# Patient Record
Sex: MALE | Race: ASIAN | NOT HISPANIC OR LATINO
[De-identification: names, ages, dates, MRNs, and addresses within clinical notes are randomized per-mention and may not be internally consistent; named-entity substitution may affect disease eponyms.]

---

## 2021-09-14 ENCOUNTER — TRANSCRIPTION ENCOUNTER (OUTPATIENT)
Age: 36
End: 2021-09-14

## 2021-09-14 VITALS
SYSTOLIC BLOOD PRESSURE: 158 MMHG | HEART RATE: 89 BPM | WEIGHT: 177.91 LBS | DIASTOLIC BLOOD PRESSURE: 100 MMHG | RESPIRATION RATE: 17 BRPM | OXYGEN SATURATION: 100 % | TEMPERATURE: 98 F

## 2021-09-14 PROCEDURE — 99291 CRITICAL CARE FIRST HOUR: CPT

## 2021-09-14 NOTE — ED ADULT TRIAGE NOTE - OTHER COMPLAINTS
reports b/l leg numbness and tingling for approx 1 hour. denies fall/injury. also reports sudden onset lower back pain prior to leg numbness. no bowel incontinence. pt reports working out at gym today and heavy lifting. reports b/l leg numbness and tingling for approx 1 hour. denies fall/injury. also reports sudden onset lower back pain prior to leg numbness. no bowel incontinence. pt reports working out at gym today and heavy lifting. hx HIV

## 2021-09-14 NOTE — ED ADULT NURSE NOTE - IN THE PAST 12 MONTHS HAVE YOU USED DRUGS OTHER THAN THOSE REQUIRED FOR MEDICAL REASON?
No Diverticulitis of intestine with perforation without bleeding, unspecified part of intestinal tract

## 2021-09-14 NOTE — ED ADULT NURSE NOTE - OTHER COMPLAINTS
reports b/l leg numbness and tingling for approx 1 hour. denies fall/injury. also reports sudden onset lower back pain prior to leg numbness. no bowel incontinence. pt reports working out at gym today and heavy lifting. hx HIV

## 2021-09-15 ENCOUNTER — RESULT REVIEW (OUTPATIENT)
Age: 36
End: 2021-09-15

## 2021-09-15 ENCOUNTER — INPATIENT (INPATIENT)
Facility: HOSPITAL | Age: 36
LOS: 12 days | Discharge: NURSING, REHAB OR LONG-TERM CARE | DRG: 29 | End: 2021-09-28
Attending: NEUROLOGICAL SURGERY | Admitting: NEUROLOGICAL SURGERY
Payer: MEDICAID

## 2021-09-15 DIAGNOSIS — B20 HUMAN IMMUNODEFICIENCY VIRUS [HIV] DISEASE: ICD-10-CM

## 2021-09-15 DIAGNOSIS — G95.20 UNSPECIFIED CORD COMPRESSION: ICD-10-CM

## 2021-09-15 DIAGNOSIS — S06.5X9A TRAUMATIC SUBDURAL HEMORRHAGE WITH LOSS OF CONSCIOUSNESS OF UNSPECIFIED DURATION, INITIAL ENCOUNTER: ICD-10-CM

## 2021-09-15 LAB
ALBUMIN SERPL ELPH-MCNC: 4.5 G/DL — SIGNIFICANT CHANGE UP (ref 3.3–5)
ALP SERPL-CCNC: 51 U/L — SIGNIFICANT CHANGE UP (ref 40–120)
ALT FLD-CCNC: 19 U/L — SIGNIFICANT CHANGE UP (ref 10–45)
ANION GAP SERPL CALC-SCNC: 12 MMOL/L — SIGNIFICANT CHANGE UP (ref 5–17)
APPEARANCE UR: ABNORMAL
APTT BLD: 28.9 SEC — SIGNIFICANT CHANGE UP (ref 27.5–35.5)
AST SERPL-CCNC: 43 U/L — HIGH (ref 10–40)
BACTERIA # UR AUTO: SIGNIFICANT CHANGE UP /HPF
BASOPHILS # BLD AUTO: 0.02 K/UL — SIGNIFICANT CHANGE UP (ref 0–0.2)
BASOPHILS NFR BLD AUTO: 0.1 % — SIGNIFICANT CHANGE UP (ref 0–2)
BILIRUB SERPL-MCNC: 0.4 MG/DL — SIGNIFICANT CHANGE UP (ref 0.2–1.2)
BILIRUB UR-MCNC: NEGATIVE — SIGNIFICANT CHANGE UP
BLD GP AB SCN SERPL QL: NEGATIVE — SIGNIFICANT CHANGE UP
BLD GP AB SCN SERPL QL: NEGATIVE — SIGNIFICANT CHANGE UP
BUN SERPL-MCNC: 12 MG/DL — SIGNIFICANT CHANGE UP (ref 7–23)
CALCIUM SERPL-MCNC: 9.6 MG/DL — SIGNIFICANT CHANGE UP (ref 8.4–10.5)
CHLORIDE SERPL-SCNC: 102 MMOL/L — SIGNIFICANT CHANGE UP (ref 96–108)
CO2 SERPL-SCNC: 27 MMOL/L — SIGNIFICANT CHANGE UP (ref 22–31)
COLOR SPEC: YELLOW — SIGNIFICANT CHANGE UP
CREAT SERPL-MCNC: 0.79 MG/DL — SIGNIFICANT CHANGE UP (ref 0.5–1.3)
CRP SERPL-MCNC: <3 MG/L — SIGNIFICANT CHANGE UP (ref 0–4)
DIFF PNL FLD: ABNORMAL
EOSINOPHIL # BLD AUTO: 0 K/UL — SIGNIFICANT CHANGE UP (ref 0–0.5)
EOSINOPHIL NFR BLD AUTO: 0 % — SIGNIFICANT CHANGE UP (ref 0–6)
EPI CELLS # UR: SIGNIFICANT CHANGE UP /HPF (ref 0–5)
ERYTHROCYTE [SEDIMENTATION RATE] IN BLOOD: 4 MM/HR — SIGNIFICANT CHANGE UP
GLUCOSE BLDC GLUCOMTR-MCNC: 130 MG/DL — HIGH (ref 70–99)
GLUCOSE BLDC GLUCOMTR-MCNC: 158 MG/DL — HIGH (ref 70–99)
GLUCOSE SERPL-MCNC: 135 MG/DL — HIGH (ref 70–99)
GLUCOSE UR QL: NEGATIVE — SIGNIFICANT CHANGE UP
HCT VFR BLD CALC: 40.8 % — SIGNIFICANT CHANGE UP (ref 39–50)
HGB BLD-MCNC: 14.1 G/DL — SIGNIFICANT CHANGE UP (ref 13–17)
HYALINE CASTS # UR AUTO: SIGNIFICANT CHANGE UP /LPF (ref 0–2)
IMM GRANULOCYTES NFR BLD AUTO: 0.8 % — SIGNIFICANT CHANGE UP (ref 0–1.5)
INR BLD: 0.97 — SIGNIFICANT CHANGE UP (ref 0.88–1.16)
KETONES UR-MCNC: ABNORMAL MG/DL
LEUKOCYTE ESTERASE UR-ACNC: NEGATIVE — SIGNIFICANT CHANGE UP
LYMPHOCYTES # BLD AUTO: 1.35 K/UL — SIGNIFICANT CHANGE UP (ref 1–3.3)
LYMPHOCYTES # BLD AUTO: 8.1 % — LOW (ref 13–44)
MAGNESIUM SERPL-MCNC: 1.5 MG/DL — LOW (ref 1.6–2.6)
MCHC RBC-ENTMCNC: 31.5 PG — SIGNIFICANT CHANGE UP (ref 27–34)
MCHC RBC-ENTMCNC: 34.6 GM/DL — SIGNIFICANT CHANGE UP (ref 32–36)
MCV RBC AUTO: 91.3 FL — SIGNIFICANT CHANGE UP (ref 80–100)
MONOCYTES # BLD AUTO: 0.4 K/UL — SIGNIFICANT CHANGE UP (ref 0–0.9)
MONOCYTES NFR BLD AUTO: 2.4 % — SIGNIFICANT CHANGE UP (ref 2–14)
NEUTROPHILS # BLD AUTO: 14.79 K/UL — HIGH (ref 1.8–7.4)
NEUTROPHILS NFR BLD AUTO: 88.6 % — HIGH (ref 43–77)
NITRITE UR-MCNC: NEGATIVE — SIGNIFICANT CHANGE UP
NRBC # BLD: 0 /100 WBCS — SIGNIFICANT CHANGE UP (ref 0–0)
PH UR: 7.5 — SIGNIFICANT CHANGE UP (ref 5–8)
PLATELET # BLD AUTO: 255 K/UL — SIGNIFICANT CHANGE UP (ref 150–400)
POTASSIUM SERPL-MCNC: 3.7 MMOL/L — SIGNIFICANT CHANGE UP (ref 3.5–5.3)
POTASSIUM SERPL-SCNC: 3.7 MMOL/L — SIGNIFICANT CHANGE UP (ref 3.5–5.3)
PROT SERPL-MCNC: 7.3 G/DL — SIGNIFICANT CHANGE UP (ref 6–8.3)
PROT UR-MCNC: 30 MG/DL
PROTHROM AB SERPL-ACNC: 11.7 SEC — SIGNIFICANT CHANGE UP (ref 10.6–13.6)
RBC # BLD: 4.47 M/UL — SIGNIFICANT CHANGE UP (ref 4.2–5.8)
RBC # FLD: 11.9 % — SIGNIFICANT CHANGE UP (ref 10.3–14.5)
RBC CASTS # UR COMP ASSIST: ABNORMAL /HPF
RH IG SCN BLD-IMP: POSITIVE — SIGNIFICANT CHANGE UP
RH IG SCN BLD-IMP: POSITIVE — SIGNIFICANT CHANGE UP
SARS-COV-2 RNA SPEC QL NAA+PROBE: NEGATIVE — SIGNIFICANT CHANGE UP
SODIUM SERPL-SCNC: 141 MMOL/L — SIGNIFICANT CHANGE UP (ref 135–145)
SP GR SPEC: 1.01 — SIGNIFICANT CHANGE UP (ref 1–1.03)
UROBILINOGEN FLD QL: 0.2 E.U./DL — SIGNIFICANT CHANGE UP
WBC # BLD: 16.69 K/UL — HIGH (ref 3.8–10.5)
WBC # FLD AUTO: 16.69 K/UL — HIGH (ref 3.8–10.5)
WBC UR QL: < 5 /HPF — SIGNIFICANT CHANGE UP

## 2021-09-15 PROCEDURE — 99291 CRITICAL CARE FIRST HOUR: CPT | Mod: 25

## 2021-09-15 PROCEDURE — 72157 MRI CHEST SPINE W/O & W/DYE: CPT | Mod: 26

## 2021-09-15 PROCEDURE — 75774 ARTERY X-RAY EACH VESSEL: CPT | Mod: 26,59

## 2021-09-15 PROCEDURE — 88305 TISSUE EXAM BY PATHOLOGIST: CPT | Mod: 26

## 2021-09-15 PROCEDURE — 36218 PLACE CATHETER IN ARTERY: CPT | Mod: 59

## 2021-09-15 PROCEDURE — 69990 MICROSURGERY ADD-ON: CPT

## 2021-09-15 PROCEDURE — 72128 CT CHEST SPINE W/O DYE: CPT | Mod: 26,MG

## 2021-09-15 PROCEDURE — G1004: CPT

## 2021-09-15 PROCEDURE — 75705 ARTERY X-RAYS SPINE: CPT | Mod: 26,59

## 2021-09-15 PROCEDURE — 36226 PLACE CATH VERTEBRAL ART: CPT | Mod: 50

## 2021-09-15 PROCEDURE — 75894 X-RAYS TRANSCATH THERAPY: CPT | Mod: 26

## 2021-09-15 PROCEDURE — 99291 CRITICAL CARE FIRST HOUR: CPT

## 2021-09-15 PROCEDURE — 36215 PLACE CATHETER IN ARTERY: CPT | Mod: 59

## 2021-09-15 PROCEDURE — 63271 EXCISE INTRSPINL LESION THRC: CPT

## 2021-09-15 PROCEDURE — 36245 INS CATH ABD/L-EXT ART 1ST: CPT

## 2021-09-15 PROCEDURE — 72158 MRI LUMBAR SPINE W/O & W/DYE: CPT | Mod: 26

## 2021-09-15 PROCEDURE — 36223 PLACE CATH CAROTID/INOM ART: CPT | Mod: 50

## 2021-09-15 PROCEDURE — 72131 CT LUMBAR SPINE W/O DYE: CPT | Mod: 26,MG

## 2021-09-15 PROCEDURE — 75898 FOLLOW-UP ANGIOGRAPHY: CPT | Mod: 26

## 2021-09-15 PROCEDURE — 61624 TCAT PERM OCCLS/EMBOLJ CNS: CPT

## 2021-09-15 PROCEDURE — 36227 PLACE CATH XTRNL CAROTID: CPT

## 2021-09-15 PROCEDURE — 36217 PLACE CATHETER IN ARTERY: CPT | Mod: 59

## 2021-09-15 PROCEDURE — 71045 X-RAY EXAM CHEST 1 VIEW: CPT | Mod: 26

## 2021-09-15 RX ORDER — DEXTROSE 50 % IN WATER 50 %
12.5 SYRINGE (ML) INTRAVENOUS ONCE
Refills: 0 | Status: DISCONTINUED | OUTPATIENT
Start: 2021-09-15 | End: 2021-09-15

## 2021-09-15 RX ORDER — SODIUM CHLORIDE 9 MG/ML
1000 INJECTION INTRAMUSCULAR; INTRAVENOUS; SUBCUTANEOUS
Refills: 0 | Status: DISCONTINUED | OUTPATIENT
Start: 2021-09-15 | End: 2021-09-16

## 2021-09-15 RX ORDER — METHOCARBAMOL 500 MG/1
500 TABLET, FILM COATED ORAL EVERY 8 HOURS
Refills: 0 | Status: DISCONTINUED | OUTPATIENT
Start: 2021-09-15 | End: 2021-09-28

## 2021-09-15 RX ORDER — ACETAMINOPHEN 500 MG
1000 TABLET ORAL EVERY 8 HOURS
Refills: 0 | Status: COMPLETED | OUTPATIENT
Start: 2021-09-15 | End: 2021-09-16

## 2021-09-15 RX ORDER — CHLORHEXIDINE GLUCONATE 213 G/1000ML
1 SOLUTION TOPICAL EVERY 12 HOURS
Refills: 0 | Status: COMPLETED | OUTPATIENT
Start: 2021-09-15 | End: 2021-09-16

## 2021-09-15 RX ORDER — ONDANSETRON 8 MG/1
4 TABLET, FILM COATED ORAL EVERY 6 HOURS
Refills: 0 | Status: COMPLETED | OUTPATIENT
Start: 2021-09-15 | End: 2021-09-16

## 2021-09-15 RX ORDER — BICTEGRAVIR SODIUM, EMTRICITABINE, AND TENOFOVIR ALAFENAMIDE FUMARATE 30; 120; 15 MG/1; MG/1; MG/1
1 TABLET ORAL DAILY
Refills: 0 | Status: DISCONTINUED | OUTPATIENT
Start: 2021-09-15 | End: 2021-09-15

## 2021-09-15 RX ORDER — HYDROMORPHONE HYDROCHLORIDE 2 MG/ML
0.25 INJECTION INTRAMUSCULAR; INTRAVENOUS; SUBCUTANEOUS ONCE
Refills: 0 | Status: DISCONTINUED | OUTPATIENT
Start: 2021-09-15 | End: 2021-09-15

## 2021-09-15 RX ORDER — CEFAZOLIN SODIUM 1 G
1000 VIAL (EA) INJECTION EVERY 8 HOURS
Refills: 0 | Status: COMPLETED | OUTPATIENT
Start: 2021-09-15 | End: 2021-09-16

## 2021-09-15 RX ORDER — METOCLOPRAMIDE HCL 10 MG
10 TABLET ORAL EVERY 8 HOURS
Refills: 0 | Status: DISCONTINUED | OUTPATIENT
Start: 2021-09-15 | End: 2021-09-28

## 2021-09-15 RX ORDER — DEXTROSE 50 % IN WATER 50 %
25 SYRINGE (ML) INTRAVENOUS ONCE
Refills: 0 | Status: DISCONTINUED | OUTPATIENT
Start: 2021-09-15 | End: 2021-09-15

## 2021-09-15 RX ORDER — SODIUM CHLORIDE 9 MG/ML
1000 INJECTION, SOLUTION INTRAVENOUS
Refills: 0 | Status: DISCONTINUED | OUTPATIENT
Start: 2021-09-15 | End: 2021-09-15

## 2021-09-15 RX ORDER — GLUCAGON INJECTION, SOLUTION 0.5 MG/.1ML
1 INJECTION, SOLUTION SUBCUTANEOUS ONCE
Refills: 0 | Status: DISCONTINUED | OUTPATIENT
Start: 2021-09-15 | End: 2021-09-17

## 2021-09-15 RX ORDER — OXYCODONE HYDROCHLORIDE 5 MG/1
15 TABLET ORAL EVERY 6 HOURS
Refills: 0 | Status: DISCONTINUED | OUTPATIENT
Start: 2021-09-15 | End: 2021-09-19

## 2021-09-15 RX ORDER — PANTOPRAZOLE SODIUM 20 MG/1
40 TABLET, DELAYED RELEASE ORAL
Refills: 0 | Status: DISCONTINUED | OUTPATIENT
Start: 2021-09-15 | End: 2021-09-28

## 2021-09-15 RX ORDER — DEXAMETHASONE 0.5 MG/5ML
10 ELIXIR ORAL EVERY 6 HOURS
Refills: 0 | Status: DISCONTINUED | OUTPATIENT
Start: 2021-09-15 | End: 2021-09-15

## 2021-09-15 RX ORDER — SODIUM CHLORIDE 9 MG/ML
1000 INJECTION INTRAMUSCULAR; INTRAVENOUS; SUBCUTANEOUS ONCE
Refills: 0 | Status: COMPLETED | OUTPATIENT
Start: 2021-09-15 | End: 2021-09-15

## 2021-09-15 RX ORDER — CHLORHEXIDINE GLUCONATE 213 G/1000ML
1 SOLUTION TOPICAL DAILY
Refills: 0 | Status: DISCONTINUED | OUTPATIENT
Start: 2021-09-15 | End: 2021-09-28

## 2021-09-15 RX ORDER — POLYETHYLENE GLYCOL 3350 17 G/17G
17 POWDER, FOR SOLUTION ORAL DAILY
Refills: 0 | Status: DISCONTINUED | OUTPATIENT
Start: 2021-09-15 | End: 2021-09-28

## 2021-09-15 RX ORDER — DEXAMETHASONE 0.5 MG/5ML
4 ELIXIR ORAL EVERY 6 HOURS
Refills: 0 | Status: DISCONTINUED | OUTPATIENT
Start: 2021-09-15 | End: 2021-09-15

## 2021-09-15 RX ORDER — POVIDONE-IODINE 5 %
1 AEROSOL (ML) TOPICAL ONCE
Refills: 0 | Status: COMPLETED | OUTPATIENT
Start: 2021-09-15 | End: 2021-09-15

## 2021-09-15 RX ORDER — INSULIN LISPRO 100/ML
VIAL (ML) SUBCUTANEOUS
Refills: 0 | Status: DISCONTINUED | OUTPATIENT
Start: 2021-09-15 | End: 2021-09-17

## 2021-09-15 RX ORDER — DEXAMETHASONE 0.5 MG/5ML
4 ELIXIR ORAL EVERY 6 HOURS
Refills: 0 | Status: DISCONTINUED | OUTPATIENT
Start: 2021-09-15 | End: 2021-09-17

## 2021-09-15 RX ORDER — BICTEGRAVIR SODIUM, EMTRICITABINE, AND TENOFOVIR ALAFENAMIDE FUMARATE 30; 120; 15 MG/1; MG/1; MG/1
1 TABLET ORAL DAILY
Refills: 0 | Status: DISCONTINUED | OUTPATIENT
Start: 2021-09-15 | End: 2021-09-28

## 2021-09-15 RX ORDER — SENNA PLUS 8.6 MG/1
2 TABLET ORAL AT BEDTIME
Refills: 0 | Status: DISCONTINUED | OUTPATIENT
Start: 2021-09-15 | End: 2021-09-28

## 2021-09-15 RX ORDER — ACETAMINOPHEN 500 MG
650 TABLET ORAL EVERY 6 HOURS
Refills: 0 | Status: DISCONTINUED | OUTPATIENT
Start: 2021-09-15 | End: 2021-09-15

## 2021-09-15 RX ORDER — DEXAMETHASONE 0.5 MG/5ML
10 ELIXIR ORAL ONCE
Refills: 0 | Status: COMPLETED | OUTPATIENT
Start: 2021-09-15 | End: 2021-09-15

## 2021-09-15 RX ORDER — OXYCODONE AND ACETAMINOPHEN 5; 325 MG/1; MG/1
2 TABLET ORAL EVERY 6 HOURS
Refills: 0 | Status: DISCONTINUED | OUTPATIENT
Start: 2021-09-15 | End: 2021-09-15

## 2021-09-15 RX ORDER — DEXTROSE 50 % IN WATER 50 %
15 SYRINGE (ML) INTRAVENOUS ONCE
Refills: 0 | Status: DISCONTINUED | OUTPATIENT
Start: 2021-09-15 | End: 2021-09-15

## 2021-09-15 RX ADMIN — Medication 102 MILLIGRAM(S): at 12:25

## 2021-09-15 RX ADMIN — SODIUM CHLORIDE 1000 MILLILITER(S): 9 INJECTION INTRAMUSCULAR; INTRAVENOUS; SUBCUTANEOUS at 10:45

## 2021-09-15 RX ADMIN — SODIUM CHLORIDE 75 MILLILITER(S): 9 INJECTION INTRAMUSCULAR; INTRAVENOUS; SUBCUTANEOUS at 08:54

## 2021-09-15 RX ADMIN — Medication 1 APPLICATION(S): at 15:20

## 2021-09-15 RX ADMIN — OXYCODONE HYDROCHLORIDE 15 MILLIGRAM(S): 5 TABLET ORAL at 23:59

## 2021-09-15 RX ADMIN — HYDROMORPHONE HYDROCHLORIDE 0.25 MILLIGRAM(S): 2 INJECTION INTRAMUSCULAR; INTRAVENOUS; SUBCUTANEOUS at 14:39

## 2021-09-15 RX ADMIN — SODIUM CHLORIDE 75 MILLILITER(S): 9 INJECTION INTRAMUSCULAR; INTRAVENOUS; SUBCUTANEOUS at 09:00

## 2021-09-15 RX ADMIN — OXYCODONE HYDROCHLORIDE 15 MILLIGRAM(S): 5 TABLET ORAL at 05:49

## 2021-09-15 RX ADMIN — METHOCARBAMOL 500 MILLIGRAM(S): 500 TABLET, FILM COATED ORAL at 23:58

## 2021-09-15 RX ADMIN — HYDROMORPHONE HYDROCHLORIDE 0.25 MILLIGRAM(S): 2 INJECTION INTRAMUSCULAR; INTRAVENOUS; SUBCUTANEOUS at 16:10

## 2021-09-15 RX ADMIN — Medication 102 MILLIGRAM(S): at 01:36

## 2021-09-15 RX ADMIN — CHLORHEXIDINE GLUCONATE 1 APPLICATION(S): 213 SOLUTION TOPICAL at 13:23

## 2021-09-15 RX ADMIN — OXYCODONE HYDROCHLORIDE 15 MILLIGRAM(S): 5 TABLET ORAL at 07:39

## 2021-09-15 RX ADMIN — BICTEGRAVIR SODIUM, EMTRICITABINE, AND TENOFOVIR ALAFENAMIDE FUMARATE 1 TABLET(S): 30; 120; 15 TABLET ORAL at 23:58

## 2021-09-15 RX ADMIN — Medication 10 MILLIGRAM(S): at 05:06

## 2021-09-15 NOTE — ED PROVIDER NOTE - PROGRESS NOTE DETAILS
neurosurgery consult called; coming to ED right away d/w radiology, MRI tech being called in d/w radiology, MRI tech being called in. reccs for pt in mri. nsgy reccs for admission to nsgy/tele under dr d'amico.

## 2021-09-15 NOTE — PROGRESS NOTE ADULT - SUBJECTIVE AND OBJECTIVE BOX
Surgery: Spinal angiogram with possible embolization and T8-T9 laminectomy for intradural/extradural mass   Consent: Signed by patient vs HCP - yes                   NAME/NUMBER of HCP:     Representative Consent: [ X ] Signed by patient vs HCP                                                 [  ] N/A -> only for cerebral angiogram    No Known Allergies      OVERNIGHT EVENTS: New lower extremity plegia with loss of sensation, started on decadron 10 IV q 6 hours.    T(C): 37.7 (09-15-21 @ 09:00), Max: 37.7 (09-15-21 @ 09:00)  HR: 101 (09-15-21 @ 11:00) (88 - 116)  BP: 137/84 (09-15-21 @ 11:00) (135/84 - 158/100)  RR: 36 (09-15-21 @ 11:00) (15 - 36)  SpO2: 100% (09-15-21 @ 11:00) (99% - 100%)  Wt(kg): --    EXAM:  General: NAD, pt is comfortably sitting up in bed, on room air  HEENT: CN II-XII grossly intact, PERRL 3mm briskly reactive, EOMI b/l, face symmetric, tongue midline, neck FROM  Cardiovascular: RRR, normal S1 and S2   Respiratory: lungs CTAB, no wheezing, rhonchi, or crackles   GI: normoactive BS to auscultation, abd soft, NTND   Neuro: A&Ox3, No aphasia, speech clear, no dysmetria, no pronator drift. Follows commands.  OSPINA x4 spontaneously, 5/5 strength in all extremities throughout. SILT throughout   Extremities: distal pulses 2+ x4  Wound/incision:  Drain:       09-15    141  |  102  |  12  ----------------------------<  135<H>  3.7   |  27  |  0.79    Ca    9.6      15 Sep 2021 01:05  Mg     1.5     09-15    TPro  7.3  /  Alb  4.5  /  TBili  0.4  /  DBili  x   /  AST  43<H>  /  ALT  19  /  AlkPhos  51  09-15    CBC Full  -  ( 15 Sep 2021 01:05 )  WBC Count : 16.69 K/uL  RBC Count : 4.47 M/uL  Hemoglobin : 14.1 g/dL  Hematocrit : 40.8 %  Platelet Count - Automated : 255 K/uL  Mean Cell Volume : 91.3 fl  Mean Cell Hemoglobin : 31.5 pg  Mean Cell Hemoglobin Concentration : 34.6 gm/dL  Auto Neutrophil # : 14.79 K/uL  Auto Lymphocyte # : 1.35 K/uL  Auto Monocyte # : 0.40 K/uL  Auto Eosinophil # : 0.00 K/uL  Auto Basophil # : 0.02 K/uL  Auto Neutrophil % : 88.6 %  Auto Lymphocyte % : 8.1 %  Auto Monocyte % : 2.4 %  Auto Eosinophil % : 0.0 %  Auto Basophil % : 0.1 %    PT/INR - ( 15 Sep 2021 01:05 )   PT: 11.7 sec;   INR: 0.97          PTT - ( 15 Sep 2021 01:05 )  PTT:28.9 sec    Pregnancy test (serum hcg for any female under 56, must be resulted day before OR): [ ] Negative Result  [ ] Positive Result  [ ] N/A : male or female>57 y/o  Type & Screen (in past 72hrs):     2 Type & Screen within 72 hours if anticipate blood need in OR:  _ Y _ N     Blood ordered and on hold for OR:   [ ] No need     [ ] 1u pRBC on hold      [ ] 2u pRBC on hold    COVID swab (in past 48hrs): _ Y  _N    CXR: normal  EKG: normal  ECHO: n/a  Medical Clearances: n/a  Other Clearances:  n/a    Last dose of antiplatelet/anticoagulation drug: none     Implanted Devices (pacemaker, drug pump...etc):  []YES   [X] NO                  If yes --> EPS consulted to interrogate device: [ ] YES  [ ] NO                            If yes -->  EPS called to let them know patient going for surgery: [ ] device needs to be turned off                                                                                                                                                 [ ] magnet needs to be placed for surgery                                                                                                                                                [ ] nothing to do per EP, may proceed with cautery use in OR                                       3M nasal swab ordered?  X Y  _N    Cranial surgery: Order written for hair to be shampooed night before surgery and morning before surgery  [] yes   [X]no  Chlorhexidine Wipes ordered for Neck Down?  X Y  _ N  (twice a day if 1 day before surgery, daily for 3 days if 3 days prior, daily if in ICU)                 Assessment:  36 M PMHx HIV (CD4 700s, VLUD on biktarvy) hx of IVDU and methamphetamine use,  was at gym this afternoon lifting heavy weights, ie dead lifts; tonight began to develop pain in thoracolumbar region; over past couple of hours he began to develop paresthesias down both legs and buttock, then progressed to complete plegia of bilateral extremities with loss of sensation from T8 dermatomes and below found on MRI to have intradural/extramedullary lesion with mass effect on spinal cord displacing it to the left, now s/p decadron load and pending spinal angiogram with possible embolization and OR for T8-T9 lami decompression     Plan:  Neuro  - neuro checks q1  - Vitals q 1   - MAP goal > 80 for spinal cord perfusion  - Decadron 10q6 IV   - Angio prior to OR   - OR possible this AM if patient is amenable     Cardio  - Map goal >80  - EKG WNL     Pulm  - RA   - CXR     GI   - NPO in anticipation of OR   - bowel regimen   - protonix while on Decadron     /renal  - + allen 9/15   - NS @ 75     Heme  - SCDs     Endo  - ISS while on decadron     D/W Dr. D'amico         Assessment:  Present when checked    []  GCS  E   V  M     Heart Failure: []Acute, [] acute on chronic , []chronic  Heart Failure:  [] Diastolic (HFpEF), [] Systolic (HFrEF), []Combined (HFpEF and HFrEF), [] RHF, [] Pulm HTN, [] Other    [] CAROLA, [] ATN, [] AIN, [] other  [] CKD1, [] CKD2, [] CKD 3, [] CKD 4, [] CKD 5, []ESRD    Encephalopathy: [] Metabolic, [] Hepatic, [] toxic, [] Neurological, [] Other    Abnormal Nurtitional Status: [] malnurtition (see nutrition note), [ ]underweight: BMI < 19, [] morbid obesity: BMI >40, [] Cachexia    [] Sepsis  [] hypovolemic shock,[] cardiogenic shock, [] hemorrhagic shock, [] neuogenic shock  [] Acute Respiratory Failure  []Cerebral edema, [] Brain compression/ herniation,   [] Functional quadriplegia  [] Acute blood loss anemia

## 2021-09-15 NOTE — ED PROVIDER NOTE - OBJECTIVE STATEMENT
36 m PMHx HIV (CD4 700s, VLUD) was at gym this afternoon lifting heavy weights, ie dead lifts; tonight began to develop pain in thoracolumbar region; over past couple of hours he began to develop paresthesias down both legs. now can barely feel legs and unable to stand or move legs. has not gone to bathroom, but thus far no bladder or bowel incontinence.  + IVDU, most recently today (methamphetamine).  no fever/chills.

## 2021-09-15 NOTE — H&P ADULT - NSHPLABSRESULTS_GEN_ALL_CORE
CBC Full  -  ( 15 Sep 2021 01:05 )  WBC Count : 16.69 K/uL    < from: MR Thoracic Spine w/wo IV Cont (09.15.21 @ 03:35) >    IMPRESSION:    Ill-defined intradural extramedullary 0.9 x 0.6 cm lesion with faint associated enhancement at the level of T8. The lesion exerts mass effect on the cord displacing it to the left. There is minimal associated cord edema.      < end of copied text >

## 2021-09-15 NOTE — ED PROVIDER NOTE - PHYSICAL EXAMINATION
CONSTITUTIONAL: NAD   SKIN: Normal color and turgor.    HEAD: NC/AT.  EYES: Conjunctiva clear. EOMI. PERRL.    ENT: Airway clear. Normal voice.   RESPIRATORY:  Normal work of breathing. Lungs CTAB.  CARDIOVASCULAR:  RRR, S1S2. No M/R/G.      GI:  Abdomen soft, nontender.    MSK: Neck supple.  No lower extremity edema or calf tenderness.  No joint swelling or ROM limitation.  NEURO: Alert and oriented; CN II-XII grossly intact. Decreased sensatiion to light touch below level of umbilicus.  Decreased pinprick sensation both legs.  Rectal tone poor.  Patellar DTR absent bilat.  No clonus in feet.  Babinsky neg. CONSTITUTIONAL: NAD   SKIN: Normal color and turgor.    HEAD: NC/AT.  EYES: Conjunctiva clear. EOMI. PERRL.    ENT: Airway clear. Normal voice.   RESPIRATORY:  Normal work of breathing. Lungs CTAB.  CARDIOVASCULAR:  RRR, S1S2. No M/R/G.      GI:  Abdomen soft, nontender.    MSK: Neck supple.  Mild TTP over midline thoracolumbar region.  No lower extremity edema or calf tenderness.  No joint swelling or ROM limitation.  NEURO: Alert and oriented; CN II-XII grossly intact. Decreased sensatiion to light touch below level of umbilicus.  Decreased pinprick sensation both legs.  Rectal tone poor.  Patellar DTR absent bilat.  No clonus in feet.  Babinski neg.

## 2021-09-15 NOTE — H&P ADULT - PROBLEM SELECTOR PLAN 1
No - MRI T and L spine  - CT T and L spine  - Decadron 10 mg IV   - Preop labs in anticipation of possible OR - Admit to Neuro ICU  - recommending OR, patient will decide within the next few hours   - MAP goal > 80 for spinal cord perfusion   - Decadron 10mg q6

## 2021-09-15 NOTE — H&P ADULT - NSHPPHYSICALEXAM_GEN_ALL_CORE
General Examination: pleasant, conversant not in obvious pain     Neurologic Examination:           Cranial nerves 2-10 intact, legs flaccid bilaterally, absent lower extremity reflexes, upper extremities 5/5 with refelxes 2+, sensation: decreased in bl lower extremities and buttok, poor rectal tone  Pulm: breathing comfortably   Abdomen: soft, non tender, non distended, no suprapubic distention

## 2021-09-15 NOTE — PROGRESS NOTE ADULT - ASSESSMENT
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     PHx:  (1) HIV  (2) IVDU and methamphetamine use    PLAN:    NEURO:  Neurochecks q1h  Pain control - tylenol  Decadron 10 mg IV q6h  MAP > 80 mmHg  Dr. D'Amico (on ED admission) and I discussed with patient, re:  urgency for surgical decompression and likelihood for permanent paraplegia, loss of bladder/bowel function.  Patient understands prognosis and has deferred urgent surgical decompression last evening, currently discussing with family ([place]) and friends.  Spinal angiogram for better characterization, r/o vascular malformation    CV  MAP goal > 80 mmHg for spinal cord perfusion  Telemetry  TTE    PULM  On room air  Admission CXR    ENDO:  Euglycemia goal  HbA1c, lipids panel pending    GI/:  IV NS hydration  LFTs normal  Urinary cath. Monitor Is/OS  DIET  NPO    HEM/ONC:  Hb 14.1 - being hydrated    CD4 700s; diff pending  VTE Prophylaxis  SCDs    ID:  Afebrile, reactive leukocytosis  Continue retroviral    Social:  Updated patient    *****    ATTENDING ATTESTATION:  I was physically present for the key portions of the evaluation and management (E/M) service provided.  I agree with the above history, physical and plan, which I have reviewed and edited where appropriate.    Patient at high risk for neurological deterioration or death due to:  infection, DVT/PE.  Critical Care Time:  I have personally provided 35 minutes of critical care time excluding time spent on separate procedures.    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   POD 0 S/P spinal angiogram and T8- T0 laminectomy with evacuation of subarachnoid clot    PHx:  (1) HIV  (2) IVDU and methamphetamine use.    PLAN:    NEURO:  Neurochecks Q1h  Analgesics: tylenol, oxycodone  MRI/ MRA spine 9/16  Decadron 4mg Q 6, taper over 7days  S/P spinal angiogram, no active heme, no vascular abn  Aggressive PT/OT    CV  Initially MAP goal > 80--> post op, Keep SBP <140   Telemetry    PULM  On room air  CXR clear    ENDO:  Euglycemia goal  HbA1c, lipids panel pending    GI  DIET: NPO for now  NS at 75ml/hr  PPI while on decadron  LFTs normal    :  Urinary cath. Monitor Is/OS  Continue allen for urine retention    HEM/ONC:  Hb 14.1. . Post op labs pending  VTE Prophylaxis: SCDs  Hold chemoppx for now as fresh post op.   SQL in 48 hours post op    ID:  Afebrile, reactive leukocytosis  Continue retroviral treatment  Perioperative ancef    Social:  Updated patient.    *****    ATTENDING ATTESTATION:  I was physically present for the key portions of the evaluation and management (E/M) service provided.  I agree with the above history, physical and plan, which I have reviewed and edited where appropriate.    Patient at high risk for neurological deterioration or death due to:  infection, DVT/PE.  Critical Care Time:  I have personally provided 35 minutes of critical care time excluding time spent on separate procedures.    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   POD 0 S/P spinal angiogram and T8 laminectomy with evacuation of subdural/subarachnoid clot    PHx:  (1) HIV  (2) IVDU and methamphetamine use.    PLAN:    NEURO:  Neurochecks Q1h  Analgesics: Tylenol, oxycodone  MRI/ MRA spine 9/16  Decadron 4mg Q 6, taper over 7days  S/P spinal angiogram, no active heme, no vascular abn. Follow up MRI/MRA  Monitor bile bag output   Aggressive PT/OT    CV  Initially MAP goal > 80--> post op, Keep SBP <140   Telemetry  Lay    PULM  On room air  CXR clear    ENDO:  Euglycemia goal  HbA1c, lipids panel    GI  DIET: Dysphagia and advance as tolerated  PPI while on decadron  LFTs normal  Bowel regimen    :  Urinary cath. Monitor Is/OS  Continue allen for urine retention  NS at 75ml/hr    HEM/ONC:  Hb 14.1. . Post op labs pending  VTE Prophylaxis: SCDs  Hold chemoppx for now as fresh post op.   SQL in 48 hours post op    ID:  Afebrile, reactive leukocytosis  Continue retroviral treatment  Perioperative ancef    Social:  Updated patient.    *****    ATTENDING ATTESTATION:  I was physically present for the key portions of the evaluation and management (E/M) service provided.  I agree with the above history, physical and plan, which I have reviewed and edited where appropriate.    Patient at high risk for neurological deterioration or death due to:  infection, DVT/PE.  Critical Care Time:  I have personally provided 35 minutes of critical care time excluding time spent on separate procedures.    *****

## 2021-09-15 NOTE — ED PROVIDER NOTE - NS ED ROS FT
CONSTITUTIONAL: No fever, chills, or generalized weakness  NEURO: HPI; No headache, no dizziness, no syncope  EYES: No visual changes  ENT: No rhinorrhea or sore throat  PULM: No cough or dyspnea  CV: No chest pain or palpitations  GI: No abdominal pain, vomiting, or diarrhea  : No dysuria, hematuria, frequency  MSK: HPI  SKIN: no rash or unusual bruising

## 2021-09-15 NOTE — PROGRESS NOTE ADULT - SUBJECTIVE AND OBJECTIVE BOX
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  At gym yesterday afternoon lifted heavy weights, ie dead lifts; began to develop pain in thoracolumbar region; over past couple of hours he began to develop paresthesias down both legs and buttock. Since 22:00 09.14.2021, barely feel legs and unable to stand or move legs.  Brought in by ambulance after a passerby on the street witnessed him unable to rise from curb.  Has not gone to bathroom, but thus far no bladder or bowel incontinence.  + IVDU, most recently yesterday (methamphetamine).  No fever/chills.  Improved sensation after decadron dose.  Urinary retention 800 mL this morning requiring allen catheter.    09.15.2021 - Dr. D'Amico and I discussed with patient, re: urgency for surgical decompression and likelihood for permanent paraplegia, loss of bladder/bowel function.  Patient has deferred urgent surgical decompression last evening, discussing with family ([place]) and friends.    Past Medical History:  Infection, HIV    Allergies:  No Known Allergies    Home Meds:  Biktarvy oral tablet: 1 tab(s) orally once a day (15 Sep 2021 06:13)      ICU Vital Signs Last 24 Hrs  T(C): 37.1 (15 Sep 2021 14:02), Max: 37.7 (15 Sep 2021 09:00)  T(F): 98.7 (15 Sep 2021 14:02), Max: 99.8 (15 Sep 2021 09:00)  HR: 94 (15 Sep 2021 15:00) (83 - 120)  BP: 153/90 (15 Sep 2021 15:00) (135/84 - 158/100)  BP(mean): 116 (15 Sep 2021 15:00) (100 - 116)  RR: 16 (15 Sep 2021 15:00) (14 - 36)  SpO2: 100% (15 Sep 2021 15:00) (99% - 100%)      09-14-21 @ 07:01  -  09-15-21 @ 07:00  --------------------------------------------------------  IN: 0 mL / OUT: 750 mL / NET: -750 mL    09-15-21 @ 07:01  -  09-15-21 @ 21:01  --------------------------------------------------------  IN: 1537.5 mL / OUT: 1225 mL / NET: 312.5 mL        PHYSICAL EXAMINATION  Neurologic Examination: alert, oriented, mentation clear, REUBEN, face symmetric, UE full power and sensation  -T8-10 decreased sensation, ++ decreased sensation below, including lower extremities - absent LE reflexes, flaccid paraplegia bilaterally; no LE reaction to stimuli (except upgoing Babinski bilaterally); decreased rectal tone on ED admission          CVS S1S2 noS3S4 noM  Pulmonary: Clear to bases  Abdomen: Soft, absent abdo reflexes  Extremities: Intact pulses, warm      MEDICATIONS:   bictegravir 50 mG/emtricitabine 200 mG/tenofovir alafenamide 25 mG (BIKTARVY) 1 Tablet(s) Oral daily  chlorhexidine 2% Cloths 1 Application(s) Topical every 12 hours  chlorhexidine 2% Cloths 1 Application(s) Topical daily  dexAMETHasone  IVPB 10 milliGRAM(s) IV Intermittent every 6 hours  glucagon  Injectable 1 milliGRAM(s) IntraMuscular once  insulin lispro (ADMELOG) corrective regimen sliding scale   SubCutaneous three times a day before meals  LORazepam   Injectable 2 milliGRAM(s) IV Push every 1 hour PRN  oxyCODONE    IR 15 milliGRAM(s) Oral every 6 hours PRN  pantoprazole    Tablet 40 milliGRAM(s) Oral before breakfast  polyethylene glycol 3350 17 Gram(s) Oral daily  senna 2 Tablet(s) Oral at bedtime  sodium chloride 0.9%. 1000 milliLiter(s) (75 mL/Hr) IV Continuous <Continuous>          LABS:                     14.1   16.69 )-----------( 255      ( 15 Sep 2021 01:05 )             40.8     09-15    141  |  102  |  12  ----------------------------<  135<H>  3.7   |  27  |  0.79    Ca    9.6      15 Sep 2021 01:05  Mg     1.5     09-15    TPro  7.3  /  Alb  4.5  /  TBili  0.4  /  DBili  x   /  AST  43<H>  /  ALT  19  /  AlkPhos  51  09-15    LIVER FUNCTIONS - ( 15 Sep 2021 01:05 )  Alb: 4.5 g/dL / Pro: 7.3 g/dL / ALK PHOS: 51 U/L / ALT: 19 U/L / AST: 43 U/L / GGT: x             CAPILLARY BLOOD GLUCOSE    POCT Blood Glucose.: 158 mg/dL (15 Sep 2021 07:44)    CRP < 3.0    IV FLUIDS:  []  DRIPS:  []  LINES:  []  DRAINS:  []  WOUNDS:  []    MRI T-L spine:  diffuse T1 iso to hyperintense signal and heterogeneous T2 signal throughout the spinal canal of the thoracic and lumbar spine which is most compatible with spinal canal hemorrhage, more focal hematoma in the spinal canal at the T8-T9 level on the right which measures 0.7 x 0.6 cm with mass effect on the cord deviated to the left; focal T2 hyperintense/T1 hypointense signal within the ventral portion of the spinal canal from L1 through L5-S1 most likely representing subdural hematoma; diffuse mild to moderate spinal canal stenosis in the lumbar spine secondary to hemorrhage with more prominent spinal canal stenosis at L5-S1 due to the hemorrhage as well as prominence of the epidural fat    CODE STATUS:  [Full]  GOALS OF CARE:  [Aggressive]  DISPOSITION:  [ICU] NEUROCRITICAL CARE ATTENDING NOTE    ALISSA ARAUJO [id number]    35 y/o M Hx HIV (CD4 700s, viral load undetectable) with 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  At gym yesterday afternoon lifted heavy weights, i.e. dead lifts; began to develop pain in thoracolumbar region; over past couple of hours he began to develop paresthesias down both legs and buttock. Since 22:00 09.14.2021, barely feel legs and unable to stand or move legs.  Brought in by ambulance after a passerby on the street witnessed him unable to rise from curb.  Has not gone to bathroom, but thus far no bladder or bowel incontinence.  + IVDU, most recently yesterday (methamphetamine).  No fever/chills.  Improved sensation after decadron dose.  Urinary retention 800 mL this morning requiring allen catheter.    Hospital course: Patient initially deferred urgent surgical decompression.  Agreed after discussion with family.     Past Medical History:  Infection, HIV    Allergies:  No Known Allergies    Home Meds:  Biktarvy oral tablet: 1 tab(s) orally once a day (15 Sep 2021 06:13)      ICU Vital Signs Last 24 Hrs  T(C): 37.1 (15 Sep 2021 14:02), Max: 37.7 (15 Sep 2021 09:00)  T(F): 98.7 (15 Sep 2021 14:02), Max: 99.8 (15 Sep 2021 09:00)  HR: 94 (15 Sep 2021 15:00) (83 - 120)  BP: 153/90 (15 Sep 2021 15:00) (135/84 - 158/100)  BP(mean): 116 (15 Sep 2021 15:00) (100 - 116)  RR: 16 (15 Sep 2021 15:00) (14 - 36)  SpO2: 100% (15 Sep 2021 15:00) (99% - 100%)      09-14-21 @ 07:01  -  09-15-21 @ 07:00  --------------------------------------------------------  IN: 0 mL / OUT: 750 mL / NET: -750 mL    09-15-21 @ 07:01  -  09-15-21 @ 21:01  --------------------------------------------------------  IN: 1537.5 mL / OUT: 1225 mL / NET: 312.5 mL        PHYSICAL EXAMINATION  Neurologic Examination:   Mental status: Alert, oriented, mentation clear  Cranial nerves: REUBEN, face symmetric  Motor: UE full power and sensation  -T8-10 decreased sensation, ++ decreased sensation below, including lower extremities - absent LE reflexes, flaccid paraplegia bilaterally; no LE reaction to stimuli (except upgoing Babinski bilaterally); decreased rectal tone on ED admission          CVS S1S2 noS3/S4 no M/R/G.  Pulmonary: Clear to bases  Abdomen: Soft, non- tender  Extremities: Intact pulses, warm      MEDICATIONS  (STANDING):  bictegravir 50 mG/emtricitabine 200 mG/tenofovir alafenamide 25 mG (BIKTARVY) 1 Tablet(s) Oral daily  chlorhexidine 2% Cloths 1 Application(s) Topical every 12 hours  chlorhexidine 2% Cloths 1 Application(s) Topical daily  dexAMETHasone  IVPB 10 milliGRAM(s) IV Intermittent every 6 hours  glucagon  Injectable 1 milliGRAM(s) IntraMuscular once  insulin lispro (ADMELOG) corrective regimen sliding scale   SubCutaneous three times a day before meals  pantoprazole    Tablet 40 milliGRAM(s) Oral before breakfast  polyethylene glycol 3350 17 Gram(s) Oral daily  senna 2 Tablet(s) Oral at bedtime  sodium chloride 0.9%. 1000 milliLiter(s) (75 mL/Hr) IV Continuous <Continuous>    MEDICATIONS  (PRN):  LORazepam   Injectable 2 milliGRAM(s) IV Push every 1 hour PRN CIWA-Ar score 8 or greater  oxyCODONE    IR 15 milliGRAM(s) Oral every 6 hours PRN Severe Pain (7 - 10)        LABS:                     14.1   16.69 )-----------( 255      ( 15 Sep 2021 01:05 )             40.8     09-15    141  |  102  |  12  ----------------------------<  135<H>  3.7   |  27  |  0.79    Ca    9.6      15 Sep 2021 01:05  Mg     1.5     09-15    TPro  7.3  /  Alb  4.5  /  TBili  0.4  /  DBili  x   /  AST  43<H>  /  ALT  19  /  AlkPhos  51  09-15    LIVER FUNCTIONS - ( 15 Sep 2021 01:05 )  Alb: 4.5 g/dL / Pro: 7.3 g/dL / ALK PHOS: 51 U/L / ALT: 19 U/L / AST: 43 U/L / GGT: x             CAPILLARY BLOOD GLUCOSE  POCT Blood Glucose.: 158 mg/dL (15 Sep 2021 07:44)    CRP < 3.0      MRI T-L spine:  Diffuse T1 iso to hyperintense signal and heterogeneous T2 signal throughout the spinal canal of the thoracic and lumbar spine which is most compatible with spinal canal hemorrhage, more focal hematoma in the spinal canal at the T8-T9 level on the right which measures 0.7 x 0.6 cm with mass effect on the cord deviated to the left; focal T2 hyperintense/T1 hypointense signal within the ventral portion of the spinal canal from L1 through L5-S1 most likely representing subdural hematoma; diffuse mild to moderate spinal canal stenosis in the lumbar spine secondary to hemorrhage with more prominent spinal canal stenosis at L5-S1 due to the hemorrhage as well as prominence of the epidural fat    CODE STATUS:  [Full]  GOALS OF CARE:  [Aggressive]  DISPOSITION:  [ICU] NEUROCRITICAL CARE ATTENDING NOTE    ALISSA ARAUJO [id number]    37 y/o M Hx HIV (CD4 700s, viral load undetectable) with 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  At gym yesterday afternoon lifted heavy weights, i.e. dead lifts; began to develop pain in thoracolumbar region; over past couple of hours he began to develop paresthesias down both legs and buttock. Since 22:00 09.14.2021, barely feel legs and unable to stand or move legs.  Brought in by ambulance after a passerby on the street witnessed him unable to rise from curb.  Has not gone to bathroom, but thus far no bladder or bowel incontinence.  + IVDU, most recently yesterday (methamphetamine).  No fever/chills.  Improved sensation after decadron dose.  Urinary retention 800 mL this morning requiring allen catheter.    Hospital course: Patient initially deferred urgent surgical decompression.  Agreed after his discussion with family.     Past Medical History:  Infection, HIV    Allergies:  No Known Allergies    Home Meds:  Biktarvy oral tablet: 1 tab(s) orally once a day (15 Sep 2021 06:13)      ICU Vital Signs Last 24 Hrs  T(C): 37.1 (15 Sep 2021 14:02), Max: 37.7 (15 Sep 2021 09:00)  T(F): 98.7 (15 Sep 2021 14:02), Max: 99.8 (15 Sep 2021 09:00)  HR: 94 (15 Sep 2021 15:00) (83 - 120)  BP: 153/90 (15 Sep 2021 15:00) (135/84 - 158/100)  BP(mean): 116 (15 Sep 2021 15:00) (100 - 116)  RR: 16 (15 Sep 2021 15:00) (14 - 36)  SpO2: 100% (15 Sep 2021 15:00) (99% - 100%)      09-14-21 @ 07:01  -  09-15-21 @ 07:00  --------------------------------------------------------  IN: 0 mL / OUT: 750 mL / NET: -750 mL    09-15-21 @ 07:01  -  09-15-21 @ 21:01  --------------------------------------------------------  IN: 1537.5 mL / OUT: 1225 mL / NET: 312.5 mL        PHYSICAL EXAMINATION: Seen immediately post op  Neuro: Mental status: Alert, oriented x3, mentation clear.  Cranial nerves: REUBEN, face symmetric    Motor: B/L upper extremities full power and sensation.  T8-10 decreased sensation with even further decreased sensation distally (improved from preop exam)  RLE 3+ patellar reflex, LLE 2+ patellar reflex.   Flaccid paraplegia bilaterally; no LE reaction to stimuli.   Decreased rectal tone on ED admission            CVS S1S2 noS3/S4 no M/R/G.  Pulmonary: Clear to bases  Abdomen: Soft, non- tender  Extremities: Intact pulses, warm      MEDICATIONS  (STANDING):  bictegravir 50 mG/emtricitabine 200 mG/tenofovir alafenamide 25 mG (BIKTARVY) 1 Tablet(s) Oral daily  chlorhexidine 2% Cloths 1 Application(s) Topical every 12 hours  chlorhexidine 2% Cloths 1 Application(s) Topical daily  dexAMETHasone  IVPB 10 milliGRAM(s) IV Intermittent every 6 hours  glucagon  Injectable 1 milliGRAM(s) IntraMuscular once  insulin lispro (ADMELOG) corrective regimen sliding scale   SubCutaneous three times a day before meals  pantoprazole    Tablet 40 milliGRAM(s) Oral before breakfast  polyethylene glycol 3350 17 Gram(s) Oral daily  senna 2 Tablet(s) Oral at bedtime  sodium chloride 0.9%. 1000 milliLiter(s) (75 mL/Hr) IV Continuous <Continuous>    MEDICATIONS  (PRN):  LORazepam   Injectable 2 milliGRAM(s) IV Push every 1 hour PRN CIWA-Ar score 8 or greater  oxyCODONE    IR 15 milliGRAM(s) Oral every 6 hours PRN Severe Pain (7 - 10)        LABS:                     14.1   16.69 )-----------( 255      ( 15 Sep 2021 01:05 )             40.8     09-15    141  |  102  |  12  ----------------------------<  135<H>  3.7   |  27  |  0.79    Ca    9.6      15 Sep 2021 01:05  Mg     1.5     09-15    TPro  7.3  /  Alb  4.5  /  TBili  0.4  /  DBili  x   /  AST  43<H>  /  ALT  19  /  AlkPhos  51  09-15    LIVER FUNCTIONS - ( 15 Sep 2021 01:05 )  Alb: 4.5 g/dL / Pro: 7.3 g/dL / ALK PHOS: 51 U/L / ALT: 19 U/L / AST: 43 U/L / GGT: x             CAPILLARY BLOOD GLUCOSE  POCT Blood Glucose.: 158 mg/dL (15 Sep 2021 07:44)    CRP < 3.0      MRI T-L spine:  Diffuse T1 iso to hyperintense signal and heterogeneous T2 signal throughout the spinal canal of the thoracic and lumbar spine which is most compatible with spinal canal hemorrhage, more focal hematoma in the spinal canal at the T8-T9 level on the right which measures 0.7 x 0.6 cm with mass effect on the cord deviated to the left; focal T2 hyperintense/T1 hypointense signal within the ventral portion of the spinal canal from L1 through L5-S1 most likely representing subdural hematoma; diffuse mild to moderate spinal canal stenosis in the lumbar spine secondary to hemorrhage with more prominent spinal canal stenosis at L5-S1 due to the hemorrhage as well as prominence of the epidural fat    CODE STATUS:  [Full]  GOALS OF CARE:  [Aggressive]  DISPOSITION:  [ICU]

## 2021-09-15 NOTE — CHART NOTE - NSCHARTNOTEFT_GEN_A_CORE
Neurosurgical Indications for Screening Dopplers on Admission:     1) Known hypercoagulation disorder (h/o VTE, thrombophilia, HIT, etc.)   2) Admitted from prolonged stay from another institution (straight forward ER transfers not included)  3) Presenting with significant leg immobility - YES   4) Presenting with signs and symptoms of VTE?    5) With significant critical illness, Including "found down" for unknown period of time in HPI  6) With significant neurotrauma (TBI, SCI / TLS spine fractures)   7) Who are comatose   8) With known malignancy (e.g. glioblastoma multiforme, meningioma, etc.). Excludes IA chemo 23hr observation stays  9) On hemodialysis   10) Who have received platelet transfusion or antithrombotic reversal agents recently   11) Who have had recent major orthopedic surgery          Screening dopplers indicated?   YES   N _    DVT Prophylaxis:  [X] SCD's   _ chemoprophylaxis

## 2021-09-15 NOTE — H&P ADULT - ASSESSMENT
36 m PMHx HIV (CD4 700s, VLUD) was at gym this afternoon lifting heavy weights, ie dead lifts; tonight began to develop pain in thoracolumbar region; over past couple of hours he began to develop paresthesias down both legs and buttok. now can barely feel legs and unable to stand or move legs. Was brought in by ambulance after a passerby on the street witnessed him unable to rise from curb.  has not gone to bathroom, but thus far no bladder or bowel incontinence.  + IVDU, most recently today (methamphetamine).  no fever/chills.
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
## 2021-09-15 NOTE — PROGRESS NOTE ADULT - SUBJECTIVE AND OBJECTIVE BOX
Post op Note    Dx:    36y    Male   s/p       Post op pt seen and examined at bedside. ?**Extubated, awake, no acute distress.     T(C): 37.1 (09-15-21 @ 14:02), Max: 37.7 (09-15-21 @ 09:00)  HR: 94 (09-15-21 @ 15:00) (83 - 120)  BP: 153/90 (09-15-21 @ 15:00) (135/84 - 158/100)  RR: 16 (09-15-21 @ 15:00) (14 - 36)  SpO2: 100% (09-15-21 @ 15:00) (99% - 100%)  Wt(kg): --      Physical exam  Awake, alert, oriented x 3, PERRL, EOMI  Follows commands, speech clear  OSPINA X4 with good strength   Incision: C/D/I    Lines and drains:   Allen:   EBL:   Crystalloids:   UO:     Plan:     - Neurocheck q 1 /? SCM   - Vitals q 1   - pain control prn  - Cont Keppra/decadron ?   - Post op CT? / ? MRI   - f/u final path   - home meds   - f/u post op labs   - HOB up/ambulate as tolerated   - PT/OT / ? speech  - Monitor HMV/RENÉE output   - ADAT   - remove allen, A-line in AM   - Bowel regimen   - Protonix for ulcer ppx  - Vendynes for thromboprophylaxis   - No chemo ppx due to fresh post op status     d/w   HPI:  CHIEF COMPLAINT/ REASON FOR CONSULTATION: possible spinal cord compression      HPI: 36 m PMHx HIV (CD4 700s, VLUD) was at gym this afternoon lifting heavy weights, ie dead lifts; tonight began to develop pain in thoracolumbar region; over past couple of hours he began to develop paresthesias down both legs and buttok. now can barely feel legs and unable to stand or move legs. Was brought in by ambulance after a passerby on the street witnessed him unable to rise from curb.  has not gone to bathroom, but thus far no bladder or bowel incontinence.  + IVDU, most recently today (methamphetamine).  no fever/chills.      S/Overnight events:  s/p spinal angiogram (neg) and T8-T9 lami decompression, intraoperative findings of subarachnoid clot s/p evacuation POD # 0 (9/15) dilaudid x 1 for pain. diet advanced       Hospital Course:   POD# 1: s/p spinal angiogram (neg) and T8-T9 lami decompression, intraoperative findings of subarachnoid clot s/p evacuation POD # 0 (9/15) dilaudid x 1 for pain. diet advanced       Vital Signs Last 24 Hrs  T(C): 37.1 (16 Sep 2021 01:00), Max: 37.7 (15 Sep 2021 09:00)  T(F): 98.8 (16 Sep 2021 01:00), Max: 99.8 (15 Sep 2021 09:00)  HR: 94 (16 Sep 2021 03:00) (83 - 120)  BP: 128/64 (16 Sep 2021 03:00) (120/74 - 160/70)  BP(mean): 91 (16 Sep 2021 03:00) (91 - 116)  RR: 25 (16 Sep 2021 03:00) (14 - 36)  SpO2: 99% (16 Sep 2021 03:00) (99% - 100%)    I&O's Detail    14 Sep 2021 07:01  -  15 Sep 2021 07:00  --------------------------------------------------------  IN:  Total IN: 0 mL    OUT:    Indwelling Catheter - Urethral (mL): 750 mL  Total OUT: 750 mL    Total NET: -750 mL      15 Sep 2021 07:01  -  16 Sep 2021 03:56  --------------------------------------------------------  IN:    IV PiggyBack: 50 mL    Oral Fluid: 480 mL    sodium chloride 0.9%: 862.5 mL    Sodium Chloride 0.9% Bolus: 1000 mL  Total IN: 2392.5 mL    OUT:    Indwelling Catheter - Urethral (mL): 1600 mL  Total OUT: 1600 mL    Total NET: 792.5 mL        I&O's Summary    14 Sep 2021 07:  -  15 Sep 2021 07:00  --------------------------------------------------------  IN: 0 mL / OUT: 750 mL / NET: -750 mL    15 Sep 2021 07:01  -  16 Sep 2021 03:56  --------------------------------------------------------  IN: 2392.5 mL / OUT: 1600 mL / NET: 792.5 mL        PHYSICAL EXAM:  AAOX3. Verbal function intact  Cranial Nerves: II-XII intact  Motor: 5/5 power in b/l UE   LE: 0/5 motor strength, sensation to light touch intact, LLE > RLE   RLE slightly hyperreflexic, LLE hyporeflexic   Pronator Drift: none     DEVICE/DRAIN DRESSING:    TUBES/LINES:  [] CVC  [x] A-line  [] Lumbar Drain  [] Ventriculostomy  [x] Other: HMV connected to bile bag   [x] allen     DIET:  [] NPO  [x] Mechanical  [] Tube feeds    LABS:                        12.0   17.64 )-----------( 217      ( 15 Sep 2021 23:39 )             35.8     09-15    140  |  109<H>  |  10  ----------------------------<  148<H>  4.0   |  22  |  0.72    Ca    9.0      15 Sep 2021 23:39  Phos  2.6     -15  Mg     1.9     09-15    TPro  7.3  /  Alb  4.5  /  TBili  0.4  /  DBili  x   /  AST  43<H>  /  ALT  19  /  AlkPhos  51  -15    PT/INR - ( 15 Sep 2021 01:05 )   PT: 11.7 sec;   INR: 0.97          PTT - ( 15 Sep 2021 01:05 )  PTT:28.9 sec  Urinalysis Basic - ( 15 Sep 2021 07:51 )    Color: Yellow / Appearance: Hazy / S.015 / pH: x  Gluc: x / Ketone: Trace mg/dL  / Bili: Negative / Urobili: 0.2 E.U./dL   Blood: x / Protein: 30 mg/dL / Nitrite: NEGATIVE   Leuk Esterase: NEGATIVE / RBC: 5-10 /HPF / WBC < 5 /HPF   Sq Epi: x / Non Sq Epi: 0-5 /HPF / Bacteria: None /HPF          CAPILLARY BLOOD GLUCOSE      POCT Blood Glucose.: 130 mg/dL (15 Sep 2021 10:43)  POCT Blood Glucose.: 158 mg/dL (15 Sep 2021 07:44)      Drug Levels: [] N/A    CSF Analysis: [] N/A      Allergies    No Known Allergies    Intolerances      MEDICATIONS:  Antibiotics:  bictegravir 50 mG/emtricitabine 200 mG/tenofovir alafenamide 25 mG (BIKTARVY) 1 Tablet(s) Oral daily  ceFAZolin   IVPB 1000 milliGRAM(s) IV Intermittent every 8 hours    Neuro:  acetaminophen   Tablet .. 1000 milliGRAM(s) Oral every 8 hours  LORazepam   Injectable 2 milliGRAM(s) IV Push every 1 hour PRN  methocarbamol 500 milliGRAM(s) Oral every 8 hours  ondansetron   Disintegrating Tablet 4 milliGRAM(s) Oral every 6 hours  oxyCODONE    IR 15 milliGRAM(s) Oral every 6 hours PRN  pregabalin 50 milliGRAM(s) Oral every 8 hours    Anticoagulation:    OTHER:  chlorhexidine 2% Cloths 1 Application(s) Topical every 12 hours  chlorhexidine 2% Cloths 1 Application(s) Topical daily  dexAMETHasone  Injectable 4 milliGRAM(s) IV Push every 6 hours  glucagon  Injectable 1 milliGRAM(s) IntraMuscular once  insulin lispro (ADMELOG) corrective regimen sliding scale   SubCutaneous three times a day before meals  metoclopramide 10 milliGRAM(s) Oral every 8 hours PRN  pantoprazole    Tablet 40 milliGRAM(s) Oral before breakfast  polyethylene glycol 3350 17 Gram(s) Oral daily  senna 2 Tablet(s) Oral at bedtime    IVF:  sodium chloride 0.9%. 1000 milliLiter(s) IV Continuous <Continuous>    CULTURES:    RADIOLOGY & ADDITIONAL TESTS:      ASSESSMENT:  36 M PMHx HIV (CD4 700s, VLUD on biktarvy), hx of IVDU and methamphetamine use, was at gym this afternoon lifting heavy weights, ie dead lifts; tonight began to develop pain in thoracolumbar region; over past couple of hours he began to develop paresthesias down both legs and buttock, then progressed to complete plegia of bilateral extremities with loss of sensation from T8 dermatomes and below found on MRI to have intradural/extramedullary lesion with mass effect on spinal cord displacing it to the left, now s/p decadron load and s/p spinal angiogram (neg) and T8-T9 lami decompression, intraoperative findings of subarachnoid clot s/p evacuation POD # 1 (9/15)     FLACCID PARALYSIS OF LEG    Handoff    MEWS Score    Infection, HIV    Subdural hematoma    Subdural hematoma    Flaccid paralysis of legs    Spinal cord compression    HIV disease    Subdural hematoma    Angiogram, spine, selective    Lumbar laminectomy    NUMBNESS    Spinal cord compression    [email or web address]        Plan:     Neuro  - neuro checks q1  - Vitals q 1   - higher map goal is not pursued due to findings of spinal SAH, will keep pt normotensive   - MRI tomorrow morning   - decadron taper   - SQL in 48 hours   - PT/OT   - monitor bile bag output     Cardio  - SBP < 140   - EKG WNL     Pulm  - RA       GI   - ADAT   - dysphagia screen   - bowel regimen   - protonix while on Decadron     /renal  - + allen 9/15   - NS @ 75   - baseline labs     Heme  - SCDs   - SQL after 48 hours     Endo  - ISS while on decadron   - baseline a1 c   - pt /ot     D/W Dr. D'amico         []  GCS  E   V  M     Heart Failure: []Acute, [] acute on chronic , []chronic  Heart Failure:  [] Diastolic (HFpEF), [] Systolic (HFrEF), []Combined (HFpEF and HFrEF), [] RHF, [] Pulm HTN, [] Other    [] CAROLA, [] ATN, [] AIN, [] other  [] CKD1, [] CKD2, [] CKD 3, [] CKD 4, [] CKD 5, []ESRD    Encephalopathy: [] Metabolic, [] Hepatic, [] toxic, [] Neurological, [] Other    Abnormal Nurtitional Status: [] malnurtition (see nutrition note), [ ]underweight: BMI < 19, [] morbid obesity: BMI >40, [] Cachexia    [] Sepsis  [] hypovolemic shock,[] cardiogenic shock, [] hemorrhagic shock, [] neuogenic shock  [] Acute Respiratory Failure  []Cerebral edema, [] Brain compression/ herniation,   [] Functional quadriplegia  [] Acute blood loss anemia

## 2021-09-15 NOTE — PROGRESS NOTE ADULT - ASSESSMENT
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   PHx:  (1) HIV  (2) IVDU and methamphetamine use    PLAN -     NEURO -   -neurochecks q1h  -pain control - tylenol  -decadron 10 mg IV q6h  -MAP > 80 mmHg  -Dr. D'Amico (on ED admission) and I discussed with patient, re:  urgency for surgical decompression and likelihood for permanent paraplegia.  Patient understands prognosis and has deferred urgent surgical decompression last evening, currently discussing with family (Angelica) and friends.  -spinal angiogram for better characterization of T8 lesion    CV -  -MAP goal > 80 mmHg for spinal cord perfusion  -telemetry  -TTE    PUL -  -on room air  -admission CXR    ENDO -  -euglycemia goal  -HbA1c, lipids panel pending    GI/ -  -IV NS hydration  -LFTs normal  -urinary catheter placed    DIET -   -NPO    HEM/ONC -  -Hb 14.1 - being hydrated  -  -CD4 700s; diff pending    VTE Prophylaxis -  -SCDs    ID -   -afebrile, reactive leukocytosis  -continue retroviral    Social -  -updated patient    *****    ATTENDING ATTESTATION:  I was physically present for the key portions of the evaluation and management (E/M) service provided.  I agree with the above history, physical and plan, which I have reviewed and edited where appropriate.    Patient at high risk for neurological deterioration or death due to:  infection, DVT/PE.  Critical Care Time:  I have personally provided 45 minutes of critical care time excluding time spent on separate procedures.    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   PHx:  (1) HIV  (2) IVDU and methamphetamine use    PLAN -     NEURO -   -neurochecks q1h  -pain control - tylenol  -decadron 10 mg IV q6h  -MAP > 80 mmHg  -Dr. D'Amico (on ED admission) and I discussed with patient, re:  urgency for surgical decompression and likelihood for permanent paraplegia.  Patient understands prognosis and has deferred urgent surgical decompression last evening, currently discussing with family ([place]) and friends.  -spinal angiogram for better characterization, r/o vascular malformation    CV -  -MAP goal > 80 mmHg for spinal cord perfusion  -telemetry  -TTE    PUL -  -on room air  -admission CXR    ENDO -  -euglycemia goal  -HbA1c, lipids panel pending    GI/ -  -IV NS hydration  -LFTs normal  -urinary catheter placed    DIET -   -NPO    HEM/ONC -  -Hb 14.1 - being hydrated  -  -CD4 700s; diff pending    VTE Prophylaxis -  -SCDs    ID -   -afebrile, reactive leukocytosis  -continue retroviral    Social -  -updated patient    *****    ATTENDING ATTESTATION:  I was physically present for the key portions of the evaluation and management (E/M) service provided.  I agree with the above history, physical and plan, which I have reviewed and edited where appropriate.    Patient at high risk for neurological deterioration or death due to:  infection, DVT/PE.  Critical Care Time:  I have personally provided 45 minutes of critical care time excluding time spent on separate procedures.    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   PHx:  (1) HIV  (2) IVDU and methamphetamine use    PLAN -     NEURO -   -neurochecks q1h  -pain control - tylenol  -decadron 10 mg IV q6h  -MAP > 80 mmHg  -Dr. D'Amico (on ED admission) and I discussed with patient, re:  urgency for surgical decompression and likelihood for permanent paraplegia, loss of bladder/bowel function.  Patient understands prognosis and has deferred urgent surgical decompression last evening, currently discussing with family ([place]) and friends.  -spinal angiogram for better characterization, r/o vascular malformation    CV -  -MAP goal > 80 mmHg for spinal cord perfusion  -telemetry  -TTE    PUL -  -on room air  -admission CXR    ENDO -  -euglycemia goal  -HbA1c, lipids panel pending    GI/ -  -IV NS hydration  -LFTs normal  -urinary catheter placed    DIET -   -NPO    HEM/ONC -  -Hb 14.1 - being hydrated  -  -CD4 700s; diff pending    VTE Prophylaxis -  -SCDs    ID -   -afebrile, reactive leukocytosis  -continue retroviral    Social -  -updated patient    *****    ATTENDING ATTESTATION:  I was physically present for the key portions of the evaluation and management (E/M) service provided.  I agree with the above history, physical and plan, which I have reviewed and edited where appropriate.    Patient at high risk for neurological deterioration or death due to:  infection, DVT/PE.  Critical Care Time:  I have personally provided 45 minutes of critical care time excluding time spent on separate procedures.    *****

## 2021-09-15 NOTE — H&P ADULT - HISTORY OF PRESENT ILLNESS
CHIEF COMPLAINT/ REASON FOR CONSULTATION: possible spinal cord compression      HPI: 36 m PMHx HIV (CD4 700s, VLUD) was at gym this afternoon lifting heavy weights, ie dead lifts; tonight began to develop pain in thoracolumbar region; over past couple of hours he began to develop paresthesias down both legs and buttok. now can barely feel legs and unable to stand or move legs. Was brought in by ambulance after a passerby on the street witnessed him unable to rise from curb.  has not gone to bathroom, but thus far no bladder or bowel incontinence.  + IVDU, most recently today (methamphetamine).  no fever/chills.    PAST MEDICAL HISTORY   Infection, HIV      PAST SURGICAL HISTORY denies         MEDICATIONS:  Antibiotics:    Neuro:    Anticoagulation:    Other:  dexAMETHasone  IVPB 10 milliGRAM(s) IV Intermittent Once      SOCIAL HISTORY:   Occupation:   Marital Status:     FAMILY HISTORY:      REVIEW OF SYSTEMS:  Check here if all are normal other than Neurological [x]      PHYSICAL EXAMINATION:   T(C): 36.9 (09-14-21 @ 23:53), Max: 36.9 (09-14-21 @ 23:53)  HR: 89 (09-14-21 @ 23:53) (89 - 89)  BP: 158/100 (09-14-21 @ 23:53) (158/100 - 158/100)  RR: 17 (09-14-21 @ 23:53) (17 - 17)  SpO2: 100% (09-14-21 @ 23:53) (100% - 100%)  Wt(kg): --  Weight (kg): 80.7 (09-14 @ 23:53)    General Examination:     Neurologic Examination:           Cranial nerves 2-10 intact, legs flaccid bilaterally, absent lower extremity reflexes, upper extremities 5/5 with refelxes 2+,     LABS:                        14.1   16.69 )-----------( 255      ( 15 Sep 2021 01:05 )             40.8           PT/INR - ( 15 Sep 2021 01:05 )   PT: 11.7 sec;   INR: 0.97          PTT - ( 15 Sep 2021 01:05 )  PTT:28.9 sec      RADIOLOGY & ADDITIONAL STUDIES:     CHIEF COMPLAINT/ REASON FOR CONSULTATION: possible spinal cord compression      HPI: 36 m PMHx HIV (CD4 700s, VLUD) was at gym this afternoon lifting heavy weights, ie dead lifts; tonight began to develop pain in thoracolumbar region; over past couple of hours he began to develop paresthesias down both legs and buttok. now can barely feel legs and unable to stand or move legs. Was brought in by ambulance after a passerby on the street witnessed him unable to rise from curb.  has not gone to bathroom, but thus far no bladder or bowel incontinence.  + IVDU, most recently today (methamphetamine).  no fever/chills.    PAST MEDICAL HISTORY   Infection, HIV      PAST SURGICAL HISTORY denies         MEDICATIONS:  Antibiotics:    Neuro:    Anticoagulation:    Other:  dexAMETHasone  IVPB 10 milliGRAM(s) IV Intermittent Once      SOCIAL HISTORY:   Occupation:   Marital Status:     FAMILY HISTORY:      REVIEW OF SYSTEMS:  Check here if all are normal other than Neurological [x]      PHYSICAL EXAMINATION:   T(C): 36.9 (09-14-21 @ 23:53), Max: 36.9 (09-14-21 @ 23:53)  HR: 89 (09-14-21 @ 23:53) (89 - 89)  BP: 158/100 (09-14-21 @ 23:53) (158/100 - 158/100)  RR: 17 (09-14-21 @ 23:53) (17 - 17)  SpO2: 100% (09-14-21 @ 23:53) (100% - 100%)  Wt(kg): --  Weight (kg): 80.7 (09-14 @ 23:53)    General Examination: pleasant, conversant not in obvious pain     Neurologic Examination:           Cranial nerves 2-10 intact, legs flaccid bilaterally, absent lower extremity reflexes, upper extremities 5/5 with refelxes 2+, sensation: decreased in bl lower extremities and buttok, poor rectal tone  Pulm: breathing comfortably   Abdomen: soft, non tender, non distended      LABS:                        14.1   16.69 )-----------( 255      ( 15 Sep 2021 01:05 )             40.8           PT/INR - ( 15 Sep 2021 01:05 )   PT: 11.7 sec;   INR: 0.97          PTT - ( 15 Sep 2021 01:05 )  PTT:28.9 sec      RADIOLOGY & ADDITIONAL STUDIES:      CHIEF COMPLAINT/ REASON FOR CONSULTATION: possible spinal cord compression      HPI: 36 m PMHx HIV (CD4 700s, VLUD) was at gym this afternoon lifting heavy weights, ie dead lifts; tonight began to develop pain in thoracolumbar region; over past couple of hours he began to develop paresthesias down both legs and buttok. now can barely feel legs and unable to stand or move legs. Was brought in by ambulance after a passerby on the street witnessed him unable to rise from curb.  has not gone to bathroom, but thus far no bladder or bowel incontinence.  + IVDU, most recently today (methamphetamine).  no fever/chills.    PAST MEDICAL HISTORY   Infection, HIV      PAST SURGICAL HISTORY denies         MEDICATIONS:  Antibiotics:    Neuro:    Anticoagulation:    Other:  dexAMETHasone  IVPB 10 milliGRAM(s) IV Intermittent Once      SOCIAL HISTORY:   Occupation:   Marital Status:     FAMILY HISTORY:      REVIEW OF SYSTEMS:  Check here if all are normal other than Neurological [x]      PHYSICAL EXAMINATION:   T(C): 36.9 (09-14-21 @ 23:53), Max: 36.9 (09-14-21 @ 23:53)  HR: 89 (09-14-21 @ 23:53) (89 - 89)  BP: 158/100 (09-14-21 @ 23:53) (158/100 - 158/100)  RR: 17 (09-14-21 @ 23:53) (17 - 17)  SpO2: 100% (09-14-21 @ 23:53) (100% - 100%)  Wt(kg): --  Weight (kg): 80.7 (09-14 @ 23:53)

## 2021-09-15 NOTE — PROGRESS NOTE ADULT - SUBJECTIVE AND OBJECTIVE BOX
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  At gym this afternoon lifting heavy weights, ie dead lifts; tonight began to develop pain in thoracolumbar region; over past couple of hours he began to develop paresthesias down both legs and buttock. Now can barely feel legs and unable to stand or move legs. Was brought in by ambulance after a passerby on the street witnessed him unable to rise from curb.  has not gone to bathroom, but thus far no bladder or bowel incontinence.  + IVDU, most recently today (methamphetamine).  no fever/chills.    Past Medical History:  []  Allergies:  []  Home Meds:  []    PHYSICAL EXAMINATION  [vitals]  NEUROLOGICAL EXAMINATION:  []  GENERAL:  []  EENT:  []  CARDIOVASCULAR:  []  PULMONARY:  []  ABDOMEN:  []  EXTREMITIES:  []  SKIN:  []    LABS & INVESTIGATIONS  []    MEDICATIONS: []    IV FLUIDS:  []  DRIPS:  []  LINES:  []  DRAINS:  []  WOUNDS:  []    MRI T-L spine:  Ill-defined intradural extramedullary 0.9 x 0.6 cm lesion with faint associated enhancement at the level of T8. The lesion exerts mass effect on the cord displacing it to the left. There is minimal associated cord edema.    CODE STATUS:  []  GOALS OF CARE:  []  DISPOSITION:  []    ASSESSMENT & PLAN    ASSESSMENT    PLAN -   NEURO -   CV -  PUL -  ENDO -  GI/ -  DIET -   HEM/ONC -  VTE Prophylaxis -  ID -   Social -    *****    ATTENDING ATTESTATION:  I was physically present for the key portions of the evaluation and management (E/M) service provided.  I agree with the above history, physical and plan, which I have reviewed and edited where appropriate.    Patient at high risk for neurological deterioriation or death due to:  _____.  Critical Care Time:  I have personally provided ___ minutes of critical care time excluding time spent on separate procedures.    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  At gym yesterday afternoon lifted heavy weights, ie dead lifts; began to develop pain in thoracolumbar region; over past couple of hours he began to develop paresthesias down both legs and buttock. Since 22:00 2021, barely feel legs and unable to stand or move legs.  Brought in by ambulance after a passerby on the street witnessed him unable to rise from curb.  Has not gone to bathroom, but thus far no bladder or bowel incontinence.  + IVDU, most recently today (methamphetamine).  No fever/chills.  Improved sensation after decadron dose.  Urinary retention 800 mL this morning requiring allen catheter.    09.15.2021 - Dr. D'Amico and I discussed with patient, re:  urgency for surgical decompression and likelihood for paraplegia.  Patient has deferred urgent surgical decompression last evening, discussing with family ([place]) and friends.    Past Medical History:  Infection, HIV    Allergies:  No Known Allergies    Home Meds:  Biktarvy oral tablet: 1 tab(s) orally once a day (15 Sep 2021 06:13)    Current Meds:  MEDICATIONS  (STANDING):  bictegravir 50 mG/emtricitabine 200 mG/tenofovir alafenamide 25 mG (BIKTARVY) 1 Tablet(s) Oral daily  dexAMETHasone  IVPB 10 milliGRAM(s) IV Intermittent every 6 hours  insulin lispro (ADMELOG) corrective regimen sliding scale   SubCutaneous three times a day before meals  pantoprazole    Tablet 40 milliGRAM(s) Oral before breakfast    MEDICATIONS  (PRN):  oxyCODONE    IR 15 milliGRAM(s) Oral every 6 hours PRN Severe Pain (7 - 10)    PHYSICAL EXAMINATION    ICU Vital Signs Last 24 Hrs  T(C): 37.1 (15 Sep 2021 07:00), Max: 37.1 (15 Sep 2021 07:00)  T(F): 98.7 (15 Sep 2021 07:00), Max: 98.7 (15 Sep 2021 07:00)  HR: 105 (15 Sep 2021 08:00) (88 - 105)  BP: 138/79 (15 Sep 2021 08:00) (138/79 - 158/100)  BP(mean): 102 (15 Sep 2021 08:00) (102 - 107)  RR: 17 (15 Sep 2021 08:00) (16 - 18)  SpO2: 100% (15 Sep 2021 08:00) (99% - 100%)    Neurologic Examination: alert, oriented, mentation clear, REUBEN, face symmetric, UE full power and sensation  -T8-10 decreased sensation, ++ decreased sensation below, including lower extremities - absent LE reflexes, flaccid paraplegia bilaterally; no LE reaction to stimuli; decreased rectal tone on ED admission          CVS - S1S2 noS3S4 noM  Pul - clear to bases  Abdo - soft, absent abdo reflexes  Extrem - intact pulses, warm    I/O's    21 @ 07:01  -  09-15-21 @ 07:00  --------------------------------------------------------  IN: 0 mL / OUT: 750 mL / NET: -750 mL    09-15-21 @ 07:01  -  09-15-21 @ 08:24  --------------------------------------------------------  IN: 0 mL / OUT: 175 mL / NET: -175 mL    LABS:                        14.1   16.69 )-----------( 255      ( 15 Sep 2021 01:05 )             40.8     15    141  |  102  |  12  ----------------------------<  135<H>  3.7   |  27  |  0.79    Ca    9.6      15 Sep 2021 01:05  Mg     1.5     15    TPro  7.3  /  Alb  4.5  /  TBili  0.4  /  DBili  x   /  AST  43<H>  /  ALT  19  /  AlkPhos  51  -15    PT/INR - ( 15 Sep 2021 01:05 )   PT: 11.7 sec;   INR: 0.97     PTT - ( 15 Sep 2021 01:05 )  PTT:28.9 sec    Urinalysis Basic - ( 15 Sep 2021 07:51 )    Color: Yellow / Appearance: Hazy / S.015 / pH: x  Gluc: x / Ketone: Trace mg/dL  / Bili: Negative / Urobili: 0.2 E.U./dL   Blood: x / Protein: 30 mg/dL / Nitrite: NEGATIVE   Leuk Esterase: NEGATIVE / RBC: 5-10 /HPF / WBC < 5 /HPF   Sq Epi: x / Non Sq Epi: 0-5 /HPF / Bacteria: None /HPF    CAPILLARY BLOOD GLUCOSE    POCT Blood Glucose.: 158 mg/dL (15 Sep 2021 07:44)    IV FLUIDS:  []  DRIPS:  []  LINES:  []  DRAINS:  []  WOUNDS:  []    MRI T-L spine:  Ill-defined intradural extramedullary 0.9 x 0.6 cm lesion with faint associated enhancement at the level of T8. The lesion exerts mass effect on the cord displacing it to the left. There is minimal associated cord edema.    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  At gym yesterday afternoon lifted heavy weights, ie dead lifts; began to develop pain in thoracolumbar region; over past couple of hours he began to develop paresthesias down both legs and buttock. Since 22:00 2021, barely feel legs and unable to stand or move legs.  Brought in by ambulance after a passerby on the street witnessed him unable to rise from curb.  Has not gone to bathroom, but thus far no bladder or bowel incontinence.  + IVDU, most recently yesterday (methamphetamine).  No fever/chills.  Improved sensation after decadron dose.  Urinary retention 800 mL this morning requiring allen catheter.    09.15.2021 - Dr. D'Amico and I discussed with patient, re:  urgency for surgical decompression and likelihood for paraplegia.  Patient has deferred urgent surgical decompression last evening, discussing with family ([place]) and friends.    Past Medical History:  Infection, HIV    Allergies:  No Known Allergies    Home Meds:  Biktarvy oral tablet: 1 tab(s) orally once a day (15 Sep 2021 06:13)    Current Meds:  MEDICATIONS  (STANDING):  bictegravir 50 mG/emtricitabine 200 mG/tenofovir alafenamide 25 mG (BIKTARVY) 1 Tablet(s) Oral daily  dexAMETHasone  IVPB 10 milliGRAM(s) IV Intermittent every 6 hours  insulin lispro (ADMELOG) corrective regimen sliding scale   SubCutaneous three times a day before meals  pantoprazole    Tablet 40 milliGRAM(s) Oral before breakfast  polyethylene glycol 3350 17 Gram(s) Oral daily  povidone iodine 5% Nasal Swab 1 Application(s) Both Nostrils once  senna 2 Tablet(s) Oral at bedtime  sodium chloride 0.9% Bolus 1000 milliLiter(s) IV Bolus once  sodium chloride 0.9%. 1000 milliLiter(s) (75 mL/Hr) IV Continuous <Continuous>    MEDICATIONS  (PRN):  LORazepam   Injectable 2 milliGRAM(s) IV Push every 1 hour PRN CIWA-Ar score 8 or greater  oxyCODONE    IR 15 milliGRAM(s) Oral every 6 hours PRN Severe Pain (7 - 10)    PHYSICAL EXAMINATION    ICU Vital Signs Last 24 Hrs  T(C): 37.1 (15 Sep 2021 07:00), Max: 37.1 (15 Sep 2021 07:00)  T(F): 98.7 (15 Sep 2021 07:00), Max: 98.7 (15 Sep 2021 07:00)  HR: 105 (15 Sep 2021 08:00) (88 - 105)  BP: 138/79 (15 Sep 2021 08:00) (138/79 - 158/100)  BP(mean): 102 (15 Sep 2021 08:00) (102 - 107)  RR: 17 (15 Sep 2021 08:) (16 - 18)  SpO2: 100% (15 Sep 2021 08:) (99% - 100%)    Neurologic Examination: alert, oriented, mentation clear, REUBEN, face symmetric, UE full power and sensation  -T8-10 decreased sensation, ++ decreased sensation below, including lower extremities - absent LE reflexes, flaccid paraplegia bilaterally; no LE reaction to stimuli (except upgoing Babinski bilaterally); decreased rectal tone on ED admission          CVS - S1S2 noS3S4 noM  Pul - clear to bases  Abdo - soft, absent abdo reflexes  Extrem - intact pulses, warm    I/O's    21 @ 07:  -  09-15-21 @ 07:00  --------------------------------------------------------  IN: 0 mL / OUT: 750 mL / NET: -750 mL    09-15-21 @ 07:01  -  09-15-21 @ 08:24  --------------------------------------------------------  IN: 0 mL / OUT: 175 mL / NET: -175 mL    LABS:                        14.1   16.69 )-----------( 255      ( 15 Sep 2021 01:05 )             40.8     09-15    141  |  102  |  12  ----------------------------<  135<H>  3.7   |  27  |  0.79    Ca    9.6      15 Sep 2021 01:05  Mg     1.5     09-15    TPro  7.3  /  Alb  4.5  /  TBili  0.4  /  DBili  x   /  AST  43<H>  /  ALT  19  /  AlkPhos  51  09-15    PT/INR - ( 15 Sep 2021 01:05 )   PT: 11.7 sec;   INR: 0.97     PTT - ( 15 Sep 2021 01:05 )  PTT:28.9 sec    Urinalysis Basic - ( 15 Sep 2021 07:51 )    Color: Yellow / Appearance: Hazy / S.015 / pH: x  Gluc: x / Ketone: Trace mg/dL  / Bili: Negative / Urobili: 0.2 E.U./dL   Blood: x / Protein: 30 mg/dL / Nitrite: NEGATIVE   Leuk Esterase: NEGATIVE / RBC: 5-10 /HPF / WBC < 5 /HPF   Sq Epi: x / Non Sq Epi: 0-5 /HPF / Bacteria: None /HPF    CAPILLARY BLOOD GLUCOSE    POCT Blood Glucose.: 158 mg/dL (15 Sep 2021 07:44)    CRP < 3.0    IV FLUIDS:  []  DRIPS:  []  LINES:  []  DRAINS:  []  WOUNDS:  []    MRI T-L spine:  diffuse T1 iso to hyperintense signal and heterogeneous T2 signal throughout the spinal canal of the thoracic and lumbar spine which is most compatible with spinal canal hemorrhage, more focal hematoma in the spinal canal at the T8-T9 level on the right which measures 0.7 x 0.6 cm with mass effect on the cord deviated to the left; focal T2 hyperintense/T1 hypointense signal within the ventral portion of the spinal canal from L1 through L5-S1 most likely representing subdural hematoma; diffuse mild to moderate spinal canal stenosis in the lumbar spine secondary to hemorrhage with more prominent spinal canal stenosis at L5-S1 due to the hemorrhage as well as prominence of the epidural fat    CODE STATUS:  [Full]  GOALS OF CARE:  [Aggressive]  DISPOSITION:  [ICU]   NEUROCRITICAL CARE ATTENDING NOTE (Wed.09.15.2021)    ALISSA ARAUJO [id number]    36M Hx HIV (CD4 700s, VLUD) with 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  At gym yesterday afternoon lifted heavy weights, ie dead lifts; began to develop pain in thoracolumbar region; over past couple of hours he began to develop paresthesias down both legs and buttock. Since 22:00 2021, barely feel legs and unable to stand or move legs.  Brought in by ambulance after a passerby on the street witnessed him unable to rise from curb.  Has not gone to bathroom, but thus far no bladder or bowel incontinence.  + IVDU, most recently yesterday (methamphetamine).  No fever/chills.  Improved sensation after decadron dose.  Urinary retention 800 mL this morning requiring allen catheter.    09.15.2021 - Dr. D'Amico and I discussed with patient, re:  urgency for surgical decompression and likelihood for permanent paraplegia, loss of bladder/bowel function.  Patient has deferred urgent surgical decompression last evening, discussing with family ([place]) and friends.    Past Medical History:  Infection, HIV    Allergies:  No Known Allergies    Home Meds:  Biktarvy oral tablet: 1 tab(s) orally once a day (15 Sep 2021 06:13)    Current Meds:  MEDICATIONS  (STANDING):  bictegravir 50 mG/emtricitabine 200 mG/tenofovir alafenamide 25 mG (BIKTARVY) 1 Tablet(s) Oral daily  dexAMETHasone  IVPB 10 milliGRAM(s) IV Intermittent every 6 hours  insulin lispro (ADMELOG) corrective regimen sliding scale   SubCutaneous three times a day before meals  pantoprazole    Tablet 40 milliGRAM(s) Oral before breakfast  polyethylene glycol 3350 17 Gram(s) Oral daily  povidone iodine 5% Nasal Swab 1 Application(s) Both Nostrils once  senna 2 Tablet(s) Oral at bedtime  sodium chloride 0.9% Bolus 1000 milliLiter(s) IV Bolus once  sodium chloride 0.9%. 1000 milliLiter(s) (75 mL/Hr) IV Continuous <Continuous>    MEDICATIONS  (PRN):  LORazepam   Injectable 2 milliGRAM(s) IV Push every 1 hour PRN CIWA-Ar score 8 or greater  oxyCODONE    IR 15 milliGRAM(s) Oral every 6 hours PRN Severe Pain (7 - 10)    PHYSICAL EXAMINATION    ICU Vital Signs Last 24 Hrs  T(C): 37.1 (15 Sep 2021 07:00), Max: 37.1 (15 Sep 2021 07:00)  T(F): 98.7 (15 Sep 2021 07:00), Max: 98.7 (15 Sep 2021 07:00)  HR: 105 (15 Sep 2021 08:) (88 - 105)  BP: 138/79 (15 Sep 2021 08:00) (138/79 - 158/100)  BP(mean): 102 (15 Sep 2021 08:) (102 - 107)  RR: 17 (15 Sep 2021 08:) (16 - 18)  SpO2: 100% (15 Sep 2021 08:) (99% - 100%)    Neurologic Examination: alert, oriented, mentation clear, REUBEN, face symmetric, UE full power and sensation  -T8-10 decreased sensation, ++ decreased sensation below, including lower extremities - absent LE reflexes, flaccid paraplegia bilaterally; no LE reaction to stimuli (except upgoing Babinski bilaterally); decreased rectal tone on ED admission          CVS - S1S2 noS3S4 noM  Pul - clear to bases  Abdo - soft, absent abdo reflexes  Extrem - intact pulses, warm    I/O's    21 @ 07:01  -  09-15-21 @ 07:00  --------------------------------------------------------  IN: 0 mL / OUT: 750 mL / NET: -750 mL    09-15-21 @ 07:01  -  09-15-21 @ 08:24  --------------------------------------------------------  IN: 0 mL / OUT: 175 mL / NET: -175 mL    LABS:                        14.1   16.69 )-----------( 255      ( 15 Sep 2021 01:05 )             40.8     09-15    141  |  102  |  12  ----------------------------<  135<H>  3.7   |  27  |  0.79    Ca    9.6      15 Sep 2021 01:05  Mg     1.5     09-15    TPro  7.3  /  Alb  4.5  /  TBili  0.4  /  DBili  x   /  AST  43<H>  /  ALT  19  /  AlkPhos  51  15    PT/INR - ( 15 Sep 2021 01:05 )   PT: 11.7 sec;   INR: 0.97     PTT - ( 15 Sep 2021 01:05 )  PTT:28.9 sec    Urinalysis Basic - ( 15 Sep 2021 07:51 )    Color: Yellow / Appearance: Hazy / S.015 / pH: x  Gluc: x / Ketone: Trace mg/dL  / Bili: Negative / Urobili: 0.2 E.U./dL   Blood: x / Protein: 30 mg/dL / Nitrite: NEGATIVE   Leuk Esterase: NEGATIVE / RBC: 5-10 /HPF / WBC < 5 /HPF   Sq Epi: x / Non Sq Epi: 0-5 /HPF / Bacteria: None /HPF    CAPILLARY BLOOD GLUCOSE    POCT Blood Glucose.: 158 mg/dL (15 Sep 2021 07:44)    CRP < 3.0    IV FLUIDS:  []  DRIPS:  []  LINES:  []  DRAINS:  []  WOUNDS:  []    MRI T-L spine:  diffuse T1 iso to hyperintense signal and heterogeneous T2 signal throughout the spinal canal of the thoracic and lumbar spine which is most compatible with spinal canal hemorrhage, more focal hematoma in the spinal canal at the T8-T9 level on the right which measures 0.7 x 0.6 cm with mass effect on the cord deviated to the left; focal T2 hyperintense/T1 hypointense signal within the ventral portion of the spinal canal from L1 through L5-S1 most likely representing subdural hematoma; diffuse mild to moderate spinal canal stenosis in the lumbar spine secondary to hemorrhage with more prominent spinal canal stenosis at L5-S1 due to the hemorrhage as well as prominence of the epidural fat    CODE STATUS:  [Full]  GOALS OF CARE:  [Aggressive]  DISPOSITION:  [ICU]

## 2021-09-16 LAB
A1C WITH ESTIMATED AVERAGE GLUCOSE RESULT: 5 % — SIGNIFICANT CHANGE UP (ref 4–5.6)
ANION GAP SERPL CALC-SCNC: 12 MMOL/L — SIGNIFICANT CHANGE UP (ref 5–17)
ANION GAP SERPL CALC-SCNC: 9 MMOL/L — SIGNIFICANT CHANGE UP (ref 5–17)
BUN SERPL-MCNC: 10 MG/DL — SIGNIFICANT CHANGE UP (ref 7–23)
BUN SERPL-MCNC: 13 MG/DL — SIGNIFICANT CHANGE UP (ref 7–23)
CALCIUM SERPL-MCNC: 8.9 MG/DL — SIGNIFICANT CHANGE UP (ref 8.4–10.5)
CALCIUM SERPL-MCNC: 9 MG/DL — SIGNIFICANT CHANGE UP (ref 8.4–10.5)
CHLORIDE SERPL-SCNC: 107 MMOL/L — SIGNIFICANT CHANGE UP (ref 96–108)
CHLORIDE SERPL-SCNC: 109 MMOL/L — HIGH (ref 96–108)
CO2 SERPL-SCNC: 22 MMOL/L — SIGNIFICANT CHANGE UP (ref 22–31)
CO2 SERPL-SCNC: 22 MMOL/L — SIGNIFICANT CHANGE UP (ref 22–31)
COVID-19 SPIKE DOMAIN AB INTERP: POSITIVE
COVID-19 SPIKE DOMAIN ANTIBODY RESULT: >250 U/ML — HIGH
CREAT SERPL-MCNC: 0.72 MG/DL — SIGNIFICANT CHANGE UP (ref 0.5–1.3)
CREAT SERPL-MCNC: 0.73 MG/DL — SIGNIFICANT CHANGE UP (ref 0.5–1.3)
ESTIMATED AVERAGE GLUCOSE: 97 MG/DL — SIGNIFICANT CHANGE UP (ref 68–114)
GLUCOSE BLDC GLUCOMTR-MCNC: 131 MG/DL — HIGH (ref 70–99)
GLUCOSE BLDC GLUCOMTR-MCNC: 138 MG/DL — HIGH (ref 70–99)
GLUCOSE BLDC GLUCOMTR-MCNC: 144 MG/DL — HIGH (ref 70–99)
GLUCOSE BLDC GLUCOMTR-MCNC: 157 MG/DL — HIGH (ref 70–99)
GLUCOSE SERPL-MCNC: 147 MG/DL — HIGH (ref 70–99)
GLUCOSE SERPL-MCNC: 148 MG/DL — HIGH (ref 70–99)
HCT VFR BLD CALC: 35.8 % — LOW (ref 39–50)
HCT VFR BLD CALC: 36.7 % — LOW (ref 39–50)
HGB BLD-MCNC: 12 G/DL — LOW (ref 13–17)
HGB BLD-MCNC: 12.5 G/DL — LOW (ref 13–17)
MAGNESIUM SERPL-MCNC: 1.8 MG/DL — SIGNIFICANT CHANGE UP (ref 1.6–2.6)
MAGNESIUM SERPL-MCNC: 1.9 MG/DL — SIGNIFICANT CHANGE UP (ref 1.6–2.6)
MCHC RBC-ENTMCNC: 31.6 PG — SIGNIFICANT CHANGE UP (ref 27–34)
MCHC RBC-ENTMCNC: 31.6 PG — SIGNIFICANT CHANGE UP (ref 27–34)
MCHC RBC-ENTMCNC: 33.5 GM/DL — SIGNIFICANT CHANGE UP (ref 32–36)
MCHC RBC-ENTMCNC: 34.1 GM/DL — SIGNIFICANT CHANGE UP (ref 32–36)
MCV RBC AUTO: 92.7 FL — SIGNIFICANT CHANGE UP (ref 80–100)
MCV RBC AUTO: 94.2 FL — SIGNIFICANT CHANGE UP (ref 80–100)
NRBC # BLD: 0 /100 WBCS — SIGNIFICANT CHANGE UP (ref 0–0)
NRBC # BLD: 0 /100 WBCS — SIGNIFICANT CHANGE UP (ref 0–0)
PHOSPHATE SERPL-MCNC: 2.6 MG/DL — SIGNIFICANT CHANGE UP (ref 2.5–4.5)
PHOSPHATE SERPL-MCNC: 3 MG/DL — SIGNIFICANT CHANGE UP (ref 2.5–4.5)
PLATELET # BLD AUTO: 217 K/UL — SIGNIFICANT CHANGE UP (ref 150–400)
PLATELET # BLD AUTO: 220 K/UL — SIGNIFICANT CHANGE UP (ref 150–400)
POTASSIUM SERPL-MCNC: 3.6 MMOL/L — SIGNIFICANT CHANGE UP (ref 3.5–5.3)
POTASSIUM SERPL-MCNC: 4 MMOL/L — SIGNIFICANT CHANGE UP (ref 3.5–5.3)
POTASSIUM SERPL-SCNC: 3.6 MMOL/L — SIGNIFICANT CHANGE UP (ref 3.5–5.3)
POTASSIUM SERPL-SCNC: 4 MMOL/L — SIGNIFICANT CHANGE UP (ref 3.5–5.3)
RBC # BLD: 3.8 M/UL — LOW (ref 4.2–5.8)
RBC # BLD: 3.96 M/UL — LOW (ref 4.2–5.8)
RBC # FLD: 12.6 % — SIGNIFICANT CHANGE UP (ref 10.3–14.5)
RBC # FLD: 12.7 % — SIGNIFICANT CHANGE UP (ref 10.3–14.5)
SARS-COV-2 IGG+IGM SERPL QL IA: >250 U/ML — HIGH
SARS-COV-2 IGG+IGM SERPL QL IA: POSITIVE
SODIUM SERPL-SCNC: 140 MMOL/L — SIGNIFICANT CHANGE UP (ref 135–145)
SODIUM SERPL-SCNC: 141 MMOL/L — SIGNIFICANT CHANGE UP (ref 135–145)
WBC # BLD: 17.64 K/UL — HIGH (ref 3.8–10.5)
WBC # BLD: 20.26 K/UL — HIGH (ref 3.8–10.5)
WBC # FLD AUTO: 17.64 K/UL — HIGH (ref 3.8–10.5)
WBC # FLD AUTO: 20.26 K/UL — HIGH (ref 3.8–10.5)

## 2021-09-16 PROCEDURE — 72158 MRI LUMBAR SPINE W/O & W/DYE: CPT | Mod: 26

## 2021-09-16 PROCEDURE — 93970 EXTREMITY STUDY: CPT | Mod: 26

## 2021-09-16 PROCEDURE — 72157 MRI CHEST SPINE W/O & W/DYE: CPT | Mod: 26

## 2021-09-16 RX ORDER — DIAZEPAM 5 MG
5 TABLET ORAL EVERY 8 HOURS
Refills: 0 | Status: DISCONTINUED | OUTPATIENT
Start: 2021-09-16 | End: 2021-09-17

## 2021-09-16 RX ORDER — MAGNESIUM SULFATE 500 MG/ML
2 VIAL (ML) INJECTION ONCE
Refills: 0 | Status: COMPLETED | OUTPATIENT
Start: 2021-09-16 | End: 2021-09-16

## 2021-09-16 RX ORDER — INFLUENZA VIRUS VACCINE 15; 15; 15; 15 UG/.5ML; UG/.5ML; UG/.5ML; UG/.5ML
0.5 SUSPENSION INTRAMUSCULAR ONCE
Refills: 0 | Status: DISCONTINUED | OUTPATIENT
Start: 2021-09-16 | End: 2021-09-28

## 2021-09-16 RX ORDER — HYDROMORPHONE HYDROCHLORIDE 2 MG/ML
0.5 INJECTION INTRAMUSCULAR; INTRAVENOUS; SUBCUTANEOUS ONCE
Refills: 0 | Status: DISCONTINUED | OUTPATIENT
Start: 2021-09-16 | End: 2021-09-16

## 2021-09-16 RX ORDER — ENOXAPARIN SODIUM 100 MG/ML
40 INJECTION SUBCUTANEOUS ONCE
Refills: 0 | Status: COMPLETED | OUTPATIENT
Start: 2021-09-17 | End: 2021-09-16

## 2021-09-16 RX ORDER — POTASSIUM CHLORIDE 20 MEQ
40 PACKET (EA) ORAL ONCE
Refills: 0 | Status: COMPLETED | OUTPATIENT
Start: 2021-09-16 | End: 2021-09-16

## 2021-09-16 RX ADMIN — Medication 50 MILLIGRAM(S): at 13:46

## 2021-09-16 RX ADMIN — Medication 50 GRAM(S): at 06:42

## 2021-09-16 RX ADMIN — Medication 50 MILLIGRAM(S): at 06:13

## 2021-09-16 RX ADMIN — Medication 1000 MILLIGRAM(S): at 21:56

## 2021-09-16 RX ADMIN — ENOXAPARIN SODIUM 40 MILLIGRAM(S): 100 INJECTION SUBCUTANEOUS at 23:56

## 2021-09-16 RX ADMIN — POLYETHYLENE GLYCOL 3350 17 GRAM(S): 17 POWDER, FOR SOLUTION ORAL at 11:56

## 2021-09-16 RX ADMIN — Medication 2 MILLIGRAM(S): at 11:09

## 2021-09-16 RX ADMIN — ONDANSETRON 4 MILLIGRAM(S): 8 TABLET, FILM COATED ORAL at 00:00

## 2021-09-16 RX ADMIN — Medication 4 MILLIGRAM(S): at 17:45

## 2021-09-16 RX ADMIN — Medication 40 MILLIEQUIVALENT(S): at 06:57

## 2021-09-16 RX ADMIN — ONDANSETRON 4 MILLIGRAM(S): 8 TABLET, FILM COATED ORAL at 06:13

## 2021-09-16 RX ADMIN — Medication 100 MILLIGRAM(S): at 21:26

## 2021-09-16 RX ADMIN — ONDANSETRON 4 MILLIGRAM(S): 8 TABLET, FILM COATED ORAL at 17:45

## 2021-09-16 RX ADMIN — HYDROMORPHONE HYDROCHLORIDE 0.5 MILLIGRAM(S): 2 INJECTION INTRAMUSCULAR; INTRAVENOUS; SUBCUTANEOUS at 02:51

## 2021-09-16 RX ADMIN — Medication 1000 MILLIGRAM(S): at 07:00

## 2021-09-16 RX ADMIN — PANTOPRAZOLE SODIUM 40 MILLIGRAM(S): 20 TABLET, DELAYED RELEASE ORAL at 06:54

## 2021-09-16 RX ADMIN — Medication 1000 MILLIGRAM(S): at 13:16

## 2021-09-16 RX ADMIN — Medication 2: at 11:50

## 2021-09-16 RX ADMIN — Medication 4 MILLIGRAM(S): at 11:56

## 2021-09-16 RX ADMIN — Medication 5 MILLIGRAM(S): at 21:26

## 2021-09-16 RX ADMIN — SENNA PLUS 2 TABLET(S): 8.6 TABLET ORAL at 23:00

## 2021-09-16 RX ADMIN — METHOCARBAMOL 500 MILLIGRAM(S): 500 TABLET, FILM COATED ORAL at 06:14

## 2021-09-16 RX ADMIN — CHLORHEXIDINE GLUCONATE 1 APPLICATION(S): 213 SOLUTION TOPICAL at 11:59

## 2021-09-16 RX ADMIN — Medication 4 MILLIGRAM(S): at 00:00

## 2021-09-16 RX ADMIN — HYDROMORPHONE HYDROCHLORIDE 0.5 MILLIGRAM(S): 2 INJECTION INTRAMUSCULAR; INTRAVENOUS; SUBCUTANEOUS at 01:42

## 2021-09-16 RX ADMIN — ONDANSETRON 4 MILLIGRAM(S): 8 TABLET, FILM COATED ORAL at 11:46

## 2021-09-16 RX ADMIN — Medication 100 MILLIGRAM(S): at 06:14

## 2021-09-16 RX ADMIN — BICTEGRAVIR SODIUM, EMTRICITABINE, AND TENOFOVIR ALAFENAMIDE FUMARATE 1 TABLET(S): 30; 120; 15 TABLET ORAL at 11:47

## 2021-09-16 RX ADMIN — Medication 1000 MILLIGRAM(S): at 14:13

## 2021-09-16 RX ADMIN — Medication 50 MILLIGRAM(S): at 21:26

## 2021-09-16 RX ADMIN — OXYCODONE HYDROCHLORIDE 15 MILLIGRAM(S): 5 TABLET ORAL at 00:11

## 2021-09-16 RX ADMIN — Medication 1000 MILLIGRAM(S): at 06:13

## 2021-09-16 RX ADMIN — Medication 4 MILLIGRAM(S): at 06:13

## 2021-09-16 RX ADMIN — Medication 100 MILLIGRAM(S): at 13:17

## 2021-09-16 RX ADMIN — METHOCARBAMOL 500 MILLIGRAM(S): 500 TABLET, FILM COATED ORAL at 21:26

## 2021-09-16 RX ADMIN — Medication 5 MILLIGRAM(S): at 13:16

## 2021-09-16 RX ADMIN — METHOCARBAMOL 500 MILLIGRAM(S): 500 TABLET, FILM COATED ORAL at 13:16

## 2021-09-16 RX ADMIN — Medication 1000 MILLIGRAM(S): at 21:26

## 2021-09-16 NOTE — DIETITIAN INITIAL EVALUATION ADULT. - PROBLEM SELECTOR PLAN 1
- Admit to Neuro ICU  - recommending OR, patient will decide within the next few hours   - MAP goal > 80 for spinal cord perfusion   - Decadron 10mg q6

## 2021-09-16 NOTE — PROGRESS NOTE ADULT - SUBJECTIVE AND OBJECTIVE BOX
HPI:  CHIEF COMPLAINT/ REASON FOR CONSULTATION: possible spinal cord compression      HPI: 36 m PMHx HIV (CD4 700s, VLUD) was at gym this afternoon lifting heavy weights, ie dead lifts; tonight began to develop pain in thoracolumbar region; over past couple of hours he began to develop paresthesias down both legs and buttok. now can barely feel legs and unable to stand or move legs. Was brought in by ambulance after a passerby on the street witnessed him unable to rise from curb.  has not gone to bathroom, but thus far no bladder or bowel incontinence.  + IVDU, most recently today (methamphetamine).  no fever/chills.      S/Overnight events:  s/p spinal angiogram (neg) and T8-T9 lami decompression, intraoperative findings of subarachnoid clot s/p evacuation POD # 0 (9/15) dilaudid x 1 for pain. diet advanced       Hospital Course:   POD# 1: s/p spinal angiogram (neg) and T8-T9 lami decompression, intraoperative findings of subarachnoid clot s/p evacuation POD # 0 (9/15) dilaudid x 1 for pain. diet advanced       Vital Signs Last 24 Hrs  T(C): 37.1 (16 Sep 2021 01:00), Max: 37.7 (15 Sep 2021 09:00)  T(F): 98.8 (16 Sep 2021 01:00), Max: 99.8 (15 Sep 2021 09:00)  HR: 94 (16 Sep 2021 03:00) (83 - 120)  BP: 128/64 (16 Sep 2021 03:00) (120/74 - 160/70)  BP(mean): 91 (16 Sep 2021 03:00) (91 - 116)  RR: 25 (16 Sep 2021 03:00) (14 - 36)  SpO2: 99% (16 Sep 2021 03:00) (99% - 100%)    I&O's Detail    14 Sep 2021 07:01  -  15 Sep 2021 07:00  --------------------------------------------------------  IN:  Total IN: 0 mL    OUT:    Indwelling Catheter - Urethral (mL): 750 mL  Total OUT: 750 mL    Total NET: -750 mL      15 Sep 2021 07:01  -  16 Sep 2021 03:56  --------------------------------------------------------  IN:    IV PiggyBack: 50 mL    Oral Fluid: 480 mL    sodium chloride 0.9%: 862.5 mL    Sodium Chloride 0.9% Bolus: 1000 mL  Total IN: 2392.5 mL    OUT:    Indwelling Catheter - Urethral (mL): 1600 mL  Total OUT: 1600 mL    Total NET: 792.5 mL        I&O's Summary    14 Sep 2021 07:  -  15 Sep 2021 07:00  --------------------------------------------------------  IN: 0 mL / OUT: 750 mL / NET: -750 mL    15 Sep 2021 07:01  -  16 Sep 2021 03:56  --------------------------------------------------------  IN: 2392.5 mL / OUT: 1600 mL / NET: 792.5 mL        PHYSICAL EXAM:  AAOX3. Verbal function intact  Cranial Nerves: II-XII intact  Motor: 5/5 power in b/l UE   LE: 0/5 motor strength, sensation to light touch intact, LLE > RLE   RLE slightly hyperreflexic, LLE hyporeflexic   Pronator Drift: none     DEVICE/DRAIN DRESSING:    TUBES/LINES:  [] CVC  [x] A-line  [] Lumbar Drain  [] Ventriculostomy  [x] Other: HMV connected to bile bag   [x] allen     DIET:  [] NPO  [x] Mechanical  [] Tube feeds    LABS:                        12.0   17.64 )-----------( 217      ( 15 Sep 2021 23:39 )             35.8     09-15    140  |  109<H>  |  10  ----------------------------<  148<H>  4.0   |  22  |  0.72    Ca    9.0      15 Sep 2021 23:39  Phos  2.6     -15  Mg     1.9     09-15    TPro  7.3  /  Alb  4.5  /  TBili  0.4  /  DBili  x   /  AST  43<H>  /  ALT  19  /  AlkPhos  51  -15    PT/INR - ( 15 Sep 2021 01:05 )   PT: 11.7 sec;   INR: 0.97          PTT - ( 15 Sep 2021 01:05 )  PTT:28.9 sec  Urinalysis Basic - ( 15 Sep 2021 07:51 )    Color: Yellow / Appearance: Hazy / S.015 / pH: x  Gluc: x / Ketone: Trace mg/dL  / Bili: Negative / Urobili: 0.2 E.U./dL   Blood: x / Protein: 30 mg/dL / Nitrite: NEGATIVE   Leuk Esterase: NEGATIVE / RBC: 5-10 /HPF / WBC < 5 /HPF   Sq Epi: x / Non Sq Epi: 0-5 /HPF / Bacteria: None /HPF          CAPILLARY BLOOD GLUCOSE      POCT Blood Glucose.: 130 mg/dL (15 Sep 2021 10:43)  POCT Blood Glucose.: 158 mg/dL (15 Sep 2021 07:44)      Drug Levels: [] N/A    CSF Analysis: [] N/A      Allergies    No Known Allergies    Intolerances      MEDICATIONS:  Antibiotics:  bictegravir 50 mG/emtricitabine 200 mG/tenofovir alafenamide 25 mG (BIKTARVY) 1 Tablet(s) Oral daily  ceFAZolin   IVPB 1000 milliGRAM(s) IV Intermittent every 8 hours    Neuro:  acetaminophen   Tablet .. 1000 milliGRAM(s) Oral every 8 hours  LORazepam   Injectable 2 milliGRAM(s) IV Push every 1 hour PRN  methocarbamol 500 milliGRAM(s) Oral every 8 hours  ondansetron   Disintegrating Tablet 4 milliGRAM(s) Oral every 6 hours  oxyCODONE    IR 15 milliGRAM(s) Oral every 6 hours PRN  pregabalin 50 milliGRAM(s) Oral every 8 hours    Anticoagulation:    OTHER:  chlorhexidine 2% Cloths 1 Application(s) Topical every 12 hours  chlorhexidine 2% Cloths 1 Application(s) Topical daily  dexAMETHasone  Injectable 4 milliGRAM(s) IV Push every 6 hours  glucagon  Injectable 1 milliGRAM(s) IntraMuscular once  insulin lispro (ADMELOG) corrective regimen sliding scale   SubCutaneous three times a day before meals  metoclopramide 10 milliGRAM(s) Oral every 8 hours PRN  pantoprazole    Tablet 40 milliGRAM(s) Oral before breakfast  polyethylene glycol 3350 17 Gram(s) Oral daily  senna 2 Tablet(s) Oral at bedtime    IVF:  sodium chloride 0.9%. 1000 milliLiter(s) IV Continuous <Continuous>    CULTURES:    RADIOLOGY & ADDITIONAL TESTS:      ASSESSMENT:  36 M PMHx HIV (CD4 700s, VLUD on biktarvy), hx of IVDU and methamphetamine use, was at gym this afternoon lifting heavy weights, ie dead lifts; tonight began to develop pain in thoracolumbar region; over past couple of hours he began to develop paresthesias down both legs and buttock, then progressed to complete plegia of bilateral extremities with loss of sensation from T8 dermatomes and below found on MRI to have intradural/extramedullary lesion with mass effect on spinal cord displacing it to the left, now s/p decadron load and s/p spinal angiogram (neg) and T8-T9 lami decompression, intraoperative findings of subarachnoid clot s/p evacuation POD # 1 (9/15)     FLACCID PARALYSIS OF LEG    Handoff    MEWS Score    Infection, HIV    Subdural hematoma    Subdural hematoma    Flaccid paralysis of legs    Spinal cord compression    HIV disease    Subdural hematoma    Angiogram, spine, selective    Lumbar laminectomy    NUMBNESS    Spinal cord compression    [email or web address]        Plan:     Neuro  - neuro checks q1  - Vitals q 1   - higher map goal is not pursued due to findings of spinal SAH, will keep pt normotensive   - MRI tomorrow morning   - decadron taper   - SQL in 48 hours   - PT/OT   - monitor bile bag output     Cardio  - SBP < 140   - EKG WNL     Pulm  - RA       GI   - ADAT   - dysphagia screen   - bowel regimen   - protonix while on Decadron     /renal  - + allen 9/15   - NS @ 75   - baseline labs     Heme  - SCDs   - SQL after 48 hours     Endo  - ISS while on decadron   - baseline a1 c   - pt /ot     D/W Dr. D'amico         []  GCS  E   V  M     Heart Failure: []Acute, [] acute on chronic , []chronic  Heart Failure:  [] Diastolic (HFpEF), [] Systolic (HFrEF), []Combined (HFpEF and HFrEF), [] RHF, [] Pulm HTN, [] Other    [] CAROLA, [] ATN, [] AIN, [] other  [] CKD1, [] CKD2, [] CKD 3, [] CKD 4, [] CKD 5, []ESRD    Encephalopathy: [] Metabolic, [] Hepatic, [] toxic, [] Neurological, [] Other    Abnormal Nurtitional Status: [] malnurtition (see nutrition note), [ ]underweight: BMI < 19, [] morbid obesity: BMI >40, [] Cachexia    [] Sepsis  [] hypovolemic shock,[] cardiogenic shock, [] hemorrhagic shock, [] neuogenic shock  [] Acute Respiratory Failure  []Cerebral edema, [] Brain compression/ herniation,   [] Functional quadriplegia  [] Acute blood loss anemia

## 2021-09-16 NOTE — PROGRESS NOTE ADULT - ASSESSMENT
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   POD 0 S/P spinal angiogram and T8 laminectomy with evacuation of subdural/subarachnoid clot    PHx:  (1) HIV  (2) IVDU and methamphetamine use.    PLAN:    NEURO:  Neurochecks Q1h  Analgesics: Tylenol, oxycodone  MRI/ MRA spine 9/16  Decadron 4mg Q 6, taper over 7days  S/P spinal angiogram, no active heme, no vascular abn. Follow up MRI/MRA  Monitor bile bag output   Aggressive PT/OT    CV  Initially MAP goal > 80--> post op, Keep SBP <140   Telemetry  [place]    PULM  On room air  CXR clear    ENDO:  Euglycemia goal  HbA1c, lipids panel    GI  DIET: Dysphagia and advance as tolerated  PPI while on decadron  LFTs normal  Bowel regimen    :  Urinary cath. Monitor Is/OS  Continue allen for urine retention  NS at 75ml/hr    HEM/ONC:  Hb 14.1. . Post op labs pending  VTE Prophylaxis: SCDs  Hold chemoppx for now as fresh post op.   SQL in 48 hours post op    ID:  Afebrile, reactive leukocytosis  Continue retroviral treatment  Perioperative ancef    Social:  Updated patient.    *****    ATTENDING ATTESTATION:  I was physically present for the key portions of the evaluation and management (E/M) service provided.  I agree with the above history, physical and plan, which I have reviewed and edited where appropriate.    Patient at high risk for neurological deterioration or death due to:  infection, DVT/PE.  Critical Care Time:  I have personally provided 45 minutes of critical care time excluding time spent on separate procedures.    *****

## 2021-09-16 NOTE — PROGRESS NOTE ADULT - SUBJECTIVE AND OBJECTIVE BOX
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  At gym yesterday afternoon lifted heavy weights, i.e. dead lifts; began to develop pain in thoracolumbar region; over past couple of hours he began to develop paresthesias down both legs and buttock. Since 22:00 09.14.2021, barely feel legs and unable to stand or move legs.  Brought in by ambulance after a passerby on the street witnessed him unable to rise from curb.  Has not gone to bathroom, but thus far no bladder or bowel incontinence.  + IVDU, most recently yesterday (methamphetamine).  No fever/chills.  Improved sensation after decadron dose.  Urinary retention 800 mL this morning requiring allen catheter.    Hospital course: Patient initially deferred urgent surgical decompression.  Agreed after his discussion with family.           PHYSICAL EXAMINATION  T(C): 36.9 (09-16 @ 05:15), Max: 37.7 (09-15 @ 09:00)  HR: 95 (09-16 @ 07:00) (83 - 120)  BP: 126/67 (09-16 @ 07:00) (120/74 - 160/70)  BP(mean): 88 (09-16 @ 07:00)  ABP:  (140/67 - 174/85)  ABP(mean):  (93 - 111)  RR: 13 (09-16 @ 07:00) (13 - 36)  SpO2: 98% (09-16 @ 07:00) (96% - 100%)  CVP(mm Hg): --      09-15-21 @ 07:01  -  09-16-21 @ 07:00  --------------------------------------------------------  IN:    IV PiggyBack: 150 mL    Oral Fluid: 1200 mL    sodium chloride 0.9%: 1087.5 mL    Sodium Chloride 0.9% Bolus: 1000 mL  Total IN: 3437.5 mL    OUT:    Drain (mL): 40 mL    Indwelling Catheter - Urethral (mL): 1860 mL  Total OUT: 1900 mL    Total NET: 1537.5 mL      09-16-21 @ 07:01  -  09-16-21 @ 07:09  --------------------------------------------------------  IN:    sodium chloride 0.9%: 75 mL  Total IN: 75 mL    OUT:  Total OUT: 0 mL    Total NET: 75 mL          09-15-21 @ 07:01  -  09-16-21 @ 07:00  --------------------------------------------------------  IN: 0 mL/kg/hr / OUT: 0.96 mL/kg/hr / NET: -0.96 mL/kg/hr            LABS:  CAPILLARY BLOOD GLUCOSE      POCT Blood Glucose.: 138 mg/dL (16 Sep 2021 05:32)  POCT Blood Glucose.: 130 mg/dL (15 Sep 2021 10:43)  POCT Blood Glucose.: 158 mg/dL (15 Sep 2021 07:44)                          12.5   20.26 )-----------( 220      ( 16 Sep 2021 05:08 )             36.7     09-16    141  |  107  |  13  ----------------------------<  147<H>  3.6   |  22  |  0.73    Ca    8.9      16 Sep 2021 05:08  Phos  3.0     09-16  Mg     1.8     09-16    TPro  7.3  /  Alb  4.5  /  TBili  0.4  /  DBili  x   /  AST  43<H>  /  ALT  19  /  AlkPhos  51  09-15          MEDICATIONS:  MEDICATIONS  (STANDING):  acetaminophen   Tablet .. 1000 milliGRAM(s) Oral every 8 hours  bictegravir 50 mG/emtricitabine 200 mG/tenofovir alafenamide 25 mG (BIKTARVY) 1 Tablet(s) Oral daily  ceFAZolin   IVPB 1000 milliGRAM(s) IV Intermittent every 8 hours  chlorhexidine 2% Cloths 1 Application(s) Topical daily  dexAMETHasone  Injectable 4 milliGRAM(s) IV Push every 6 hours  glucagon  Injectable 1 milliGRAM(s) IntraMuscular once  insulin lispro (ADMELOG) corrective regimen sliding scale   SubCutaneous three times a day before meals  methocarbamol 500 milliGRAM(s) Oral every 8 hours  ondansetron   Disintegrating Tablet 4 milliGRAM(s) Oral every 6 hours  pantoprazole    Tablet 40 milliGRAM(s) Oral before breakfast  polyethylene glycol 3350 17 Gram(s) Oral daily  pregabalin 50 milliGRAM(s) Oral every 8 hours  senna 2 Tablet(s) Oral at bedtime  sodium chloride 0.9%. 1000 milliLiter(s) (75 mL/Hr) IV Continuous <Continuous>    MEDICATIONS  (PRN):  LORazepam   Injectable 2 milliGRAM(s) IV Push every 1 hour PRN CIWA-Ar score 8 or greater  metoclopramide 10 milliGRAM(s) Oral every 8 hours PRN nausea/vomiting  oxyCODONE    IR 15 milliGRAM(s) Oral every 6 hours PRN Severe Pain (7 - 10)      Past Medical History:  Infection, HIV    Allergies:  No Known Allergies    Home Meds:  Biktarvy oral tablet: 1 tab(s) orally once a day (15 Sep 2021 06:13)        PHYSICAL EXAMINATION: Seen immediately post op  Neuro: Mental status: Alert, oriented x3, mentation clear.  Cranial nerves: REUBEN, face symmetric    Motor: B/L upper extremities full power and sensation.  T8-10 decreased sensation with even further decreased sensation distally (improved from preop exam)  RLE 3+ patellar reflex, LLE 2+ patellar reflex.   Flaccid paraplegia bilaterally; no LE reaction to stimuli.   Decreased rectal tone on ED admission            CVS S1S2 noS3/S4 no M/R/G.  Pulmonary: Clear to bases  Abdomen: Soft, non- tender  Extremities: Intact pulses, warm            CAPILLARY BLOOD GLUCOSE  POCT Blood Glucose.: 158 mg/dL (15 Sep 2021 07:44)    CRP < 3.0      MRI T-L spine:  Diffuse T1 iso to hyperintense signal and heterogeneous T2 signal throughout the spinal canal of the thoracic and lumbar spine which is most compatible with spinal canal hemorrhage, more focal hematoma in the spinal canal at the T8-T9 level on the right which measures 0.7 x 0.6 cm with mass effect on the cord deviated to the left; focal T2 hyperintense/T1 hypointense signal within the ventral portion of the spinal canal from L1 through L5-S1 most likely representing subdural hematoma; diffuse mild to moderate spinal canal stenosis in the lumbar spine secondary to hemorrhage with more prominent spinal canal stenosis at L5-S1 due to the hemorrhage as well as prominence of the epidural fat

## 2021-09-16 NOTE — DIETITIAN INITIAL EVALUATION ADULT. - OTHER INFO
36M PMHx HIV (CD4 700s, VLUD on biktarvy), hx of IVDU and methamphetamine use, was at gym this afternoon lifting heavy weights, ie dead lifts; tonight began to develop pain in thoracolumbar region; over past couple of hours he began to develop paresthesias down both legs and buttock, then progressed to complete plegia of bilateral extremities with loss of sensation from T8 dermatomes and below found on MRI to have intradural/extramedullary lesion with 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 Transferred to NSICU for further monitoring s/p sx.     On assessment, pt resting in bed without complaints. Currently on regular diet tolerating Po well. Pt currently on CLD tolerating well this am. No reported n/v/d. Last BM 9/14- started on bowel regime. No abd pain or distention noted. Education provided on importance of adequate PO intake with emphasis on lean protein to support wound healing once diet advances- pt receptive. Pt noting good appetite PTA. UBW is consistent with admission weight- pt denies weight changes. NKFA. Skin: Surgical incisions, griffin score 12. No pain noted at this time- well controlled at time of assessment. Please see nutrition recs below. RD to follow

## 2021-09-17 DIAGNOSIS — F12.10 CANNABIS ABUSE, UNCOMPLICATED: ICD-10-CM

## 2021-09-17 DIAGNOSIS — F15.10 OTHER STIMULANT ABUSE, UNCOMPLICATED: ICD-10-CM

## 2021-09-17 LAB
ANION GAP SERPL CALC-SCNC: 8 MMOL/L — SIGNIFICANT CHANGE UP (ref 5–17)
BUN SERPL-MCNC: 19 MG/DL — SIGNIFICANT CHANGE UP (ref 7–23)
CALCIUM SERPL-MCNC: 8.6 MG/DL — SIGNIFICANT CHANGE UP (ref 8.4–10.5)
CHLORIDE SERPL-SCNC: 109 MMOL/L — HIGH (ref 96–108)
CO2 SERPL-SCNC: 26 MMOL/L — SIGNIFICANT CHANGE UP (ref 22–31)
CREAT SERPL-MCNC: 0.8 MG/DL — SIGNIFICANT CHANGE UP (ref 0.5–1.3)
GLUCOSE BLDC GLUCOMTR-MCNC: 129 MG/DL — HIGH (ref 70–99)
GLUCOSE SERPL-MCNC: 129 MG/DL — HIGH (ref 70–99)
HCT VFR BLD CALC: 34.4 % — LOW (ref 39–50)
HGB BLD-MCNC: 11.4 G/DL — LOW (ref 13–17)
MAGNESIUM SERPL-MCNC: 2.2 MG/DL — SIGNIFICANT CHANGE UP (ref 1.6–2.6)
MCHC RBC-ENTMCNC: 32 PG — SIGNIFICANT CHANGE UP (ref 27–34)
MCHC RBC-ENTMCNC: 33.1 GM/DL — SIGNIFICANT CHANGE UP (ref 32–36)
MCV RBC AUTO: 96.6 FL — SIGNIFICANT CHANGE UP (ref 80–100)
NRBC # BLD: 0 /100 WBCS — SIGNIFICANT CHANGE UP (ref 0–0)
PHOSPHATE SERPL-MCNC: 4.4 MG/DL — SIGNIFICANT CHANGE UP (ref 2.5–4.5)
PLATELET # BLD AUTO: 206 K/UL — SIGNIFICANT CHANGE UP (ref 150–400)
POTASSIUM SERPL-MCNC: 4.4 MMOL/L — SIGNIFICANT CHANGE UP (ref 3.5–5.3)
POTASSIUM SERPL-SCNC: 4.4 MMOL/L — SIGNIFICANT CHANGE UP (ref 3.5–5.3)
RBC # BLD: 3.56 M/UL — LOW (ref 4.2–5.8)
RBC # FLD: 12.9 % — SIGNIFICANT CHANGE UP (ref 10.3–14.5)
SODIUM SERPL-SCNC: 143 MMOL/L — SIGNIFICANT CHANGE UP (ref 135–145)
WBC # BLD: 17.96 K/UL — HIGH (ref 3.8–10.5)
WBC # FLD AUTO: 17.96 K/UL — HIGH (ref 3.8–10.5)

## 2021-09-17 PROCEDURE — 99223 1ST HOSP IP/OBS HIGH 75: CPT

## 2021-09-17 PROCEDURE — 99024 POSTOP FOLLOW-UP VISIT: CPT

## 2021-09-17 RX ORDER — OXYCODONE HYDROCHLORIDE 5 MG/1
5 TABLET ORAL EVERY 4 HOURS
Refills: 0 | Status: DISCONTINUED | OUTPATIENT
Start: 2021-09-17 | End: 2021-09-22

## 2021-09-17 RX ORDER — TRAZODONE HCL 50 MG
50 TABLET ORAL AT BEDTIME
Refills: 0 | Status: DISCONTINUED | OUTPATIENT
Start: 2021-09-17 | End: 2021-09-28

## 2021-09-17 RX ORDER — DEXAMETHASONE 0.5 MG/5ML
4 ELIXIR ORAL EVERY 6 HOURS
Refills: 0 | Status: COMPLETED | OUTPATIENT
Start: 2021-09-17 | End: 2021-09-18

## 2021-09-17 RX ORDER — DEXAMETHASONE 0.5 MG/5ML
4 ELIXIR ORAL EVERY 8 HOURS
Refills: 0 | Status: COMPLETED | OUTPATIENT
Start: 2021-09-18 | End: 2021-09-20

## 2021-09-17 RX ORDER — DEXAMETHASONE 0.5 MG/5ML
2 ELIXIR ORAL EVERY 12 HOURS
Refills: 0 | Status: COMPLETED | OUTPATIENT
Start: 2021-09-23 | End: 2021-09-24

## 2021-09-17 RX ORDER — ENOXAPARIN SODIUM 100 MG/ML
40 INJECTION SUBCUTANEOUS AT BEDTIME
Refills: 0 | Status: DISCONTINUED | OUTPATIENT
Start: 2021-09-17 | End: 2021-09-28

## 2021-09-17 RX ORDER — DEXAMETHASONE 0.5 MG/5ML
2 ELIXIR ORAL EVERY 6 HOURS
Refills: 0 | Status: COMPLETED | OUTPATIENT
Start: 2021-09-20 | End: 2021-09-22

## 2021-09-17 RX ORDER — DEXAMETHASONE 0.5 MG/5ML
ELIXIR ORAL
Refills: 0 | Status: COMPLETED | OUTPATIENT
Start: 2021-09-17 | End: 2021-09-25

## 2021-09-17 RX ADMIN — OXYCODONE HYDROCHLORIDE 15 MILLIGRAM(S): 5 TABLET ORAL at 00:34

## 2021-09-17 RX ADMIN — ENOXAPARIN SODIUM 40 MILLIGRAM(S): 100 INJECTION SUBCUTANEOUS at 21:35

## 2021-09-17 RX ADMIN — OXYCODONE HYDROCHLORIDE 15 MILLIGRAM(S): 5 TABLET ORAL at 18:43

## 2021-09-17 RX ADMIN — Medication 50 MILLIGRAM(S): at 21:36

## 2021-09-17 RX ADMIN — Medication 50 MILLIGRAM(S): at 05:38

## 2021-09-17 RX ADMIN — BICTEGRAVIR SODIUM, EMTRICITABINE, AND TENOFOVIR ALAFENAMIDE FUMARATE 1 TABLET(S): 30; 120; 15 TABLET ORAL at 11:45

## 2021-09-17 RX ADMIN — Medication 4 MILLIGRAM(S): at 00:00

## 2021-09-17 RX ADMIN — METHOCARBAMOL 500 MILLIGRAM(S): 500 TABLET, FILM COATED ORAL at 21:36

## 2021-09-17 RX ADMIN — CHLORHEXIDINE GLUCONATE 1 APPLICATION(S): 213 SOLUTION TOPICAL at 12:28

## 2021-09-17 RX ADMIN — Medication 4 MILLIGRAM(S): at 17:51

## 2021-09-17 RX ADMIN — Medication 50 MILLIGRAM(S): at 14:18

## 2021-09-17 RX ADMIN — POLYETHYLENE GLYCOL 3350 17 GRAM(S): 17 POWDER, FOR SOLUTION ORAL at 11:45

## 2021-09-17 RX ADMIN — Medication 4 MILLIGRAM(S): at 05:40

## 2021-09-17 RX ADMIN — METHOCARBAMOL 500 MILLIGRAM(S): 500 TABLET, FILM COATED ORAL at 14:18

## 2021-09-17 RX ADMIN — Medication 5 MILLIGRAM(S): at 14:17

## 2021-09-17 RX ADMIN — SENNA PLUS 2 TABLET(S): 8.6 TABLET ORAL at 21:35

## 2021-09-17 RX ADMIN — PANTOPRAZOLE SODIUM 40 MILLIGRAM(S): 20 TABLET, DELAYED RELEASE ORAL at 06:52

## 2021-09-17 RX ADMIN — Medication 5 MILLIGRAM(S): at 05:38

## 2021-09-17 RX ADMIN — OXYCODONE HYDROCHLORIDE 15 MILLIGRAM(S): 5 TABLET ORAL at 00:14

## 2021-09-17 RX ADMIN — METHOCARBAMOL 500 MILLIGRAM(S): 500 TABLET, FILM COATED ORAL at 05:38

## 2021-09-17 RX ADMIN — OXYCODONE HYDROCHLORIDE 15 MILLIGRAM(S): 5 TABLET ORAL at 19:40

## 2021-09-17 RX ADMIN — Medication 5 MILLIGRAM(S): at 21:36

## 2021-09-17 NOTE — PHYSICAL THERAPY INITIAL EVALUATION ADULT - PERTINENT HX OF CURRENT PROBLEM, REHAB EVAL
36 m PMHx HIV (CD4 700s, VLUD) was at gym this afternoon lifting heavy weights, ie dead lifts; tonight began to develop pain in thoracolumbar region; over past couple of hours he began to develop paresthesias down both legs and buttok. now can barely feel legs and unable to stand or move legs.

## 2021-09-17 NOTE — PROCEDURE NOTE - ADDITIONAL PROCEDURE DETAILS
Drain insertion site identified, drain taken off suction, site prepped with chlorhexidine, anchoring suture cut, catheter pulled without resistance, tip of catheter visualized, two steri strips placed. Patient tolerated procedure well without complications, no blood loss.

## 2021-09-17 NOTE — PROGRESS NOTE ADULT - ASSESSMENT
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   S/P spinal angiogram and T8 laminectomy with evacuation of subdural/subarachnoid clot, 9/15/2021      PHx:  (1) HIV  (2) IVDU and methamphetamine use.    PLAN:    NEURO:  Neurochecks Q1h  Analgesics: Tylenol, oxycodone  MRI/ MRA spine 9/16  Decadron 4mg Q 6, taper over 7days  S/P spinal angiogram, no active heme, no vascular abn. Follow up MRI/MRA  Monitor bile bag output   Aggressive PT/OT    CV  Initially MAP goal > 80--> post op, Keep SBP <140   Telemetry  [place]    PULM  On room air  CXR clear    ENDO:  Euglycemia goal  HbA1c, lipids panel    GI  DIET: Dysphagia and advance as tolerated  PPI while on decadron  LFTs normal  Bowel regimen    :  Urinary cath. Monitor Is/OS  Continue allen for urine retention  NS at 75ml/hr    HEM/ONC:  Hb 14.1. . Post op labs pending  VTE Prophylaxis: SCDs  Hold chemoppx for now as fresh post op.   SQL in 48 hours post op    ID:  Afebrile, reactive leukocytosis  Continue retroviral treatment  Perioperative ancef    Social:  Updated patient.    SDU

## 2021-09-17 NOTE — BH CONSULTATION LIAISON ASSESSMENT NOTE - RISK ASSESSMENT
Assessed at low acute risk for suicide Assessed at low acute risk for suicide - no current or known past SI, future-oriented, tx seeking  risk factors include new medical dx, h/o substance use  protective factors include family support, spiritual beliefs, lack of past psychiatric dx

## 2021-09-17 NOTE — OCCUPATIONAL THERAPY INITIAL EVALUATION ADULT - MD ORDER
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  At gym; began to develop pain in thoracolumbar region; over past couple of hours he began to develop paresthesias down both legs and buttock. Since 22:00 09.14.2021, barely feel legs and unable to stand or move legs.  Brought in by ambulance after a passerby on the street witnessed him unable to rise from curb.

## 2021-09-17 NOTE — BH CONSULTATION LIAISON ASSESSMENT NOTE - CURRENT MEDICATION
MEDICATIONS  (STANDING):  bictegravir 50 mG/emtricitabine 200 mG/tenofovir alafenamide 25 mG (BIKTARVY) 1 Tablet(s) Oral daily  bisacodyl 5 milliGRAM(s) Oral at bedtime  chlorhexidine 2% Cloths 1 Application(s) Topical daily  dexAMETHasone     Tablet   Oral   dexAMETHasone     Tablet 4 milliGRAM(s) Oral every 6 hours  diazepam    Tablet 5 milliGRAM(s) Oral every 8 hours  enoxaparin Injectable 40 milliGRAM(s) SubCutaneous at bedtime  influenza   Vaccine 0.5 milliLiter(s) IntraMuscular once  methocarbamol 500 milliGRAM(s) Oral every 8 hours  pantoprazole    Tablet 40 milliGRAM(s) Oral before breakfast  polyethylene glycol 3350 17 Gram(s) Oral daily  pregabalin 50 milliGRAM(s) Oral every 8 hours  senna 2 Tablet(s) Oral at bedtime    MEDICATIONS  (PRN):  LORazepam   Injectable 2 milliGRAM(s) IV Push every 1 hour PRN CIWA-Ar score 8 or greater  metoclopramide 10 milliGRAM(s) Oral every 8 hours PRN nausea/vomiting  oxyCODONE    IR 15 milliGRAM(s) Oral every 6 hours PRN Severe Pain (7 - 10)  saline laxative (FLEET) Rectal Enema 1 Enema Rectal once PRN constipation

## 2021-09-17 NOTE — PHYSICAL THERAPY INITIAL EVALUATION ADULT - ADDITIONAL COMMENTS
Pt lives alone in an apartment, no JES[address]S + elevator inside.  Pt denies recent history of falls or Assistive Device use.

## 2021-09-17 NOTE — BH CONSULTATION LIAISON ASSESSMENT NOTE - HPI (INCLUDE ILLNESS QUALITY, SEVERITY, DURATION, TIMING, CONTEXT, MODIFYING FACTORS, ASSOCIATED SIGNS AND SYMPTOMS)
Pt seen early this afternoon with Rajeev Lind, psychology intern.      Pt seen early this afternoon with Rajeev Lind, psychology intern.     Substance use - reports daily cannabis use, helps with sleep. Reports rare crystal meth use, last use on day of presentation, use prior to that in February 2021. History of more frequent use - reports helped with self-consciousness during sexual experiences  PPH - denies past psychiatric dx, outpt or intpt treatment. Denies past medication trials    SH - originally from [place], lives alone in [facility], sister lives in same building. Recently completed school for [employer] and plans to start jewelry business.    FH - no known family psychiatric hx Per H&P Pt is "36 m PMHx HIV (CD4 700s, VLUD) was at gym this afternoon lifting heavy weights, ie dead lifts; tonight began to develop pain in thoracolumbar region; over past couple of hours he began to develop paresthesias down both legs and buttok. now can barely feel legs and unable to stand or move legs. Was brought in by ambulance after a passerby on the street witnessed him unable to rise from curb.  has not gone to bathroom, but thus far no bladder or bowel incontinence.  + IVDU, most recently today (methamphetamine).  no fever/chills." Pt found on MRI to have intradural/extramedullary lesion with mass effect on spinal cord, admitted for neurosurgical treatment with T8-T9 lami decompression, evacuation of subarachnoid clot on 9/15. Also receiving decadron taper. Psychiatry consulted for evaluation of mood and possible SI in setting of new lower extremity paralysis.     Per primary team, long-term prognosis of leg function unclear. Pt started on Valium yesterday for elevated anxiety and possible panic attack. Pt also with episode yesterday in which he asked RN about the purpose of his arterial line, was informed about it's importance, and later was found with arterial line removed. Pt reported this to be accidental and denied any SI to primary team.    Pt seen early this afternoon with Rajeev Lind, psychology intern. Pt awake, alert, calm, talkative, forthcoming about recent events. Endorses sadness and anxiety about recent dx, desire to be "strong" and "fight" but also periods of just wanting to "accept" what he has been told about his prognosis, which he understands to be "permanent" paralysis, with fluctuating emotions over several days. He reports one episode of elevated anxiety with racing heart, difficulty breathing yesterday relieved with support from RN and later Valium. He reports understanding that there was concern for SI after he inquired about the purpose of his arterial line (states he noticed the location was an important body location in Chinese medicine so asked RN about it) and then it accidentally fell out while he was sleeping; adamantly denies any experience of SI, intent, plan or past attempts. Cites his family, work as , spiritual beliefs as protective factors. Denies sustained low mood or anhedonia. Reports ok sleep overnight, frequent reliance on cannabis at home to sleep.   pt receptive to support and interest in continuing psychotherapy.    Substance use - reports daily cannabis use, helps with sleep. Reports rare crystal meth use, last use on day of presentation in setting of a sexual encounter, use prior to that in February 2021. History of more frequent use - reports helped with self-consciousness during sexual experiences  PPH - denies past psychiatric dx, outpt or intpt treatment. Denies past medication trials. Denies past sx suggestive of MDD, lorene or psychosis, ever any SI.    SH - originally from [place], lives alone in [facility], sister lives in same building. Recently completed school for [employer] and plans to start jewelry business.    FH - no known family psychiatric hx

## 2021-09-17 NOTE — BH CONSULTATION LIAISON ASSESSMENT NOTE - OTHER
not discussed bilateral legs in SCDs, sitting up in bed "I don't know" full range, tearful when discussing aspects of recent events, at times bright stable posture unable to move LE focus on recent events, desire to be "strong". No delusions or paranoia, no SI

## 2021-09-17 NOTE — BH CONSULTATION LIAISON ASSESSMENT NOTE - NSBHCHARTREVIEWLAB_PSY_A_CORE FT
11.4   17.96 )-----------( 206      ( 17 Sep 2021 06:20 )             34.4     09-17    143  |  109<H>  |  19  ----------------------------<  129<H>  4.4   |  26  |  0.80    Ca    8.6      17 Sep 2021 06:20  Phos  4.4     09-17  Mg     2.2     09-17    No utox seen

## 2021-09-17 NOTE — BH CONSULTATION LIAISON ASSESSMENT NOTE - NSBHCHARTREVIEWVS_PSY_A_CORE FT
Vital Signs Last 24 Hrs  T(C): 36.8 (17 Sep 2021 08:48), Max: 37.4 (16 Sep 2021 18:00)  T(F): 98.2 (17 Sep 2021 08:48), Max: 99.3 (16 Sep 2021 18:00)  HR: 82 (17 Sep 2021 12:00) (70 - 132)  BP: 136/72 (17 Sep 2021 12:00) (105/59 - 148/84)  BP(mean): 97 (17 Sep 2021 12:00) (78 - 110)  RR: 24 (17 Sep 2021 12:00) (17 - 26)  SpO2: 100% (17 Sep 2021 12:00) (95% - 100%)

## 2021-09-17 NOTE — PHYSICAL THERAPY INITIAL EVALUATION ADULT - GENERAL OBSERVATIONS, REHAB EVAL
Patient received semi-duarte in bed in NAD on RA, +SCDs, +Heplock, +tele. Cleared by RN. Agreeable to PT.

## 2021-09-17 NOTE — OCCUPATIONAL THERAPY INITIAL EVALUATION ADULT - DIAGNOSIS, OT EVAL
Pt p/w decrease B LE sensation ( proximal R>L), proprioception ( R>L) and motor (B 0/5) demonstrating decrease in balance and trunk control affecting ADLs and functional mobility.

## 2021-09-17 NOTE — PROGRESS NOTE ADULT - SUBJECTIVE AND OBJECTIVE BOX
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  At gym yesterday afternoon lifted heavy weights, i.e. dead lifts; began to develop pain in thoracolumbar region; over past couple of hours he began to develop paresthesias down both legs and buttock. Since 22:00 09.14.2021, barely feel legs and unable to stand or move legs.  Brought in by ambulance after a passerby on the street witnessed him unable to rise from curb.  Has not gone to bathroom, but thus far no bladder or bowel incontinence.  + IVDU, most recently yesterday (methamphetamine).  No fever/chills.  Improved sensation after decadron dose.  Urinary retention 800 mL this morning requiring allen catheter.    Hospital course: Patient initially deferred urgent surgical decompression.  Agreed after his discussion with family.       PHYSICAL EXAMINATION  T(C): 36.5 (09-17 @ 05:46), Max: 37.4 (09-16 @ 18:00)  HR: 80 (09-17 @ 06:00) (70 - 155)  BP: 111/60 (09-17 @ 06:00) (105/59 - 133/78)  BP(mean): 80 (09-17 @ 06:00)  ABP:  (118/62 - 185/89)  ABP(mean):  (90 - 122)  RR: 21 (09-17 @ 06:00) (12 - 28)  SpO2: 98% (09-17 @ 06:00) (95% - 100%)  CVP(mm Hg): --      09-16-21 @ 07:01  -  09-17-21 @ 07:00  --------------------------------------------------------  IN:    IV PiggyBack: 50 mL    Oral Fluid: 1760 mL    sodium chloride 0.9%: 412.5 mL  Total IN: 2222.5 mL    OUT:    Drain (mL): 60 mL    Indwelling Catheter - Urethral (mL): 1010 mL    Intermittent Catheterization - Urethral (mL): 1850 mL  Total OUT: 2920 mL    Total NET: -697.5 mL          09-16-21 @ 07:01  -  09-17-21 @ 07:00  --------------------------------------------------------  IN: 0 mL/kg/hr / OUT: 1.48 mL/kg/hr / NET: -1.48 mL/kg/hr            LABS:  CAPILLARY BLOOD GLUCOSE      POCT Blood Glucose.: 129 mg/dL (17 Sep 2021 05:50)  POCT Blood Glucose.: 131 mg/dL (16 Sep 2021 23:40)  POCT Blood Glucose.: 144 mg/dL (16 Sep 2021 17:42)  POCT Blood Glucose.: 157 mg/dL (16 Sep 2021 11:35)                          11.4   17.96 )-----------( 206      ( 17 Sep 2021 06:20 )             34.4     09-17    143  |  109<H>  |  19  ----------------------------<  129<H>  4.4   |  26  |  0.80    Ca    8.6      17 Sep 2021 06:20  Phos  4.4     09-17  Mg     2.2     09-17            MEDICATIONS:  MEDICATIONS  (STANDING):  bictegravir 50 mG/emtricitabine 200 mG/tenofovir alafenamide 25 mG (BIKTARVY) 1 Tablet(s) Oral daily  bisacodyl 5 milliGRAM(s) Oral at bedtime  chlorhexidine 2% Cloths 1 Application(s) Topical daily  dexAMETHasone  Injectable 4 milliGRAM(s) IV Push every 6 hours  diazepam    Tablet 5 milliGRAM(s) Oral every 8 hours  enoxaparin Injectable 40 milliGRAM(s) SubCutaneous at bedtime  glucagon  Injectable 1 milliGRAM(s) IntraMuscular once  influenza   Vaccine 0.5 milliLiter(s) IntraMuscular once  insulin lispro (ADMELOG) corrective regimen sliding scale   SubCutaneous Before meals and at bedtime  methocarbamol 500 milliGRAM(s) Oral every 8 hours  pantoprazole    Tablet 40 milliGRAM(s) Oral before breakfast  polyethylene glycol 3350 17 Gram(s) Oral daily  pregabalin 50 milliGRAM(s) Oral every 8 hours  senna 2 Tablet(s) Oral at bedtime    MEDICATIONS  (PRN):  LORazepam   Injectable 2 milliGRAM(s) IV Push every 1 hour PRN CIWA-Ar score 8 or greater  metoclopramide 10 milliGRAM(s) Oral every 8 hours PRN nausea/vomiting  oxyCODONE    IR 15 milliGRAM(s) Oral every 6 hours PRN Severe Pain (7 - 10)  saline laxative (FLEET) Rectal Enema 1 Enema Rectal once PRN constipation              Past Medical History:  Infection, HIV    Allergies:  No Known Allergies    Home Meds:  Biktarvy oral tablet: 1 tab(s) orally once a day (15 Sep 2021 06:13)        PHYSICAL EXAMINATION: Seen immediately post op  Neuro: Mental status: Alert, oriented x3, mentation clear.  Cranial nerves: REUBEN, face symmetric    Motor: B/L upper extremities full power and sensation.  T8-10 decreased sensation with even further decreased sensation distally (improved from preop exam)  RLE 3+ patellar reflex, LLE 2+ patellar reflex.   Flaccid paraplegia bilaterally; no LE reaction to stimuli.   Decreased rectal tone on ED admission            CVS S1S2 noS3/S4 no M/R/G.  Pulmonary: Clear to bases  Abdomen: Soft, non- tender  Extremities: Intact pulses, warm            CAPILLARY BLOOD GLUCOSE  POCT Blood Glucose.: 158 mg/dL (15 Sep 2021 07:44)    CRP < 3.0      MRI T-L spine:  Diffuse T1 iso to hyperintense signal and heterogeneous T2 signal throughout the spinal canal of the thoracic and lumbar spine which is most compatible with spinal canal hemorrhage, more focal hematoma in the spinal canal at the T8-T9 level on the right which measures 0.7 x 0.6 cm with mass effect on the cord deviated to the left; focal T2 hyperintense/T1 hypointense signal within the ventral portion of the spinal canal from L1 through L5-S1 most likely representing subdural hematoma; diffuse mild to moderate spinal canal stenosis in the lumbar spine secondary to hemorrhage with more prominent spinal canal stenosis at L5-S1 due to the hemorrhage as well as prominence of the epidural fat

## 2021-09-17 NOTE — BH CONSULTATION LIAISON ASSESSMENT NOTE - SUMMARY
RECOMMENDATIONS  -no need for 1:1 observation for suicidality  -emotional support provided. Will return to provide further psychological support while in hospital   -consider trazodone 25mg-50mg po QHS PRN for insomnia  -recommend lorazepam 0.5mg-1mg po Q6H PRN for anxiety/panic symptoms. Would consider discontinuation of long-acting benzodiazepine (Valium) as tolerated  -recommend engagement in outpt psychotherapy - at acute rehab if available. Will provide additional outpt referrals (below)  -no psychiatric barrier to discharge. Please contact with questions. 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 No clear past psychiatric h/o suggestive of a mood d/o. Pt receptive to emotional support and would benefit from ongoing counseling in rehab/as outpt. No evidence of any active SI; pt with strong coping skills and family support, future-oriented and treatment seeking. Would also benefit from additional substance counseling when acute phase of illness passes.    RECOMMENDATIONS  -no need for 1:1 observation for suicidality  -emotional support provided. Will return to provide further psychological support while in hospital   -consider trazodone 25mg-50mg po QHS PRN for insomnia  -recommend lorazepam 0.5mg-1mg po Q6H PRN for anxiety/panic symptoms. Would consider discontinuation of long-acting benzodiazepine (Valium) as tolerated  -recommend engagement in outpt psychotherapy - at acute rehab if available. Will provide additional outpt referrals (below)  -no psychiatric barrier to discharge. Please contact with questions.

## 2021-09-17 NOTE — BH CONSULTATION LIAISON ASSESSMENT NOTE - NSSUICPROTFACT_PSY_ALL_CORE
Identifies reasons for living/Supportive social network of family or friends/Fear of death or the actual act of killing self/Cultural, spiritual and/or moral attitudes against suicide/Engaged in work or school/Ability to cope with stress/Frustration tolerance

## 2021-09-17 NOTE — PROGRESS NOTE ADULT - SUBJECTIVE AND OBJECTIVE BOX
HPI:    36 m PMHx HIV (CD4 700s, VLUD) was at gym this afternoon lifting heavy weights, ie dead lifts; tonight began to develop pain in thoracolumbar region; over past couple of hours he began to develop paresthesias down both legs and buttok. now can barely feel legs and unable to stand or move legs. Was brought in by ambulance after a passerby on the street witnessed him unable to rise from curb.  has not gone to bathroom, but thus far no bladder or bowel incontinence.  + IVDU, most recently today (methamphetamine). No fever/chills.      S/Overnight events:  MARCO overnight. Patient voiced concerns over current condition and stated he is feeling anxious, improved from day shift but still present. Pend psych consult in am.       Hospital Course:   9/15: s/p spinal angiogram (neg) and T8-T9 lami decompression, intraoperative findings of subarachnoid clot s/p evacuation POD # 0 (9/15) dilaudid x 1 for pain. diet advanced.   : POD# 1: s/p spinal angiogram (neg) and T8-T9 lami decompression, intraoperative findings of subarachnoid clot s/p evacuation POD #1 (9/15) dilaudid x 1 for pain. diet advanced.   17: POD#2 s/p spinal angio, POD#2 s/p T8-9 lami decompression.  LE dopplers negative. MRI T/L spine read shows T7/T8 spinal cord edema consistent with infarction, L5-S1 increase in spinal canal stenosis w/ mixed SDH and diffuse SAH. Pend psych consult in am for anxiety/depression. Pend SDU.       Vital Signs Last 24 Hrs  T(C): 36.1 (17 Sep 2021 00:35), Max: 37.4 (16 Sep 2021 18:00)  T(F): 97 (17 Sep 2021 00:35), Max: 99.3 (16 Sep 2021 18:00)  HR: 85 (17 Sep 2021 00:00) (75 - 155)  BP: 121/85 (17 Sep 2021 00:00) (109/65 - 139/75)  BP(mean): 98 (17 Sep 2021 00:00) (81 - 100)  RR: 20 (17 Sep 2021 00:00) (12 - 28)  SpO2: 96% (17 Sep 2021 00:00) (95% - 100%)    I&O's Detail    15 Sep 2021 07:01  -  16 Sep 2021 07:00  --------------------------------------------------------  IN:    IV PiggyBack: 150 mL    Oral Fluid: 1200 mL    sodium chloride 0.9%: 1087.5 mL    Sodium Chloride 0.9% Bolus: 1000 mL  Total IN: 3437.5 mL    OUT:    Drain (mL): 40 mL    Indwelling Catheter - Urethral (mL): 1860 mL  Total OUT: 1900 mL    Total NET: 1537.5 mL      16 Sep 2021 07:01  -  17 Sep 2021 02:20  --------------------------------------------------------  IN:    IV PiggyBack: 50 mL    Oral Fluid: 1760 mL    sodium chloride 0.9%: 412.5 mL  Total IN: 2222.5 mL    OUT:    Drain (mL): 40 mL    Indwelling Catheter - Urethral (mL): 1010 mL    Intermittent Catheterization - Urethral (mL): 1850 mL  Total OUT: 2900 mL    Total NET: -677.5 mL        I&O's Summary    15 Sep 2021 07:  -  16 Sep 2021 07:00  --------------------------------------------------------  IN: 3437.5 mL / OUT: 1900 mL / NET: 1537.5 mL    16 Sep 2021 07:  -  17 Sep 2021 02:20  --------------------------------------------------------  IN: 2222.5 mL / OUT: 2900 mL / NET: -677.5 mL        PHYSICAL EXAM:  General: NAD, pt is comfortably laying in hospital bed, A&O x3, on RA  HEENT: CN II-XII grossly intact, PERRL 3mm, EOMI b/l, face symmetric, tongue midline, neck FROM  Cardiovascular: RRR, normal S1 and S2   Respiratory: lungs CTAB, no wheezing, rhonchi, or crackles   GI: normoactive BS to auscultation, abd soft, NTND   Neuro: no aphasia, speech clear, no dysmetria, no pronator drift  strength 5/5 b/l UE, 0/5 b/l LE   b/l LE flaccid, absent reflexes, no clonus, babinski neg b/l   RLE sensation (especially along L3/L4 dermatome) to light touch diminished compared to LLE   Extremities: R groin site C/D/I, no hematoma. Distal pulses 2+ x4   Incision/Wound: midline thoracic incision C/D/I   Drains: HMV tubing connected to bile bag x1, posterior thoracic region       TUBES/LINES:  [] CVC  [] A-line  [] Lumbar Drain  [] Ventriculostomy  [X] Other - bile bag     DIET:  [] NPO  [X] Mechanical  [] Tube feeds    LABS:                        12.5   20.26 )-----------( 220      ( 16 Sep 2021 05:08 )             36.7     09-16    141  |  107  |  13  ----------------------------<  147<H>  3.6   |  22  |  0.73    Ca    8.9      16 Sep 2021 05:08  Phos  3.0     09-16  Mg     1.8     -16        Urinalysis Basic - ( 15 Sep 2021 07:51 )    Color: Yellow / Appearance: Hazy / S.015 / pH: x  Gluc: x / Ketone: Trace mg/dL  / Bili: Negative / Urobili: 0.2 E.U./dL   Blood: x / Protein: 30 mg/dL / Nitrite: NEGATIVE   Leuk Esterase: NEGATIVE / RBC: 5-10 /HPF / WBC < 5 /HPF   Sq Epi: x / Non Sq Epi: 0-5 /HPF / Bacteria: None /HPF          CAPILLARY BLOOD GLUCOSE      POCT Blood Glucose.: 131 mg/dL (16 Sep 2021 23:40)  POCT Blood Glucose.: 144 mg/dL (16 Sep 2021 17:42)  POCT Blood Glucose.: 157 mg/dL (16 Sep 2021 11:35)  POCT Blood Glucose.: 138 mg/dL (16 Sep 2021 05:32)      Drug Levels: [] N/A    CSF Analysis: [] N/A      Allergies    No Known Allergies    Intolerances      MEDICATIONS:  Antibiotics:  bictegravir 50 mG/emtricitabine 200 mG/tenofovir alafenamide 25 mG (BIKTARVY) 1 Tablet(s) Oral daily    Neuro:  diazepam    Tablet 5 milliGRAM(s) Oral every 8 hours  LORazepam   Injectable 2 milliGRAM(s) IV Push every 1 hour PRN  methocarbamol 500 milliGRAM(s) Oral every 8 hours  oxyCODONE    IR 15 milliGRAM(s) Oral every 6 hours PRN  pregabalin 50 milliGRAM(s) Oral every 8 hours    Anticoagulation:    OTHER:  chlorhexidine 2% Cloths 1 Application(s) Topical daily  dexAMETHasone  Injectable 4 milliGRAM(s) IV Push every 6 hours  glucagon  Injectable 1 milliGRAM(s) IntraMuscular once  influenza   Vaccine 0.5 milliLiter(s) IntraMuscular once  insulin lispro (ADMELOG) corrective regimen sliding scale   SubCutaneous Before meals and at bedtime  metoclopramide 10 milliGRAM(s) Oral every 8 hours PRN  pantoprazole    Tablet 40 milliGRAM(s) Oral before breakfast  polyethylene glycol 3350 17 Gram(s) Oral daily  senna 2 Tablet(s) Oral at bedtime    IVF:    CULTURES:    RADIOLOGY & ADDITIONAL TESTS:  < from: MR Thoracic Spine w/wo IV Cont (21 @ 20:11) >  Impression: Status post bilateral T8 laminectomies with a small postoperative surgical fluid collection at the laminectomy site causing mild mass effect on the spinal cord. Status post evacuation of the previously seen clot within the thecal sac at the T8 level.    Abnormal T2 signal seen within the central spinal cord from the T7/T8 through the T9 level consistent with spinal cord infarction.    < from: MR Lumbar Spine w/wo IV Cont (21 @ 20:11) >  Impression: Interval appearance of pockets of lumbar subdural mixed hemorrhage. Diffuse subarachnoid hemorrhage. Interval increase in spinal canal stenosis to moderate to severe most prominent at the L5-S1 level.      ASSESSMENT:  36 M PMHx HIV (CD4 700s, VLUD on biktarvy), hx of IVDU and methamphetamine use, was at gym this afternoon lifting heavy weights, ie dead lifts; tonight began to develop pain in thoracolumbar region; over past couple of hours he began to develop paresthesias down both legs and buttock, then progressed to complete plegia of bilateral extremities with loss of sensation from T8 dermatomes and below found on MRI to have intradural/extramedullary lesion with mass effect on spinal cord displacing it to the left, now s/p decadron load and s/p spinal angiogram (neg) and T8-T9 lami decompression, intraoperative findings of subarachnoid clot s/p evacuation (9/15)       FLACCID PARALYSIS OF LEG    Handoff    MEWS Score    Infection, HIV    Subdural hematoma    Subdural hematoma    Flaccid paralysis of legs    Spinal cord compression    HIV disease    Subdural hematoma    Angiogram, spine, selective    Lumbar laminectomy    NUMBNESS    Spinal cord compression    [email or web address]        PLAN:   HPI:    36 m PMHx HIV (CD4 700s, VLUD) was at gym this afternoon lifting heavy weights, ie dead lifts; tonight began to develop pain in thoracolumbar region; over past couple of hours he began to develop paresthesias down both legs and buttok. now can barely feel legs and unable to stand or move legs. Was brought in by ambulance after a passerby on the street witnessed him unable to rise from curb.  has not gone to bathroom, but thus far no bladder or bowel incontinence.  + IVDU, most recently today (methamphetamine). No fever/chills.      S/Overnight events:  [facility] overnight. Patient voiced concerns over current condition and stated he is feeling anxious, improved from day shift but still present. Pend psych consult in am.       Hospital Course:   9/15: s/p spinal angiogram (neg) and T8-T9 lami decompression, intraoperative findings of subarachnoid clot s/p evacuation POD # 0 (9/15) dilaudid x 1 for pain. diet advanced.   : POD# 1: s/p spinal angiogram (neg) and T8-T9 lami decompression, intraoperative findings of subarachnoid clot s/p evacuation POD #1 (9/15) dilaudid x 1 for pain. diet advanced.   17: POD#2 s/p spinal angio, POD#2 s/p T8-9 lami decompression.  LE dopplers negative. MRI T/L spine read shows T7/T8 spinal cord edema consistent with infarction, L5-S1 increase in spinal canal stenosis w/ mixed SDH and diffuse SAH. Pend psych consult in am for anxiety/depression. Pend SDU.       Vital Signs Last 24 Hrs  T(C): 36.1 (17 Sep 2021 00:35), Max: 37.4 (16 Sep 2021 18:00)  T(F): 97 (17 Sep 2021 00:35), Max: 99.3 (16 Sep 2021 18:00)  HR: 85 (17 Sep 2021 00:00) (75 - 155)  BP: 121/85 (17 Sep 2021 00:00) (109/65 - 139/75)  BP(mean): 98 (17 Sep 2021 00:00) (81 - 100)  RR: 20 (17 Sep 2021 00:00) (12 - 28)  SpO2: 96% (17 Sep 2021 00:00) (95% - 100%)    I&O's Detail    15 Sep 2021 07:01  -  16 Sep 2021 07:00  --------------------------------------------------------  IN:    IV PiggyBack: 150 mL    Oral Fluid: 1200 mL    sodium chloride 0.9%: 1087.5 mL    Sodium Chloride 0.9% Bolus: 1000 mL  Total IN: 3437.5 mL    OUT:    Drain (mL): 40 mL    Indwelling Catheter - Urethral (mL): 1860 mL  Total OUT: 1900 mL    Total NET: 1537.5 mL      16 Sep 2021 07:01  -  17 Sep 2021 02:20  --------------------------------------------------------  IN:    IV PiggyBack: 50 mL    Oral Fluid: 1760 mL    sodium chloride 0.9%: 412.5 mL  Total IN: 2222.5 mL    OUT:    Drain (mL): 40 mL    Indwelling Catheter - Urethral (mL): 1010 mL    Intermittent Catheterization - Urethral (mL): 1850 mL  Total OUT: 2900 mL    Total NET: -677.5 mL        I&O's Summary    15 Sep 2021 07:  -  16 Sep 2021 07:00  --------------------------------------------------------  IN: 3437.5 mL / OUT: 1900 mL / NET: 1537.5 mL    16 Sep 2021 07:  -  17 Sep 2021 02:20  --------------------------------------------------------  IN: 2222.5 mL / OUT: 2900 mL / NET: -677.5 mL        PHYSICAL EXAM:  General: NAD, pt is comfortably laying in hospital bed, A&O x3, on RA  HEENT: CN II-XII grossly intact, PERRL 3mm, EOMI b/l, face symmetric, tongue midline, neck FROM  Cardiovascular: RRR, normal S1 and S2   Respiratory: lungs CTAB, no wheezing, rhonchi, or crackles   GI: normoactive BS to auscultation, abd soft, NTND   Neuro: no aphasia, speech clear, no dysmetria, no pronator drift  strength 5/5 b/l UE, 0/5 b/l LE   b/l LE flaccid, absent reflexes, no clonus, babinski neg b/l   RLE sensation (especially along L3/L4 dermatome) to light touch diminished compared to LLE   Extremities: R groin site C/D/I, no hematoma. Distal pulses 2+ x4   Incision/Wound: midline thoracic incision C/D/I   Drains: HMV tubing connected to bile bag x1, posterior thoracic region       TUBES/LINES:  [] CVC  [] A-line  [] Lumbar Drain  [] Ventriculostomy  [X] Other - bile bag     DIET:  [] NPO  [X] Mechanical  [] Tube feeds    LABS:                        12.5   20.26 )-----------( 220      ( 16 Sep 2021 05:08 )             36.7     09-16    141  |  107  |  13  ----------------------------<  147<H>  3.6   |  22  |  0.73    Ca    8.9      16 Sep 2021 05:08  Phos  3.0     09-16  Mg     1.8     -16        Urinalysis Basic - ( 15 Sep 2021 07:51 )    Color: Yellow / Appearance: Hazy / S.015 / pH: x  Gluc: x / Ketone: Trace mg/dL  / Bili: Negative / Urobili: 0.2 E.U./dL   Blood: x / Protein: 30 mg/dL / Nitrite: NEGATIVE   Leuk Esterase: NEGATIVE / RBC: 5-10 /HPF / WBC < 5 /HPF   Sq Epi: x / Non Sq Epi: 0-5 /HPF / Bacteria: None /HPF          CAPILLARY BLOOD GLUCOSE      POCT Blood Glucose.: 131 mg/dL (16 Sep 2021 23:40)  POCT Blood Glucose.: 144 mg/dL (16 Sep 2021 17:42)  POCT Blood Glucose.: 157 mg/dL (16 Sep 2021 11:35)  POCT Blood Glucose.: 138 mg/dL (16 Sep 2021 05:32)      Drug Levels: [] N/A    CSF Analysis: [] N/A      Allergies    No Known Allergies    Intolerances      MEDICATIONS:  Antibiotics:  bictegravir 50 mG/emtricitabine 200 mG/tenofovir alafenamide 25 mG (BIKTARVY) 1 Tablet(s) Oral daily    Neuro:  diazepam    Tablet 5 milliGRAM(s) Oral every 8 hours  LORazepam   Injectable 2 milliGRAM(s) IV Push every 1 hour PRN  methocarbamol 500 milliGRAM(s) Oral every 8 hours  oxyCODONE    IR 15 milliGRAM(s) Oral every 6 hours PRN  pregabalin 50 milliGRAM(s) Oral every 8 hours    Anticoagulation:    OTHER:  chlorhexidine 2% Cloths 1 Application(s) Topical daily  dexAMETHasone  Injectable 4 milliGRAM(s) IV Push every 6 hours  glucagon  Injectable 1 milliGRAM(s) IntraMuscular once  influenza   Vaccine 0.5 milliLiter(s) IntraMuscular once  insulin lispro (ADMELOG) corrective regimen sliding scale   SubCutaneous Before meals and at bedtime  metoclopramide 10 milliGRAM(s) Oral every 8 hours PRN  pantoprazole    Tablet 40 milliGRAM(s) Oral before breakfast  polyethylene glycol 3350 17 Gram(s) Oral daily  senna 2 Tablet(s) Oral at bedtime    IVF:    CULTURES:    RADIOLOGY & ADDITIONAL TESTS:  < from: MR Thoracic Spine w/wo IV Cont (21 @ 20:11) >  Impression: Status post bilateral T8 laminectomies with a small postoperative surgical fluid collection at the laminectomy site causing mild mass effect on the spinal cord. Status post evacuation of the previously seen clot within the thecal sac at the T8 level.    Abnormal T2 signal seen within the central spinal cord from the T7/T8 through the T9 level consistent with spinal cord infarction.    < from: MR Lumbar Spine w/wo IV Cont (21 @ 20:11) >  Impression: Interval appearance of pockets of lumbar subdural mixed hemorrhage. Diffuse subarachnoid hemorrhage. Interval increase in spinal canal stenosis to moderate to severe most prominent at the L5-S1 level.      ASSESSMENT:  36 M PMHx HIV (CD4 700s, VLUD on biktarvy), hx of IVDU and methamphetamine use, was at gym this afternoon lifting heavy weights, ie dead lifts; tonight began to develop pain in thoracolumbar region; over past couple of hours he began to develop paresthesias down both legs and buttock, then progressed to complete plegia of bilateral extremities with loss of sensation from T8 dermatomes and below found on MRI to have intradural/extramedullary lesion with mass effect on spinal cord displacing it to the left, now s/p decadron load and s/p spinal angiogram (neg) and T8-T9 lami decompression, intraoperative findings of subarachnoid clot s/p evacuation (9/15)       FLACCID PARALYSIS OF LEG    Handoff    MEWS Score    Infection, HIV    Subdural hematoma    Subdural hematoma    Flaccid paralysis of legs    Spinal cord compression    HIV disease    Subdural hematoma    Angiogram, spine, selective    Lumbar laminectomy    NUMBNESS    Spinal cord compression    [email or web address]        PLAN:  Neuro  - neuro and vitals checks q4hrs, pend SDU   - higher map goal is not pursued due to findings of spinal SAH, will keep pt normotensive   - MRI post-op completed    - decadron taper 4q6   - SQL in 48 hours   - PT/OT   - monitor bile bag output     Cardio  - SBP < 140   - EKG WNL     Pulm  - RA   - encourage incentive spirometry use     GI   - ADAT   - bowel regimen   - protonix while on Decadron     /renal  - failed TOV on , straight cath q6hrs PRN   - NS @ 75   - baseline labs     Heme  - SCDs   - SQL after 48 hours ()     Endo  - ISS while on decadron   - baseline a1c 5.0      GOC: full code   Level of care: SDU pend  Dispo: pt /ot pend assessment     Assessment and plan discussed w/ Dr. D'amico

## 2021-09-18 LAB
ANION GAP SERPL CALC-SCNC: 7 MMOL/L — SIGNIFICANT CHANGE UP (ref 5–17)
BUN SERPL-MCNC: 17 MG/DL — SIGNIFICANT CHANGE UP (ref 7–23)
CALCIUM SERPL-MCNC: 9.3 MG/DL — SIGNIFICANT CHANGE UP (ref 8.4–10.5)
CHLORIDE SERPL-SCNC: 104 MMOL/L — SIGNIFICANT CHANGE UP (ref 96–108)
CO2 SERPL-SCNC: 28 MMOL/L — SIGNIFICANT CHANGE UP (ref 22–31)
CREAT SERPL-MCNC: 0.79 MG/DL — SIGNIFICANT CHANGE UP (ref 0.5–1.3)
GLUCOSE SERPL-MCNC: 121 MG/DL — HIGH (ref 70–99)
HCT VFR BLD CALC: 38.1 % — LOW (ref 39–50)
HGB BLD-MCNC: 12.9 G/DL — LOW (ref 13–17)
MAGNESIUM SERPL-MCNC: 2.1 MG/DL — SIGNIFICANT CHANGE UP (ref 1.6–2.6)
MCHC RBC-ENTMCNC: 32.5 PG — SIGNIFICANT CHANGE UP (ref 27–34)
MCHC RBC-ENTMCNC: 33.9 GM/DL — SIGNIFICANT CHANGE UP (ref 32–36)
MCV RBC AUTO: 96 FL — SIGNIFICANT CHANGE UP (ref 80–100)
NRBC # BLD: 0 /100 WBCS — SIGNIFICANT CHANGE UP (ref 0–0)
PHOSPHATE SERPL-MCNC: 4 MG/DL — SIGNIFICANT CHANGE UP (ref 2.5–4.5)
PLATELET # BLD AUTO: 212 K/UL — SIGNIFICANT CHANGE UP (ref 150–400)
POTASSIUM SERPL-MCNC: 4.6 MMOL/L — SIGNIFICANT CHANGE UP (ref 3.5–5.3)
POTASSIUM SERPL-SCNC: 4.6 MMOL/L — SIGNIFICANT CHANGE UP (ref 3.5–5.3)
RBC # BLD: 3.97 M/UL — LOW (ref 4.2–5.8)
RBC # FLD: 12.4 % — SIGNIFICANT CHANGE UP (ref 10.3–14.5)
SODIUM SERPL-SCNC: 139 MMOL/L — SIGNIFICANT CHANGE UP (ref 135–145)
WBC # BLD: 12.59 K/UL — HIGH (ref 3.8–10.5)
WBC # FLD AUTO: 12.59 K/UL — HIGH (ref 3.8–10.5)

## 2021-09-18 RX ADMIN — METHOCARBAMOL 500 MILLIGRAM(S): 500 TABLET, FILM COATED ORAL at 21:39

## 2021-09-18 RX ADMIN — OXYCODONE HYDROCHLORIDE 15 MILLIGRAM(S): 5 TABLET ORAL at 02:00

## 2021-09-18 RX ADMIN — Medication 4 MILLIGRAM(S): at 06:58

## 2021-09-18 RX ADMIN — BICTEGRAVIR SODIUM, EMTRICITABINE, AND TENOFOVIR ALAFENAMIDE FUMARATE 1 TABLET(S): 30; 120; 15 TABLET ORAL at 12:20

## 2021-09-18 RX ADMIN — Medication 4 MILLIGRAM(S): at 21:40

## 2021-09-18 RX ADMIN — Medication 5 MILLIGRAM(S): at 21:39

## 2021-09-18 RX ADMIN — OXYCODONE HYDROCHLORIDE 15 MILLIGRAM(S): 5 TABLET ORAL at 01:11

## 2021-09-18 RX ADMIN — ENOXAPARIN SODIUM 40 MILLIGRAM(S): 100 INJECTION SUBCUTANEOUS at 21:40

## 2021-09-18 RX ADMIN — Medication 4 MILLIGRAM(S): at 01:11

## 2021-09-18 RX ADMIN — METHOCARBAMOL 500 MILLIGRAM(S): 500 TABLET, FILM COATED ORAL at 06:58

## 2021-09-18 RX ADMIN — PANTOPRAZOLE SODIUM 40 MILLIGRAM(S): 20 TABLET, DELAYED RELEASE ORAL at 06:58

## 2021-09-18 RX ADMIN — Medication 50 MILLIGRAM(S): at 06:58

## 2021-09-18 RX ADMIN — Medication 4 MILLIGRAM(S): at 12:19

## 2021-09-18 RX ADMIN — SENNA PLUS 2 TABLET(S): 8.6 TABLET ORAL at 21:40

## 2021-09-18 RX ADMIN — Medication 50 MILLIGRAM(S): at 21:39

## 2021-09-18 RX ADMIN — POLYETHYLENE GLYCOL 3350 17 GRAM(S): 17 POWDER, FOR SOLUTION ORAL at 12:20

## 2021-09-18 RX ADMIN — Medication 50 MILLIGRAM(S): at 12:20

## 2021-09-18 NOTE — PROGRESS NOTE ADULT - SUBJECTIVE AND OBJECTIVE BOX
HPI:    36 m PMHx HIV (CD4 700s, VLUD) was at gym this afternoon lifting heavy weights, ie dead lifts; tonight began to develop pain in thoracolumbar region; over past couple of hours he began to develop paresthesias down both legs and buttok. now can barely feel legs and unable to stand or move legs. Was brought in by ambulance after a passerby on the street witnessed him unable to rise from curb. Has not gone to bathroom, but thus far no bladder or bowel incontinence.  + IVDU, most recently today (methamphetamine). No fever/chills.    Hospital Course:   9/15: s/p spinal angiogram (neg) and T8-T9 lami decompression, intraoperative findings of subarachnoid clot s/p evacuation POD # 0 (9/15) dilaudid x 1 for pain. diet advanced.   9/16: POD# 1: s/p spinal angiogram (neg) and T8-T9 lami decompression, intraoperative findings of subarachnoid clot s/p evacuation POD #1 (9/15) dilaudid x 1 for pain. diet advanced.   9/17: POD#2 s/p spinal angio, POD#2 s/p T8-9 lami decompression.  LE dopplers negative. MRI T/L spine read shows T7/T8 spinal cord edema consistent with infarction, L5-S1 increase in spinal canal stenosis w/ mixed SDH and diffuse SAH. Pend psych consult in am for anxiety/depression. Pend SDU.   9/18: POD 3. T8-9 lami/decompression. Psych eval suggests adjustment d/o and outpatient follow up     Vital Signs Last 24 Hrs  T(C): 36.6 (17 Sep 2021 21:08), Max: 36.8 (17 Sep 2021 08:48)  T(F): 97.8 (17 Sep 2021 21:08), Max: 98.2 (17 Sep 2021 08:48)  HR: 68 (17 Sep 2021 21:08) (68 - 95)  BP: 101/65 (17 Sep 2021 21:08) (101/65 - 148/84)  BP(mean): 82 (17 Sep 2021 14:00) (78 - 110)  RR: 16 (17 Sep 2021 21:08) (16 - 26)  SpO2: 96% (17 Sep 2021 21:08) (95% - 100%)    I&O's Detail    16 Sep 2021 07:01  -  17 Sep 2021 07:00  --------------------------------------------------------  IN:    IV PiggyBack: 50 mL    Oral Fluid: 1760 mL    sodium chloride 0.9%: 412.5 mL  Total IN: 2222.5 mL    OUT:    Drain (mL): 60 mL    Indwelling Catheter - Urethral (mL): 1010 mL    Intermittent Catheterization - Urethral (mL): 1850 mL  Total OUT: 2920 mL    Total NET: -697.5 mL      17 Sep 2021 07:01  -  18 Sep 2021 01:54  --------------------------------------------------------  IN:    Oral Fluid: 550 mL  Total IN: 550 mL    OUT:    Drain (mL): 15 mL    Intermittent Catheterization - Urethral (mL): 3000 mL    Voided (mL): 0 mL  Total OUT: 3015 mL    Total NET: -2465 mL        I&O's Summary    16 Sep 2021 07:01  -  17 Sep 2021 07:00  --------------------------------------------------------  IN: 2222.5 mL / OUT: 2920 mL / NET: -697.5 mL    17 Sep 2021 07:01  -  18 Sep 2021 01:54  --------------------------------------------------------  IN: 550 mL / OUT: 3015 mL / NET: -2465 mL        PHYSICAL EXAM:  General: NAD, pt is comfortably laying in hospital bed, A&O x3, on RA  HEENT: CN II-XII grossly intact, PERRL 3mm, EOMI b/l, face symmetric, tongue midline, neck FROM  Cardiovascular: RRR, normal S1 and S2   Respiratory: lungs CTAB, no wheezing, rhonchi, or crackles   GI: normoactive BS to auscultation, abd soft, NTND   Neuro: no aphasia, speech clear, no dysmetria, no pronator drift  strength 5/5 b/l UE, 0/5 b/l LE   b/l LE flaccid, absent reflexes, no clonus, babinski neg b/l   RLE sensation (especially along L3/L4 dermatome) to light touch diminished compared to LLE   Extremities: R groin site C/D/I, no hematoma. Distal pulses 2+ x4   Incision/Wound: midline thoracic incision C/D/I     TUBES/LINES:  [] CVC  [] A-line  [] Lumbar Drain  [] Ventriculostomy    DIET:  [] NPO  [X] Mechanical  [] Tube feeds      LABS:                        11.4   17.96 )-----------( 206      ( 17 Sep 2021 06:20 )             34.4     09-17    143  |  109<H>  |  19  ----------------------------<  129<H>  4.4   |  26  |  0.80    Ca    8.6      17 Sep 2021 06:20  Phos  4.4     09-17  Mg     2.2     09-17        CAPILLARY BLOOD GLUCOSE      POCT Blood Glucose.: 129 mg/dL (17 Sep 2021 05:50)      Drug Levels: [] N/A    CSF Analysis: [] N/A      Allergies    No Known Allergies    Intolerances      MEDICATIONS:  Antibiotics:  bictegravir 50 mG/emtricitabine 200 mG/tenofovir alafenamide 25 mG (BIKTARVY) 1 Tablet(s) Oral daily    Neuro:  LORazepam     Tablet 0.5 milliGRAM(s) Oral every 6 hours PRN  LORazepam   Injectable 2 milliGRAM(s) IV Push every 1 hour PRN  methocarbamol 500 milliGRAM(s) Oral every 8 hours  oxyCODONE    IR 15 milliGRAM(s) Oral every 6 hours PRN  oxyCODONE    IR 5 milliGRAM(s) Oral every 4 hours PRN  pregabalin 50 milliGRAM(s) Oral every 8 hours  traZODone 50 milliGRAM(s) Oral at bedtime PRN    Anticoagulation:   enoxaparin Injectable 40 milliGRAM(s) SubCutaneous at bedtime    OTHER:   bisacodyl 5 milliGRAM(s) Oral at bedtime  chlorhexidine 2% Cloths 1 Application(s) Topical daily  dexAMETHasone     Tablet   Oral   dexAMETHasone     Tablet 4 milliGRAM(s) Oral every 6 hours  dexAMETHasone     Tablet 4 milliGRAM(s) Oral every 8 hours  influenza   Vaccine 0.5 milliLiter(s) IntraMuscular once  metoclopramide 10 milliGRAM(s) Oral every 8 hours PRN  pantoprazole    Tablet 40 milliGRAM(s) Oral before breakfast  polyethylene glycol 3350 17 Gram(s) Oral daily  saline laxative (FLEET) Rectal Enema 1 Enema Rectal once PRN  senna 2 Tablet(s) Oral at bedtime    IVF:     CULTURES:     RADIOLOGY & ADDITIONAL TESTS:   < from: MR Thoracic Spine w/wo IV Cont (09.16.21 @ 20:11) >  Impression: Status post bilateral T8 laminectomies with a small postoperative surgical fluid collection at the laminectomy site causing mild mass effect on the spinal cord. Status post evacuation of the previously seen clot within the thecal sac at the T8 level.    Abnormal T2 signal seen within the central spinal cord from the T7/T8 through the T9 level consistent with spinal cord infarction.    < from: MR Lumbar Spine w/wo IV Cont (09.16.21 @ 20:11) >  Impression: Interval appearance of pockets of lumbar subdural mixed hemorrhage. Diffuse subarachnoid hemorrhage. Interval increase in spinal canal stenosis to moderate to severe most prominent at the L5-S1 level.    ASSESSMENT:   36 M PMHx HIV (CD4 700s, VLUD on biktarvy), hx of IVDU and methamphetamine use, was at gym this afternoon lifting heavy weights, ie dead lifts; tonight began to develop pain in thoracolumbar region; over past couple of hours he began to develop paresthesias down both legs and buttock, then progressed to complete plegia of bilateral extremities with loss of sensation from T8 dermatomes and below found on MRI to have intradural/extramedullary lesion with mass effect on spinal cord displacing it to the left, now s/p decadron load and s/p spinal angiogram (neg) and T8-T9 lami decompression, intraoperative findings of subarachnoid clot s/p evacuation (9/15).     PLAN:   Neuro  - neuro and vitals checks q4hrs, pend SDU   - higher map goal is not pursued due to findings of spinal SAH, will keep pt normotensive   - MRI post-op completed 9/16   - decadron taper 4q6 to off  - PT/OT   - bile bag d/c'ed 9/17    PSYCH: adjustment d/o   -     Cardio  - SBP < 140   - EKG WNL     Pulm  - RA   - encourage incentive spirometry use     GI   - regular   - bowel regimen   - protonix while on Decadron     /renal  - failed TOV on 9/16, straight cath q6hrs PRN   - baseline labs     Heme  - SCDs, SQL  -LE dopplers negative 9/17    Endo  - ISS while on decadron   - baseline a1c 5.0      GOC: full code   Level of care: SDU pend  Dispo: pt /ot pend assessment     Assessment and plan discussed w/ Dr. D'amico

## 2021-09-18 NOTE — CONSULT NOTE ADULT - SUBJECTIVE AND OBJECTIVE BOX
Patient is a 36y old  Male who presents with a chief complaint of Paraplegia (17 Sep 2021 07:10)       HPI:  CHIEF COMPLAINT/ REASON FOR CONSULTATION: possible spinal cord compression      HPI: 36 m PMHx HIV (CD4 700s, VLUD) was at gym this afternoon lifting heavy weights, ie dead lifts; tonight began to develop pain in thoracolumbar region; over past couple of hours he began to develop paresthesias down both legs and buttok. now can barely feel legs and unable to stand or move legs. Was brought in by ambulance after a passerby on the street witnessed him unable to rise from curb.  has not gone to bathroom, but thus far no bladder or bowel incontinence.  + IVDU, most recently today (methamphetamine).  no fever/chills.    PAST MEDICAL HISTORY   Infection, HIV      PAST SURGICAL HISTORY denies         MEDICATIONS:  Antibiotics:    Neuro:    Anticoagulation:    Other:  dexAMETHasone  IVPB 10 milliGRAM(s) IV Intermittent Once        FAMILY HISTORY:          PAST MEDICAL & SURGICAL HISTORY:  Infection, HIV        MEDICATIONS  (STANDING):  bictegravir 50 mG/emtricitabine 200 mG/tenofovir alafenamide 25 mG (BIKTARVY) 1 Tablet(s) Oral daily  bisacodyl 5 milliGRAM(s) Oral at bedtime  chlorhexidine 2% Cloths 1 Application(s) Topical daily  dexAMETHasone     Tablet   Oral   dexAMETHasone     Tablet 4 milliGRAM(s) Oral every 6 hours  dexAMETHasone     Tablet 4 milliGRAM(s) Oral every 8 hours  enoxaparin Injectable 40 milliGRAM(s) SubCutaneous at bedtime  influenza   Vaccine 0.5 milliLiter(s) IntraMuscular once  methocarbamol 500 milliGRAM(s) Oral every 8 hours  pantoprazole    Tablet 40 milliGRAM(s) Oral before breakfast  polyethylene glycol 3350 17 Gram(s) Oral daily  pregabalin 50 milliGRAM(s) Oral every 8 hours  senna 2 Tablet(s) Oral at bedtime    MEDICATIONS  (PRN):  LORazepam     Tablet 0.5 milliGRAM(s) Oral every 6 hours PRN Agitation  LORazepam   Injectable 2 milliGRAM(s) IV Push every 1 hour PRN CIWA-Ar score 8 or greater  metoclopramide 10 milliGRAM(s) Oral every 8 hours PRN nausea/vomiting  oxyCODONE    IR 15 milliGRAM(s) Oral every 6 hours PRN Severe Pain (7 - 10)  oxyCODONE    IR 5 milliGRAM(s) Oral every 4 hours PRN Moderate Pain (4 - 6)  saline laxative (FLEET) Rectal Enema 1 Enema Rectal once PRN constipation  traZODone 50 milliGRAM(s) Oral at bedtime PRN insomnia        Social History:                -  Home Living Status:  lives with his father in an elevator accessible apartment building         -  Prior Home Care Services:   none         -  Family support: family         -  Occupation:     Baseline Functional Level Prior to Admission:             - ADL's/ IADL's:  fully independent         - ambulatory status PTA:  walked without assistive devices    FAMILY HISTORY:      CBC Full  -  ( 17 Sep 2021 06:20 )  WBC Count : 17.96 K/uL  RBC Count : 3.56 M/uL  Hemoglobin : 11.4 g/dL  Hematocrit : 34.4 %  Platelet Count - Automated : 206 K/uL  Mean Cell Volume : 96.6 fl  Mean Cell Hemoglobin : 32.0 pg  Mean Cell Hemoglobin Concentration : 33.1 gm/dL  Auto Neutrophil # : x  Auto Lymphocyte # : x  Auto Monocyte # : x  Auto Eosinophil # : x  Auto Basophil # : x  Auto Neutrophil % : x  Auto Lymphocyte % : x  Auto Monocyte % : x  Auto Eosinophil % : x  Auto Basophil % : x      09-17    143  |  109<H>  |  19  ----------------------------<  129<H>  4.4   |  26  |  0.80    Ca    8.6      17 Sep 2021 06:20  Phos  4.4     09-17  Mg     2.2     09-17              Radiology:    < from: MR Thoracic Spine w/wo IV Cont (09.16.21 @ 20:11) >  EXAM:  MR SPINE THORACIC WAW IC                          PROCEDURE DATE:  09/16/2021          INTERPRETATION:  Flaccid paralysis of the leg.    Technique: MRI of the thoracic spine was performed utilizing sagittal  T1, axial and sagittal T2-weighted and axial gradient echo images as well as sagittal STIR images. Following the administration of 7.5 cc of Gadavist, sagittal and axial fat-saturated T1-weighted images were obtained.    Comparison is made to a prior MRI of the thoracic spine performed on 5/15/2021.    The patient is now status post bilateral laminectomies at the T8 level. There is a small epidural fluid collection of fluid at the laminectomy site measuring approximately 4 cm cranial caudal x 1.4 cm wide x 1.3 cm AP and causing effacement of the posterior spinal cord from the T7/T8 through the superior endplate of T9 levels. There has been evacuation of the previously seen intradural extra medullary right-sided hemorrhage at the T8 level.    The previously seen focus of T2 signal hyperintensity within the spinal cord has progressed and now there is abnormal T2 signal hyperintensity ("snake eyes") within the central spinal cord from the inferior endplate of T7 through the mid T9 vertebral body highly suspicious for spinal cord infarction. Again noted is diffuse subarachnoid hemorrhage within the thoracic thecal sac    Findings: There is normal curvature to the thoracic spine. The vertebral bodies are of normal height and configuration. The intervertebral discs spaces are   within normal limits. Evaluation of the spinal cord demonstrates no evidence of abnormal signal changes.    Evaluation of the individual levels demonstrates no evidence of a focal disc herniation or spinal cord compression.    Impression: Status post bilateral T8 laminectomies with a small postoperative surgical fluid collection at the laminectomy site causing mild mass effect on the spinal cord. Status post evacuation of the previously seen clot within the thecal sac at the T8 level.    Abnormal T2 signal seen within the central spinal cord from the T7/T8 through the T9 level consistent with spinal cord infarction.        < from: MR Thoracic Spine w/wo IV Cont (09.15.21 @ 03:35) >    EXAM:  MR SPINE THORACIC WAW IC                          PROCEDURE DATE:  09/15/2021          INTERPRETATION:  Sly MANNING MD, have reviewed the images and the report and agree with the findings, with the following modifications:    IV contrast: 7.5 mL IV Gadavist.    COMPARISON: CT thoracic spine of the same day    This examination is being interpreted concurrently with the MRI lumbar spine of the same day.    There is diffuse T1 iso to hyperintense signal and heterogeneous T2 signal throughout the spinal canal of the thoracic and lumbar spine which is most compatible with spinal canal hemorrhage. There is more focal hematoma in the spinal canal at the T8-T9 level on the right which measures 0.7 x 0.6 cm with mass effect on the cord which is deviated to the left.    There is focal T2 hyperintense/T1 hypointense signal within the ventral portion of the spinal canal from L1 through L5-S1 most likely representing subdural hematoma. There is diffuse mild to moderate spinal canal stenosis in the lumbar spine secondary to hemorrhage with more prominent spinal canal stenosis at L5-S1 due to the hemorrhage as well as prominence of the epidural fat.    There is presacral heterogeneous fluid which may represent edema and/or hemorrhage.    Findings were discussed with Dr. Damico by Dr. Sly Moreno on 9/15/2021 7:32 AM    ******PRELIMINARY REPORT******        PROCEDURE INFORMATION:  Exam: MR Thoracic Spine Without and With Contrast  Exam date and time: 9/15/2021 3:06 AM  Age: 36 years old  Clinical indication: Weakness; Patient HX: Concern for cord compression. Acute flaccid paralysis le    TECHNIQUE:  Imaging protocol: Multiplanar magnetic resonance images of the thoracic spine without and with contrast.    COMPARISON:  No relevant prior studies available.    FINDINGS:  Vertebrae: Unremarkable.  Spinal cord: Ill-defined intradural extramedullary 0.9 x 0.6 cm lesion with faint associated  enhancement at the level of T8. The lesion exerts mass effect on the cord displacing it to the left. There is minimal associated cord edema.  Discs/Spinal canal/Neural foramina: No significant disc disease. No significant spinal canal stenosis.  Soft tissues: Unremarkable.    IMPRESSION:    Ill-defined intradural extramedullary 0.9 x 0.6 cm lesion with faint associated enhancement at the level of T8. The lesion exerts mass effect on the cord displacing it to the left. There is minimal associated cord edema.            Vital Signs Last 24 Hrs  T(C): 36.6 (18 Sep 2021 06:36), Max: 36.8 (17 Sep 2021 08:48)  T(F): 97.9 (18 Sep 2021 06:36), Max: 98.2 (17 Sep 2021 08:48)  HR: 65 (18 Sep 2021 06:36) (64 - 95)  BP: 102/68 (18 Sep 2021 06:36) (101/65 - 148/84)  BP(mean): 82 (17 Sep 2021 14:00) (82 - 110)  RR: 16 (18 Sep 2021 06:36) (16 - 26)  SpO2: 95% (18 Sep 2021 06:36) (94% - 100%)        REVIEW OF SYSTEMS:    CONSTITUTIONAL: No fever, weight loss, or fatigue  EYES: No eye pain, visual disturbances, or discharge  ENMT:  No difficulty hearing, tinnitus, vertigo; No sinus or throat pain  NECK: No pain or stiffness  BREASTS: No pain, masses, or nipple discharge  RESPIRATORY: No cough, wheezing, chills or hemoptysis; No shortness of breath  CARDIOVASCULAR: No chest pain, palpitations, dizziness, or leg swelling  GASTROINTESTINAL: No abdominal or epigastric pain. No nausea, vomiting, or hematemesis; No diarrhea or constipation. No melena or hematochezia.  GENITOURINARY: No dysuria, frequency, hematuria, or incontinence  NEUROLOGICAL: as per HPI  SKIN: No itching, burning, rashes, or lesions   LYMPH NODES: No enlarged glands  ENDOCRINE: No heat or cold intolerance; No hair loss  MUSCULOSKELETAL: No joint pain or swelling; No muscle, back, or extremity pain  PSYCHIATRIC: No depression, anxiety, mood swings, or difficulty sleeping  HEME/LYMPH: No easy bruising, or bleeding gums  ALLERGY AND IMMUNOLOGIC: No hives or eczema  VASCULAR: no swelling, erythema,   : no dysuria, hematuria, frequency        Physical Exam:  WDWN 37 yo  gentleman lying in semi Rogers's position, awake/alert, reports feeling of the left thigh is less    Head: normocephalic, atraumatic    Eyes: PERRLA, EOMI, no nystagmus, sclera anicteric    ENT: nasal discharge, uvula midline, no oropharyngeal erythema/exudate    Neck: supple, negative JVD, negative carotid bruits, no thyromegaly    Chest: CTA bilaterally, neg wheeze/ rhonchi/ rales/ crackles/ egophany    Cardiovascular: regular rate and rhythm, neg murmurs/rubs/gallops    Abdomen: soft, non distended, non tender to palpation in all 4 quadrants, negative rebound/guarding, normal bowel sounds    Extremities: WWP, neg cyanosis/clubbing/edema, negative calf tenderness to palpation, negative Nahomi's sign    Spine: dressing c/d/i    Rectal: flaccid tone    Neurologic Exam:    Alert and oriented to person, place, date/year, speech fluent w/o dysarthria, follows commands, recent and remote memory intact, repetition intact, comprehension intact,  attention/concentration intact, fund of knowledge appropriate    Cranial Nerves:     II:                         pupils equal, round and reactive to light, visual fields intact   III/ IV/VI:             extraocular movements intact, neg nystagmus, neg ptosis  V:                        facial sensation intact, V1-3 normal  VII:                      face symmetric, no droop, normal eye closure and smile  VIII:                     hearing intact to finger rub bilaterally  IX and X:             no hoarseness, gag intact, palate/ uvula rise symmetrically  XI:                       SCM/ trapezius strength intact bilateral  XII:                      no tongue deviation    Motor Exam:    Right UE:            : 5/5                             wrist extensors/ flexors: 5/5                             biceps:   5/5                             triceps:  5/5                             deltoid:  5/5                             pronator drift: neg    Left UE:              :  5/5                             wrist extensors/ flexors:  5/5                             biceps:    5/5                             triceps:   5/5                             deltoid:  5/5                             pronator drift: neg    Bilateral LE's:       flaccid tone, plegic               Sensation:           ~ T7-8 level with patchy areas of decreased sensation in the perineum, right lateral and inner thigh/ Right lateral and inner leg, intact                            sensation of both dorsum/sole of the feet                     DTR:                  biceps/brachioradialis: 1 + equal bilaterally                                                      patella/ankle: 0 bilaterall                                                   neg clonus                           neg Babinski                        Gait:  not tested        PM&R Impression:    1) 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   3) T7-8 complete paraplegia         Disposition Plan/Recs:    excellent for acute spinal cord injury rehab placement                     Patient is a 36y old  Male who presents with a chief complaint of Paraplegia (17 Sep 2021 07:10)       HPI:  CHIEF COMPLAINT/ REASON FOR CONSULTATION: possible spinal cord compression      HPI: 36 m PMHx HIV (CD4 700s, VLUD) was at gym this afternoon lifting heavy weights, ie dead lifts; tonight began to develop pain in thoracolumbar region; over past couple of hours he began to develop paresthesias down both legs and buttok. now can barely feel legs and unable to stand or move legs. Was brought in by ambulance after a passerby on the street witnessed him unable to rise from curb.  has not gone to bathroom, but thus far no bladder or bowel incontinence.  + IVDU, most recently today (methamphetamine).  no fever/chills.    PAST MEDICAL HISTORY   Infection, HIV      PAST SURGICAL HISTORY denies         MEDICATIONS:  Antibiotics:    Neuro:    Anticoagulation:    Other:  dexAMETHasone  IVPB 10 milliGRAM(s) IV Intermittent Once        FAMILY HISTORY:          PAST MEDICAL & SURGICAL HISTORY:  Infection, HIV        MEDICATIONS  (STANDING):  bictegravir 50 mG/emtricitabine 200 mG/tenofovir alafenamide 25 mG (BIKTARVY) 1 Tablet(s) Oral daily  bisacodyl 5 milliGRAM(s) Oral at bedtime  chlorhexidine 2% Cloths 1 Application(s) Topical daily  dexAMETHasone     Tablet   Oral   dexAMETHasone     Tablet 4 milliGRAM(s) Oral every 6 hours  dexAMETHasone     Tablet 4 milliGRAM(s) Oral every 8 hours  enoxaparin Injectable 40 milliGRAM(s) SubCutaneous at bedtime  influenza   Vaccine 0.5 milliLiter(s) IntraMuscular once  methocarbamol 500 milliGRAM(s) Oral every 8 hours  pantoprazole    Tablet 40 milliGRAM(s) Oral before breakfast  polyethylene glycol 3350 17 Gram(s) Oral daily  pregabalin 50 milliGRAM(s) Oral every 8 hours  senna 2 Tablet(s) Oral at bedtime    MEDICATIONS  (PRN):  LORazepam     Tablet 0.5 milliGRAM(s) Oral every 6 hours PRN Agitation  LORazepam   Injectable 2 milliGRAM(s) IV Push every 1 hour PRN CIWA-Ar score 8 or greater  metoclopramide 10 milliGRAM(s) Oral every 8 hours PRN nausea/vomiting  oxyCODONE    IR 15 milliGRAM(s) Oral every 6 hours PRN Severe Pain (7 - 10)  oxyCODONE    IR 5 milliGRAM(s) Oral every 4 hours PRN Moderate Pain (4 - 6)  saline laxative (FLEET) Rectal Enema 1 Enema Rectal once PRN constipation  traZODone 50 milliGRAM(s) Oral at bedtime PRN insomnia        Social History:                -  Home Living Status:  lives with his father in an elevator accessible apartment building         -  Prior Home Care Services:   none         -  Family support: family         -  Occupation:     Baseline Functional Level Prior to Admission:             - ADL's/ IADL's:  fully independent         - ambulatory status PTA:  walked without assistive devices    FAMILY HISTORY:      CBC Full  -  ( 17 Sep 2021 06:20 )  WBC Count : 17.96 K/uL  RBC Count : 3.56 M/uL  Hemoglobin : 11.4 g/dL  Hematocrit : 34.4 %  Platelet Count - Automated : 206 K/uL  Mean Cell Volume : 96.6 fl  Mean Cell Hemoglobin : 32.0 pg  Mean Cell Hemoglobin Concentration : 33.1 gm/dL  Auto Neutrophil # : x  Auto Lymphocyte # : x  Auto Monocyte # : x  Auto Eosinophil # : x  Auto Basophil # : x  Auto Neutrophil % : x  Auto Lymphocyte % : x  Auto Monocyte % : x  Auto Eosinophil % : x  Auto Basophil % : x      09-17    143  |  109<H>  |  19  ----------------------------<  129<H>  4.4   |  26  |  0.80    Ca    8.6      17 Sep 2021 06:20  Phos  4.4     09-17  Mg     2.2     09-17              Radiology:    < from: MR Thoracic Spine w/wo IV Cont (09.16.21 @ 20:11) >  EXAM:  MR SPINE THORACIC WAW IC                          PROCEDURE DATE:  09/16/2021          INTERPRETATION:  Flaccid paralysis of the leg.    Technique: MRI of the thoracic spine was performed utilizing sagittal  T1, axial and sagittal T2-weighted and axial gradient echo images as well as sagittal STIR images. Following the administration of 7.5 cc of Gadavist, sagittal and axial fat-saturated T1-weighted images were obtained.    Comparison is made to a prior MRI of the thoracic spine performed on 5/15/2021.    The patient is now status post bilateral laminectomies at the T8 level. There is a small epidural fluid collection of fluid at the laminectomy site measuring approximately 4 cm cranial caudal x 1.4 cm wide x 1.3 cm AP and causing effacement of the posterior spinal cord from the T7/T8 through the superior endplate of T9 levels. There has been evacuation of the previously seen intradural extra medullary right-sided hemorrhage at the T8 level.    The previously seen focus of T2 signal hyperintensity within the spinal cord has progressed and now there is abnormal T2 signal hyperintensity ("snake eyes") within the central spinal cord from the inferior endplate of T7 through the mid T9 vertebral body highly suspicious for spinal cord infarction. Again noted is diffuse subarachnoid hemorrhage within the thoracic thecal sac    Findings: There is normal curvature to the thoracic spine. The vertebral bodies are of normal height and configuration. The intervertebral discs spaces are   within normal limits. Evaluation of the spinal cord demonstrates no evidence of abnormal signal changes.    Evaluation of the individual levels demonstrates no evidence of a focal disc herniation or spinal cord compression.    Impression: Status post bilateral T8 laminectomies with a small postoperative surgical fluid collection at the laminectomy site causing mild mass effect on the spinal cord. Status post evacuation of the previously seen clot within the thecal sac at the T8 level.    Abnormal T2 signal seen within the central spinal cord from the T7/T8 through the T9 level consistent with spinal cord infarction.        < from: MR Thoracic Spine w/wo IV Cont (09.15.21 @ 03:35) >    EXAM:  MR SPINE THORACIC WAW IC                          PROCEDURE DATE:  09/15/2021          INTERPRETATION:  Sly MANNING MD, have reviewed the images and the report and agree with the findings, with the following modifications:    IV contrast: 7.5 mL IV Gadavist.    COMPARISON: CT thoracic spine of the same day    This examination is being interpreted concurrently with the MRI lumbar spine of the same day.    There is diffuse T1 iso to hyperintense signal and heterogeneous T2 signal throughout the spinal canal of the thoracic and lumbar spine which is most compatible with spinal canal hemorrhage. There is more focal hematoma in the spinal canal at the T8-T9 level on the right which measures 0.7 x 0.6 cm with mass effect on the cord which is deviated to the left.    There is focal T2 hyperintense/T1 hypointense signal within the ventral portion of the spinal canal from L1 through L5-S1 most likely representing subdural hematoma. There is diffuse mild to moderate spinal canal stenosis in the lumbar spine secondary to hemorrhage with more prominent spinal canal stenosis at L5-S1 due to the hemorrhage as well as prominence of the epidural fat.    There is presacral heterogeneous fluid which may represent edema and/or hemorrhage.    Findings were discussed with Dr. Damico by Dr. Sly Moreno on 9/15/2021 7:32 AM    ******PRELIMINARY REPORT******        PROCEDURE INFORMATION:  Exam: MR Thoracic Spine Without and With Contrast  Exam date and time: 9/15/2021 3:06 AM  Age: 36 years old  Clinical indication: Weakness; Patient HX: Concern for cord compression. Acute flaccid paralysis le    TECHNIQUE:  Imaging protocol: Multiplanar magnetic resonance images of the thoracic spine without and with contrast.    COMPARISON:  No relevant prior studies available.    FINDINGS:  Vertebrae: Unremarkable.  Spinal cord: Ill-defined intradural extramedullary 0.9 x 0.6 cm lesion with faint associated  enhancement at the level of T8. The lesion exerts mass effect on the cord displacing it to the left. There is minimal associated cord edema.  Discs/Spinal canal/Neural foramina: No significant disc disease. No significant spinal canal stenosis.  Soft tissues: Unremarkable.    IMPRESSION:    Ill-defined intradural extramedullary 0.9 x 0.6 cm lesion with faint associated enhancement at the level of T8. The lesion exerts mass effect on the cord displacing it to the left. There is minimal associated cord edema.            Vital Signs Last 24 Hrs  T(C): 36.6 (18 Sep 2021 06:36), Max: 36.8 (17 Sep 2021 08:48)  T(F): 97.9 (18 Sep 2021 06:36), Max: 98.2 (17 Sep 2021 08:48)  HR: 65 (18 Sep 2021 06:36) (64 - 95)  BP: 102/68 (18 Sep 2021 06:36) (101/65 - 148/84)  BP(mean): 82 (17 Sep 2021 14:00) (82 - 110)  RR: 16 (18 Sep 2021 06:36) (16 - 26)  SpO2: 95% (18 Sep 2021 06:36) (94% - 100%)        REVIEW OF SYSTEMS:    CONSTITUTIONAL: No fever, weight loss, or fatigue  EYES: No eye pain, visual disturbances, or discharge  ENMT:  No difficulty hearing, tinnitus, vertigo; No sinus or throat pain  NECK: No pain or stiffness  BREASTS: No pain, masses, or nipple discharge  RESPIRATORY: No cough, wheezing, chills or hemoptysis; No shortness of breath  CARDIOVASCULAR: No chest pain, palpitations, dizziness, or leg swelling  GASTROINTESTINAL: No abdominal or epigastric pain. No nausea, vomiting, or hematemesis; No diarrhea or constipation. No melena or hematochezia.  GENITOURINARY: No dysuria, frequency, hematuria, or incontinence  NEUROLOGICAL: as per HPI  SKIN: No itching, burning, rashes, or lesions   LYMPH NODES: No enlarged glands  ENDOCRINE: No heat or cold intolerance; No hair loss  MUSCULOSKELETAL: No joint pain or swelling; No muscle, back, or extremity pain  PSYCHIATRIC: No depression, anxiety, mood swings, or difficulty sleeping  HEME/LYMPH: No easy bruising, or bleeding gums  ALLERGY AND IMMUNOLOGIC: No hives or eczema  VASCULAR: no swelling, erythema,   : no dysuria, hematuria, frequency        Physical Exam:  WDWN 35 yo  gentleman lying in semi Rogers's position, awake/alert, reports feeling of the left thigh is less    Head: normocephalic, atraumatic    Eyes: PERRLA, EOMI, no nystagmus, sclera anicteric    ENT: nasal discharge, uvula midline, no oropharyngeal erythema/exudate    Neck: supple, negative JVD, negative carotid bruits, no thyromegaly    Chest: CTA bilaterally, neg wheeze/ rhonchi/ rales/ crackles/ egophany    Cardiovascular: regular rate and rhythm, neg murmurs/rubs/gallops    Abdomen: soft, non distended, non tender to palpation in all 4 quadrants, negative rebound/guarding, normal bowel sounds    Extremities: WWP, neg cyanosis/clubbing/edema, negative calf tenderness to palpation, negative Nahomi's sign    Spine: dressing c/d/i    Rectal: flaccid tone    Neurologic Exam:    Alert and oriented to person, place, date/year, speech fluent w/o dysarthria, follows commands, recent and remote memory intact, repetition intact, comprehension intact,  attention/concentration intact, fund of knowledge appropriate    Cranial Nerves:     II:                         pupils equal, round and reactive to light, visual fields intact   III/ IV/VI:             extraocular movements intact, neg nystagmus, neg ptosis  V:                        facial sensation intact, V1-3 normal  VII:                      face symmetric, no droop, normal eye closure and smile  VIII:                     hearing intact to finger rub bilaterally  IX and X:             no hoarseness, gag intact, palate/ uvula rise symmetrically  XI:                       SCM/ trapezius strength intact bilateral  XII:                      no tongue deviation    Motor Exam:    Right UE:            : 5/5                             wrist extensors/ flexors: 5/5                             biceps:   5/5                             triceps:  5/5                             deltoid:  5/5                             pronator drift: neg    Left UE:              :  5/5                             wrist extensors/ flexors:  5/5                             biceps:    5/5                             triceps:   5/5                             deltoid:  5/5                             pronator drift: neg    Bilateral LE's:       flaccid tone, plegic               Sensation:           ~ T7-8 level with patchy areas of decreased sensation in the perineum, right lateral and inner thigh/ Right lateral and inner leg, intact                            sensation of both dorsum/sole of the feet/ankles                     DTR:                  biceps/brachioradialis: 1 + equal bilaterally                                                      patella/ankle: 0 bilaterall                                                      neg bilateral abdominal and cremasteric reflexes                           neg clonus                           neg Babinski                              PM&R Impression:    1) 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   3) T7-8 complete paraplegia         Disposition Plan/Recs:    excellent for acute spinal cord injury rehab placement

## 2021-09-19 LAB
ANION GAP SERPL CALC-SCNC: 8 MMOL/L — SIGNIFICANT CHANGE UP (ref 5–17)
BUN SERPL-MCNC: 19 MG/DL — SIGNIFICANT CHANGE UP (ref 7–23)
CALCIUM SERPL-MCNC: 8.9 MG/DL — SIGNIFICANT CHANGE UP (ref 8.4–10.5)
CHLORIDE SERPL-SCNC: 104 MMOL/L — SIGNIFICANT CHANGE UP (ref 96–108)
CO2 SERPL-SCNC: 26 MMOL/L — SIGNIFICANT CHANGE UP (ref 22–31)
CREAT SERPL-MCNC: 0.75 MG/DL — SIGNIFICANT CHANGE UP (ref 0.5–1.3)
GLUCOSE SERPL-MCNC: 115 MG/DL — HIGH (ref 70–99)
HCT VFR BLD CALC: 36.6 % — LOW (ref 39–50)
HGB BLD-MCNC: 12.2 G/DL — LOW (ref 13–17)
MAGNESIUM SERPL-MCNC: 2 MG/DL — SIGNIFICANT CHANGE UP (ref 1.6–2.6)
MCHC RBC-ENTMCNC: 31.9 PG — SIGNIFICANT CHANGE UP (ref 27–34)
MCHC RBC-ENTMCNC: 33.3 GM/DL — SIGNIFICANT CHANGE UP (ref 32–36)
MCV RBC AUTO: 95.6 FL — SIGNIFICANT CHANGE UP (ref 80–100)
NRBC # BLD: 0 /100 WBCS — SIGNIFICANT CHANGE UP (ref 0–0)
PHOSPHATE SERPL-MCNC: 3.9 MG/DL — SIGNIFICANT CHANGE UP (ref 2.5–4.5)
PLATELET # BLD AUTO: 230 K/UL — SIGNIFICANT CHANGE UP (ref 150–400)
POTASSIUM SERPL-MCNC: 4.3 MMOL/L — SIGNIFICANT CHANGE UP (ref 3.5–5.3)
POTASSIUM SERPL-SCNC: 4.3 MMOL/L — SIGNIFICANT CHANGE UP (ref 3.5–5.3)
RBC # BLD: 3.83 M/UL — LOW (ref 4.2–5.8)
RBC # FLD: 11.9 % — SIGNIFICANT CHANGE UP (ref 10.3–14.5)
SODIUM SERPL-SCNC: 138 MMOL/L — SIGNIFICANT CHANGE UP (ref 135–145)
WBC # BLD: 11.61 K/UL — HIGH (ref 3.8–10.5)
WBC # FLD AUTO: 11.61 K/UL — HIGH (ref 3.8–10.5)

## 2021-09-19 PROCEDURE — 99024 POSTOP FOLLOW-UP VISIT: CPT

## 2021-09-19 RX ORDER — SODIUM CHLORIDE 9 MG/ML
500 INJECTION INTRAMUSCULAR; INTRAVENOUS; SUBCUTANEOUS ONCE
Refills: 0 | Status: COMPLETED | OUTPATIENT
Start: 2021-09-19 | End: 2021-09-19

## 2021-09-19 RX ORDER — OXYCODONE HYDROCHLORIDE 5 MG/1
10 TABLET ORAL EVERY 4 HOURS
Refills: 0 | Status: DISCONTINUED | OUTPATIENT
Start: 2021-09-19 | End: 2021-09-22

## 2021-09-19 RX ADMIN — Medication 4 MILLIGRAM(S): at 21:40

## 2021-09-19 RX ADMIN — Medication 5 MILLIGRAM(S): at 21:40

## 2021-09-19 RX ADMIN — OXYCODONE HYDROCHLORIDE 15 MILLIGRAM(S): 5 TABLET ORAL at 04:38

## 2021-09-19 RX ADMIN — SENNA PLUS 2 TABLET(S): 8.6 TABLET ORAL at 21:40

## 2021-09-19 RX ADMIN — POLYETHYLENE GLYCOL 3350 17 GRAM(S): 17 POWDER, FOR SOLUTION ORAL at 11:48

## 2021-09-19 RX ADMIN — Medication 50 MILLIGRAM(S): at 21:40

## 2021-09-19 RX ADMIN — Medication 4 MILLIGRAM(S): at 06:54

## 2021-09-19 RX ADMIN — OXYCODONE HYDROCHLORIDE 15 MILLIGRAM(S): 5 TABLET ORAL at 05:25

## 2021-09-19 RX ADMIN — ENOXAPARIN SODIUM 40 MILLIGRAM(S): 100 INJECTION SUBCUTANEOUS at 21:41

## 2021-09-19 RX ADMIN — BICTEGRAVIR SODIUM, EMTRICITABINE, AND TENOFOVIR ALAFENAMIDE FUMARATE 1 TABLET(S): 30; 120; 15 TABLET ORAL at 11:47

## 2021-09-19 RX ADMIN — Medication 4 MILLIGRAM(S): at 15:24

## 2021-09-19 RX ADMIN — SODIUM CHLORIDE 2000 MILLILITER(S): 9 INJECTION INTRAMUSCULAR; INTRAVENOUS; SUBCUTANEOUS at 04:38

## 2021-09-19 RX ADMIN — PANTOPRAZOLE SODIUM 40 MILLIGRAM(S): 20 TABLET, DELAYED RELEASE ORAL at 06:54

## 2021-09-19 RX ADMIN — METHOCARBAMOL 500 MILLIGRAM(S): 500 TABLET, FILM COATED ORAL at 11:51

## 2021-09-19 RX ADMIN — METHOCARBAMOL 500 MILLIGRAM(S): 500 TABLET, FILM COATED ORAL at 21:40

## 2021-09-19 RX ADMIN — Medication 1 ENEMA: at 11:48

## 2021-09-19 NOTE — PROVIDER CONTACT NOTE (OTHER) - SITUATION
Patient states he is very anxious about his situation and that he is "panicking."
sinus bradycardia and pt is asking for [id number] PO for his pain

## 2021-09-19 NOTE — PROVIDER CONTACT NOTE (OTHER) - ASSESSMENT
HR in 150s and 160s
A&O x 4, awake, BP-118/77 sinus bradycardia as low as 52bpm, denies lightheadedness or dizziness, unable to urinate on his own

## 2021-09-19 NOTE — PROVIDER CONTACT NOTE (OTHER) - RECOMMENDATIONS
Rebecca WADDELL  spoke via [facility] made aware of pt VS, Rebecca WADDELL ordered 500cc bolus of NS and ok to give Oxy 15mg PO.

## 2021-09-19 NOTE — PROGRESS NOTE ADULT - SUBJECTIVE AND OBJECTIVE BOX
HPI: 36 m PMHx HIV (CD4 700s, VLUD) was at gym this afternoon lifting heavy weights, ie dead lifts; tonight began to develop pain in thoracolumbar region; over past couple of hours he began to develop paresthesias down both legs and buttok. now can barely feel legs and unable to stand or move legs. Was brought in by ambulance after a passerby on the street witnessed him unable to rise from curb.  has not gone to bathroom, but thus far no bladder or bowel incontinence.  + IVDU, most recently today (methamphetamine).  no fever/chills.   (15 Sep 2021 01:25)    OVERNIGHT EVENTS: MARCO overnight, neuro stable.    Hospital Course:  9/15: s/p spinal angiogram (neg) and T8-T9 lami decompression, intraoperative findings of subarachnoid clot s/p evacuation POD # 0 (9/15) dilaudid x 1 for pain. diet advanced.   9/16: POD# 1: s/p spinal angiogram (neg) and T8-T9 lami decompression, intraoperative findings of subarachnoid clot s/p evacuation POD #1 (9/15) dilaudid x 1 for pain. diet advanced.   9/17: POD#2 s/p spinal angio, POD#2 s/p T8-9 lami decompression.  LE dopplers negative. MRI T/L spine read shows T7/T8 spinal cord edema consistent with infarction, L5-S1 increase in spinal canal stenosis w/ mixed SDH and diffuse SAH. Psych consulted for anxiety/depression. Pend SDU. Bile bag removed today  9/18: POD 3. T8-9 lami/decompression. Psych eval suggests adjustment d/o and outpatient follow up   9/19: POD4. MARCO overnight. Pending spinal rehab placement.     Vital Signs Last 24 Hrs  T(C): 36.7 (18 Sep 2021 21:42), Max: 36.7 (18 Sep 2021 14:00)  T(F): 98 (18 Sep 2021 21:42), Max: 98.1 (18 Sep 2021 14:00)  HR: 81 (18 Sep 2021 21:42) (60 - 81)  BP: 121/70 (18 Sep 2021 21:42) (102/68 - 121/70)  BP(mean): --  RR: 16 (18 Sep 2021 21:42) (16 - 17)  SpO2: 98% (18 Sep 2021 21:42) (94% - 98%)    I&O's Summary    17 Sep 2021 07:01  -  18 Sep 2021 07:00  --------------------------------------------------------  IN: 1250 mL / OUT: 5015 mL / NET: -3765 mL    18 Sep 2021 07:01  -  19 Sep 2021 00:23  --------------------------------------------------------  IN: 0 mL / OUT: 3400 mL / NET: -3400 mL        PHYSICAL EXAM:  General: NAD, pt is comfortably laying in hospital bed, A&O x3, on RA  HEENT: CN II-XII grossly intact, PERRL 3mm, EOMI b/l, face symmetric, tongue midline, neck FROM  Cardiovascular: RRR, normal S1 and S2   Respiratory: lungs CTAB, no wheezing, rhonchi, or crackles   GI: normoactive BS to auscultation, abd soft, NTND   Neuro: no aphasia, speech clear, no dysmetria, no pronator drift  strength 5/5 b/l UE, 0/5 b/l LE   b/l LE flaccid, absent reflexes, no clonus, babinski neg b/l   RLE sensation (especially along L3/L4 dermatome) to light touch diminished compared to LLE   Extremities: R groin site C/D/I, no hematoma. Distal pulses 2+ x4   Incision/Wound: midline thoracic incision C/D/I     TUBES/LINES:  [] CVC  [] A-line  [] Lumbar Drain  [] Ventriculostomy    DIET:  [] NPO  [X] Mechanical  [] Tube feeds    LABS:                        12.9   12.59 )-----------( 212      ( 18 Sep 2021 08:42 )             38.1     09-18    139  |  104  |  17  ----------------------------<  121<H>  4.6   |  28  |  0.79    Ca    9.3      18 Sep 2021 08:42  Phos  4.0     09-18  Mg     2.1     09-18              CAPILLARY BLOOD GLUCOSE          Drug Levels: [] N/A    CSF Analysis: [] N/A      Allergies    No Known Allergies    Intolerances      MEDICATIONS:  Antibiotics:  bictegravir 50 mG/emtricitabine 200 mG/tenofovir alafenamide 25 mG (BIKTARVY) 1 Tablet(s) Oral daily    Neuro:  LORazepam     Tablet 0.5 milliGRAM(s) Oral every 6 hours PRN  LORazepam   Injectable 2 milliGRAM(s) IV Push every 1 hour PRN  methocarbamol 500 milliGRAM(s) Oral every 8 hours  oxyCODONE    IR 15 milliGRAM(s) Oral every 6 hours PRN  oxyCODONE    IR 5 milliGRAM(s) Oral every 4 hours PRN  pregabalin 50 milliGRAM(s) Oral every 8 hours  traZODone 50 milliGRAM(s) Oral at bedtime PRN    Anticoagulation:  enoxaparin Injectable 40 milliGRAM(s) SubCutaneous at bedtime    OTHER:  bisacodyl 5 milliGRAM(s) Oral at bedtime  chlorhexidine 2% Cloths 1 Application(s) Topical daily  dexAMETHasone     Tablet   Oral   dexAMETHasone     Tablet 4 milliGRAM(s) Oral every 8 hours  influenza   Vaccine 0.5 milliLiter(s) IntraMuscular once  metoclopramide 10 milliGRAM(s) Oral every 8 hours PRN  pantoprazole    Tablet 40 milliGRAM(s) Oral before breakfast  polyethylene glycol 3350 17 Gram(s) Oral daily  saline laxative (FLEET) Rectal Enema 1 Enema Rectal once  senna 2 Tablet(s) Oral at bedtime    IVF:    CULTURES:    RADIOLOGY & ADDITIONAL TESTS: x      ASSESSMENT:  36 M PMHx HIV (CD4 700s, VLUD on biktarvy), hx of IVDU and methamphetamine use, was at gym this afternoon lifting heavy weights, ie dead lifts; tonight began to develop pain in thoracolumbar region; over past couple of hours he began to develop paresthesias down both legs and buttock, then progressed to complete plegia of bilateral extremities with loss of sensation from T8 dermatomes and below found on MRI to have 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      PLAN:   Neuro  - neuro and vitals checks q4hrs, pend SDU   - higher map goal is not pursued due to findings of spinal SAH, will keep pt normotensive   - MRI post-op completed 9/16   - decadron taper 4q6 to off  - PT/OT   - bile bag d/c'ed 9/17  - psych consulted: adjustment disorder, reccs outpatient f/u    Cardio  - SBP < 140   - EKG WNL     Pulm  - RA   - encourage incentive spirometry use     GI   - regular   - bowel regimen   - protonix while on Decadron     /renal  - failed TOV on 9/16, straight cath q6hrs PRN   - baseline labs     Heme  - SCDs, SQL  -LE dopplers negative 9/17    Endo  - ISS while on decadron   - baseline a1c 5.0      GOC: full code   Level of care: SDU pend  Dispo: pt /ot pend assessment     Assessment and plan discussed w/ Dr. D'amico         []  GCS  E   V  M     Heart Failure: []Acute, [] acute on chronic , []chronic  Heart Failure:  [] Diastolic (HFpEF), [] Systolic (HFrEF), []Combined (HFpEF and HFrEF), [] RHF, [] Pulm HTN, [] Other    [] CAROLA, [] ATN, [] AIN, [] other  [] CKD1, [] CKD2, [] CKD 3, [] CKD 4, [] CKD 5, []ESRD    Encephalopathy: [] Metabolic, [] Hepatic, [] toxic, [] Neurological, [] Other    Abnormal Nurtitional Status: [] malnurtition (see nutrition note), [ ]underweight: BMI < 19, [] morbid obesity: BMI >40, [] Cachexia    [] Sepsis  [] hypovolemic shock,[] cardiogenic shock, [] hemorrhagic shock, [] neuogenic shock  [] Acute Respiratory Failure  []Cerebral edema, [] Brain compression/ herniation,   [] Functional quadriplegia  [] Acute blood loss anemia

## 2021-09-20 ENCOUNTER — TRANSCRIPTION ENCOUNTER (OUTPATIENT)
Age: 36
End: 2021-09-20

## 2021-09-20 DIAGNOSIS — D72.829 ELEVATED WHITE BLOOD CELL COUNT, UNSPECIFIED: ICD-10-CM

## 2021-09-20 DIAGNOSIS — Z98.890 OTHER SPECIFIED POSTPROCEDURAL STATES: ICD-10-CM

## 2021-09-20 DIAGNOSIS — I60.9 NONTRAUMATIC SUBARACHNOID HEMORRHAGE, UNSPECIFIED: ICD-10-CM

## 2021-09-20 LAB
ANION GAP SERPL CALC-SCNC: 8 MMOL/L — SIGNIFICANT CHANGE UP (ref 5–17)
BUN SERPL-MCNC: 20 MG/DL — SIGNIFICANT CHANGE UP (ref 7–23)
CALCIUM SERPL-MCNC: 9.4 MG/DL — SIGNIFICANT CHANGE UP (ref 8.4–10.5)
CHLORIDE SERPL-SCNC: 102 MMOL/L — SIGNIFICANT CHANGE UP (ref 96–108)
CO2 SERPL-SCNC: 28 MMOL/L — SIGNIFICANT CHANGE UP (ref 22–31)
CREAT SERPL-MCNC: 0.77 MG/DL — SIGNIFICANT CHANGE UP (ref 0.5–1.3)
GLUCOSE SERPL-MCNC: 121 MG/DL — HIGH (ref 70–99)
HCT VFR BLD CALC: 41 % — SIGNIFICANT CHANGE UP (ref 39–50)
HGB BLD-MCNC: 13.7 G/DL — SIGNIFICANT CHANGE UP (ref 13–17)
MAGNESIUM SERPL-MCNC: 2.1 MG/DL — SIGNIFICANT CHANGE UP (ref 1.6–2.6)
MCHC RBC-ENTMCNC: 31.4 PG — SIGNIFICANT CHANGE UP (ref 27–34)
MCHC RBC-ENTMCNC: 33.4 GM/DL — SIGNIFICANT CHANGE UP (ref 32–36)
MCV RBC AUTO: 93.8 FL — SIGNIFICANT CHANGE UP (ref 80–100)
NRBC # BLD: 0 /100 WBCS — SIGNIFICANT CHANGE UP (ref 0–0)
PHOSPHATE SERPL-MCNC: 4.6 MG/DL — HIGH (ref 2.5–4.5)
PLATELET # BLD AUTO: 268 K/UL — SIGNIFICANT CHANGE UP (ref 150–400)
POTASSIUM SERPL-MCNC: 4.4 MMOL/L — SIGNIFICANT CHANGE UP (ref 3.5–5.3)
POTASSIUM SERPL-SCNC: 4.4 MMOL/L — SIGNIFICANT CHANGE UP (ref 3.5–5.3)
RBC # BLD: 4.37 M/UL — SIGNIFICANT CHANGE UP (ref 4.2–5.8)
RBC # FLD: 11.8 % — SIGNIFICANT CHANGE UP (ref 10.3–14.5)
SARS-COV-2 RNA SPEC QL NAA+PROBE: SIGNIFICANT CHANGE UP
SODIUM SERPL-SCNC: 138 MMOL/L — SIGNIFICANT CHANGE UP (ref 135–145)
WBC # BLD: 10.54 K/UL — HIGH (ref 3.8–10.5)
WBC # FLD AUTO: 10.54 K/UL — HIGH (ref 3.8–10.5)

## 2021-09-20 PROCEDURE — 99233 SBSQ HOSP IP/OBS HIGH 50: CPT

## 2021-09-20 PROCEDURE — 99254 IP/OBS CNSLTJ NEW/EST MOD 60: CPT | Mod: GC

## 2021-09-20 RX ORDER — SODIUM CHLORIDE 9 MG/ML
1000 INJECTION INTRAMUSCULAR; INTRAVENOUS; SUBCUTANEOUS
Refills: 0 | Status: DISCONTINUED | OUTPATIENT
Start: 2021-09-21 | End: 2021-09-22

## 2021-09-20 RX ADMIN — CHLORHEXIDINE GLUCONATE 1 APPLICATION(S): 213 SOLUTION TOPICAL at 12:46

## 2021-09-20 RX ADMIN — PANTOPRAZOLE SODIUM 40 MILLIGRAM(S): 20 TABLET, DELAYED RELEASE ORAL at 05:36

## 2021-09-20 RX ADMIN — Medication 50 MILLIGRAM(S): at 13:51

## 2021-09-20 RX ADMIN — Medication 50 MILLIGRAM(S): at 21:13

## 2021-09-20 RX ADMIN — Medication 5 MILLIGRAM(S): at 21:13

## 2021-09-20 RX ADMIN — POLYETHYLENE GLYCOL 3350 17 GRAM(S): 17 POWDER, FOR SOLUTION ORAL at 12:45

## 2021-09-20 RX ADMIN — ENOXAPARIN SODIUM 40 MILLIGRAM(S): 100 INJECTION SUBCUTANEOUS at 21:13

## 2021-09-20 RX ADMIN — METHOCARBAMOL 500 MILLIGRAM(S): 500 TABLET, FILM COATED ORAL at 21:13

## 2021-09-20 RX ADMIN — METHOCARBAMOL 500 MILLIGRAM(S): 500 TABLET, FILM COATED ORAL at 13:51

## 2021-09-20 RX ADMIN — SENNA PLUS 2 TABLET(S): 8.6 TABLET ORAL at 21:13

## 2021-09-20 RX ADMIN — Medication 4 MILLIGRAM(S): at 05:36

## 2021-09-20 RX ADMIN — BICTEGRAVIR SODIUM, EMTRICITABINE, AND TENOFOVIR ALAFENAMIDE FUMARATE 1 TABLET(S): 30; 120; 15 TABLET ORAL at 12:45

## 2021-09-20 RX ADMIN — Medication 4 MILLIGRAM(S): at 13:51

## 2021-09-20 NOTE — PROGRESS NOTE ADULT - SUBJECTIVE AND OBJECTIVE BOX
Surgery: Spinal Angiogram  Consent: Signed by patient vs HCP. Patient to sign consent                    NAME/NUMBER of HCP:     Representative Consent: [  ] Signed by patient vs HCP                                                 [  ] N/A -> only for cerebral angiogram    No Known Allergies      OVERNIGHT EVENTS: none    T(C): 36.6 (09-20-21 @ 08:55), Max: 36.6 (09-20-21 @ 08:55)  HR: 77 (09-20-21 @ 08:55) (60 - 98)  BP: 126/84 (09-20-21 @ 08:55) (107/70 - 126/84)  RR: 18 (09-20-21 @ 08:55) (16 - 18)  SpO2: 99% (09-20-21 @ 08:55) (94% - 100%)  Wt(kg): --      EXAM:  General: NAD, pt is comfortably laying in hospital bed, A&O x3, on RA  HEENT: CN II-XII grossly intact, PERRL 3mm, EOMI b/l, face symmetric, tongue midline, neck FROM  Cardiovascular: RRR, normal S1 and S2   Respiratory: lungs CTAB, no wheezing, rhonchi, or crackles   GI: normoactive BS to auscultation, abd soft, NTND   Neuro: no aphasia, speech clear, no dysmetria, no pronator drift  strength 5/5 b/l UE, 0/5 b/l LE   b/l LE flaccid, reflexes 1+ bl,  no clonus, babinski neg b/l   RLE sensation (especially along L3/L4 dermatome) to light touch diminished compared to LLE   Extremities: R groin site C/D/I, no hematoma. Distal pulses 2+ x4   Incision/Wound: midline thoracic incision C/D/I         09-20    138  |  102  |  20  ----------------------------<  121<H>  4.4   |  28  |  0.77    Ca    9.4      20 Sep 2021 08:05  Phos  4.6     09-20  Mg     2.1     09-20      CBC Full  -  ( 20 Sep 2021 08:05 )  WBC Count : 10.54 K/uL  RBC Count : 4.37 M/uL  Hemoglobin : 13.7 g/dL  Hematocrit : 41.0 %  Platelet Count - Automated : 268 K/uL  Mean Cell Volume : 93.8 fl  Mean Cell Hemoglobin : 31.4 pg  Mean Cell Hemoglobin Concentration : 33.4 gm/dL  Auto Neutrophil # : x  Auto Lymphocyte # : x  Auto Monocyte # : x  Auto Eosinophil # : x  Auto Basophil # : x  Auto Neutrophil % : x  Auto Lymphocyte % : x  Auto Monocyte % : x  Auto Eosinophil % : x  Auto Basophil % : x        Pregnancy test: n/a  Type & Screen (in past 72hrs):     2 Type & Screen within 72 hours if anticipate blood need in OR:  _ Y _ N     Blood ordered and on hold for OR:   [x ] No need     [ ] 1u pRBC on hold      [ ] 2u pRBC on hold    CXR:  EKG:  ECHO:  Medical Clearances:  Other Clearances:     Last dose of antiplatelet/anticoagulation drug: n/a    Implanted Devices (pacemaker, drug pump...etc):  []YES   [x] NO                  If yes --> EPS consulted to interrogate/adjust device:    3M nasal swab ordered?  _Y  xN  Cranial surgery: Order written for hair to be shampooed night before surgery and morning before surgery  [] yes   [x]no  Chlorhexidine Wipes ordered for Neck Down?  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 D'Amico      Assessment:  Present when checked    []  GCS  E   V  M     Heart Failure: []Acute, [] acute on chronic , []chronic  Heart Failure:  [] Diastolic (HFpEF), [] Systolic (HFrEF), []Combined (HFpEF and HFrEF), [] RHF, [] Pulm HTN, [] Other    [] CAORLA, [] ATN, [] AIN, [] other  [] CKD1, [] CKD2, [] CKD 3, [] CKD 4, [] CKD 5, []ESRD    Encephalopathy: [] Metabolic, [] Hepatic, [] toxic, [] Neurological, [] Other    Abnormal Nurtitional Status: [] malnurtition (see nutrition note), [ ]underweight: BMI < 19, [] morbid obesity: BMI >40, [] Cachexia    [] Sepsis  [] hypovolemic shock,[] cardiogenic shock, [] hemorrhagic shock, [] neuogenic shock  [] Acute Respiratory Failure  []Cerebral edema, [] Brain compression/ herniation,   [] Functional quadriplegia  [] Acute blood loss anemia

## 2021-09-20 NOTE — BH CONSULTATION LIAISON PROGRESS NOTE - NSBHFUPINTERVALHXFT_PSY_A_CORE
Psychiatry f/u for adjustment d/o in setting of new paralysis. Interim hx reviewed and pt seen for f/u. Not receiving trazodone for insomnia. Also seen for session by psychology intern.    On interview, pt awake, alert, calm. Pt spoke about fluctuating emotions over the weekend, with periods of elevated anxiety and sadness as well as moments of hope and increasing optimism about his prognosis. Pt hopeful about gaining strength and ability to perform bathroom functions without assistance.   describes using "toolbox" of coping skills including breathing techniques, meditation, and aiming to decrease reliance on pain medication.  Reports restlessness/excess energy as well as pain at night impairing sleep, relieved with "muscle relaxant and nerve blocker"; with medication able to sleep 9p-5a. Denies any AVH. Denies any SI/HI. Psychiatry f/u for adjustment d/o in setting of new paralysis. Interim hx reviewed and pt seen for f/u. Not receiving trazodone for insomnia. Also seen for session by psychology intern.    On interview, pt awake, alert, calm. Pt spoke about fluctuating emotions over the weekend, with periods of elevated anxiety and sadness as well as moments of hope and increasing optimism about his prognosis. Pt hopeful about gaining strength and ability to perform bathroom functions without assistance.   describes using "toolbox" of coping skills including breathing techniques, meditation, and aiming to decrease reliance on pain medication.  Reports restlessness/excess energy as well as pain at night impairing sleep, relieved with "muscle relaxant and nerve blocker"; with medication able to sleep 9p-5a. Denies any AVH. Denies any SI/HI.    Psychotherapy note:  Pt and consulting psychology intern, Ellis Degroot, discussed pt’s attitudes about current health status and functioning. Pt stated that he has “accepted” the temporary/permanent loss of his lower mobility “about 70%” but “struggles with negative thoughts 30%”. Pt noted that he sometimes thinks about things he could have done differently to prevent his bilateral leg paralysis including “not going out that night” or “partying with friends”, and “not lifting so heavy”. Pt stated that when those thoughts occur, he begins to feel hopeless and low.     Pt and  discussed strength and resiliency factors including pt’s social supports (friends and family members), energy yoga practice, and spiritual beliefs (i.e., accepting things that we can’t control brings peace). Pt stated that these activities/supports helps him feel hopeful about his recovery while also feeling peaceful about the current state of his mobility. Pt and writer also discussed coping skills in the pt’s “toolkit” (i.e., acceptance skills, leaning on social supports, envisioning a life worth living), and potential interest in individual or group psychotherapy. Pt expressed interest in psychotherapy, discussed obstacles and motivators for therapy, and reviewed outpatient centers near him.

## 2021-09-20 NOTE — BH CONSULTATION LIAISON PROGRESS NOTE - NSBHASSESSMENTFT_PSY_ALL_CORE
37yo man with history of HIV, no formal psychiatric hx, daily cannabis use and past crystal meth abuse, who presents with sx of an adjustment disorder, with ongoing fluctuating low mood and elevated anxiety in setting of new dx of lower extremity paralysis s/p neurosurgery for spinal cord infarction and hemorrhage. Pt remains receptive to emotional support in hospital and would benefit from ongoing counseling in rehab/as outpt. No evidence of any active SI on longitudinal assessment; pt with strong coping skills and family support, future-oriented and treatment seeking. Consider use of trazodone for insomnia. Would also benefit from additional substance counseling when acute phase of illness passes.    DDx: Adjustment d/o  r/o steroid exacerbated mood sx  cannabis abuse  amphetamine abuse r/o use disorder    RECOMMENDATIONS  -no need for 1:1 observation for suicidality  -emotional support provided. Will continue to offer psychological support in hospital  -consider trazodone 25mg-50mg po QHS PRN for insomnia  -recommend lorazepam 0.5mg-1mg po Q6H PRN for anxiety/panic symptoms.   -recommend engagement in outpt psychotherapy - at acute rehab if available. Will provide additional outpt referrals depending on disposition  -no psychiatric barrier to discharge. Please contact with questions.      Assessed at low acute risk for suicide - no current or known past SI, future-oriented, tx seeking  risk factors include new medical dx, h/o substance use  protective factors include family support, spiritual beliefs, lack of past psychiatric dx   37yo man with history of HIV, no formal psychiatric hx, daily cannabis use and past crystal meth abuse, who presents with sx of an adjustment disorder, with ongoing fluctuating low mood and elevated anxiety in setting of new dx of lower extremity paralysis s/p neurosurgery for spinal cord infarction and hemorrhage. Pt remains receptive to emotional support in hospital and would benefit from ongoing counseling in rehab/as outpt. No evidence of any active SI on longitudinal assessment; pt with strong coping skills and family support, future-oriented and treatment seeking. Consider use of trazodone for insomnia. Would also benefit from additional substance counseling when acute phase of illness passes.    DDx: Adjustment d/o  r/o steroid exacerbated mood sx  cannabis abuse  amphetamine abuse r/o use disorder    Risk assessment: Assessed at low acute risk for suicide - no current or known past SI, future-oriented, tx seeking  risk factors include new medical dx, h/o substance use  modifiable risk factors include lack of engagement in mental health/substance tx  protective factors include family support, spiritual beliefs, lack of past psychiatric dx    RECOMMENDATIONS  -no need for 1:1 observation for suicidality  -emotional support provided. Will continue to offer psychological support in hospital  -consider trazodone 25mg-50mg po QHS PRN for insomnia  -recommend lorazepam 0.5mg-1mg po Q6H PRN for anxiety/panic symptoms.   -recommend engagement in outpt psychotherapy - at acute rehab if available. Will provide additional outpt referrals depending on disposition  -no psychiatric barrier to discharge. Please contact with questions.      Assessed at low acute risk for suicide - no current or known past SI, future-oriented, tx seeking  risk factors include new medical dx, h/o substance use  protective factors include family support, spiritual beliefs, lack of past psychiatric dx

## 2021-09-20 NOTE — PROGRESS NOTE ADULT - SUBJECTIVE AND OBJECTIVE BOX
HPI:  CHIEF COMPLAINT/ REASON FOR CONSULTATION: possible spinal cord compression      HPI: 36 m PMHx HIV (CD4 700s, VLUD) was at gym this afternoon lifting heavy weights, ie dead lifts; tonight began to develop pain in thoracolumbar region; over past couple of hours he began to develop paresthesias down both legs and buttok. now can barely feel legs and unable to stand or move legs. Was brought in by ambulance after a passerby on the street witnessed him unable to rise from curb.  has not gone to bathroom, but thus far no bladder or bowel incontinence.  + IVDU, most recently today (methamphetamine).  no fever/chills.      Hospital course  9/15: s/p spinal angiogram (neg) and T8-T9 lami decompression, intraoperative findings of subarachnoid clot s/p evacuation POD # 0 (9/15) dilaudid x 1 for pain. diet advanced.   9/16: POD# 1: s/p spinal angiogram (neg) and T8-T9 lami decompression, intraoperative findings of subarachnoid clot s/p evacuation POD #1 (9/15) dilaudid x 1 for pain. diet advanced.   9/17: POD#2 s/p spinal angio, POD#2 s/p T8-9 lami decompression.  LE dopplers negative. MRI T/L spine read shows T7/T8 spinal cord edema consistent with infarction, L5-S1 increase in spinal canal stenosis w/ mixed SDH and diffuse SAH. Psych consulted for anxiety/depression. Pend SDU. Bile bag removed today  9/18: POD 3. T8-9 lami/decompression. Psych eval suggests adjustment d/o and outpatient follow up   9/19: POD4. KIRILL overnight. Pending spinal rehab placement.            PHYSICAL EXAMINATION:   T(C): 36.9 (09-14-21 @ 23:53), Max: 36.9 (09-14-21 @ 23:53)  HR: 89 (09-14-21 @ 23:53) (89 - 89)  BP: 158/100 (09-14-21 @ 23:53) (158/100 - 158/100)  RR: 17 (09-14-21 @ 23:53) (17 - 17)  SpO2: 100% (09-14-21 @ 23:53) (100% - 100%)  Wt(kg): --  Weight (kg): 80.7 (09-14 @ 23:53)    OVERNIGHT EVENTS: kirill overnight   Vital Signs Last 24 Hrs  T(C): 36.4 (19 Sep 2021 21:36), Max: 36.9 (19 Sep 2021 01:01)  T(F): 97.5 (19 Sep 2021 21:36), Max: 98.5 (19 Sep 2021 01:01)  HR: 63 (19 Sep 2021 21:36) (52 - 98)  BP: 123/69 (19 Sep 2021 21:36) (99/57 - 123/79)  BP(mean): --  RR: 16 (19 Sep 2021 21:36) (16 - 18)  SpO2: 96% (19 Sep 2021 21:36) (94% - 100%)    I&O's Summary    18 Sep 2021 07:01  -  19 Sep 2021 07:00  --------------------------------------------------------  IN: 1000 mL / OUT: 4100 mL / NET: -3100 mL    19 Sep 2021 07:01  -  20 Sep 2021 00:48  --------------------------------------------------------  IN: 0 mL / OUT: 3470 mL / NET: -3470 mL      PHYSICAL EXAM:  General: NAD, pt is comfortably laying in hospital bed, A&O x3, on RA  HEENT: CN II-XII grossly intact, PERRL 3mm, EOMI b/l, face symmetric, tongue midline, neck FROM  Cardiovascular: RRR, normal S1 and S2   Respiratory: lungs CTAB, no wheezing, rhonchi, or crackles   GI: normoactive BS to auscultation, abd soft, NTND   Neuro: no aphasia, speech clear, no dysmetria, no pronator drift  strength 5/5 b/l UE, 0/5 b/l LE   b/l LE flaccid, reflexes 1+ bl,  no clonus, babinski neg b/l   RLE sensation (especially along L3/L4 dermatome) to light touch diminished compared to LLE   Extremities: R groin site C/D/I, no hematoma. Distal pulses 2+ x4   Incision/Wound: midline thoracic incision C/D/I         LABS:                        12.2   11.61 )-----------( 230      ( 19 Sep 2021 07:33 )             36.6     09-19    138  |  104  |  19  ----------------------------<  115<H>  4.3   |  26  |  0.75    Ca    8.9      19 Sep 2021 07:33  Phos  3.9     09-19  Mg     2.0     09-19              CAPILLARY BLOOD GLUCOSE          Drug Levels: [] N/A    CSF Analysis: [] N/A      Allergies    No Known Allergies    Intolerances      MEDICATIONS:  Antibiotics:  bictegravir 50 mG/emtricitabine 200 mG/tenofovir alafenamide 25 mG (BIKTARVY) 1 Tablet(s) Oral daily    Neuro:  LORazepam     Tablet 0.5 milliGRAM(s) Oral every 6 hours PRN  LORazepam   Injectable 2 milliGRAM(s) IV Push every 1 hour PRN  methocarbamol 500 milliGRAM(s) Oral every 8 hours  oxyCODONE    IR 5 milliGRAM(s) Oral every 4 hours PRN  oxyCODONE    IR 10 milliGRAM(s) Oral every 4 hours PRN  pregabalin 50 milliGRAM(s) Oral every 8 hours  traZODone 50 milliGRAM(s) Oral at bedtime PRN    Anticoagulation:  enoxaparin Injectable 40 milliGRAM(s) SubCutaneous at bedtime    OTHER:  bisacodyl 5 milliGRAM(s) Oral at bedtime  chlorhexidine 2% Cloths 1 Application(s) Topical daily  dexAMETHasone     Tablet   Oral   dexAMETHasone     Tablet 4 milliGRAM(s) Oral every 8 hours  dexAMETHasone     Tablet 2 milliGRAM(s) Oral every 6 hours  influenza   Vaccine 0.5 milliLiter(s) IntraMuscular once  metoclopramide 10 milliGRAM(s) Oral every 8 hours PRN  pantoprazole    Tablet 40 milliGRAM(s) Oral before breakfast  polyethylene glycol 3350 17 Gram(s) Oral daily  senna 2 Tablet(s) Oral at bedtime    IVF:    CULTURES:    RADIOLOGY & ADDITIONAL TESTS:      36 M PMHx HIV (CD4 700s, VLUD on biktarvy), hx of IVDU and methamphetamine use, was at gym this afternoon lifting heavy weights, ie dead lifts; tonight began to develop pain in thoracolumbar region; over past couple of hours he began to develop paresthesias down both legs and buttock, then progressed to complete plegia of bilateral extremities with loss of sensation from T8 dermatomes and below found on MRI to have 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      PLAN:   Neuro  - neuro and vitals checks q4hrs, pend SDU   - higher map goal is not pursued due to findings of spinal SAH, will keep pt normotensive   - MRI post-op completed 9/16   - decadron taper 4q6 to off  - Oxy IR 5/10 for pain PRN  - PT/OT   - bile bag d/c'ed 9/17  - psych consulted: adjustment disorder, reccs outpatient f/u    Cardio  - SBP < 140   - EKG WNL     Pulm  - RA   - encourage incentive spirometry use     GI   - regular   - bowel regimen   - protonix while on Decadron     /renal  - failed TOV on 9/16, straight cath q6hrs PRN   - baseline labs     Heme  - SCDs, SQL  -LE dopplers negative 9/17    Endo  - ISS while on decadron   - baseline a1c 5.0      GOC: full code   Level of care: SDU pend  Dispo: pt /ot pend assessment     Assessment and plan discussed w/ Dr. D'amico

## 2021-09-20 NOTE — PROGRESS NOTE ADULT - ASSESSMENT
per Neurosurgery    36 M PMHx HIV (CD4 700s, VLUD on biktarvy), hx of IVDU and methamphetamine use, was at gym this afternoon lifting heavy weights, ie dead lifts; tonight began to develop pain in thoracolumbar region; over past couple of hours he began to develop paresthesias down both legs and buttock, then progressed to complete plegia of bilateral extremities with loss of sensation from T8 dermatomes and below found on MRI to have intradural/extramedullary lesion with mass effect on spinal cord displacing it to the left, now s/p decadron load and s/p spinal angiogram (neg) and T8-T9 lami decompression, intraoperative findings of subarachnoid clot s/p evacuation (9/15)      Plan:  Pending consent  NPO  Maintenance fluids while NPO  Pending repeat COVID36 M PMHx HIV (CD4 700s, VLUD on biktarvy), hx of IVDU and methamphetamine use, was at gym this afternoon lifting heavy weights, ie dead lifts; tonight began to develop pain in thoracolumbar region; over past couple of hours he began to develop paresthesias down both legs and buttock, then progressed to complete plegia of bilateral extremities with loss of sensation from T8 dermatomes and below found on MRI to have intradural/extramedullary lesion with mass effect on spinal cord displacing it to the left, now s/p decadron load and s/p spinal angiogram (neg) and T8-T9 lami decompression, intraoperative findings of subarachnoid clot s/p evacuation (9/15)      Plan:  Pending consent  NPO  Maintenance fluids while NPO  Pending repeat COVID

## 2021-09-20 NOTE — PROGRESS NOTE ADULT - SUBJECTIVE AND OBJECTIVE BOX
Physical Medicine and Rehabilitation Progress Note:    Patient is a 36y old  Male who presents with a chief complaint of Paraplegia (17 Sep 2021 07:10)      HPI:  CHIEF COMPLAINT/ REASON FOR CONSULTATION: possible spinal cord compression      HPI: 36 m PMHx HIV (CD4 700s, VLUD) was at gym this afternoon lifting heavy weights, ie dead lifts; tonight began to develop pain in thoracolumbar region; over past couple of hours he began to develop paresthesias down both legs and buttok. now can barely feel legs and unable to stand or move legs. Was brought in by ambulance after a passerby on the street witnessed him unable to rise from curb.  has not gone to bathroom, but thus far no bladder or bowel incontinence.  + IVDU, most recently today (methamphetamine).  no fever/chills.    PAST MEDICAL HISTORY   Infection, HIV      PAST SURGICAL HISTORY denies         MEDICATIONS:  Antibiotics:    Neuro:    Anticoagulation:    Other:  dexAMETHasone  IVPB 10 milliGRAM(s) IV Intermittent Once      SOCIAL HISTORY:   Occupation:   Marital Status:     FAMILY HISTORY:      REVIEW OF SYSTEMS:  Check here if all are normal other than Neurological [x]      PHYSICAL EXAMINATION:   T(C): 36.9 (09-14-21 @ 23:53), Max: 36.9 (09-14-21 @ 23:53)  HR: 89 (09-14-21 @ 23:53) (89 - 89)  BP: 158/100 (09-14-21 @ 23:53) (158/100 - 158/100)  RR: 17 (09-14-21 @ 23:53) (17 - 17)  SpO2: 100% (09-14-21 @ 23:53) (100% - 100%)  Wt(kg): --  Weight (kg): 80.7 (09-14 @ 23:53)                   (15 Sep 2021 01:25)                            13.7   10.54 )-----------( 268      ( 20 Sep 2021 08:05 )             41.0       09-20    138  |  102  |  20  ----------------------------<  121<H>  4.4   |  28  |  0.77    Ca    9.4      20 Sep 2021 08:05  Phos  4.6     09-20  Mg     2.1     09-20      Vital Signs Last 24 Hrs  T(C): 36.6 (20 Sep 2021 08:55), Max: 36.6 (20 Sep 2021 08:55)  T(F): 97.9 (20 Sep 2021 08:55), Max: 97.9 (20 Sep 2021 08:55)  HR: 77 (20 Sep 2021 08:55) (60 - 98)  BP: 126/84 (20 Sep 2021 08:55) (107/70 - 126/84)  BP(mean): --  RR: 18 (20 Sep 2021 08:55) (16 - 18)  SpO2: 99% (20 Sep 2021 08:55) (94% - 100%)    MEDICATIONS  (STANDING):  bictegravir 50 mG/emtricitabine 200 mG/tenofovir alafenamide 25 mG (BIKTARVY) 1 Tablet(s) Oral daily  bisacodyl 5 milliGRAM(s) Oral at bedtime  chlorhexidine 2% Cloths 1 Application(s) Topical daily  dexAMETHasone     Tablet   Oral   dexAMETHasone     Tablet 4 milliGRAM(s) Oral every 8 hours  dexAMETHasone     Tablet 2 milliGRAM(s) Oral every 6 hours  enoxaparin Injectable 40 milliGRAM(s) SubCutaneous at bedtime  influenza   Vaccine 0.5 milliLiter(s) IntraMuscular once  methocarbamol 500 milliGRAM(s) Oral every 8 hours  pantoprazole    Tablet 40 milliGRAM(s) Oral before breakfast  polyethylene glycol 3350 17 Gram(s) Oral daily  pregabalin 50 milliGRAM(s) Oral every 8 hours  senna 2 Tablet(s) Oral at bedtime    MEDICATIONS  (PRN):  LORazepam     Tablet 0.5 milliGRAM(s) Oral every 6 hours PRN Agitation  LORazepam   Injectable 2 milliGRAM(s) IV Push every 1 hour PRN CIWA-Ar score 8 or greater  metoclopramide 10 milliGRAM(s) Oral every 8 hours PRN nausea/vomiting  oxyCODONE    IR 5 milliGRAM(s) Oral every 4 hours PRN Moderate Pain (4 - 6)  oxyCODONE    IR 10 milliGRAM(s) Oral every 4 hours PRN Severe Pain (7 - 10)  traZODone 50 milliGRAM(s) Oral at bedtime PRN insomnia    Currently Undergoing Physical/ Occupational Therapy at bedside.    Functional Status Assessment:   9/19/2021      Therapeutic Interventions      Bed Mobility  Bed Mobility Training Rolling/Turning: minimum assist (75% patient effort);  verbal cues;  nonverbal cues (demo/gestures);  1 person assist;  at the hips, cues on use of bed rail, able to initiate roll with upper body with use of BUE and assist the hips together with RLE crossed over LLE  Bed Mobility Training Sit-to-Supine: moderate assist (50% patient effort);  verbal cues;  nonverbal cues (demo/gestures);  1 person assist;  to manage BLE with cues on leaning towards the R elbow and using LUE to grab the bed rail for support  Bed Mobility Training Supine-to-Sit: verbal cues;  nonverbal cues (demo/gestures);  1 person assist;  moderate assist (50% patient effort);  to dangle BLE but pt able to bring trunk upright slowly with cues on using BUE and HOB elevated (sidelying to sit)  Bed Mobility Training Limitations: decreased ability to use legs for bridging/pushing;  impaired ability to control trunk for mobility;  pain;  impaired balance    Therapeutic Exercise  Therapeutic Exercise Detail: -Pt sat on EOB x 20 minutes with Fair static sitting balance with use of BUE for support-Pt performed dynamic seated ex requiring min A/CGA to keep trunk midline, reaching for targets, pulling/pushing forward and back with PT, reaching down proximal third of tibia keeping back straight and return to upright with varying levels of assist from Mod/Min/CGA-PROM of BLE-AROM of BUE-Intact proprioception/Kinesthesia on B hips, B knees, B ankles and L great toe, impared on R great toe-Decreased sensation on BLE R>L           PM&R Impression: as above    Current Disposition Plan Recommendations:    acute rehab placement

## 2021-09-20 NOTE — CONSULT NOTE ADULT - PROBLEM SELECTOR RECOMMENDATION 7
IS, pain control, mobilization IS, pain control, mobilization    -Pt has needed to be straight cath    -Last reported BM on 9/19

## 2021-09-20 NOTE — BH CONSULTATION LIAISON PROGRESS NOTE - OTHER
full range, briefly tearful when discussing aspects of recent events, overall bright stable posture lying back in bed lying back in bed, unable to move legs "it's a roller coaster" unable to move LE mildly increased rate, not pressured focus on recent events No delusions or paranoia, no SI

## 2021-09-20 NOTE — CONSULT NOTE ADULT - SUBJECTIVE AND OBJECTIVE BOX
Patient is a 36y old  Male who presents with a chief complaint of Paraplegia (17 Sep 2021 07:10)      HPI:  CHIEF COMPLAINT/ REASON FOR CONSULTATION: possible spinal cord compression      HPI: 36 m PMHx HIV (CD4 700s, VLUD) was at gym this afternoon lifting heavy weights, ie dead lifts; tonight began to develop pain in thoracolumbar region; over past couple of hours he began to develop paresthesias down both legs and buttok. now can barely feel legs and unable to stand or move legs. Was brought in by ambulance after a passerby on the street witnessed him unable to rise from curb.  has not gone to bathroom, but thus far no bladder or bowel incontinence.  + IVDU, most recently today (methamphetamine).  no fever/chills.    now s/p decadron load and s/p spinal angiogram (neg) and T8-T9 lami decompression, intraoperative findings of subarachnoid clot s/p evacuation POD # 0 (9/15).    Subjective:      Allergies    No Known Allergies    Intolerances    Home meds:     MEDICATIONS  (STANDING):  bictegravir 50 mG/emtricitabine 200 mG/tenofovir alafenamide 25 mG (BIKTARVY) 1 Tablet(s) Oral daily  bisacodyl 5 milliGRAM(s) Oral at bedtime  chlorhexidine 2% Cloths 1 Application(s) Topical daily  dexAMETHasone     Tablet   Oral   dexAMETHasone     Tablet 4 milliGRAM(s) Oral every 8 hours  dexAMETHasone     Tablet 2 milliGRAM(s) Oral every 6 hours  enoxaparin Injectable 40 milliGRAM(s) SubCutaneous at bedtime  influenza   Vaccine 0.5 milliLiter(s) IntraMuscular once  methocarbamol 500 milliGRAM(s) Oral every 8 hours  pantoprazole    Tablet 40 milliGRAM(s) Oral before breakfast  polyethylene glycol 3350 17 Gram(s) Oral daily  pregabalin 50 milliGRAM(s) Oral every 8 hours  senna 2 Tablet(s) Oral at bedtime    MEDICATIONS  (PRN):  LORazepam     Tablet 0.5 milliGRAM(s) Oral every 6 hours PRN Agitation  LORazepam   Injectable 2 milliGRAM(s) IV Push every 1 hour PRN CIWA-Ar score 8 or greater  metoclopramide 10 milliGRAM(s) Oral every 8 hours PRN nausea/vomiting  oxyCODONE    IR 5 milliGRAM(s) Oral every 4 hours PRN Moderate Pain (4 - 6)  oxyCODONE    IR 10 milliGRAM(s) Oral every 4 hours PRN Severe Pain (7 - 10)  traZODone 50 milliGRAM(s) Oral at bedtime PRN insomnia      Drug Dosing Weight  Height (cm): 175.3 (15 Sep 2021 13:18)  Weight (kg): 80.7 (15 Sep 2021 13:18)  BMI (kg/m2): 26.3 (15 Sep 2021 13:18)  BSA (m2): 1.97 (15 Sep 2021 13:18)    PAST MEDICAL & SURGICAL HISTORY:  Infection, HIV        FAMILY HISTORY:  no reported cardiac history    SOCIAL HISTORY: past hx of IVDU    ADVANCE DIRECTIVES:    Vital Signs Last 24 Hrs  T(C): 36.6 (20 Sep 2021 08:55), Max: 36.6 (20 Sep 2021 08:55)  T(F): 97.9 (20 Sep 2021 08:55), Max: 97.9 (20 Sep 2021 08:55)  HR: 77 (20 Sep 2021 08:55) (60 - 98)  BP: 126/84 (20 Sep 2021 08:55) (107/70 - 126/84)  BP(mean): --  ABP: --  ABP(mean): --  RR: 18 (20 Sep 2021 08:55) (16 - 18)  SpO2: 99% (20 Sep 2021 08:55) (94% - 100%)          I&O's Detail    19 Sep 2021 07:01  -  20 Sep 2021 07:00  --------------------------------------------------------  IN:  Total IN: 0 mL    OUT:    Intermittent Catheterization - Urethral (mL): 3870 mL  Total OUT: 3870 mL    Total NET: -3870 mL          PHYSICAL EXAM:      Constitutional:    Eyes:    ENMT:    Neck:    Breasts:    Back:    Respiratory:    Cardiovascular:    Gastrointestinal:    Genitourinary:    Rectal:    Extremities:    Vascular:    Neurological:    Skin:    Lymph Nodes:    Musculoskeletal:    Psychiatric:        LABS:  CBC Full  -  ( 20 Sep 2021 08:05 )  WBC Count : 10.54 K/uL  RBC Count : 4.37 M/uL  Hemoglobin : 13.7 g/dL  Hematocrit : 41.0 %  Platelet Count - Automated : 268 K/uL  Mean Cell Volume : 93.8 fl  Mean Cell Hemoglobin : 31.4 pg  Mean Cell Hemoglobin Concentration : 33.4 gm/dL  Auto Neutrophil # : x  Auto Lymphocyte # : x  Auto Monocyte # : x  Auto Eosinophil # : x  Auto Basophil # : x  Auto Neutrophil % : x  Auto Lymphocyte % : x  Auto Monocyte % : x  Auto Eosinophil % : x  Auto Basophil % : x    09-20    138  |  102  |  20  ----------------------------<  121<H>  4.4   |  28  |  0.77    Ca    9.4      20 Sep 2021 08:05  Phos  4.6     09-20  Mg     2.1     09-20      CAPILLARY BLOOD GLUCOSE              EKG:    ECHO, US:        RADIOLOGY:  < from: CT Lumbar Spine No Cont (09.15.21 @ 01:39) >  IMPRESSION:    1.  No central canal stenosis or significant neural foraminal narrowing in the thoracolumbar spine.  2.  Severe proctitis.    < end of copied text >      < from: MR Lumbar Spine w/wo IV Cont (09.16.21 @ 20:11) >  Impression: Interval appearance of pockets of lumbar subdural mixed hemorrhage. Diffuse subarachnoid hemorrhage. Interval increase in spinal canal stenosis to moderate to severe most prominent at the L5-S1 level.    < end of copied text >     Patient is a 36y old  Male who presents with a chief complaint of Paraplegia (17 Sep 2021 07:10)      HPI:  CHIEF COMPLAINT/ REASON FOR CONSULTATION: possible spinal cord compression      HPI: 36 m PMHx HIV (CD4 700s, VLUD) was at gym this afternoon lifting heavy weights, ie dead lifts; tonight began to develop pain in thoracolumbar region; over past couple of hours he began to develop paresthesias down both legs and buttok. now can barely feel legs and unable to stand or move legs. Was brought in by ambulance after a passerby on the street witnessed him unable to rise from curb.  has not gone to bathroom, but thus far no bladder or bowel incontinence.  + IVDU, most recently today (methamphetamine).  no fever/chills.    now s/p decadron load and s/p spinal angiogram (neg) and T8-T9 lami decompression, intraoperative findings of subarachnoid clot s/p evacuation POD # 0 (9/15).    Subjective:  Pt has no acute complaints - is in good spirits. Denies SOB, CP. ROS is otherwise negative.     Allergies    No Known Allergies    Intolerances    Home meds: Biktarvy     MEDICATIONS  (STANDING):  bictegravir 50 mG/emtricitabine 200 mG/tenofovir alafenamide 25 mG (BIKTARVY) 1 Tablet(s) Oral daily  bisacodyl 5 milliGRAM(s) Oral at bedtime  chlorhexidine 2% Cloths 1 Application(s) Topical daily  dexAMETHasone     Tablet   Oral   dexAMETHasone     Tablet 4 milliGRAM(s) Oral every 8 hours  dexAMETHasone     Tablet 2 milliGRAM(s) Oral every 6 hours  enoxaparin Injectable 40 milliGRAM(s) SubCutaneous at bedtime  influenza   Vaccine 0.5 milliLiter(s) IntraMuscular once  methocarbamol 500 milliGRAM(s) Oral every 8 hours  pantoprazole    Tablet 40 milliGRAM(s) Oral before breakfast  polyethylene glycol 3350 17 Gram(s) Oral daily  pregabalin 50 milliGRAM(s) Oral every 8 hours  senna 2 Tablet(s) Oral at bedtime    MEDICATIONS  (PRN):  LORazepam     Tablet 0.5 milliGRAM(s) Oral every 6 hours PRN Agitation  LORazepam   Injectable 2 milliGRAM(s) IV Push every 1 hour PRN CIWA-Ar score 8 or greater  metoclopramide 10 milliGRAM(s) Oral every 8 hours PRN nausea/vomiting  oxyCODONE    IR 5 milliGRAM(s) Oral every 4 hours PRN Moderate Pain (4 - 6)  oxyCODONE    IR 10 milliGRAM(s) Oral every 4 hours PRN Severe Pain (7 - 10)  traZODone 50 milliGRAM(s) Oral at bedtime PRN insomnia      Drug Dosing Weight  Height (cm): 175.3 (15 Sep 2021 13:18)  Weight (kg): 80.7 (15 Sep 2021 13:18)  BMI (kg/m2): 26.3 (15 Sep 2021 13:18)  BSA (m2): 1.97 (15 Sep 2021 13:18)    PAST MEDICAL & SURGICAL HISTORY:  Infection, HIV        FAMILY HISTORY:  no reported cardiac history    SOCIAL HISTORY: past hx of meth use    ADVANCE DIRECTIVES:    Vital Signs Last 24 Hrs  T(C): 36.6 (20 Sep 2021 08:55), Max: 36.6 (20 Sep 2021 08:55)  T(F): 97.9 (20 Sep 2021 08:55), Max: 97.9 (20 Sep 2021 08:55)  HR: 77 (20 Sep 2021 08:55) (60 - 98)  BP: 126/84 (20 Sep 2021 08:55) (107/70 - 126/84)  BP(mean): --  ABP: --  ABP(mean): --  RR: 18 (20 Sep 2021 08:55) (16 - 18)  SpO2: 99% (20 Sep 2021 08:55) (94% - 100%)          I&O's Detail    19 Sep 2021 07:01  -  20 Sep 2021 07:00  --------------------------------------------------------  IN:  Total IN: 0 mL    OUT:    Intermittent Catheterization - Urethral (mL): 3870 mL  Total OUT: 3870 mL    Total NET: -3870 mL          PHYSICAL EXAM:      Constitutional: NAD  Eyes: PERRLA  ENMT: MMM  Neck: supple  Back: midline  Respiratory: CTA b/l  Cardiovascular: rrr, s1s2, no m/r/g  Gastrointestinal: soft, NTND, + BS  Extremities: wwp  Vascular: + 2 pulses radial  Neurological: AAO x 4, b/l LE weakness (1/5 b/l)  Skin: no rash  Lymph Nodes: no LAD  Musculoskeletal: no joint swelling  Psychiatric: normal affect        LABS:  CBC Full  -  ( 20 Sep 2021 08:05 )  WBC Count : 10.54 K/uL  RBC Count : 4.37 M/uL  Hemoglobin : 13.7 g/dL  Hematocrit : 41.0 %  Platelet Count - Automated : 268 K/uL  Mean Cell Volume : 93.8 fl  Mean Cell Hemoglobin : 31.4 pg  Mean Cell Hemoglobin Concentration : 33.4 gm/dL  Auto Neutrophil # : x  Auto Lymphocyte # : x  Auto Monocyte # : x  Auto Eosinophil # : x  Auto Basophil # : x  Auto Neutrophil % : x  Auto Lymphocyte % : x  Auto Monocyte % : x  Auto Eosinophil % : x  Auto Basophil % : x    09-20    138  |  102  |  20  ----------------------------<  121<H>  4.4   |  28  |  0.77    Ca    9.4      20 Sep 2021 08:05  Phos  4.6     09-20  Mg     2.1     09-20      CAPILLARY BLOOD GLUCOSE              EKG:    ECHO, US:        RADIOLOGY:  < from: CT Lumbar Spine No Cont (09.15.21 @ 01:39) >  IMPRESSION:    1.  No central canal stenosis or significant neural foraminal narrowing in the thoracolumbar spine.  2.  Severe proctitis.    < end of copied text >      < from: MR Lumbar Spine w/wo IV Cont (09.16.21 @ 20:11) >  Impression: Interval appearance of pockets of lumbar subdural mixed hemorrhage. Diffuse subarachnoid hemorrhage. Interval increase in spinal canal stenosis to moderate to severe most prominent at the L5-S1 level.    < end of copied text >

## 2021-09-20 NOTE — CONSULT NOTE ADULT - ASSESSMENT
per Neurosurgery    36 M PMHx HIV (CD4 700s, VLUD on biktarvy), hx of IVDU and methamphetamine use, was at gym this afternoon lifting heavy weights, ie dead lifts; tonight began to develop pain in thoracolumbar region; over past couple of hours he began to develop paresthesias down both legs and buttock, then progressed to complete plegia of bilateral extremities with loss of sensation from T8 dermatomes and below found on MRI to have intradural/extramedullary lesion with mass effect on spinal cord displacing it to the left, now s/p decadron load and s/p spinal angiogram (neg) and T8-T9 lami decompression, intraoperative findings of subarachnoid clot s/p evacuation (9/15).     PLAN:   Neuro  - neuro and vitals checks q4hrs, pend SDU   - higher map goal is not pursued due to findings of spinal SAH, will keep pt normotensive   - MRI post-op completed 9/16   - decadron taper 4q6 to off  - PT/OT   - bile bag d/c'ed 9/17    PSYCH: adjustment d/o   -     Cardio  - SBP < 140   - EKG WNL     Pulm  - RA   - encourage incentive spirometry use     GI   - regular   - bowel regimen   - protonix while on Decadron     /renal  - failed TOV on 9/16, straight cath q6hrs PRN   - baseline labs     Heme  - SCDs, SQL  -LE dopplers negative 9/17    Endo  - ISS while on decadron   - baseline a1c 5.0      GOC: full code   Level of care: SDU pend
36 M PMHx HIV (CD4 700s, VLUD on biktarvy), hx of IVDU and methamphetamine use, was at gym this afternoon lifting heavy weights, ie dead lifts; tonight began to develop pain in thoracolumbar region; over past couple of hours he began to develop paresthesias down both legs and buttock, then progressed to complete plegia of bilateral extremities with loss of sensation from T8 dermatomes and below found on MRI to have 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

## 2021-09-21 LAB
ANION GAP SERPL CALC-SCNC: 7 MMOL/L — SIGNIFICANT CHANGE UP (ref 5–17)
BUN SERPL-MCNC: 20 MG/DL — SIGNIFICANT CHANGE UP (ref 7–23)
CALCIUM SERPL-MCNC: 8.5 MG/DL — SIGNIFICANT CHANGE UP (ref 8.4–10.5)
CHLORIDE SERPL-SCNC: 104 MMOL/L — SIGNIFICANT CHANGE UP (ref 96–108)
CO2 SERPL-SCNC: 26 MMOL/L — SIGNIFICANT CHANGE UP (ref 22–31)
CREAT SERPL-MCNC: 0.76 MG/DL — SIGNIFICANT CHANGE UP (ref 0.5–1.3)
GLUCOSE SERPL-MCNC: 95 MG/DL — SIGNIFICANT CHANGE UP (ref 70–99)
HCT VFR BLD CALC: 40.5 % — SIGNIFICANT CHANGE UP (ref 39–50)
HGB BLD-MCNC: 13.6 G/DL — SIGNIFICANT CHANGE UP (ref 13–17)
MAGNESIUM SERPL-MCNC: 1.8 MG/DL — SIGNIFICANT CHANGE UP (ref 1.6–2.6)
MCHC RBC-ENTMCNC: 31.5 PG — SIGNIFICANT CHANGE UP (ref 27–34)
MCHC RBC-ENTMCNC: 33.6 GM/DL — SIGNIFICANT CHANGE UP (ref 32–36)
MCV RBC AUTO: 93.8 FL — SIGNIFICANT CHANGE UP (ref 80–100)
NRBC # BLD: 0 /100 WBCS — SIGNIFICANT CHANGE UP (ref 0–0)
PHOSPHATE SERPL-MCNC: 3.3 MG/DL — SIGNIFICANT CHANGE UP (ref 2.5–4.5)
PLATELET # BLD AUTO: 267 K/UL — SIGNIFICANT CHANGE UP (ref 150–400)
POTASSIUM SERPL-MCNC: 4 MMOL/L — SIGNIFICANT CHANGE UP (ref 3.5–5.3)
POTASSIUM SERPL-SCNC: 4 MMOL/L — SIGNIFICANT CHANGE UP (ref 3.5–5.3)
RBC # BLD: 4.32 M/UL — SIGNIFICANT CHANGE UP (ref 4.2–5.8)
RBC # FLD: 11.9 % — SIGNIFICANT CHANGE UP (ref 10.3–14.5)
SODIUM SERPL-SCNC: 137 MMOL/L — SIGNIFICANT CHANGE UP (ref 135–145)
SURGICAL PATHOLOGY STUDY: SIGNIFICANT CHANGE UP
WBC # BLD: 12.56 K/UL — HIGH (ref 3.8–10.5)
WBC # FLD AUTO: 12.56 K/UL — HIGH (ref 3.8–10.5)

## 2021-09-21 PROCEDURE — 99233 SBSQ HOSP IP/OBS HIGH 50: CPT | Mod: GC

## 2021-09-21 PROCEDURE — 99233 SBSQ HOSP IP/OBS HIGH 50: CPT

## 2021-09-21 RX ORDER — SODIUM CHLORIDE 9 MG/ML
1000 INJECTION INTRAMUSCULAR; INTRAVENOUS; SUBCUTANEOUS
Refills: 0 | Status: DISCONTINUED | OUTPATIENT
Start: 2021-09-21 | End: 2021-09-21

## 2021-09-21 RX ORDER — MULTIVIT WITH MIN/MFOLATE/K2 340-15/3 G
1 POWDER (GRAM) ORAL ONCE
Refills: 0 | Status: COMPLETED | OUTPATIENT
Start: 2021-09-21 | End: 2021-09-22

## 2021-09-21 RX ADMIN — Medication 2 MILLIGRAM(S): at 00:40

## 2021-09-21 RX ADMIN — BICTEGRAVIR SODIUM, EMTRICITABINE, AND TENOFOVIR ALAFENAMIDE FUMARATE 1 TABLET(S): 30; 120; 15 TABLET ORAL at 12:07

## 2021-09-21 RX ADMIN — SODIUM CHLORIDE 100 MILLILITER(S): 9 INJECTION INTRAMUSCULAR; INTRAVENOUS; SUBCUTANEOUS at 16:50

## 2021-09-21 RX ADMIN — OXYCODONE HYDROCHLORIDE 10 MILLIGRAM(S): 5 TABLET ORAL at 23:25

## 2021-09-21 RX ADMIN — SENNA PLUS 2 TABLET(S): 8.6 TABLET ORAL at 21:25

## 2021-09-21 RX ADMIN — ENOXAPARIN SODIUM 40 MILLIGRAM(S): 100 INJECTION SUBCUTANEOUS at 21:25

## 2021-09-21 RX ADMIN — Medication 5 MILLIGRAM(S): at 21:25

## 2021-09-21 RX ADMIN — METHOCARBAMOL 500 MILLIGRAM(S): 500 TABLET, FILM COATED ORAL at 21:25

## 2021-09-21 RX ADMIN — SODIUM CHLORIDE 50 MILLILITER(S): 9 INJECTION INTRAMUSCULAR; INTRAVENOUS; SUBCUTANEOUS at 00:01

## 2021-09-21 RX ADMIN — Medication 2 MILLIGRAM(S): at 07:30

## 2021-09-21 RX ADMIN — Medication 2 MILLIGRAM(S): at 12:06

## 2021-09-21 RX ADMIN — Medication 2 MILLIGRAM(S): at 21:27

## 2021-09-21 RX ADMIN — PANTOPRAZOLE SODIUM 40 MILLIGRAM(S): 20 TABLET, DELAYED RELEASE ORAL at 07:30

## 2021-09-21 RX ADMIN — Medication 50 MILLIGRAM(S): at 21:25

## 2021-09-21 NOTE — BH CONSULTATION LIAISON PROGRESS NOTE - OTHER
stable posture lying back in bed lying back in bed, unable to move legs "ok" unable to move LE focus on recent events, processing of emotions and adjustment to changes in physical condition No delusions or paranoia, no SI

## 2021-09-21 NOTE — BH CONSULTATION LIAISON PROGRESS NOTE - NSBHCONSULTMEDSLEEP_PSY_A_CORE FT
Recommend trazodone 25-50mg po QHS PRN for insomnia
Recommend trazodone 25-50mg po QHS PRN for insomnia

## 2021-09-21 NOTE — BH CONSULTATION LIAISON PROGRESS NOTE - NSBHCONSULTRECOMMENDOTHER_PSY_A_CORE FT
encourage sobriety. Substance counseling when better able to participate
encourage sobriety. Substance counseling when better able to participate

## 2021-09-21 NOTE — BH CONSULTATION LIAISON PROGRESS NOTE - NSBHFUPINTERVALHXFT_PSY_A_CORE
Psychiatry f/u for adjustment to new paralysis. Interim hx reviewed and pt seen this AM with psychology intern Rajeev Lind. Pt also seen by intern for therapy session. Pt reports optimistic mood, focus on future goals and gaining strength, still with moments of elevated anxiety and low mood processing recent events. Endorses elevated pain in PM relieved with muscle relaxant and pain medication allowing for ~8h sleep; reports trying to avoid use of pain medication in the day to maintain a clear sensorium. Denies need for trazodone for sleep or PRN medication for anxiety. No reported AVH. No SI. Interested in continuing therapy as outpt

## 2021-09-21 NOTE — PROGRESS NOTE ADULT - SUBJECTIVE AND OBJECTIVE BOX
Patient is a 36y old  Male who presents with a chief complaint of Paraplegia (17 Sep 2021 07:10)      HPI:  CHIEF COMPLAINT/ REASON FOR CONSULTATION: possible spinal cord compression      HPI: 36 m PMHx HIV (CD4 700s, VLUD) was at gym this afternoon lifting heavy weights, ie dead lifts; tonight began to develop pain in thoracolumbar region; over past couple of hours he began to develop paresthesias down both legs and buttok. now can barely feel legs and unable to stand or move legs. Was brought in by ambulance after a passerby on the street witnessed him unable to rise from curb.  has not gone to bathroom, but thus far no bladder or bowel incontinence.  + IVDU, most recently today (methamphetamine).  no fever/chills.    now s/p decadron load and s/p spinal angiogram (neg) and T8-T9 lami decompression, intraoperative findings of subarachnoid clot s/p evacuation POD # 0 (9/15).    Subjective:  Pt has no acute complaints - is in good spirits. Denies SOB, CP. ROS is otherwise negative.     Allergies    No Known Allergies    Intolerances    Home meds: Biktarvy       MEDICATIONS  (STANDING):  bictegravir 50 mG/emtricitabine 200 mG/tenofovir alafenamide 25 mG (BIKTARVY) 1 Tablet(s) Oral daily  bisacodyl 5 milliGRAM(s) Oral at bedtime  chlorhexidine 2% Cloths 1 Application(s) Topical daily  dexAMETHasone     Tablet   Oral   dexAMETHasone     Tablet 2 milliGRAM(s) Oral every 6 hours  enoxaparin Injectable 40 milliGRAM(s) SubCutaneous at bedtime  influenza   Vaccine 0.5 milliLiter(s) IntraMuscular once  methocarbamol 500 milliGRAM(s) Oral every 8 hours  pantoprazole    Tablet 40 milliGRAM(s) Oral before breakfast  polyethylene glycol 3350 17 Gram(s) Oral daily  pregabalin 50 milliGRAM(s) Oral every 8 hours  senna 2 Tablet(s) Oral at bedtime  sodium chloride 0.9%. 1000 milliLiter(s) (50 mL/Hr) IV Continuous <Continuous>    MEDICATIONS  (PRN):  LORazepam     Tablet 0.5 milliGRAM(s) Oral every 6 hours PRN Agitation  LORazepam   Injectable 2 milliGRAM(s) IV Push every 1 hour PRN CIWA-Ar score 8 or greater  metoclopramide 10 milliGRAM(s) Oral every 8 hours PRN nausea/vomiting  oxyCODONE    IR 5 milliGRAM(s) Oral every 4 hours PRN Moderate Pain (4 - 6)  oxyCODONE    IR 10 milliGRAM(s) Oral every 4 hours PRN Severe Pain (7 - 10)  traZODone 50 milliGRAM(s) Oral at bedtime PRN insomnia          Drug Dosing Weight  Height (cm): 175.3 (15 Sep 2021 13:18)  Weight (kg): 80.7 (15 Sep 2021 13:18)  BMI (kg/m2): 26.3 (15 Sep 2021 13:18)  BSA (m2): 1.97 (15 Sep 2021 13:18)    PAST MEDICAL & SURGICAL HISTORY:  Infection, HIV        FAMILY HISTORY:  no reported cardiac history    SOCIAL HISTORY: past hx of meth use    ADVANCE DIRECTIVES:      Vital Signs Last 24 Hrs  T(C): 36.8 (21 Sep 2021 04:47), Max: 37 (20 Sep 2021 14:13)  T(F): 98.2 (21 Sep 2021 04:47), Max: 98.6 (20 Sep 2021 14:13)  HR: 64 (21 Sep 2021 04:47) (58 - 81)  BP: 114/79 (21 Sep 2021 04:47) (104/66 - 126/84)  BP(mean): --  RR: 18 (21 Sep 2021 04:47) (16 - 18)  SpO2: 99% (21 Sep 2021 04:47) (98% - 100%)      PHYSICAL EXAM:      Constitutional: NAD  Eyes: PERRLA  ENMT: MMM  Neck: supple  Back: midline  Respiratory: CTA b/l  Cardiovascular: rrr, s1s2, no m/r/g  Gastrointestinal: soft, NTND, + BS  Extremities: wwp  Vascular: + 2 pulses radial  Neurological: AAO x 4, b/l LE weakness (1/5 b/l)  Skin: no rash  Lymph Nodes: no LAD  Musculoskeletal: no joint swelling  Psychiatric: normal affect        LABS:                          13.6   12.56 )-----------( 267      ( 21 Sep 2021 08:14 )             40.5   09-21    137  |  104  |  20  ----------------------------<  95  4.0   |  26  |  0.76    Ca    8.5      21 Sep 2021 08:14  Phos  3.3     09-21  Mg     1.8     09-21              EKG:    ECHO, US:        RADIOLOGY:  < from: CT Lumbar Spine No Cont (09.15.21 @ 01:39) >  IMPRESSION:    1.  No central canal stenosis or significant neural foraminal narrowing in the thoracolumbar spine.  2.  Severe proctitis.    < end of copied text >      < from: MR Lumbar Spine w/wo IV Cont (09.16.21 @ 20:11) >  Impression: Interval appearance of pockets of lumbar subdural mixed hemorrhage. Diffuse subarachnoid hemorrhage. Interval increase in spinal canal stenosis to moderate to severe most prominent at the L5-S1 level.    < end of copied text >

## 2021-09-21 NOTE — BH CONSULTATION LIAISON PROGRESS NOTE - NSBHADMITCOUNSEL_PSY_A_CORE
risks and benefits of treatment options/instructions for management, treatment and follow up/importance of adherence to chosen treatment/risk factor reduction risks and benefits of treatment options/instructions for management, treatment and follow up/importance of adherence to chosen treatment/risk factor reduction/other...

## 2021-09-21 NOTE — BRIEF OPERATIVE NOTE - OPERATION/FINDINGS
T8 Laminectomy with evacuation of hematoma. Some questionably enlarged blood vessels vs. blood in the subarachnoid space. Controlled with bipolar. Pathology sent.
Femoral spinal angiogram performed under general anesthesia via right CFA using a 5Fr short sheath. Angiography performed from T5 to L3, including the sacrum, as well as external carotid and vertebral artery branches without findings of vascular pathology. A coil was placed at the left T8 intercostal artery for surgical navigation. Right groin was closed with exoseal and manual compression. Maintained intubated.    Full report to follow, d/w Dr. Watts
Under GA using a 5 fr sheath right CFA, Selective Spinal  angiogram was performed: B/l T-6 to b/l L-1  Findings: No AVM, or early venous shunting.  ASA from Left T-7, T-8 and right L-1  Full report to follow.  Patient tolerated procedure well, no new neurological deficit, hemodynamically stable.  Right groin/vasc access site: Exoseal and manual compression applied, hemostasis achieved, no hematoma.  Patient was transferred to Cath lab recovery area.  Above d/w: Dr. Watts

## 2021-09-21 NOTE — BH CONSULTATION LIAISON PROGRESS NOTE - NSBHCHARTREVIEWVS_PSY_A_CORE FT
Vital Signs Last 24 Hrs  T(C): 36.8 (21 Sep 2021 04:47), Max: 37 (20 Sep 2021 14:13)  T(F): 98.2 (21 Sep 2021 04:47), Max: 98.6 (20 Sep 2021 14:13)  HR: 64 (21 Sep 2021 04:47) (58 - 81)  BP: 114/79 (21 Sep 2021 04:47) (104/66 - 117/79)  BP(mean): --  RR: 18 (21 Sep 2021 04:47) (16 - 18)  SpO2: 99% (21 Sep 2021 04:47) (98% - 100%)
Vital Signs Last 24 Hrs  T(C): 36.6 (20 Sep 2021 08:55), Max: 36.6 (20 Sep 2021 08:55)  T(F): 97.9 (20 Sep 2021 08:55), Max: 97.9 (20 Sep 2021 08:55)  HR: 77 (20 Sep 2021 08:55) (60 - 98)  BP: 126/84 (20 Sep 2021 08:55) (107/70 - 126/84)  BP(mean): --  RR: 18 (20 Sep 2021 08:55) (16 - 18)  SpO2: 99% (20 Sep 2021 08:55) (94% - 100%)

## 2021-09-21 NOTE — BH CONSULTATION LIAISON PROGRESS NOTE - NSBHASSESSMENTFT_PSY_ALL_CORE
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 Pt remains receptive to emotional support in hospital and would benefit from ongoing counseling in rehab/as outpt. No evidence of any active SI on longitudinal assessment; pt with strong coping skills and family support, future-oriented and treatment seeking. Pt counseled about option of use of trazodone for insomnia if sleep difficulty persists as pain subsides. Would also benefit from additional substance counseling when acute phase of illness passes.    DDx: Adjustment d/o  r/o steroid exacerbated mood sx  cannabis abuse  amphetamine abuse r/o use disorder    Risk assessment: Assessed at low acute risk for suicide - no current or known past SI, future-oriented, tx seeking  risk factors include new medical dx, h/o substance use  modifiable risk factors include lack of engagement in mental health/substance tx  protective factors include family support, spiritual beliefs, lack of past psychiatric dx    RECOMMENDATIONS  -no need for 1:1 observation for suicidality  -emotional support provided. Will continue to offer psychological support in hospital  -consider trazodone 25mg-50mg po QHS PRN for insomnia  -recommend lorazepam 0.5mg-1mg po Q6H PRN for anxiety/panic symptoms (has not been requiring).   -recommend engagement in outpt psychotherapy - at acute rehab if available. Will provide additional outpt referrals depending on disposition  -no psychiatric barrier to discharge. Please contact with questions.      Assessed at low acute risk for suicide - no current or known past SI, future-oriented, tx seeking  risk factors include new medical dx, h/o substance use  protective factors include family support, spiritual beliefs, lack of past psychiatric dx

## 2021-09-21 NOTE — BRIEF OPERATIVE NOTE - NSICDXBRIEFPREOP_GEN_ALL_CORE_FT
PRE-OP DIAGNOSIS:  Subdural hematoma 15-Sep-2021 18:53:13 thoracolumbar Tucker Syed  

## 2021-09-21 NOTE — PROGRESS NOTE ADULT - ASSESSMENT
36 M PMHx HIV (CD4 700s, VLUD on biktarvy), hx of IVDU and methamphetamine use, was at gym this afternoon lifting heavy weights, ie dead lifts; tonight began to develop pain in thoracolumbar region; over past couple of hours he began to develop paresthesias down both legs and buttock, then progressed to complete plegia of bilateral extremities with loss of sensation from T8 dermatomes and below found on MRI to have 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

## 2021-09-21 NOTE — BH CONSULTATION LIAISON PROGRESS NOTE - NSBHCONSULTFOLLOWAFTERCARE_PSY_A_CORE FT
Encourage engagement in psychotherapy at acute rehab.  Once at home, can provide information for:    [facility]  [address]  [phone]  [facility]  80 [facility]  [phone]  [facility]  [address]  [phone]  [facility]  [address]  [phone]  [facility] [place] [facility]  [address]  [phone]  [facility]  [address]  [phone]  [facility]  Educational [place]  197 [facility]
Encourage engagement in psychotherapy at acute rehab.  Once at home, can provide information for:    [facility]  [address]  [phone]  [facility]  80 [facility]  [phone]  [facility]  [address]  [phone]  [facility]  [address]  [phone]  [facility] [place] [facility]  [address]  [phone]  [facility]  [address]  [phone]  [facility]  [facility] [place]  197 [facility]

## 2021-09-21 NOTE — BH CONSULTATION LIAISON PROGRESS NOTE - CURRENT MEDICATION
MEDICATIONS  (STANDING):  bictegravir 50 mG/emtricitabine 200 mG/tenofovir alafenamide 25 mG (BIKTARVY) 1 Tablet(s) Oral daily  bisacodyl 5 milliGRAM(s) Oral at bedtime  chlorhexidine 2% Cloths 1 Application(s) Topical daily  dexAMETHasone     Tablet   Oral   dexAMETHasone     Tablet 2 milliGRAM(s) Oral every 6 hours  enoxaparin Injectable 40 milliGRAM(s) SubCutaneous at bedtime  influenza   Vaccine 0.5 milliLiter(s) IntraMuscular once  methocarbamol 500 milliGRAM(s) Oral every 8 hours  pantoprazole    Tablet 40 milliGRAM(s) Oral before breakfast  polyethylene glycol 3350 17 Gram(s) Oral daily  pregabalin 50 milliGRAM(s) Oral every 8 hours  senna 2 Tablet(s) Oral at bedtime  sodium chloride 0.9%. 1000 milliLiter(s) (50 mL/Hr) IV Continuous <Continuous>    MEDICATIONS  (PRN):  LORazepam     Tablet 0.5 milliGRAM(s) Oral every 6 hours PRN Agitation  LORazepam   Injectable 2 milliGRAM(s) IV Push every 1 hour PRN CIWA-Ar score 8 or greater  metoclopramide 10 milliGRAM(s) Oral every 8 hours PRN nausea/vomiting  oxyCODONE    IR 5 milliGRAM(s) Oral every 4 hours PRN Moderate Pain (4 - 6)  oxyCODONE    IR 10 milliGRAM(s) Oral every 4 hours PRN Severe Pain (7 - 10)  traZODone 50 milliGRAM(s) Oral at bedtime PRN insomnia  
MEDICATIONS  (STANDING):  bictegravir 50 mG/emtricitabine 200 mG/tenofovir alafenamide 25 mG (BIKTARVY) 1 Tablet(s) Oral daily  bisacodyl 5 milliGRAM(s) Oral at bedtime  chlorhexidine 2% Cloths 1 Application(s) Topical daily  dexAMETHasone     Tablet   Oral   dexAMETHasone     Tablet 4 milliGRAM(s) Oral every 8 hours  dexAMETHasone     Tablet 2 milliGRAM(s) Oral every 6 hours  enoxaparin Injectable 40 milliGRAM(s) SubCutaneous at bedtime  influenza   Vaccine 0.5 milliLiter(s) IntraMuscular once  methocarbamol 500 milliGRAM(s) Oral every 8 hours  pantoprazole    Tablet 40 milliGRAM(s) Oral before breakfast  polyethylene glycol 3350 17 Gram(s) Oral daily  pregabalin 50 milliGRAM(s) Oral every 8 hours  senna 2 Tablet(s) Oral at bedtime    MEDICATIONS  (PRN):  LORazepam     Tablet 0.5 milliGRAM(s) Oral every 6 hours PRN Agitation  LORazepam   Injectable 2 milliGRAM(s) IV Push every 1 hour PRN CIWA-Ar score 8 or greater  metoclopramide 10 milliGRAM(s) Oral every 8 hours PRN nausea/vomiting  oxyCODONE    IR 10 milliGRAM(s) Oral every 4 hours PRN Severe Pain (7 - 10)  oxyCODONE    IR 5 milliGRAM(s) Oral every 4 hours PRN Moderate Pain (4 - 6)  traZODone 50 milliGRAM(s) Oral at bedtime PRN insomnia

## 2021-09-21 NOTE — PROGRESS NOTE ADULT - SUBJECTIVE AND OBJECTIVE BOX
NEUROSURGERY POST OP NOTE:    POD# 0 S/P cervical to sacral spine angiogram.    S: The patient reports that he is feeling good after his angiogram today. He denies any pain at this time.       T(C): 36.3 (09-21-21 @ 13:27), Max: 36.8 (09-21-21 @ 04:47)  HR: 62 (09-21-21 @ 13:27) (58 - 69)  BP: 118/81 (09-21-21 @ 13:27) (108/74 - 124/77)  RR: 16 (09-21-21 @ 13:27) (16 - 18)  SpO2: 100% (09-21-21 @ 13:27) (98% - 100%)      09-20-21 @ 07:01  -  09-21-21 @ 07:00  --------------------------------------------------------  IN: 300 mL / OUT: 3625 mL / NET: -3325 mL    09-21-21 @ 07:01  -  09-21-21 @ 17:10  --------------------------------------------------------  IN: 350 mL / OUT: 400 mL / NET: -50 mL        bictegravir 50 mG/emtricitabine 200 mG/tenofovir alafenamide 25 mG (BIKTARVY) 1 Tablet(s) Oral daily  bisacodyl 5 milliGRAM(s) Oral at bedtime  chlorhexidine 2% Cloths 1 Application(s) Topical daily  dexAMETHasone     Tablet   Oral   dexAMETHasone     Tablet 2 milliGRAM(s) Oral every 6 hours  enoxaparin Injectable 40 milliGRAM(s) SubCutaneous at bedtime  influenza   Vaccine 0.5 milliLiter(s) IntraMuscular once  LORazepam     Tablet 0.5 milliGRAM(s) Oral every 6 hours PRN  LORazepam   Injectable 2 milliGRAM(s) IV Push every 1 hour PRN  magnesium citrate Oral Solution 1 Bottle Oral once  methocarbamol 500 milliGRAM(s) Oral every 8 hours  metoclopramide 10 milliGRAM(s) Oral every 8 hours PRN  oxyCODONE    IR 5 milliGRAM(s) Oral every 4 hours PRN  oxyCODONE    IR 10 milliGRAM(s) Oral every 4 hours PRN  pantoprazole    Tablet 40 milliGRAM(s) Oral before breakfast  polyethylene glycol 3350 17 Gram(s) Oral daily  pregabalin 50 milliGRAM(s) Oral every 8 hours  senna 2 Tablet(s) Oral at bedtime  sodium chloride 0.9%. 1000 milliLiter(s) IV Continuous <Continuous>  sodium chloride 0.9%. 1000 milliLiter(s) IV Continuous <Continuous>  traZODone 50 milliGRAM(s) Oral at bedtime PRN      RADIOLOGY:     Exam:  General: NAD, pt is comfortably laying in bed, on room air  HEENT: CN II-XII grossly intact, PERRL, EOMI b/l, face symmetric, tongue midline  Cardiovascular: regular rate and rhythm, normal S1 and S2   Respiratory: symmetrical rise and fall of the chest  GI: abd soft, nontender, nondistended  Neuro: A&Ox3, No aphasia. Follows commands.  strength 5/5 bilateral upper extremities, 0/5 bilateral lower extremities. Negative clonus bilaterally. RLE sensation diminished to light touch compared to LLE.   Extremities: distal pulses 2+ x4  Wound/incision: R groin site is dressed with a clean dressing. No hematoma palpated.     Assessment:   36 M PMHx HIV (CD4 700s, VLUD on biktarvy), hx of IVDU and methamphetamine use, was at gym this afternoon lifting heavy weights, ie dead lifts; tonight began to develop pain in thoracolumbar region; over past couple of hours he began to develop paresthesias down both legs and buttock, then progressed to complete plegia of bilateral extremities with loss of sensation from T8 dermatomes and below found on MRI to have intradural/extramedullary lesion with mass effect on spinal cord displacing it to the left, now s/p decadron load and s/p spinal angiogram (neg) and T8-T9 lami decompression, intraoperative findings of subarachnoid clot s/p evacuation (9/15). Now s/p cervical to sacral spine angiogram (9/21).    Plan:  Neuro  - neuro and vitals checks q4hrs, pend SDU   - higher map goal is not pursued due to findings of spinal SAH, will keep pt normotensive   - MRI post-op completed 9/16   - decadron taper 4q6 to off  - Oxy IR 5/10 for pain PRN  - PT/OT   - bile bag d/c'ed 9/17  - psych consulted: adjustment disorder, reccs outpatient f/u    Cardio  - SBP < 140   - EKG WNL     Pulm  - RA   - encourage incentive spirometry use     GI   - regular   - bowel regimen   - protonix while on Decadron     /renal  - failed TOV on 9/16, straight cath q4h. No allen   - baseline labs     Heme  - SCDs, SQL  -LE dopplers negative 9/17    Endo  - ISS while on decadron   - baseline a1c 5.0      GOC: full code   Level of care: SDU pend  Dispo: pt /ot pend assessment     Assessment and plan discussed w/ Dr. D'[place]ico

## 2021-09-21 NOTE — BRIEF OPERATIVE NOTE - NSICDXBRIEFPOSTOP_GEN_ALL_CORE_FT
POST-OP DIAGNOSIS:  Subdural hematoma 15-Sep-2021 18:55:27 thoracolumbar Tucker Syed  

## 2021-09-21 NOTE — BRIEF OPERATIVE NOTE - NSICDXBRIEFPROCEDURE_GEN_ALL_CORE_FT
PROCEDURES:  Angiogram, spine, selective 15-Sep-2021 18:37:35 cervical to sacral Tucker Syed  
PROCEDURES:  Lumbar laminectomy 15-Sep-2021 22:55:55  Tamar Morton  
PROCEDURES:  Angiogram, spine, selective 15-Sep-2021 18:37:35 cervical to sacral Tucker Syed

## 2021-09-21 NOTE — CHART NOTE - NSCHARTNOTEFT_GEN_A_CORE
Admitting Diagnosis:   Patient is a 36y old  Male who presents with a chief complaint of Cord compression (21 Sep 2021 08:43)    PAST MEDICAL & SURGICAL HISTORY:  Infection, HIV    Current Nutrition Order:  Diet, NPO after Midnight:      NPO Start Date: 20-Sep-2021,   NPO Start Time: 23:59 (09-20-21 @ 09:27)  Diet, Regular (09-16-21 @ 13:38)    PO Intake: Good (%) [   ]  Fair (50-75%) [ x  ] Poor (<25%) [   ]  -Spoke to pt on 9/21, pt is NPO since last night. Pt states he eat some salmon yesterday. Reports appetite comes back this AM, if not NPO he can eat regularly,  GI Issues: Denies n/v/d/c, last BM 9/19  Pain: Controlled   Skin Integrity: Casey score 14, surgical incision to R groin and upper back.    Labs:   09-21    137  |  104  |  20  ----------------------------<  95  4.0   |  26  |  0.76    Ca    8.5      21 Sep 2021 08:14  Phos  3.3     09-21  Mg     1.8     09-21      CAPILLARY BLOOD GLUCOSE    Medications:  MEDICATIONS  (STANDING):  bictegravir 50 mG/emtricitabine 200 mG/tenofovir alafenamide 25 mG (BIKTARVY) 1 Tablet(s) Oral daily  bisacodyl 5 milliGRAM(s) Oral at bedtime  chlorhexidine 2% Cloths 1 Application(s) Topical daily  dexAMETHasone     Tablet   Oral   dexAMETHasone     Tablet 2 milliGRAM(s) Oral every 6 hours  enoxaparin Injectable 40 milliGRAM(s) SubCutaneous at bedtime  influenza   Vaccine 0.5 milliLiter(s) IntraMuscular once  methocarbamol 500 milliGRAM(s) Oral every 8 hours  pantoprazole    Tablet 40 milliGRAM(s) Oral before breakfast  polyethylene glycol 3350 17 Gram(s) Oral daily  pregabalin 50 milliGRAM(s) Oral every 8 hours  senna 2 Tablet(s) Oral at bedtime  sodium chloride 0.9%. 1000 milliLiter(s) (50 mL/Hr) IV Continuous <Continuous>    MEDICATIONS  (PRN):  LORazepam     Tablet 0.5 milliGRAM(s) Oral every 6 hours PRN Agitation  LORazepam   Injectable 2 milliGRAM(s) IV Push every 1 hour PRN CIWA-Ar score 8 or greater  metoclopramide 10 milliGRAM(s) Oral every 8 hours PRN nausea/vomiting  oxyCODONE    IR 5 milliGRAM(s) Oral every 4 hours PRN Moderate Pain (4 - 6)  oxyCODONE    IR 10 milliGRAM(s) Oral every 4 hours PRN Severe Pain (7 - 10)  traZODone 50 milliGRAM(s) Oral at bedtime PRN insomnia      · Height for BMI (FEET)	5 Feet  · Height for BMI (INCHES)	9 Inch(s)  · Height for BMI (CENTIMETERS)	175.26 Centimeter(s)  · Weight for BMI (lbs)	178 lb  · Weight for BMI (kg)	80.7 kg  · Body Mass Index	26.2    IBW: 160 lb/72.5 kg   111 % IBW    Weight Change:     Nutrition Focused Physical Exam: Completed [   ]  Not Pertinent [ x  ]    Estimated Energy Needs:  · Weight (lbs)	178 lb  · Weight (kg)	80.7 kg  · Enter From (elayne/kg)	25  · Enter To (elayne/kg)	30  · Calculated From (elayne/kg)	2017  · Calculated To (elayne/kg)	2421     Estimated Protein Needs:  · Weight (lbs)	178 lb  · Weight (kg)	80.7 kg  · Enter From (g/kg)	1.2  · Enter To (g/kg)	1.4  · Calculated From (g/kg)	96.84  · Calculated To (g/kg)	112.98     Estimated Fluid Needs:  · Weight (lbs)	178 lb  · Weight (kg)	80.7 kg  · Enter From (ml/kg)	25  · Enter To (ml/kg)	30  · Calculated From (ml/kg)	2017  · Calculated To (ml/kg)	2421    · Other Calculations	ABW used for calculations as pt between % of IBW. (111%). Needs adjusted for wound healing.    Subjective:   36M PMHx HIV (CD4 700s, VLUD on biktarvy), hx of IVDU and methamphetamine use, was at gym 9/15 afternoon lifting heavy weights, ie dead lifts; PM began to develop pain in thoracolumbar region; over past couple of hours he began to develop paresthesias down both legs and buttock, then progressed to complete plegia of bilateral extremities with loss of sensation from T8 dermatomes and below found on MRI to have intradural/extramedullary lesion with 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 Transferred to NSICU for further monitoring s/p sx.     On follow-up assessment pt resting in bed comfortably noting good appetite but currently NPO for surgery. Reports fair appetite yesterday and ate some salmon (50% consumption). Denies n/v/d/c, NKFA, last BM 9/19. Casey score 14, surgical incision to R groin and upper back. Reinforced ed on high pro diet for wound healing needs. RD to follow.    Previous Nutrition Diagnosis: 1 Increased Nutrient Needs...     Etiology RT wound healing.     Signs/Symptoms AEB s/p spinal angiogram and T8 laminectomy with evacuation of subdural/subarachnoid clot.     Active [  x ]  Resolved [   ]    If resolved, new PES:     Goal: Consistently meet >75% est needs.    Recommendations:   1. Trends wt 2. Bowel and pain regimen per team 3. Cont to monitor lytes and replete prn 4. Reinforce ed prn  Education:     Risk Level: High [ x  ] Moderate [   ] Low [   ] Admitting Diagnosis:   Patient is a 36y old  Male who presents with a chief complaint of Cord compression (21 Sep 2021 08:43)    PAST MEDICAL & SURGICAL HISTORY:  Infection, HIV    Current Nutrition Order:  Diet, NPO after Midnight:      NPO Start Date: 20-Sep-2021,   NPO Start Time: 23:59 (09-20-21 @ 09:27)  Diet, Regular (09-16-21 @ 13:38)    PO Intake: Good (%) [   ]  Fair (50-75%) [ x  ] Poor (<25%) [   ]  -Spoke to pt on 9/21, pt is NPO since last night. Pt states he eat some salmon yesterday. Reports appetite comes back this AM, if not NPO he can eat regularly,  GI Issues: Denies n/v/d/c, last BM 9/19  Pain: Controlled   Skin Integrity: Casey score 14, surgical incision to R groin and upper back.    Labs:   09-21    137  |  104  |  20  ----------------------------<  95  4.0   |  26  |  0.76    Ca    8.5      21 Sep 2021 08:14  Phos  3.3     09-21  Mg     1.8     09-21      CAPILLARY BLOOD GLUCOSE    Medications:  MEDICATIONS  (STANDING):  bictegravir 50 mG/emtricitabine 200 mG/tenofovir alafenamide 25 mG (BIKTARVY) 1 Tablet(s) Oral daily  bisacodyl 5 milliGRAM(s) Oral at bedtime  chlorhexidine 2% Cloths 1 Application(s) Topical daily  dexAMETHasone     Tablet   Oral   dexAMETHasone     Tablet 2 milliGRAM(s) Oral every 6 hours  enoxaparin Injectable 40 milliGRAM(s) SubCutaneous at bedtime  influenza   Vaccine 0.5 milliLiter(s) IntraMuscular once  methocarbamol 500 milliGRAM(s) Oral every 8 hours  pantoprazole    Tablet 40 milliGRAM(s) Oral before breakfast  polyethylene glycol 3350 17 Gram(s) Oral daily  pregabalin 50 milliGRAM(s) Oral every 8 hours  senna 2 Tablet(s) Oral at bedtime  sodium chloride 0.9%. 1000 milliLiter(s) (50 mL/Hr) IV Continuous <Continuous>    MEDICATIONS  (PRN):  LORazepam     Tablet 0.5 milliGRAM(s) Oral every 6 hours PRN Agitation  LORazepam   Injectable 2 milliGRAM(s) IV Push every 1 hour PRN CIWA-Ar score 8 or greater  metoclopramide 10 milliGRAM(s) Oral every 8 hours PRN nausea/vomiting  oxyCODONE    IR 5 milliGRAM(s) Oral every 4 hours PRN Moderate Pain (4 - 6)  oxyCODONE    IR 10 milliGRAM(s) Oral every 4 hours PRN Severe Pain (7 - 10)  traZODone 50 milliGRAM(s) Oral at bedtime PRN insomnia      · Height for BMI (FEET)	5 Feet  · Height for BMI (INCHES)	9 Inch(s)  · Height for BMI (CENTIMETERS)	175.26 Centimeter(s)  · Weight for BMI (lbs)	178 lb  · Weight for BMI (kg)	80.7 kg  · Body Mass Index	26.2  Admit wt: 80.7kg (9/14)    IBW: 160 lb/72.5 kg   111 % IBW    Weight Change: Wt stable since admission     Nutrition Focused Physical Exam: Completed [   ]  Not Pertinent [ x  ]    Estimated Energy Needs:  · Weight (lbs)	178 lb  · Weight (kg)	80.7 kg  · Enter From (elayne/kg)	25  · Enter To (elayne/kg)	30  · Calculated From (elayne/kg)	2017  · Calculated To (elayne/kg)	2421     Estimated Protein Needs:  · Weight (lbs)	178 lb  · Weight (kg)	80.7 kg  · Enter From (g/kg)	1.2  · Enter To (g/kg)	1.4  · Calculated From (g/kg)	96.84  · Calculated To (g/kg)	112.98     Estimated Fluid Needs:  · Weight (lbs)	178 lb  · Weight (kg)	80.7 kg  · Enter From (ml/kg)	25  · Enter To (ml/kg)	30  · Calculated From (ml/kg)	2017  · Calculated To (ml/kg)	2421    · Other Calculation: ABW used for calculations as pt between % of IBW. (111%). Needs adjusted for wound healing.    Subjective:   36M PMHx HIV (CD4 700s, VLUD on biktarvy), hx of IVDU and methamphetamine use, was at gym 9/15 afternoon lifting heavy weights, ie dead lifts; PM began to develop pain in thoracolumbar region; over past couple of hours he began to develop paresthesias down both legs and buttock, then progressed to complete plegia of bilateral extremities with loss of sensation from T8 dermatomes and below found on MRI to have intradural/extramedullary lesion with 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 Transferred to NSICU for further monitoring s/p sx now on floor.     On follow-up assessment pt resting in bed comfortably noting good appetite but currently NPO for surgery. Reports fair appetite yesterday and ate some salmon (50% consumption). Denies n/v/d/c, NKFA, last BM 9/19. Casey score 14, surgical incision to R groin and upper back. Reinforced ed on high pro diet for wound healing needs. RD to follow.    Previous Nutrition Diagnosis: 1 Increased Nutrient Needs...     Etiology RT wound healing.     Signs/Symptoms AEB s/p spinal angiogram and T8 laminectomy with evacuation of subdural/subarachnoid clot.     Active [  x ]  Resolved [   ]    If resolved, new PES:     Goal: Consistently meet >75% est needs.    Recommendations:   1. Resume regular diet post-op  2. Trends wt   3. Bowel and pain regimen per team   4. Cont to monitor lytes and replete prn   Education: Encourage protein at every meal/snack     Risk Level: High [ x  ] Moderate [   ] Low [   ]  Will continue to monitor per protocol.

## 2021-09-21 NOTE — BH CONSULTATION LIAISON PROGRESS NOTE - NSICDXBHSECONDARYDX_PSY_ALL_CORE
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

## 2021-09-21 NOTE — BH CONSULTATION LIAISON PROGRESS NOTE - NSBHCHARTREVIEWLAB_PSY_A_CORE FT
13.7   10.54 )-----------( 268      ( 20 Sep 2021 08:05 )             41.0     09-20    138  |  102  |  20  ----------------------------<  121<H>  4.4   |  28  |  0.77    Ca    9.4      20 Sep 2021 08:05  Phos  4.6     09-20  Mg     2.1     09-20    
                      13.6   12.56 )-----------( 267      ( 21 Sep 2021 08:14 )             40.5     09-21    137  |  104  |  20  ----------------------------<  95  4.0   |  26  |  0.76    Ca    8.5      21 Sep 2021 08:14  Phos  3.3     09-21  Mg     1.8     09-21

## 2021-09-22 ENCOUNTER — TRANSCRIPTION ENCOUNTER (OUTPATIENT)
Age: 36
End: 2021-09-22

## 2021-09-22 LAB
ANION GAP SERPL CALC-SCNC: 9 MMOL/L — SIGNIFICANT CHANGE UP (ref 5–17)
BUN SERPL-MCNC: 22 MG/DL — SIGNIFICANT CHANGE UP (ref 7–23)
CALCIUM SERPL-MCNC: 8.9 MG/DL — SIGNIFICANT CHANGE UP (ref 8.4–10.5)
CHLORIDE SERPL-SCNC: 102 MMOL/L — SIGNIFICANT CHANGE UP (ref 96–108)
CO2 SERPL-SCNC: 23 MMOL/L — SIGNIFICANT CHANGE UP (ref 22–31)
CREAT SERPL-MCNC: 0.79 MG/DL — SIGNIFICANT CHANGE UP (ref 0.5–1.3)
GLUCOSE SERPL-MCNC: 125 MG/DL — HIGH (ref 70–99)
HCT VFR BLD CALC: 40.4 % — SIGNIFICANT CHANGE UP (ref 39–50)
HGB BLD-MCNC: 13.7 G/DL — SIGNIFICANT CHANGE UP (ref 13–17)
MAGNESIUM SERPL-MCNC: 2.5 MG/DL — SIGNIFICANT CHANGE UP (ref 1.6–2.6)
MCHC RBC-ENTMCNC: 31.3 PG — SIGNIFICANT CHANGE UP (ref 27–34)
MCHC RBC-ENTMCNC: 33.9 GM/DL — SIGNIFICANT CHANGE UP (ref 32–36)
MCV RBC AUTO: 92.2 FL — SIGNIFICANT CHANGE UP (ref 80–100)
NRBC # BLD: 0 /100 WBCS — SIGNIFICANT CHANGE UP (ref 0–0)
PHOSPHATE SERPL-MCNC: 3.7 MG/DL — SIGNIFICANT CHANGE UP (ref 2.5–4.5)
PLATELET # BLD AUTO: 277 K/UL — SIGNIFICANT CHANGE UP (ref 150–400)
POTASSIUM SERPL-MCNC: 3.9 MMOL/L — SIGNIFICANT CHANGE UP (ref 3.5–5.3)
POTASSIUM SERPL-SCNC: 3.9 MMOL/L — SIGNIFICANT CHANGE UP (ref 3.5–5.3)
RBC # BLD: 4.38 M/UL — SIGNIFICANT CHANGE UP (ref 4.2–5.8)
RBC # FLD: 11.9 % — SIGNIFICANT CHANGE UP (ref 10.3–14.5)
SODIUM SERPL-SCNC: 134 MMOL/L — LOW (ref 135–145)
WBC # BLD: 12.95 K/UL — HIGH (ref 3.8–10.5)
WBC # FLD AUTO: 12.95 K/UL — HIGH (ref 3.8–10.5)

## 2021-09-22 PROCEDURE — 99232 SBSQ HOSP IP/OBS MODERATE 35: CPT | Mod: GC

## 2021-09-22 PROCEDURE — 99024 POSTOP FOLLOW-UP VISIT: CPT

## 2021-09-22 RX ORDER — HYDROMORPHONE HYDROCHLORIDE 2 MG/ML
0.25 INJECTION INTRAMUSCULAR; INTRAVENOUS; SUBCUTANEOUS ONCE
Refills: 0 | Status: DISCONTINUED | OUTPATIENT
Start: 2021-09-22 | End: 2021-09-22

## 2021-09-22 RX ORDER — ACETAMINOPHEN 500 MG
1000 TABLET ORAL EVERY 8 HOURS
Refills: 0 | Status: DISCONTINUED | OUTPATIENT
Start: 2021-09-22 | End: 2021-09-28

## 2021-09-22 RX ORDER — HYDROMORPHONE HYDROCHLORIDE 2 MG/ML
2 INJECTION INTRAMUSCULAR; INTRAVENOUS; SUBCUTANEOUS EVERY 4 HOURS
Refills: 0 | Status: DISCONTINUED | OUTPATIENT
Start: 2021-09-22 | End: 2021-09-28

## 2021-09-22 RX ORDER — MAGNESIUM SULFATE 500 MG/ML
2 VIAL (ML) INJECTION ONCE
Refills: 0 | Status: COMPLETED | OUTPATIENT
Start: 2021-09-22 | End: 2021-09-22

## 2021-09-22 RX ORDER — TRAMADOL HYDROCHLORIDE 50 MG/1
50 TABLET ORAL EVERY 4 HOURS
Refills: 0 | Status: DISCONTINUED | OUTPATIENT
Start: 2021-09-22 | End: 2021-09-22

## 2021-09-22 RX ORDER — TRAMADOL HYDROCHLORIDE 50 MG/1
25 TABLET ORAL EVERY 4 HOURS
Refills: 0 | Status: DISCONTINUED | OUTPATIENT
Start: 2021-09-22 | End: 2021-09-22

## 2021-09-22 RX ADMIN — Medication 50 MILLIGRAM(S): at 06:48

## 2021-09-22 RX ADMIN — Medication 5 MILLIGRAM(S): at 22:12

## 2021-09-22 RX ADMIN — Medication 2 MILLIGRAM(S): at 17:14

## 2021-09-22 RX ADMIN — HYDROMORPHONE HYDROCHLORIDE 2 MILLIGRAM(S): 2 INJECTION INTRAMUSCULAR; INTRAVENOUS; SUBCUTANEOUS at 23:54

## 2021-09-22 RX ADMIN — Medication 1 BOTTLE: at 07:33

## 2021-09-22 RX ADMIN — Medication 1000 MILLIGRAM(S): at 22:12

## 2021-09-22 RX ADMIN — OXYCODONE HYDROCHLORIDE 10 MILLIGRAM(S): 5 TABLET ORAL at 00:10

## 2021-09-22 RX ADMIN — METHOCARBAMOL 500 MILLIGRAM(S): 500 TABLET, FILM COATED ORAL at 06:48

## 2021-09-22 RX ADMIN — SENNA PLUS 2 TABLET(S): 8.6 TABLET ORAL at 22:12

## 2021-09-22 RX ADMIN — METHOCARBAMOL 500 MILLIGRAM(S): 500 TABLET, FILM COATED ORAL at 22:28

## 2021-09-22 RX ADMIN — Medication 2 MILLIGRAM(S): at 08:10

## 2021-09-22 RX ADMIN — PANTOPRAZOLE SODIUM 40 MILLIGRAM(S): 20 TABLET, DELAYED RELEASE ORAL at 06:48

## 2021-09-22 RX ADMIN — HYDROMORPHONE HYDROCHLORIDE 2 MILLIGRAM(S): 2 INJECTION INTRAMUSCULAR; INTRAVENOUS; SUBCUTANEOUS at 22:54

## 2021-09-22 RX ADMIN — ENOXAPARIN SODIUM 40 MILLIGRAM(S): 100 INJECTION SUBCUTANEOUS at 22:13

## 2021-09-22 RX ADMIN — Medication 1000 MILLIGRAM(S): at 13:30

## 2021-09-22 RX ADMIN — Medication 50 GRAM(S): at 12:41

## 2021-09-22 RX ADMIN — Medication 1 ENEMA: at 17:14

## 2021-09-22 RX ADMIN — Medication 2 MILLIGRAM(S): at 03:30

## 2021-09-22 RX ADMIN — BICTEGRAVIR SODIUM, EMTRICITABINE, AND TENOFOVIR ALAFENAMIDE FUMARATE 1 TABLET(S): 30; 120; 15 TABLET ORAL at 12:39

## 2021-09-22 RX ADMIN — HYDROMORPHONE HYDROCHLORIDE 0.25 MILLIGRAM(S): 2 INJECTION INTRAMUSCULAR; INTRAVENOUS; SUBCUTANEOUS at 02:25

## 2021-09-22 RX ADMIN — Medication 1000 MILLIGRAM(S): at 12:51

## 2021-09-22 RX ADMIN — Medication 50 MILLIGRAM(S): at 22:13

## 2021-09-22 RX ADMIN — Medication 2 MILLIGRAM(S): at 12:39

## 2021-09-22 RX ADMIN — HYDROMORPHONE HYDROCHLORIDE 0.25 MILLIGRAM(S): 2 INJECTION INTRAMUSCULAR; INTRAVENOUS; SUBCUTANEOUS at 02:05

## 2021-09-22 RX ADMIN — Medication 1000 MILLIGRAM(S): at 23:12

## 2021-09-22 NOTE — DISCHARGE NOTE PROVIDER - HOSPITAL COURSE
HPI:  36 m PMHx HIV (CD4 700s, VLUD) was at gym this afternoon lifting heavy weights, ie dead lifts; tonight began to develop pain in thoracolumbar region; over past couple of hours he began to develop paresthesias down both legs and buttok. now can barely feel legs and unable to stand or move legs. Was brought in by ambulance after a passerby on the street witnessed him unable to rise from curb.  has not gone to bathroom, but thus far no bladder or bowel incontinence.  + IVDU, most recently today (methamphetamine).  no fever/chills.    Hospital Course:  9/15: s/p spinal angiogram (neg) and T8-T9 lami decompression, intraoperative findings of subarachnoid clot s/p evacuation POD # 0 (9/15) dilaudid x 1 for pain. diet advanced.   9/16: POD# 1: s/p spinal angiogram (neg) and T8-T9 lami decompression, intraoperative findings of subarachnoid clot s/p evacuation POD #1 (9/15) dilaudid x 1 for pain. diet advanced.   9/17: POD#2 s/p spinal angio, POD#2 s/p T8-9 lami decompression.  LE dopplers negative. MRI T/L spine read shows T7/T8 spinal cord edema consistent with infarction, L5-S1 increase in spinal canal stenosis w/ mixed SDH and diffuse SAH. Psych consulted for anxiety/depression. Pend SDU. Bile bag removed today  9/18: POD 3. T8-9 lami/decompression. Psych eval suggests adjustment d/o and outpatient follow up   9/19: POD4. MARCO overnight. Pending spinal rehab placement.   9/20: POD5 MARCO overnight  9/21: POD6, pending angio today. MARCO overnight, neuro stable. Angio results are negative.  9/22: POD7 angio and T8-9 lami decompression, POD1 negative angio. MARCO overnight, neuro stable. Pending dispo    Patient evaluated by PT/OT who recommended: Acute Rehab  Patient is going home? rehab? hospice? Facility Name:     Hospital course c/b:     Exam on day of discharge:    Checklist:   - Obtained follow up appointment from NP  - Reviewed final recommendations of inpatient consults  - review discharge planning on provider handoff  - post op imaging completed  - Neurologically stable for discharge  - Vitals stable for discharge   - Afebrile for discharge  - WBC is stable  - Sodium level is normal  - Pain is adequately controlled  - Pt has PICC/walker/brace/collar        HPI:  36 m PMHx HIV (CD4 700s, VLUD) was at gym this afternoon lifting heavy weights, ie dead lifts; tonight began to develop pain in thoracolumbar region; over past couple of hours he began to develop paresthesias down both legs and buttok. now can barely feel legs and unable to stand or move legs. Was brought in by ambulance after a passerby on the street witnessed him unable to rise from curb.  has not gone to bathroom, but thus far no bladder or bowel incontinence.  + IVDU, most recently today (methamphetamine).  no fever/chills.    Hospital Course:  9/15: s/p spinal angiogram (neg) and T8-T9 lami decompression, intraoperative findings of subarachnoid clot s/p evacuation POD # 0 (9/15) dilaudid x 1 for pain. diet advanced.   9/16: POD# 1: s/p spinal angiogram (neg) and T8-T9 lami decompression, intraoperative findings of subarachnoid clot s/p evacuation POD #1 (9/15) dilaudid x 1 for pain. diet advanced.   9/17: POD#2 s/p spinal angio, POD#2 s/p T8-9 lami decompression.  LE dopplers negative. MRI T/L spine read shows T7/T8 spinal cord edema consistent with infarction, L5-S1 increase in spinal canal stenosis w/ mixed SDH and diffuse SAH. Psych consulted for anxiety/depression. Pend SDU. Bile bag removed today  9/18: POD 3. T8-9 lami/decompression. Psych eval suggests adjustment d/o and outpatient follow up   9/19: POD4. MARCO overnight. Pending spinal rehab placement.   9/20: POD5 MARCO overnight  9/21: POD6, pending angio today. MARCO overnight, neuro stable. Angio results are negative.  9/22: POD7 angio and T8-9 lami decompression, POD1 negative angio. MARCO overnight, neuro stable. Pending dispo  9/23: POD8 angio and T8-9 lami decompression, POD2 negative angio. MARCO overnight, neuro stable. Pending dispo.  9/24: POD9 s/p angio and T8-9 lami decompression, POD3 negative angio. MARCO overnight, neuro stable. Pending multidisciplinary discussion regarding dispo today at 11am.   9/25: POD10 s/p spinal angio  and T8-9 lami decompression, POD4 s/p neg spinal angio. MARCO overnight. Neuro exam stable. Pending acute rehab   9/26: POD11 s/p spinal angio  and T8-9 lami decompression, POD5 s/p neg spinal angio. Pt reports posterior neck pain and frontal headaches, worse when laying flat. States it may be his usual tension headaches. Advised to use warm packs as needed. Neuro exam stable.  9/27: POD 12/POD6 spinal angio. Upgraded to SDU for higher rates of hypertonics overnight. Exam stable. f/u urine lytes    Patient evaluated by PT/OT who recommended: Acute Rehab  Patient is going home? rehab? hospice? Facility Name:     Hospital course c/b:     Exam on day of discharge:    Checklist:   - Obtained follow up appointment from NP  - Reviewed final recommendations of inpatient consults  - review discharge planning on provider handoff  - post op imaging completed  - Neurologically stable for discharge  - Vitals stable for discharge   - Afebrile for discharge  - WBC is stable  - Sodium level is normal  - Pain is adequately controlled  - Pt has PICC/walker/brace/collar        HPI:  36 m PMHx HIV (CD4 700s, VLUD) was at gym this afternoon lifting heavy weights, ie dead lifts; tonight began to develop pain in thoracolumbar region; over past couple of hours he began to develop paresthesias down both legs and buttok. now can barely feel legs and unable to stand or move legs. Was brought in by ambulance after a passerby on the street witnessed him unable to rise from curb.  has not gone to bathroom, but thus far no bladder or bowel incontinence.  + IVDU, most recently today (methamphetamine).  no fever/chills.    Hospital Course:  9/15: s/p spinal angiogram (neg) and T8-T9 lami decompression, intraoperative findings of subarachnoid clot s/p evacuation POD # 0 (9/15) dilaudid x 1 for pain. diet advanced.   9/16: POD# 1: s/p spinal angiogram (neg) and T8-T9 lami decompression, intraoperative findings of subarachnoid clot s/p evacuation POD #1 (9/15) dilaudid x 1 for pain. diet advanced.   9/17: POD#2 s/p spinal angio, POD#2 s/p T8-9 lami decompression.  LE dopplers negative. MRI T/L spine read shows T7/T8 spinal cord edema consistent with infarction, L5-S1 increase in spinal canal stenosis w/ mixed SDH and diffuse SAH. Psych consulted for anxiety/depression. Pend SDU. Bile bag removed today  9/18: POD 3. T8-9 lami/decompression. Psych eval suggests adjustment d/o and outpatient follow up   9/19: POD4. MARCO overnight. Pending spinal rehab placement.   9/20: POD5 MARCO overnight  9/21: POD6, pending angio today. MARCO overnight, neuro stable. Angio results are negative.  9/22: POD7 angio and T8-9 lami decompression, POD1 negative angio. MARCO overnight, neuro stable. Pending dispo  9/23: POD8 angio and T8-9 lami decompression, POD2 negative angio. MARCO overnight, neuro stable. Pending dispo.  9/24: POD9 s/p angio and T8-9 lami decompression, POD3 negative angio. MARCO overnight, neuro stable. Pending multidisciplinary discussion regarding dispo today at 11am.   9/25: POD10 s/p spinal angio  and T8-9 lami decompression, POD4 s/p neg spinal angio. MARCO overnight. Neuro exam stable. Pending acute rehab   9/26: POD11 s/p spinal angio  and T8-9 lami decompression, POD5 s/p neg spinal angio. Pt reports posterior neck pain and frontal headaches, worse when laying flat. States it may be his usual tension headaches. Advised to use warm packs as needed. Neuro exam stable.  9/27: POD 12/POD6 spinal angio. Upgraded to SDU for higher rates of hypertonics overnight. Exam stable. f/u urine lytes    Patient evaluated by PT/OT who recommended: Acute Rehab  Patient is going to [facility] course c/b Hyponatremia (discharge on Florinef, Salt tabs, Fluid restriction)     Exam on day of discharge:   General: NAD, pt is comfortably laying in hospital bed, A&O x3, on RA  HEENT: CN II-XII grossly intact, PERRL 3mm, EOMI b/l, face symmetric, tongue midline, neck FROM  Cardiovascular: RRR, normal S1 and S2   Respiratory: lungs CTAB, no wheezing, rhonchi, or crackles   GI: normoactive BS to auscultation, abd soft, NTND   Neuro: no aphasia, speech clear, no dysmetria, no pronator drift  strength 5/5 b/l UE, some LLE toe movements otherwise 0/5 b/l LE, b/l LE flaccid, absent reflexes, no clonus, babinski neg b/l   RLE sensation (especially along L3/L4 dermatome) to light touch diminished compared to LLE   Extremities: R groin site C/D/I, no hematoma. Distal pulses 2+ x4   Incision/Wound: midline thoracic incision C/D/I     Patient is neuro stable, vitals stable, afebrile, medically ready for discharge, cleared for discharge with hyponatremia by neurosurgeon and hospitalist          HPI:  36 m PMHx HIV (CD4 700s, VLUD) was at gym this afternoon lifting heavy weights, ie dead lifts; tonight began to develop pain in thoracolumbar region; over past couple of hours he began to develop paresthesias down both legs and buttok. now can barely feel legs and unable to stand or move legs. Was brought in by ambulance after a passerby on the street witnessed him unable to rise from curb.  has not gone to bathroom, but thus far no bladder or bowel incontinence.  + IVDU, most recently today (methamphetamine).  no fever/chills.    Hospital Course:  9/15: s/p spinal angiogram (neg) and T8-T9 lami decompression, intraoperative findings of subarachnoid clot s/p evacuation POD # 0 (9/15) dilaudid x 1 for pain. diet advanced.   9/16: POD# 1: s/p spinal angiogram (neg) and T8-T9 lami decompression, intraoperative findings of subarachnoid clot s/p evacuation POD #1 (9/15) dilaudid x 1 for pain. diet advanced.   9/17: POD#2 s/p spinal angio, POD#2 s/p T8-9 lami decompression.  LE dopplers negative. MRI T/L spine read shows T7/T8 spinal cord edema consistent with infarction, L5-S1 increase in spinal canal stenosis w/ mixed SDH and diffuse SAH. Psych consulted for anxiety/depression. Pend SDU. Bile bag removed today  9/18: POD 3. T8-9 lami/decompression. Psych eval suggests adjustment d/o and outpatient follow up   9/19: POD4. MARCO overnight. Pending spinal rehab placement.   9/20: POD5 MARCO overnight  9/21: POD6, pending angio today. MARCO overnight, neuro stable. Angio results are negative.  9/22: POD7 angio and T8-9 lami decompression, POD1 negative angio. MARCO overnight, neuro stable. Pending dispo  9/23: POD8 angio and T8-9 lami decompression, POD2 negative angio. MARCO overnight, neuro stable. Pending dispo.  9/24: POD9 s/p angio and T8-9 lami decompression, POD3 negative angio. MARCO overnight, neuro stable. Pending multidisciplinary discussion regarding dispo today at 11am.   9/25: POD10 s/p spinal angio  and T8-9 lami decompression, POD4 s/p neg spinal angio. MARCO overnight. Neuro exam stable. Pending acute rehab   9/26: POD11 s/p spinal angio  and T8-9 lami decompression, POD5 s/p neg spinal angio. Pt reports posterior neck pain and frontal headaches, worse when laying flat. States it may be his usual tension headaches. Advised to use warm packs as needed. Neuro exam stable.  9/27: POD 12/POD6 spinal angio. Upgraded to SDU for higher rates of hypertonics overnight. Exam stable. f/u urine lytes    Patient evaluated by PT/OT who recommended: Acute Rehab  Patient is going to [facility] course c/b Hyponatremia (discharge on Florinef, Salt tabs, Fluid restriction)     Exam on day of discharge:   General: NAD, pt is comfortably laying in hospital bed, A&O x3, on RA  HEENT: CN II-XII grossly intact, PERRL 3mm, EOMI b/l, face symmetric, tongue midline, neck FROM  Cardiovascular: RRR, normal S1 and S2   Respiratory: lungs CTAB, no wheezing, rhonchi, or crackles   GI: normoactive BS to auscultation, abd soft, NTND   Neuro: no aphasia, speech clear, no dysmetria, no pronator drift  strength 5/5 b/l UE, some LLE toe movements otherwise 0/5 b/l LE, b/l LE flaccid, absent reflexes, no clonus, babinski neg b/l   RLE sensation (especially along L3/L4 dermatome) to light touch diminished compared to LLE   Extremities: R groin site C/D/I, no hematoma. Distal pulses 2+ x4   Incision/Wound: midline thoracic incision C/D/I     Patient is neuro stable, vitals stable, afebrile, medically ready for discharge, cleared for discharge with hyponatremia by neurosurgeon and hospitalist

## 2021-09-22 NOTE — DISCHARGE NOTE PROVIDER - CARE PROVIDER_API CALL
DAmico, Randy S (MD)  Neurosurgery  [address]  [place], [place] [address]  Phone: [phone]  Fax: [phone]  Follow Up Time:

## 2021-09-22 NOTE — DISCHARGE NOTE PROVIDER - NSDCFUSCHEDAPPT_GEN_ALL_CORE_FT
GAYLE MAXWELL ; 10/06/2021 ; [facility] [address] [place] [address] ALISSA ARAUJO ; 10/06/2021 ; [facility] [address] [place] [address]

## 2021-09-22 NOTE — DISCHARGE NOTE PROVIDER - NSDCCPTREATMENT_GEN_ALL_CORE_FT
PRINCIPAL PROCEDURE  Procedure: Lumbar laminectomy  Findings and Treatment:       SECONDARY PROCEDURE  Procedure: Angiogram, spine, selective  Findings and Treatment: cervical to sacral

## 2021-09-22 NOTE — CONSULT NOTE ADULT - SUBJECTIVE AND OBJECTIVE BOX
NEUROSURGERY PAIN MANAGEMENT CONSULT NOTE    Chief Complaint: b/l LE weakness    HPI:  CHIEF COMPLAINT/ REASON FOR CONSULTATION: possible spinal cord compression      HPI: 36 m PMHx HIV (CD4 700s, VLUD) was at gym this afternoon lifting heavy weights, ie dead lifts; tonight began to develop pain in thoracolumbar region; over past couple of hours he began to develop paresthesias down both legs and buttok. now can barely feel legs and unable to stand or move legs. Was brought in by ambulance after a passerby on the street witnessed him unable to rise from curb.  has not gone to bathroom, but thus far no bladder or bowel incontinence.  + IVDU, most recently today (methamphetamine).  no fever/chills.    PAST MEDICAL HISTORY   Infection, HIV      PAST SURGICAL HISTORY denies         MEDICATIONS:  Antibiotics:    Neuro:    Anticoagulation:    Other:  dexAMETHasone  IVPB 10 milliGRAM(s) IV Intermittent Once      SOCIAL HISTORY:   Occupation:   Marital Status:     FAMILY HISTORY:      REVIEW OF SYSTEMS:  Check here if all are normal other than Neurological [x]      PHYSICAL EXAMINATION:   T(C): 36.9 (09-14-21 @ 23:53), Max: 36.9 (09-14-21 @ 23:53)  HR: 89 (09-14-21 @ 23:53) (89 - 89)  BP: 158/100 (09-14-21 @ 23:53) (158/100 - 158/100)  RR: 17 (09-14-21 @ 23:53) (17 - 17)  SpO2: 100% (09-14-21 @ 23:53) (100% - 100%)  Wt(kg): --  Weight (kg): 80.7 (09-14 @ 23:53)                   (15 Sep 2021 01:25)      PAST MEDICAL & SURGICAL HISTORY:  Infection, HIV        FAMILY HISTORY:      SOCIAL HISTORY:  [ ] Denies Smoking, Alcohol, or Drug Use    HOME MEDICATIONS:   Please refer to initial HNP    PAIN HOME MEDICATIONS:    Allergies    No Known Allergies    Intolerances        PAIN MEDICATIONS:  acetaminophen   Tablet .. 1000 milliGRAM(s) Oral every 8 hours  HYDROmorphone   Tablet 2 milliGRAM(s) Oral every 4 hours PRN  LORazepam     Tablet 0.5 milliGRAM(s) Oral every 6 hours PRN  LORazepam   Injectable 2 milliGRAM(s) IV Push every 1 hour PRN  methocarbamol 500 milliGRAM(s) Oral every 8 hours  pregabalin 50 milliGRAM(s) Oral every 8 hours  traZODone 50 milliGRAM(s) Oral at bedtime PRN    Heme:  enoxaparin Injectable 40 milliGRAM(s) SubCutaneous at bedtime    Antibiotics:  bictegravir 50 mG/emtricitabine 200 mG/tenofovir alafenamide 25 mG (BIKTARVY) 1 Tablet(s) Oral daily    Cardiovascular:    GI:  bisacodyl 5 milliGRAM(s) Oral at bedtime  metoclopramide 10 milliGRAM(s) Oral every 8 hours PRN  pantoprazole    Tablet 40 milliGRAM(s) Oral before breakfast  polyethylene glycol 3350 17 Gram(s) Oral daily  saline laxative (FLEET) Rectal Enema 1 Enema Rectal once PRN  senna 2 Tablet(s) Oral at bedtime    Endocrine:  dexAMETHasone     Tablet   Oral   dexAMETHasone     Tablet 2 milliGRAM(s) Oral every 6 hours    All Other Medications:  chlorhexidine 2% Cloths 1 Application(s) Topical daily  influenza   Vaccine 0.5 milliLiter(s) IntraMuscular once  magnesium sulfate  IVPB 2 Gram(s) IV Intermittent once  sodium chloride 0.9%. 1000 milliLiter(s) IV Continuous <Continuous>      Vital Signs Last 24 Hrs  T(C): 36.8 (22 Sep 2021 05:55), Max: 36.8 (21 Sep 2021 21:15)  T(F): 98.3 (22 Sep 2021 05:55), Max: 98.3 (21 Sep 2021 21:15)  HR: 69 (22 Sep 2021 05:55) (62 - 94)  BP: 114/82 (22 Sep 2021 05:55) (114/82 - 125/74)  BP(mean): --  RR: 17 (22 Sep 2021 05:55) (16 - 17)  SpO2: 96% (22 Sep 2021 05:55) (95% - 100%)    LABS:                        13.6   12.56 )-----------( 267      ( 21 Sep 2021 08:14 )             40.5     09-21    137  |  104  |  20  ----------------------------<  95  4.0   |  26  |  0.76    Ca    8.5      21 Sep 2021 08:14  Phos  3.3     09-21  Mg     1.8     09-21            RADIOLOGY:    Drug Screen:        REVIEW OF SYSTEMS:  CONSTITUTIONAL: No fever or fatigue O/N.   EYES: No eye pain, visual disturbances  ENMT:  No difficulty hearing. No throat pain  NECK: No pain or stiffness  RESPIRATORY: No cough, wheezing; No shortness of breath  CARDIOVASCULAR: No chest pain, palpitations.   GASTROINTESTINAL: Pt reports passing gas. No bowel movements. No abdominal or epigastric pain. No nausea, vomiting. GENITOURINARY: No dysuria, frequency, or incontinence  NEUROLOGICAL: No headaches, loss of strength, numbness, or tremors. No dizzinesss or lightheadedness with pain medications.   MUSCULOSKELETAL: Incisional back pain. No joint pain or swelling; No muscle, or extremity pain    PAIN ASSESSMENT: c/o back pain    PHYSICAL EXAM  GENERAL: Laying in bed, NAD  Neuro: CN II-XII PERRL, EOMI  Cranial nerves grossly intact  Motor exam: b/l lowers 0/5, b/l lowers 5/5  Sensation intact to LT in UE/LE in 3 dermatomes  CHEST/LUNG: Clear to auscultation bilaterally; No rales, rhonchi, wheezing, or rubs  HEART: Regular rate and rhythm; No murmurs, rubs, or gallops  ABDOMEN: Soft, Nontender, Nondistended; Bowel sounds present  EXTREMITIES:  2+ Peripheral Pulses, No clubbing, cyanosis, or edema  SKIN: No rashes or lesions      ASSESSMENT:   36 M PMHx HIV (CD4 700s, VLUD on biktarvy), hx of IVDU and methamphetamine use, was at gym this afternoon lifting heavy weights, ie dead lifts; tonight began to develop pain in thoracolumbar region; over past couple of hours he began to develop paresthesias down both legs and buttock, then progressed to complete plegia of bilateral extremities with loss of sensation from T8 dermatomes and below found on MRI to have 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        PLAN:   - Pain:  Start Tylenol 1000mg every 8 hours  Lyrica 50mg every 8 hours  Robaxin 500mg every 8 hours  Stop Oxycodone   Start Dilaudid 2mg every 4 hours PRN for severe pain    - Bowel regimen: Senna    - Nausea ppx: Zofran standing  - Functional Goals: Pt will get OOB with PT today. Pt will resume previous level of activity without impairment from surgery.   - Additional Consults: None recommended.   - Additional Labs/Imaging:  None recommended.   - Follow up, Discharge Planning: pending rehab  - Pain Management follow up plan: will cont to follow    d/w Dr. Manriquez

## 2021-09-22 NOTE — PROGRESS NOTE ADULT - SUBJECTIVE AND OBJECTIVE BOX
Patient is a 36y old  Male who presents with a chief complaint of Paraplegia (17 Sep 2021 07:10)      HPI:  CHIEF COMPLAINT/ REASON FOR CONSULTATION: possible spinal cord compression      HPI: 36 m PMHx HIV (CD4 700s, VLUD) was at gym this afternoon lifting heavy weights, ie dead lifts; tonight began to develop pain in thoracolumbar region; over past couple of hours he began to develop paresthesias down both legs and buttok. now can barely feel legs and unable to stand or move legs. Was brought in by ambulance after a passerby on the street witnessed him unable to rise from curb.  has not gone to bathroom, but thus far no bladder or bowel incontinence.  + IVDU, most recently today (methamphetamine).  no fever/chills.    now s/p decadron load and s/p spinal angiogram (neg) and T8-T9 lami decompression, intraoperative findings of subarachnoid clot s/p evacuation POD # 0 (9/15).    Subjective:  Pt has no acute complaints - is in good spirits. Denies SOB, CP. ROS is otherwise negative.     Allergies    No Known Allergies    Intolerances    Home meds: Biktarvy       MEDICATIONS  (STANDING):  acetaminophen   Tablet .. 1000 milliGRAM(s) Oral every 8 hours  bictegravir 50 mG/emtricitabine 200 mG/tenofovir alafenamide 25 mG (BIKTARVY) 1 Tablet(s) Oral daily  bisacodyl 5 milliGRAM(s) Oral at bedtime  chlorhexidine 2% Cloths 1 Application(s) Topical daily  dexAMETHasone     Tablet   Oral   dexAMETHasone     Tablet 2 milliGRAM(s) Oral every 6 hours  enoxaparin Injectable 40 milliGRAM(s) SubCutaneous at bedtime  influenza   Vaccine 0.5 milliLiter(s) IntraMuscular once  magnesium sulfate  IVPB 2 Gram(s) IV Intermittent once  methocarbamol 500 milliGRAM(s) Oral every 8 hours  pantoprazole    Tablet 40 milliGRAM(s) Oral before breakfast  polyethylene glycol 3350 17 Gram(s) Oral daily  pregabalin 50 milliGRAM(s) Oral every 8 hours  senna 2 Tablet(s) Oral at bedtime  sodium chloride 0.9%. 1000 milliLiter(s) (50 mL/Hr) IV Continuous <Continuous>    MEDICATIONS  (PRN):  HYDROmorphone   Tablet 2 milliGRAM(s) Oral every 4 hours PRN Severe Pain (7 - 10)  LORazepam     Tablet 0.5 milliGRAM(s) Oral every 6 hours PRN Agitation  LORazepam   Injectable 2 milliGRAM(s) IV Push every 1 hour PRN CIWA-Ar score 8 or greater  metoclopramide 10 milliGRAM(s) Oral every 8 hours PRN nausea/vomiting  saline laxative (FLEET) Rectal Enema 1 Enema Rectal once PRN constipation  traZODone 50 milliGRAM(s) Oral at bedtime PRN insomnia            Drug Dosing Weight  Height (cm): 175.3 (15 Sep 2021 13:18)  Weight (kg): 80.7 (15 Sep 2021 13:18)  BMI (kg/m2): 26.3 (15 Sep 2021 13:18)  BSA (m2): 1.97 (15 Sep 2021 13:18)    PAST MEDICAL & SURGICAL HISTORY:  Infection, HIV        FAMILY HISTORY:  no reported cardiac history    SOCIAL HISTORY: past hx of meth use    ADVANCE DIRECTIVES:      Vital Signs Last 24 Hrs  T(C): 36.8 (22 Sep 2021 05:55), Max: 36.8 (21 Sep 2021 21:15)  T(F): 98.3 (22 Sep 2021 05:55), Max: 98.3 (21 Sep 2021 21:15)  HR: 69 (22 Sep 2021 05:55) (62 - 94)  BP: 114/82 (22 Sep 2021 05:55) (114/82 - 125/74)  BP(mean): --  RR: 17 (22 Sep 2021 05:55) (16 - 17)  SpO2: 96% (22 Sep 2021 05:55) (95% - 100%)      PHYSICAL EXAM:      Constitutional: NAD  Eyes: PERRLA  ENMT: MMM  Neck: supple  Back: midline  Respiratory: CTA b/l  Cardiovascular: rrr, s1s2, no m/r/g  Gastrointestinal: soft, NTND, + BS  Extremities: wwp  Vascular: + 2 pulses radial  Neurological: AAO x 4, b/l LE weakness (1/5 b/l)  Skin: no rash  Lymph Nodes: no LAD  Musculoskeletal: no joint swelling  Psychiatric: normal affect        LABS:                          13.6   12.56 )-----------( 267      ( 21 Sep 2021 08:14 )             40.5   09-21    137  |  104  |  20  ----------------------------<  95  4.0   |  26  |  0.76    Ca    8.5      21 Sep 2021 08:14  Phos  3.3     09-21  Mg     1.8     09-21                  EKG:    ECHO, US:        RADIOLOGY:  < from: CT Lumbar Spine No Cont (09.15.21 @ 01:39) >  IMPRESSION:    1.  No central canal stenosis or significant neural foraminal narrowing in the thoracolumbar spine.  2.  Severe proctitis.    < end of copied text >      < from: MR Lumbar Spine w/wo IV Cont (09.16.21 @ 20:11) >  Impression: Interval appearance of pockets of lumbar subdural mixed hemorrhage. Diffuse subarachnoid hemorrhage. Interval increase in spinal canal stenosis to moderate to severe most prominent at the L5-S1 level.    < end of copied text >

## 2021-09-22 NOTE — DISCHARGE NOTE PROVIDER - NSDCFUADDINST_GEN_ALL_CORE_FT
Neurosurgery follow up appointment date/time:  - are staples/sutures in place?  - what day should staples/sutures be removed (POD 10-14)?  - please call the office to confirm appointment: [phone]     Wound Care:  - can patient shower?  - does dressing need to be changed/removed?    Devices:  - does patient need collar or brace?  - does collar/brace need to be worn at all times or just when OOB?    Drains/Lines:  - PICC in place? ID follow up? (Paper Rx for: antibiotics, heparin flush, weekly lab draws)  - RENÉE in place? Management?  - allen in place? Management/Urology follow up?     Activity:  - fatigue is common after surgery, rest if you feel tired   - no bending, lifting, twisting or heavy lifting   - walking is recommended, ambulate as tolerated  - you may shower when you get home, keep your incision dry  - no bathing   - no driving within 24 hours of anesthesia or while taking prescription pain medications   - keep hydrated, drink plenty of water   - skullbase precautions: no nose blowing, sneeze with mouth open, no drinking out of a straw, no straining      Inpatient consults:  - final recommendations  - you will need follow up with....    Please also follow up with your primary care doctor.     Pain Expectations:  - pain after surgery is expected  - please take pain meds as prescribed     Medications:  - changes to home meds (ex. AED's)?  - new meds?  - pain meds?  - when can antiplatelets or antocoagulants be restarted?  - were adverse affects of meds discussed with patients?   - pain medications can cause constipation, you should eat a high fiber diet and may take a stool softener while on pain meds   - Avoid taking Advil (ibuprofen), Motrin (naproxen), or Aspirin for pain as they can cause bleeding     Call the office or come to ED if:  - wound has drainage or bleeding, increased redness or pain at incision site, neurological change, fever (>101), chills, night sweats, syncope, nausea/vomiting      Playback:  - are discharge instruction on playback?  - is a picture of the incision on playback?     WITHIN 24 HOURS OF DISCHARGE, PLEASE CONTACT NEURO PA  WITH ANY QUESTIONS OR CONCERNS: [phone]   OTHERWISE, PLEASE CALL THE OFFICE WITH ANY QUESTIONS OR CONCERNS: [phone] Neurosurgery follow up appointment date/time:  - are staples/sutures in place?  - what day should staples/sutures be removed (POD 10-14)?  - please call the office to confirm appointment: [phone]     Wound Care:  - you may shower  - please leave incision open to air    Activity:  - fatigue is common after surgery, rest if you feel tired   - no bending, lifting, twisting or heavy lifting   - walking is recommended, ambulate as tolerated  - you may shower when you get home, keep your incision dry  - no bathing   - no driving within 24 hours of anesthesia or while taking prescription pain medications   - keep hydrated, drink plenty of water      Please follow up with your primary care doctor.     Pain Expectations:  - pain after surgery is expected  - please take pain meds as prescribed     Medications:  - changes to home meds (ex. AED's)?  - new meds?  - pain meds?  - pain medications can cause constipation, you should eat a high fiber diet and may take a stool softener while on pain meds   - Avoid taking Advil (ibuprofen), Motrin (naproxen), or Aspirin for pain as they can cause bleeding     Call the office or come to ED if:  - wound has drainage or bleeding, increased redness or pain at incision site, neurological change, fever (>101), chills, night sweats, syncope, nausea/vomiting      Playback:  - please see playback health for a copy of your discharge instructions     WITHIN 24 HOURS OF DISCHARGE, PLEASE CONTACT NEURO PA  WITH ANY QUESTIONS OR CONCERNS: [phone]   OTHERWISE, PLEASE CALL THE OFFICE WITH ANY QUESTIONS OR CONCERNS: [phone] Neurosurgery follow up appointment date/time: 10/6/21 at 1pm  - suture removed???  - please follow up in the office for a wound check  - please call the office to confirm appointment: [phone]     Wound Care:  - you may shower  - please leave incision open to air    Activity:  - fatigue is common after surgery, rest if you feel tired   - no bending, lifting, twisting or heavy lifting   - walking is recommended, ambulate as tolerated  - you may shower when you get home, keep your incision dry  - no bathing   - no driving within 24 hours of anesthesia or while taking prescription pain medications   - keep hydrated, drink plenty of water      Please follow up with your primary care doctor.     Pain Expectations:  - pain after surgery is expected  - please take pain meds as prescribed     Medications:  - changes to home meds (ex. AED's)?  - new meds?  - pain meds?  - pain medications can cause constipation, you should eat a high fiber diet and may take a stool softener while on pain meds   - Avoid taking Advil (ibuprofen), Motrin (naproxen), or Aspirin for pain as they can cause bleeding     Call the office or come to ED if:  - wound has drainage or bleeding, increased redness or pain at incision site, neurological change, fever (>101), chills, night sweats, syncope, nausea/vomiting      Playback:  - please see playback health for a copy of your discharge instructions     WITHIN 24 HOURS OF DISCHARGE, PLEASE CONTACT NEURO PA  WITH ANY QUESTIONS OR CONCERNS: [phone]   OTHERWISE, PLEASE CALL THE OFFICE WITH ANY QUESTIONS OR CONCERNS: [phone] Neurosurgery follow up appointment date/time: 10/6/21 at 1pm  - sutures removed prior to discharge  - please follow up in the office for a wound check  - please call the office to confirm appointment: [phone]     AT REHAB:  - please check BMP at rehab to monitor sodium. Last sodium on 9/28/21 129.   - Please continue 1.5L fluid restriction at rehab    Wound Care:  - you may shower  - please leave incision open to air    Activity:  - fatigue is common after surgery, rest if you feel tired   - no bending, lifting, twisting or heavy lifting   - walking is recommended, ambulate as tolerated  - you may shower when you get home, keep your incision dry  - no bathing   - no driving within 24 hours of anesthesia or while taking prescription pain medications   - keep hydrated, drink plenty of water      Please follow up with psychiatrist outpatient. You may follow up at any of the following:  Aftercare Recommendations  Encourage engagement in psychotherapy at acute rehab.  Once at home, can provide information for:    [facility]  [address]  [phone]    [address]  [phone]    [facility]  [address]  [phone]    [facility]  [address]  [phone]    [facility] [place] [facility]  [address]  [phone]    [facility]  [address]  [phone]    [facility]  [facility] [place]  [address] [facility]    Behavioral health: Lorazepam as needed for anxiety or panic symptoms    Please follow up with your primary care doctor.     Pain Expectations:  - pain after surgery is expected  - please take pain meds as prescribed     Medications:  - continue home medications as prescribed   - new meds: Florinef, Sodium chloride tablets  *Please check BMP at rehab to monitor Na level. Sodium on 9/28/21 was 129. Please continue fluid restriction while patient has hyponatremia   - pain meds: Lyrica 50mg every 8 hours. Tylenol as needed, Robaxin as needed, Dilaudid as needed  - pain medications can cause constipation, you should eat a high fiber diet and may take a stool softener while on pain meds   - Avoid taking Advil (ibuprofen), Motrin (naproxen), or Aspirin for pain as they can cause bleeding     Call the office or come to ED if:  - wound has drainage or bleeding, increased redness or pain at incision site, neurological change, fever (>101), chills, night sweats, syncope, nausea/vomiting      Playback:  - please see [employer] for a copy of your discharge instructions     WITHIN 24 HOURS OF DISCHARGE, PLEASE CONTACT NEURO PA  WITH ANY QUESTIONS OR CONCERNS: [phone]   OTHERWISE, PLEASE CALL THE OFFICE WITH ANY QUESTIONS OR CONCERNS: [phone]

## 2021-09-22 NOTE — DISCHARGE NOTE PROVIDER - NSDCCPCAREPLAN_GEN_ALL_CORE_FT
PRINCIPAL DISCHARGE DIAGNOSIS  Diagnosis: Flaccid paralysis of legs  Assessment and Plan of Treatment:       SECONDARY DISCHARGE DIAGNOSES  Diagnosis: HIV disease  Assessment and Plan of Treatment:     Diagnosis: Subdural hematoma  Assessment and Plan of Treatment: thoracolumbar    Diagnosis: Cannabis abuse, daily use  Assessment and Plan of Treatment:     Diagnosis: Episodic methamphetamine abuse  Assessment and Plan of Treatment:     Diagnosis: Leukocytosis, unspecified type  Assessment and Plan of Treatment:     Diagnosis: Subarachnoidal hemorrhage  Assessment and Plan of Treatment:     Diagnosis: Spinal cord compression  Assessment and Plan of Treatment:      PRINCIPAL DISCHARGE DIAGNOSIS  Diagnosis: Flaccid paralysis of legs  Assessment and Plan of Treatment:       SECONDARY DISCHARGE DIAGNOSES  Diagnosis: HIV disease  Assessment and Plan of Treatment:     Diagnosis: Subdural hematoma  Assessment and Plan of Treatment: thoracolumbar    Diagnosis: Cannabis abuse, daily use  Assessment and Plan of Treatment:     Diagnosis: Episodic methamphetamine abuse  Assessment and Plan of Treatment:     Diagnosis: Leukocytosis, unspecified type  Assessment and Plan of Treatment:     Diagnosis: Subarachnoidal hemorrhage  Assessment and Plan of Treatment:     Diagnosis: Hyponatremia  Assessment and Plan of Treatment:     Diagnosis: Spinal cord compression  Assessment and Plan of Treatment:

## 2021-09-22 NOTE — PROGRESS NOTE ADULT - SUBJECTIVE AND OBJECTIVE BOX
HPI: 36 m PMHx HIV (CD4 700s, VLUD) was at gym this afternoon lifting heavy weights, ie dead lifts; tonight began to develop pain in thoracolumbar region; over past couple of hours he began to develop paresthesias down both legs and buttok. now can barely feel legs and unable to stand or move legs. Was brought in by ambulance after a passerby on the street witnessed him unable to rise from curb.  has not gone to bathroom, but thus far no bladder or bowel incontinence.  + IVDU, most recently today (methamphetamine).  no fever/chills.   (15 Sep 2021 01:25)    OVERNIGHT EVENTS: MARCO overnight, neuro stable.    Hospital Course:  9/15: s/p spinal angiogram (neg) and T8-T9 lami decompression, intraoperative findings of subarachnoid clot s/p evacuation POD # 0 (9/15) dilaudid x 1 for pain. diet advanced.   9/16: POD# 1: s/p spinal angiogram (neg) and T8-T9 lami decompression, intraoperative findings of subarachnoid clot s/p evacuation POD #1 (9/15) dilaudid x 1 for pain. diet advanced.   9/17: POD#2 s/p spinal angio, POD#2 s/p T8-9 lami decompression.  LE dopplers negative. MRI T/L spine read shows T7/T8 spinal cord edema consistent with infarction, L5-S1 increase in spinal canal stenosis w/ mixed SDH and diffuse SAH. Psych consulted for anxiety/depression. Pend SDU. Bile bag removed today  9/18: POD 3. T8-9 lami/decompression. Psych eval suggests adjustment d/o and outpatient follow up   9/19: POD4. MARCO overnight. Pending spinal rehab placement.   9/20: POD5 MARCO overnight  9/21: POD6, pending angio today. MARCO overnight, neuro stable. Angio results are negative.  9/22: POD7 angio and T8-9 lami decompression, POD1 negative angio. MARCO overnight, neuro stable. Pending dispo    Vital Signs Last 24 Hrs  T(C): 36.8 (21 Sep 2021 21:15), Max: 36.8 (21 Sep 2021 04:47)  T(F): 98.3 (21 Sep 2021 21:15), Max: 98.3 (21 Sep 2021 21:15)  HR: 63 (21 Sep 2021 21:15) (62 - 94)  BP: 120/75 (21 Sep 2021 21:15) (114/79 - 125/74)  BP(mean): --  RR: 16 (21 Sep 2021 21:15) (16 - 18)  SpO2: 95% (21 Sep 2021 21:15) (95% - 100%)    I&O's Summary    20 Sep 2021 07:01  -  21 Sep 2021 07:00  --------------------------------------------------------  IN: 300 mL / OUT: 3625 mL / NET: -3325 mL    21 Sep 2021 07:01  -  22 Sep 2021 00:24  --------------------------------------------------------  IN: 350 mL / OUT: 2700 mL / NET: -2350 mL        PHYSICAL EXAM:  General: NAD, pt is comfortably laying in hospital bed, A&O x3, on RA  HEENT: CN II-XII grossly intact, PERRL 3mm, EOMI b/l, face symmetric, tongue midline, neck FROM  Cardiovascular: RRR, normal S1 and S2   Respiratory: lungs CTAB, no wheezing, rhonchi, or crackles   GI: normoactive BS to auscultation, abd soft, NTND   Neuro: no aphasia, speech clear, no dysmetria, no pronator drift  strength 5/5 b/l UE, 0/5 b/l LE   b/l LE flaccid, absent reflexes, no clonus, babinski neg b/l   RLE sensation (especially along L3/L4 dermatome) to light touch diminished compared to LLE   Extremities: R groin site C/D/I, no hematoma. Distal pulses 2+ x4   Incision/Wound: midline thoracic incision C/D/I     TUBES/LINES:  [] CVC  [] A-line  [] Lumbar Drain  [] Ventriculostomy    DIET:  [] NPO  [X] Mechanical  [] Tube feeds    LABS:                        13.6   12.56 )-----------( 267      ( 21 Sep 2021 08:14 )             40.5     09-21    137  |  104  |  20  ----------------------------<  95  4.0   |  26  |  0.76    Ca    8.5      21 Sep 2021 08:14  Phos  3.3     09-21  Mg     1.8     09-21              CAPILLARY BLOOD GLUCOSE          Drug Levels: [] N/A    CSF Analysis: [] N/A      Allergies    No Known Allergies    Intolerances      MEDICATIONS:  Antibiotics:  bictegravir 50 mG/emtricitabine 200 mG/tenofovir alafenamide 25 mG (BIKTARVY) 1 Tablet(s) Oral daily    Neuro:  LORazepam     Tablet 0.5 milliGRAM(s) Oral every 6 hours PRN  LORazepam   Injectable 2 milliGRAM(s) IV Push every 1 hour PRN  methocarbamol 500 milliGRAM(s) Oral every 8 hours  oxyCODONE    IR 5 milliGRAM(s) Oral every 4 hours PRN  oxyCODONE    IR 10 milliGRAM(s) Oral every 4 hours PRN  pregabalin 50 milliGRAM(s) Oral every 8 hours  traZODone 50 milliGRAM(s) Oral at bedtime PRN    Anticoagulation:  enoxaparin Injectable 40 milliGRAM(s) SubCutaneous at bedtime    OTHER:  bisacodyl 5 milliGRAM(s) Oral at bedtime  chlorhexidine 2% Cloths 1 Application(s) Topical daily  dexAMETHasone     Tablet   Oral   dexAMETHasone     Tablet 2 milliGRAM(s) Oral every 6 hours  influenza   Vaccine 0.5 milliLiter(s) IntraMuscular once  magnesium citrate Oral Solution 1 Bottle Oral once  metoclopramide 10 milliGRAM(s) Oral every 8 hours PRN  pantoprazole    Tablet 40 milliGRAM(s) Oral before breakfast  polyethylene glycol 3350 17 Gram(s) Oral daily  senna 2 Tablet(s) Oral at bedtime    IVF:  sodium chloride 0.9%. 1000 milliLiter(s) IV Continuous <Continuous>    CULTURES:    RADIOLOGY & ADDITIONAL TESTS:      ASSESSMENT:  36 M PMHx HIV (CD4 700s, VLUD on biktarvy), hx of IVDU and methamphetamine use, was at gym this afternoon lifting heavy weights, ie dead lifts; tonight began to develop pain in thoracolumbar region; over past couple of hours he began to develop paresthesias down both legs and buttock, then progressed to complete plegia of bilateral extremities with loss of sensation from T8 dermatomes and below found on MRI to have 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      PLAN:   Neuro  - neuro and vitals checks q4hrs  - MRI post-op completed 9/16   - decadron taper 4q6 to off  - Oxy IR 5/10 for pain PRN  - psych consulted: adjustment disorder, reccs outpatient f/u  - s/p spinal angiogram AVF or AVM; negative    Cardio  - SBP < 140   - EKG WNL     Pulm  - RA   - encourage incentive spirometry use     GI   - regular   - bowel regimen   - protonix while on Decadron     /renal  - failed TOV on 9/16, straight cath q4h. No allen   - baseline labs     Heme  - SCDs, SQL  -LE dopplers negative 9/17    Endo  - ISS while on decadron   - baseline a1c 5.0      GOC: full code   Level of care: SDU pend  Dispo: pt /ot pend assessment     Assessment and plan discussed w/ Dr. D'amico       []  GCS  E   V  M     Heart Failure: []Acute, [] acute on chronic , []chronic  Heart Failure:  [] Diastolic (HFpEF), [] Systolic (HFrEF), []Combined (HFpEF and HFrEF), [] RHF, [] Pulm HTN, [] Other    [] CAROLA, [] ATN, [] AIN, [] other  [] CKD1, [] CKD2, [] CKD 3, [] CKD 4, [] CKD 5, []ESRD    Encephalopathy: [] Metabolic, [] Hepatic, [] toxic, [] Neurological, [] Other    Abnormal Nurtitional Status: [] malnurtition (see nutrition note), [ ]underweight: BMI < 19, [] morbid obesity: BMI >40, [] Cachexia    [] Sepsis  [] hypovolemic shock,[] cardiogenic shock, [] hemorrhagic shock, [] neuogenic shock  [] Acute Respiratory Failure  []Cerebral edema, [] Brain compression/ herniation,   [] Functional quadriplegia  [] Acute blood loss anemia

## 2021-09-22 NOTE — DISCHARGE NOTE PROVIDER - NSDCFUADDAPPT_GEN_ALL_CORE_FT
Please follow up with Dr. D'Amico outpatient    Please follow up with your primary care doctor Please follow up with Dr. D'Amico on 10/6/21 at 1pm     Please follow up with your primary care doctor Please follow up with Dr. D'Amico on 10/6/21 at 1pm     Please follow up with psychiatry at any of the following locations:  Aftercare Recommendations  Encourage engagement in psychotherapy at acute rehab.  Once at home, can provide information for:    [facility]  [address]  [phone]    [facility]  [address] [facility]  [phone]    [facility]  [address]  [phone]    [facility]  [address]  [phone]    [facility]  [address]  [phone]    [facility]  [address]  [phone]    [facility]  [facility] [place]  [address] [facility]    Please follow up with your primary care doctor

## 2021-09-22 NOTE — DISCHARGE NOTE PROVIDER - NSDCMRMEDTOKEN_GEN_ALL_CORE_FT
Biktarvy oral tablet: 1 tab(s) orally once a day   acetaminophen 500 mg oral tablet: 2 tab(s) orally every 8 hours  Biktarvy oral tablet: 1 tab(s) orally once a day  bisacodyl 5 mg oral delayed release tablet: 1 tab(s) orally once a day (at bedtime)  polyethylene glycol 3350 oral powder for reconstitution: 17 gram(s) orally once a day  senna oral tablet: 2 tab(s) orally once a day (at bedtime)   acetaminophen 500 mg oral tablet: 2 tab(s) orally every 8 hours, As Needed  Biktarvy oral tablet: 1 tab(s) orally once a day  bisacodyl 5 mg oral delayed release tablet: 1 tab(s) orally once a day (at bedtime)  enoxaparin: 40 milligram(s) subcutaneous once a day (at bedtime)  fludrocortisone 0.1 mg oral tablet: 1 tab(s) orally every 12 hours  HYDROmorphone 2 mg oral tablet: 1 tab(s) orally every 4 hours, As needed, Severe Pain (7 - 10)  methocarbamol 500 mg oral tablet: 1 tab(s) orally every 8 hours, As Needed  polyethylene glycol 3350 oral powder for reconstitution: 17 gram(s) orally once a day  pregabalin 50 mg oral capsule: 1 cap(s) orally every 8 hours  senna oral tablet: 2 tab(s) orally once a day (at bedtime)  sodium chloride 1 g oral tablet: 3 tab(s) orally every 6 hours  traZODone 50 mg oral tablet: 1 tab(s) orally once a day (at bedtime), As needed, insomnia

## 2021-09-23 LAB
ANION GAP SERPL CALC-SCNC: 8 MMOL/L — SIGNIFICANT CHANGE UP (ref 5–17)
BUN SERPL-MCNC: 24 MG/DL — HIGH (ref 7–23)
CALCIUM SERPL-MCNC: 9.3 MG/DL — SIGNIFICANT CHANGE UP (ref 8.4–10.5)
CHLORIDE SERPL-SCNC: 98 MMOL/L — SIGNIFICANT CHANGE UP (ref 96–108)
CO2 SERPL-SCNC: 29 MMOL/L — SIGNIFICANT CHANGE UP (ref 22–31)
CREAT SERPL-MCNC: 0.87 MG/DL — SIGNIFICANT CHANGE UP (ref 0.5–1.3)
GLUCOSE SERPL-MCNC: 114 MG/DL — HIGH (ref 70–99)
HCT VFR BLD CALC: 40.9 % — SIGNIFICANT CHANGE UP (ref 39–50)
HGB BLD-MCNC: 14.1 G/DL — SIGNIFICANT CHANGE UP (ref 13–17)
MAGNESIUM SERPL-MCNC: 2.4 MG/DL — SIGNIFICANT CHANGE UP (ref 1.6–2.6)
MCHC RBC-ENTMCNC: 31.8 PG — SIGNIFICANT CHANGE UP (ref 27–34)
MCHC RBC-ENTMCNC: 34.5 GM/DL — SIGNIFICANT CHANGE UP (ref 32–36)
MCV RBC AUTO: 92.3 FL — SIGNIFICANT CHANGE UP (ref 80–100)
NRBC # BLD: 0 /100 WBCS — SIGNIFICANT CHANGE UP (ref 0–0)
PHOSPHATE SERPL-MCNC: 4.1 MG/DL — SIGNIFICANT CHANGE UP (ref 2.5–4.5)
PLATELET # BLD AUTO: 316 K/UL — SIGNIFICANT CHANGE UP (ref 150–400)
POTASSIUM SERPL-MCNC: 4.4 MMOL/L — SIGNIFICANT CHANGE UP (ref 3.5–5.3)
POTASSIUM SERPL-SCNC: 4.4 MMOL/L — SIGNIFICANT CHANGE UP (ref 3.5–5.3)
RBC # BLD: 4.43 M/UL — SIGNIFICANT CHANGE UP (ref 4.2–5.8)
RBC # FLD: 11.9 % — SIGNIFICANT CHANGE UP (ref 10.3–14.5)
SODIUM SERPL-SCNC: 135 MMOL/L — SIGNIFICANT CHANGE UP (ref 135–145)
WBC # BLD: 11.22 K/UL — HIGH (ref 3.8–10.5)
WBC # FLD AUTO: 11.22 K/UL — HIGH (ref 3.8–10.5)

## 2021-09-23 PROCEDURE — 99232 SBSQ HOSP IP/OBS MODERATE 35: CPT | Mod: GC

## 2021-09-23 PROCEDURE — 99024 POSTOP FOLLOW-UP VISIT: CPT

## 2021-09-23 RX ADMIN — HYDROMORPHONE HYDROCHLORIDE 2 MILLIGRAM(S): 2 INJECTION INTRAMUSCULAR; INTRAVENOUS; SUBCUTANEOUS at 23:45

## 2021-09-23 RX ADMIN — HYDROMORPHONE HYDROCHLORIDE 2 MILLIGRAM(S): 2 INJECTION INTRAMUSCULAR; INTRAVENOUS; SUBCUTANEOUS at 05:11

## 2021-09-23 RX ADMIN — Medication 5 MILLIGRAM(S): at 21:12

## 2021-09-23 RX ADMIN — ENOXAPARIN SODIUM 40 MILLIGRAM(S): 100 INJECTION SUBCUTANEOUS at 21:12

## 2021-09-23 RX ADMIN — Medication 2 MILLIGRAM(S): at 17:44

## 2021-09-23 RX ADMIN — Medication 1000 MILLIGRAM(S): at 22:12

## 2021-09-23 RX ADMIN — Medication 2 MILLIGRAM(S): at 07:42

## 2021-09-23 RX ADMIN — POLYETHYLENE GLYCOL 3350 17 GRAM(S): 17 POWDER, FOR SOLUTION ORAL at 13:06

## 2021-09-23 RX ADMIN — PANTOPRAZOLE SODIUM 40 MILLIGRAM(S): 20 TABLET, DELAYED RELEASE ORAL at 07:42

## 2021-09-23 RX ADMIN — Medication 50 MILLIGRAM(S): at 22:37

## 2021-09-23 RX ADMIN — HYDROMORPHONE HYDROCHLORIDE 2 MILLIGRAM(S): 2 INJECTION INTRAMUSCULAR; INTRAVENOUS; SUBCUTANEOUS at 04:11

## 2021-09-23 RX ADMIN — CHLORHEXIDINE GLUCONATE 1 APPLICATION(S): 213 SOLUTION TOPICAL at 11:07

## 2021-09-23 RX ADMIN — Medication 1000 MILLIGRAM(S): at 07:43

## 2021-09-23 RX ADMIN — Medication 1000 MILLIGRAM(S): at 13:06

## 2021-09-23 RX ADMIN — Medication 1000 MILLIGRAM(S): at 14:06

## 2021-09-23 RX ADMIN — SENNA PLUS 2 TABLET(S): 8.6 TABLET ORAL at 21:12

## 2021-09-23 RX ADMIN — METHOCARBAMOL 500 MILLIGRAM(S): 500 TABLET, FILM COATED ORAL at 21:12

## 2021-09-23 RX ADMIN — Medication 50 MILLIGRAM(S): at 21:12

## 2021-09-23 RX ADMIN — BICTEGRAVIR SODIUM, EMTRICITABINE, AND TENOFOVIR ALAFENAMIDE FUMARATE 1 TABLET(S): 30; 120; 15 TABLET ORAL at 13:06

## 2021-09-23 RX ADMIN — Medication 1000 MILLIGRAM(S): at 08:43

## 2021-09-23 RX ADMIN — Medication 1000 MILLIGRAM(S): at 21:12

## 2021-09-23 NOTE — PROGRESS NOTE ADULT - SUBJECTIVE AND OBJECTIVE BOX
Patient is a 36y old  Male who presents with a chief complaint of Paraplegia (17 Sep 2021 07:10)      HPI:  CHIEF COMPLAINT/ REASON FOR CONSULTATION: possible spinal cord compression      HPI: 36 m PMHx HIV (CD4 700s, VLUD) was at gym this afternoon lifting heavy weights, ie dead lifts; tonight began to develop pain in thoracolumbar region; over past couple of hours he began to develop paresthesias down both legs and buttok. now can barely feel legs and unable to stand or move legs. Was brought in by ambulance after a passerby on the street witnessed him unable to rise from curb.  has not gone to bathroom, but thus far no bladder or bowel incontinence.  + IVDU, most recently today (methamphetamine).  no fever/chills.    now s/p decadron load and s/p spinal angiogram (neg) and T8-T9 lami decompression, intraoperative findings of subarachnoid clot s/p evacuation POD # 0 (9/15).    Subjective:  Pt has no acute complaints - is in good spirits. Denies SOB, CP. ROS is otherwise negative.     Allergies    No Known Allergies    Intolerances    Home meds: Biktarvy       MEDICATIONS  (STANDING):  acetaminophen   Tablet .. 1000 milliGRAM(s) Oral every 8 hours  bictegravir 50 mG/emtricitabine 200 mG/tenofovir alafenamide 25 mG (BIKTARVY) 1 Tablet(s) Oral daily  bisacodyl 5 milliGRAM(s) Oral at bedtime  chlorhexidine 2% Cloths 1 Application(s) Topical daily  dexAMETHasone     Tablet   Oral   dexAMETHasone     Tablet 2 milliGRAM(s) Oral every 12 hours  enoxaparin Injectable 40 milliGRAM(s) SubCutaneous at bedtime  influenza   Vaccine 0.5 milliLiter(s) IntraMuscular once  methocarbamol 500 milliGRAM(s) Oral every 8 hours  pantoprazole    Tablet 40 milliGRAM(s) Oral before breakfast  polyethylene glycol 3350 17 Gram(s) Oral daily  pregabalin 50 milliGRAM(s) Oral every 8 hours  senna 2 Tablet(s) Oral at bedtime    MEDICATIONS  (PRN):  HYDROmorphone   Tablet 2 milliGRAM(s) Oral every 4 hours PRN Severe Pain (7 - 10)  metoclopramide 10 milliGRAM(s) Oral every 8 hours PRN nausea/vomiting  traZODone 50 milliGRAM(s) Oral at bedtime PRN insomnia              Drug Dosing Weight  Height (cm): 175.3 (15 Sep 2021 13:18)  Weight (kg): 80.7 (15 Sep 2021 13:18)  BMI (kg/m2): 26.3 (15 Sep 2021 13:18)  BSA (m2): 1.97 (15 Sep 2021 13:18)    PAST MEDICAL & SURGICAL HISTORY:  Infection, HIV        FAMILY HISTORY:  no reported cardiac history    SOCIAL HISTORY: past hx of meth use    ADVANCE DIRECTIVES:      Vital Signs Last 24 Hrs  T(C): 36.7 (23 Sep 2021 08:49), Max: 37 (22 Sep 2021 18:00)  T(F): 98 (23 Sep 2021 08:49), Max: 98.6 (22 Sep 2021 18:00)  HR: 64 (23 Sep 2021 08:49) (58 - 74)  BP: 128/86 (23 Sep 2021 08:49) (117/76 - 133/90)  BP(mean): 90 (23 Sep 2021 04:30) (90 - 104)  RR: 16 (23 Sep 2021 08:49) (16 - 18)  SpO2: 97% (23 Sep 2021 08:49) (96% - 100%)      PHYSICAL EXAM:      Constitutional: NAD  Eyes: PERRLA  ENMT: MMM  Neck: supple  Back: midline  Respiratory: CTA b/l  Cardiovascular: rrr, s1s2, no m/r/g  Gastrointestinal: soft, NTND, + BS  Extremities: wwp  Vascular: + 2 pulses radial  Neurological: AAO x 4, b/l LE weakness (1/5 b/l)  Skin: no rash  Lymph Nodes: no LAD  Musculoskeletal: no joint swelling  Psychiatric: normal affect        LABS:                          14.1   11.22 )-----------( 316      ( 23 Sep 2021 07:22 )             40.9   09-23    135  |  98  |  24<H>  ----------------------------<  114<H>  4.4   |  29  |  0.87    Ca    9.3      23 Sep 2021 07:22  Phos  4.1     09-23  Mg     2.4     09-23                    EKG:    ECHO, US:        RADIOLOGY:  < from: CT Lumbar Spine No Cont (09.15.21 @ 01:39) >  IMPRESSION:    1.  No central canal stenosis or significant neural foraminal narrowing in the thoracolumbar spine.  2.  Severe proctitis.    < end of copied text >      < from: MR Lumbar Spine w/wo IV Cont (09.16.21 @ 20:11) >  Impression: Interval appearance of pockets of lumbar subdural mixed hemorrhage. Diffuse subarachnoid hemorrhage. Interval increase in spinal canal stenosis to moderate to severe most prominent at the L5-S1 level.    < end of copied text >

## 2021-09-23 NOTE — PROGRESS NOTE ADULT - SUBJECTIVE AND OBJECTIVE BOX
HPI: 36 m PMHx HIV (CD4 700s, VLUD) was at gym this afternoon lifting heavy weights, ie dead lifts; tonight began to develop pain in thoracolumbar region; over past couple of hours he began to develop paresthesias down both legs and buttok. now can barely feel legs and unable to stand or move legs. Was brought in by ambulance after a passerby on the street witnessed him unable to rise from curb.  has not gone to bathroom, but thus far no bladder or bowel incontinence.  + IVDU, most recently today (methamphetamine).  no fever/chills.     (15 Sep 2021 01:25)    OVERNIGHT EVENTS: MARCO overnight, neuro stable    Hospital Course:  9/15: s/p spinal angiogram (neg) and T8-T9 lami decompression, intraoperative findings of subarachnoid clot s/p evacuation POD # 0 (9/15) dilaudid x 1 for pain. diet advanced.   9/16: POD# 1: s/p spinal angiogram (neg) and T8-T9 lami decompression, intraoperative findings of subarachnoid clot s/p evacuation POD #1 (9/15) dilaudid x 1 for pain. diet advanced.   9/17: POD#2 s/p spinal angio, POD#2 s/p T8-9 lami decompression.  LE dopplers negative. MRI T/L spine read shows T7/T8 spinal cord edema consistent with infarction, L5-S1 increase in spinal canal stenosis w/ mixed SDH and diffuse SAH. Psych consulted for anxiety/depression. Pend SDU. Bile bag removed today  9/18: POD 3. T8-9 lami/decompression. Psych eval suggests adjustment d/o and outpatient follow up   9/19: POD4. MARCO overnight. Pending spinal rehab placement.   9/20: POD5 MARCO overnight  9/21: POD6, pending angio today. MARCO overnight, neuro stable. Angio results are negative.  9/22: POD7 angio and T8-9 lami decompression, POD1 negative angio. MARCO overnight, neuro stable. Pending dispo. S/p enema, had BM  9/23: POD8 angio and T8-9 lami decompression, POD2 negative angio. MARCO overnight, neuro stable. Pending dispo      Vital Signs Last 24 Hrs  T(C): 36.6 (22 Sep 2021 21:00), Max: 37 (22 Sep 2021 18:00)  T(F): 97.9 (22 Sep 2021 21:00), Max: 98.6 (22 Sep 2021 18:00)  HR: 69 (22 Sep 2021 21:00) (67 - 82)  BP: 133/90 (22 Sep 2021 21:00) (114/82 - 133/90)  BP(mean): 104 (22 Sep 2021 21:00) (104 - 104)  RR: 18 (22 Sep 2021 21:00) (16 - 18)  SpO2: 99% (22 Sep 2021 21:00) (96% - 100%)    I&O's Summary    21 Sep 2021 07:01  -  22 Sep 2021 07:00  --------------------------------------------------------  IN: 350 mL / OUT: 3800 mL / NET: -3450 mL    22 Sep 2021 07:01  -  23 Sep 2021 00:38  --------------------------------------------------------  IN: 50 mL / OUT: 3700 mL / NET: -3650 mL        PHYSICAL EXAM:  General: NAD, pt is comfortably laying in hospital bed, A&O x3, on RA  HEENT: CN II-XII grossly intact, PERRL 3mm, EOMI b/l, face symmetric, tongue midline, neck FROM  Cardiovascular: RRR, normal S1 and S2   Respiratory: lungs CTAB, no wheezing, rhonchi, or crackles   GI: normoactive BS to auscultation, abd soft, NTND   Neuro: no aphasia, speech clear, no dysmetria, no pronator drift  strength 5/5 b/l UE, 0/5 b/l LE   b/l LE flaccid, absent reflexes, no clonus, babinski neg b/l   RLE sensation (especially along L3/L4 dermatome) to light touch diminished compared to LLE   Extremities: R groin site C/D/I, no hematoma. Distal pulses 2+ x4   Incision/Wound: midline thoracic incision C/D/I     TUBES/LINES:  [] CVC  [] A-line  [] Lumbar Drain  [] Ventriculostomy    DIET:  [] NPO  [X] Mechanical  [] Tube feeds  LABS:                        13.7   12.95 )-----------( 277      ( 22 Sep 2021 10:59 )             40.4     09-22    134<L>  |  102  |  22  ----------------------------<  125<H>  3.9   |  23  |  0.79    Ca    8.9      22 Sep 2021 10:59  Phos  3.7     09-22  Mg     2.5     09-22              CAPILLARY BLOOD GLUCOSE          Drug Levels: [] N/A    CSF Analysis: [] N/A      Allergies    No Known Allergies    Intolerances      MEDICATIONS:  Antibiotics:  bictegravir 50 mG/emtricitabine 200 mG/tenofovir alafenamide 25 mG (BIKTARVY) 1 Tablet(s) Oral daily    Neuro:  acetaminophen   Tablet .. 1000 milliGRAM(s) Oral every 8 hours  HYDROmorphone   Tablet 2 milliGRAM(s) Oral every 4 hours PRN  methocarbamol 500 milliGRAM(s) Oral every 8 hours  pregabalin 50 milliGRAM(s) Oral every 8 hours  traZODone 50 milliGRAM(s) Oral at bedtime PRN    Anticoagulation:  enoxaparin Injectable 40 milliGRAM(s) SubCutaneous at bedtime    OTHER:  bisacodyl 5 milliGRAM(s) Oral at bedtime  chlorhexidine 2% Cloths 1 Application(s) Topical daily  dexAMETHasone     Tablet   Oral   dexAMETHasone     Tablet 2 milliGRAM(s) Oral every 12 hours  influenza   Vaccine 0.5 milliLiter(s) IntraMuscular once  metoclopramide 10 milliGRAM(s) Oral every 8 hours PRN  pantoprazole    Tablet 40 milliGRAM(s) Oral before breakfast  polyethylene glycol 3350 17 Gram(s) Oral daily  senna 2 Tablet(s) Oral at bedtime    IVF:    CULTURES:    RADIOLOGY & ADDITIONAL TESTS: x      ASSESSMENT:  36 M PMHx HIV (CD4 700s, VLUD on biktarvy), hx of IVDU and methamphetamine use, was at gym this afternoon lifting heavy weights, ie dead lifts; tonight began to develop pain in thoracolumbar region; over past couple of hours he began to develop paresthesias down both legs and buttock, then progressed to complete plegia of bilateral extremities with loss of sensation from T8 dermatomes and below found on MRI to have 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      PLAN:   Neuro  - neuro and vitals checks q4hrs  - MRI post-op completed 9/16   - decadron taper 4q6 to off  - Oxy IR 5/10 for pain PRN  - psych consulted: adjustment disorder, reccs outpatient f/u  - s/p spinal angiogram AVF or AVM; negative    Cardio  - SBP < 140   - EKG WNL     Pulm  - RA   - encourage incentive spirometry use     GI   - regular   - bowel regimen   - protonix while on Decadron     /renal  - failed TOV on 9/16, straight cath q4h. No allen   - baseline labs     Heme  - SCDs, SQL  -LE dopplers negative 9/17    Endo  - ISS while on decadron   - baseline a1c 5.0      GOC: full code   Level of care: SDU pend  Dispo: pt/ot pend assessment     Assessment and plan discussed w/ Dr. D'amico       []  GCS  E   V  M     Heart Failure: []Acute, [] acute on chronic , []chronic  Heart Failure:  [] Diastolic (HFpEF), [] Systolic (HFrEF), []Combined (HFpEF and HFrEF), [] RHF, [] Pulm HTN, [] Other    [] CAROLA, [] ATN, [] AIN, [] other  [] CKD1, [] CKD2, [] CKD 3, [] CKD 4, [] CKD 5, []ESRD    Encephalopathy: [] Metabolic, [] Hepatic, [] toxic, [] Neurological, [] Other    Abnormal Nurtitional Status: [] malnurtition (see nutrition note), [ ]underweight: BMI < 19, [] morbid obesity: BMI >40, [] Cachexia    [] Sepsis  [] hypovolemic shock,[] cardiogenic shock, [] hemorrhagic shock, [] neuogenic shock  [] Acute Respiratory Failure  []Cerebral edema, [] Brain compression/ herniation,   [] Functional quadriplegia  [] Acute blood loss anemia

## 2021-09-23 NOTE — PROGRESS NOTE ADULT - SUBJECTIVE AND OBJECTIVE BOX
NEUROSURGERY PAIN MANAGEMENT CONSULT NOTE    Chief Complaint: b/l LE weakness    HPI:  CHIEF COMPLAINT/ REASON FOR CONSULTATION: possible spinal cord compression      HPI: 36 m PMHx HIV (CD4 700s, VLUD) was at gym this afternoon lifting heavy weights, ie dead lifts; tonight began to develop pain in thoracolumbar region; over past couple of hours he began to develop paresthesias down both legs and buttok. now can barely feel legs and unable to stand or move legs. Was brought in by ambulance after a passerby on the street witnessed him unable to rise from curb.  has not gone to bathroom, but thus far no bladder or bowel incontinence.  + IVDU, most recently today (methamphetamine).  no fever/chills.    PAST MEDICAL HISTORY   Infection, HIV      PAST SURGICAL HISTORY denies         MEDICATIONS:  Antibiotics:    Neuro:    Anticoagulation:    Other:  dexAMETHasone  IVPB 10 milliGRAM(s) IV Intermittent Once      SOCIAL HISTORY:   Occupation:   Marital Status:     FAMILY HISTORY:      REVIEW OF SYSTEMS:  Check here if all are normal other than Neurological [x]      PHYSICAL EXAMINATION:   T(C): 36.9 (09-14-21 @ 23:53), Max: 36.9 (09-14-21 @ 23:53)  HR: 89 (09-14-21 @ 23:53) (89 - 89)  BP: 158/100 (09-14-21 @ 23:53) (158/100 - 158/100)  RR: 17 (09-14-21 @ 23:53) (17 - 17)  SpO2: 100% (09-14-21 @ 23:53) (100% - 100%)  Wt(kg): --  Weight (kg): 80.7 (09-14 @ 23:53)                   (15 Sep 2021 01:25)      PAST MEDICAL & SURGICAL HISTORY:  Infection, HIV        FAMILY HISTORY:      SOCIAL HISTORY:  [ ] Denies Smoking, Alcohol, or Drug Use    HOME MEDICATIONS:   Please refer to initial HNP    PAIN HOME MEDICATIONS:    Allergies    No Known Allergies    Intolerances        PAIN MEDICATIONS:  acetaminophen   Tablet .. 1000 milliGRAM(s) Oral every 8 hours  HYDROmorphone   Tablet 2 milliGRAM(s) Oral every 4 hours PRN  methocarbamol 500 milliGRAM(s) Oral every 8 hours  pregabalin 50 milliGRAM(s) Oral every 8 hours  traZODone 50 milliGRAM(s) Oral at bedtime PRN    Heme:  enoxaparin Injectable 40 milliGRAM(s) SubCutaneous at bedtime    Antibiotics:  bictegravir 50 mG/emtricitabine 200 mG/tenofovir alafenamide 25 mG (BIKTARVY) 1 Tablet(s) Oral daily    Cardiovascular:    GI:  bisacodyl 5 milliGRAM(s) Oral at bedtime  metoclopramide 10 milliGRAM(s) Oral every 8 hours PRN  pantoprazole    Tablet 40 milliGRAM(s) Oral before breakfast  polyethylene glycol 3350 17 Gram(s) Oral daily  senna 2 Tablet(s) Oral at bedtime    Endocrine:  dexAMETHasone     Tablet   Oral   dexAMETHasone     Tablet 2 milliGRAM(s) Oral every 12 hours    All Other Medications:  chlorhexidine 2% Cloths 1 Application(s) Topical daily  influenza   Vaccine 0.5 milliLiter(s) IntraMuscular once      Vital Signs Last 24 Hrs  T(C): 36.7 (23 Sep 2021 08:49), Max: 37 (22 Sep 2021 18:00)  T(F): 98 (23 Sep 2021 08:49), Max: 98.6 (22 Sep 2021 18:00)  HR: 64 (23 Sep 2021 08:49) (58 - 74)  BP: 128/86 (23 Sep 2021 08:49) (117/76 - 133/90)  BP(mean): 90 (23 Sep 2021 04:30) (90 - 104)  RR: 16 (23 Sep 2021 08:49) (16 - 18)  SpO2: 97% (23 Sep 2021 08:49) (96% - 100%)    LABS:                        14.1   11.22 )-----------( 316      ( 23 Sep 2021 07:22 )             40.9     09-23    135  |  98  |  24<H>  ----------------------------<  114<H>  4.4   |  29  |  0.87    Ca    9.3      23 Sep 2021 07:22  Phos  4.1     09-23  Mg     2.4     09-23            RADIOLOGY:    Drug Screen:        REVIEW OF SYSTEMS:  CONSTITUTIONAL: No fever or fatigue O/N.   EYES: No eye pain, visual disturbances  ENMT:  No difficulty hearing. No throat pain  NECK: No pain or stiffness  RESPIRATORY: No cough, wheezing; No shortness of breath  CARDIOVASCULAR: No chest pain, palpitations.   GASTROINTESTINAL: Pt reports passing gas. No bowel movements. No abdominal or epigastric pain. No nausea, vomiting. GENITOURINARY: No dysuria, frequency, or incontinence  NEUROLOGICAL: No headaches, loss of strength, numbness, or tremors. No dizzinesss or lightheadedness with pain medications.   MUSCULOSKELETAL: Incisional back pain. No joint pain or swelling; No muscle, or extremity pain    PAIN ASSESSMENT: c/o back pain     PHYSICAL EXAM  GENERAL: Laying in bed, NAD  Neuro: CN II-XII PERRL, EOMI  Cranial nerves grossly intact  Motor exam: b/l lowers 0/5  Sensation intact to LT in UE/LE in 3 dermatomes  CHEST/LUNG: Clear to auscultation bilaterally; No rales, rhonchi, wheezing, or rubs  HEART: Regular rate and rhythm; No murmurs, rubs, or gallops  ABDOMEN: Soft, Nontender, Nondistended; Bowel sounds present  EXTREMITIES:  2+ Peripheral Pulses, No clubbing, cyanosis, or edema  SKIN: No rashes or lesions      ASSESSMENT:   36 M PMHx HIV (CD4 700s, VLUD on biktarvy), hx of IVDU and methamphetamine use, was at gym this afternoon lifting heavy weights, ie dead lifts; tonight began to develop pain in thoracolumbar region; over past couple of hours he began to develop paresthesias down both legs and buttock, then progressed to complete plegia of bilateral extremities with loss of sensation from T8 dermatomes and below found on MRI to have intradural/extramedullary lesion with mass effect on spinal cord displacing it to the left, now s/p decadron load and s/p spinal angiogram (neg) and T8-T9 lami decompression, intraoperative findings of subarachnoid clot s/p evacuation  (9/15    PLAN:   - Pain:  Start Tylenol 1000mg every 8 hours  Lyrica 50mg every 8 hours  Robaxin 500mg every 8 hours  Dilaudid 2mg every 4 hours PRN for severe pain  Toradol 30mg IV every 8 hours x3 days when off Decadron if okay with neurosurgery    - Bowel regimen: Senna    - Nausea ppx: Zofran standing  - Functional Goals: Pt will get OOB with PT today. Pt will resume previous level of activity without impairment from surgery.   - Additional Consults: None recommended.   - Additional Labs/Imaging:  None recommended.   - Follow up, Discharge Planning: pending rehab  - Pain Management follow up plan: will cont to follow    d/w Dr. Manriquez

## 2021-09-24 PROCEDURE — 99024 POSTOP FOLLOW-UP VISIT: CPT

## 2021-09-24 PROCEDURE — 99232 SBSQ HOSP IP/OBS MODERATE 35: CPT | Mod: GC

## 2021-09-24 RX ORDER — LACTULOSE 10 G/15ML
10 SOLUTION ORAL ONCE
Refills: 0 | Status: COMPLETED | OUTPATIENT
Start: 2021-09-24 | End: 2021-09-24

## 2021-09-24 RX ADMIN — PANTOPRAZOLE SODIUM 40 MILLIGRAM(S): 20 TABLET, DELAYED RELEASE ORAL at 06:58

## 2021-09-24 RX ADMIN — LACTULOSE 10 GRAM(S): 10 SOLUTION ORAL at 16:42

## 2021-09-24 RX ADMIN — BICTEGRAVIR SODIUM, EMTRICITABINE, AND TENOFOVIR ALAFENAMIDE FUMARATE 1 TABLET(S): 30; 120; 15 TABLET ORAL at 12:04

## 2021-09-24 RX ADMIN — HYDROMORPHONE HYDROCHLORIDE 2 MILLIGRAM(S): 2 INJECTION INTRAMUSCULAR; INTRAVENOUS; SUBCUTANEOUS at 00:45

## 2021-09-24 RX ADMIN — Medication 5 MILLIGRAM(S): at 21:11

## 2021-09-24 RX ADMIN — Medication 1000 MILLIGRAM(S): at 22:11

## 2021-09-24 RX ADMIN — Medication 50 MILLIGRAM(S): at 23:54

## 2021-09-24 RX ADMIN — METHOCARBAMOL 500 MILLIGRAM(S): 500 TABLET, FILM COATED ORAL at 21:12

## 2021-09-24 RX ADMIN — SENNA PLUS 2 TABLET(S): 8.6 TABLET ORAL at 21:11

## 2021-09-24 RX ADMIN — Medication 1000 MILLIGRAM(S): at 21:11

## 2021-09-24 RX ADMIN — Medication 1000 MILLIGRAM(S): at 07:57

## 2021-09-24 RX ADMIN — Medication 50 MILLIGRAM(S): at 21:10

## 2021-09-24 RX ADMIN — CHLORHEXIDINE GLUCONATE 1 APPLICATION(S): 213 SOLUTION TOPICAL at 10:22

## 2021-09-24 RX ADMIN — ENOXAPARIN SODIUM 40 MILLIGRAM(S): 100 INJECTION SUBCUTANEOUS at 21:11

## 2021-09-24 RX ADMIN — POLYETHYLENE GLYCOL 3350 17 GRAM(S): 17 POWDER, FOR SOLUTION ORAL at 13:20

## 2021-09-24 RX ADMIN — Medication 2 MILLIGRAM(S): at 17:27

## 2021-09-24 RX ADMIN — Medication 1000 MILLIGRAM(S): at 13:20

## 2021-09-24 RX ADMIN — Medication 2 MILLIGRAM(S): at 06:57

## 2021-09-24 RX ADMIN — Medication 1000 MILLIGRAM(S): at 06:57

## 2021-09-24 RX ADMIN — Medication 1000 MILLIGRAM(S): at 14:20

## 2021-09-24 NOTE — PROGRESS NOTE ADULT - SUBJECTIVE AND OBJECTIVE BOX
HPI:  CHIEF COMPLAINT/ REASON FOR CONSULTATION: possible spinal cord compression      HPI: 36 m PMHx HIV (CD4 700s, VLUD) was at gym this afternoon lifting heavy weights, ie dead lifts; tonight began to develop pain in thoracolumbar region; over past couple of hours he began to develop paresthesias down both legs and buttok. now can barely feel legs and unable to stand or move legs. Was brought in by ambulance after a passerby on the street witnessed him unable to rise from curb.  has not gone to bathroom, but thus far no bladder or bowel incontinence.  + IVDU, most recently today (methamphetamine).  no fever/chills.      HOSPITAL COURSE:   9/15: s/p spinal angiogram (neg) and T8-T9 lami decompression, intraoperative findings of subarachnoid clot s/p evacuation POD # 0 (9/15) dilaudid x 1 for pain. diet advanced.   9/16: POD# 1: s/p spinal angiogram (neg) and T8-T9 lami decompression, intraoperative findings of subarachnoid clot s/p evacuation POD #1 (9/15) dilaudid x 1 for pain. diet advanced.   9/17: POD#2 s/p spinal angio, POD#2 s/p T8-9 lami decompression.  LE dopplers negative. MRI T/L spine read shows T7/T8 spinal cord edema consistent with infarction, L5-S1 increase in spinal canal stenosis w/ mixed SDH and diffuse SAH. Psych consulted for anxiety/depression. Pend SDU. Bile bag removed today  9/18: POD 3. T8-9 lami/decompression. Psych eval suggests adjustment d/o and outpatient follow up   9/19: POD4. [facility] overnight. Pending spinal rehab placement.   9/20: POD5 [facility] overnight  9/21: POD6, pending angio today. [facility]CO overnight, neuro stable. Angio results are negative.  9/22: POD7 angio and T8-9 lami decompression, POD1 negative angio. [facility]CO overnight, neuro stable. Pending dispo. S/p enema, had BM  9/23: POD8 angio and T8-9 lami decompression, POD2 negative angio. [facility]CO overnight, neuro stable. Pending dispo.  9/24: POD9 s/p angio and T8-9 lami decompression, POD3 negative angio. [facility]CO overnight, neuro stable. Pending multidisciplinary discussion regarding dispo today at 11am.         MEDICATIONS:  Antibiotics:    Neuro:    Anticoagulation:    Other:  dexAMETHasone  IVPB 10 milliGRAM(s) IV Intermittent Once      SOCIAL HISTORY:   Occupation:   Marital Status:     FAMILY HISTORY:      REVIEW OF SYSTEMS:  Check here if all are normal other than Neurological [x]      PHYSICAL EXAMINATION:   T(C): 36.9 (09-14-21 @ 23:53), Max: 36.9 (09-14-21 @ 23:53)  HR: 89 (09-14-21 @ 23:53) (89 - 89)  BP: 158/100 (09-14-21 @ 23:53) (158/100 - 158/100)  RR: 17 (09-14-21 @ 23:53) (17 - 17)  SpO2: 100% (09-14-21 @ 23:53) (100% - 100%)  Wt(kg): --  Weight (kg): 80.7 (09-14 @ 23:53)                   (15 Sep 2021 01:25)    OVERNIGHT EVENTS:  Vital Signs Last 24 Hrs  T(C): 36.7 (23 Sep 2021 20:50), Max: 36.7 (23 Sep 2021 08:49)  T(F): 98 (23 Sep 2021 20:50), Max: 98 (23 Sep 2021 08:49)  HR: 66 (23 Sep 2021 20:50) (58 - 67)  BP: 133/82 (23 Sep 2021 20:50) (118/76 - 133/82)  BP(mean): 99 (23 Sep 2021 20:50) (90 - 99)  RR: 16 (23 Sep 2021 20:50) (15 - 18)  SpO2: 100% (23 Sep 2021 20:50) (96% - 100%)    I&O's Summary    22 Sep 2021 07:01  -  23 Sep 2021 07:00  --------------------------------------------------------  IN: 50 mL / OUT: 4750 mL / NET: -4700 mL    23 Sep 2021 07:01  -  24 Sep 2021 02:58  --------------------------------------------------------  IN: 0 mL / OUT: 2650 mL / NET: -2650 mL        PHYSICAL EXAM:  General: NAD, pt is comfortably laying in hospital bed, A&O x3, on RA  HEENT: CN II-XII grossly intact, PERRL 3mm, EOMI b/l, face symmetric, tongue midline, neck FROM  Cardiovascular: RRR, normal S1 and S2   Respiratory: lungs CTAB, no wheezing, rhonchi, or crackles   GI: normoactive BS to auscultation, abd soft, NTND   Neuro: no aphasia, speech clear, no dysmetria, no pronator drift  strength 5/5 b/l UE, 0/5 b/l LE   b/l LE flaccid, absent reflexes, no clonus, babinski neg b/l   RLE sensation (especially along L3/L4 dermatome) to light touch diminished compared to LLE   Extremities: R groin site C/D/I, no hematoma. Distal pulses 2+ x4   Incision/Wound: midline thoracic incision C/D/I       TUBES/LINES:  [] CVC  [] A-line  [] Lumbar Drain  [] Ventriculostomy    DIET:  [] NPO  [X] Mechanical  [] Tube feeds      LABS:                        14.1   11.22 )-----------( 316      ( 23 Sep 2021 07:22 )             40.9     09-23    135  |  98  |  24<H>  ----------------------------<  114<H>  4.4   |  29  |  0.87    Ca    9.3      23 Sep 2021 07:22  Phos  4.1     09-23  Mg     2.4     09-23              CAPILLARY BLOOD GLUCOSE          Drug Levels: [] N/A    CSF Analysis: [] N/A      Allergies    No Known Allergies    Intolerances      MEDICATIONS:  Antibiotics:  bictegravir 50 mG/emtricitabine 200 mG/tenofovir alafenamide 25 mG (BIKTARVY) 1 Tablet(s) Oral daily    Neuro:  acetaminophen   Tablet .. 1000 milliGRAM(s) Oral every 8 hours  HYDROmorphone   Tablet 2 milliGRAM(s) Oral every 4 hours PRN  methocarbamol 500 milliGRAM(s) Oral every 8 hours  pregabalin 50 milliGRAM(s) Oral every 8 hours  traZODone 50 milliGRAM(s) Oral at bedtime PRN    Anticoagulation:  enoxaparin Injectable 40 milliGRAM(s) SubCutaneous at bedtime    OTHER:  bisacodyl 5 milliGRAM(s) Oral at bedtime  chlorhexidine 2% Cloths 1 Application(s) Topical daily  dexAMETHasone     Tablet   Oral   dexAMETHasone     Tablet 2 milliGRAM(s) Oral every 12 hours  influenza   Vaccine 0.5 milliLiter(s) IntraMuscular once  metoclopramide 10 milliGRAM(s) Oral every 8 hours PRN  pantoprazole    Tablet 40 milliGRAM(s) Oral before breakfast  polyethylene glycol 3350 17 Gram(s) Oral daily  senna 2 Tablet(s) Oral at bedtime    IVF:    CULTURES:    RADIOLOGY & ADDITIONAL TESTS:      ASSESSMENT:  36 M PMHx HIV (CD4 700s, VLUD on biktarvy), hx of IVDU and methamphetamine use, was at gym this afternoon lifting heavy weights, ie dead lifts; tonight began to develop pain in thoracolumbar region; over past couple of hours he began to develop paresthesias down both legs and buttock, then progressed to complete plegia of bilateral extremities with loss of sensation from T8 dermatomes and below found on MRI to have 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        FLACCID PARALYSIS OF LEG    Handoff    MEWS Score    Infection, HIV    Subdural hematoma    Subdural hematoma    Adjustment reaction    Angiogram, spine, selective    Lumbar laminectomy    Flaccid paralysis of legs    Spinal cord compression    HIV disease    Subdural hematoma    Cannabis abuse, daily use    Episodic methamphetamine abuse    Leukocytosis, unspecified type    Subarachnoidal hemorrhage    Postoperative state    Angiogram, spine, selective    Lumbar laminectomy    NUMBNESS    Spinal cord compression    Angiogram, spine, selective    Spinal cord compression    HIV disease    Subdural hematoma    Cannabis abuse, daily use    Episodic methamphetamine abuse    Leukocytosis, unspecified type    Subarachnoidal hemorrhage    [email or web address]        PLAN:  Neuro  - neuro and vitals checks q4hrs  - MRI post-op completed 9/16   - decadron taper 4q6 to off  - Oxy IR 5/10 for pain PRN  - psych consulted: adjustment disorder, reccs outpatient f/u  - s/p spinal angiogram AVF or AVM; negative    Cardio  - SBP < 140   - EKG WNL     Pulm  - RA   - encourage incentive spirometry use     GI   - regular   - bowel regimen   - protonix while on Decadron     /renal  - failed TOV on 9/16, straight cath q4h. No allen   - baseline labs     Heme  - SCDs, SQL  -LE dopplers negative 9/17    Endo  - ISS while on decadron   - baseline a1c 5.0      GOC: full code   Level of care: SDU pend  Dispo: pt/ot recs, pend 11am dipso meeting     Assessment and plan discussed w/ Dr. D'amico

## 2021-09-24 NOTE — PROGRESS NOTE ADULT - SUBJECTIVE AND OBJECTIVE BOX
Patient is a 36y old  Male who presents with a chief complaint of Paraplegia (17 Sep 2021 07:10)      HPI:  CHIEF COMPLAINT/ REASON FOR CONSULTATION: possible spinal cord compression      HPI: 36 m PMHx HIV (CD4 700s, VLUD) was at gym this afternoon lifting heavy weights, ie dead lifts; tonight began to develop pain in thoracolumbar region; over past couple of hours he began to develop paresthesias down both legs and buttok. now can barely feel legs and unable to stand or move legs. Was brought in by ambulance after a passerby on the street witnessed him unable to rise from curb.  has not gone to bathroom, but thus far no bladder or bowel incontinence.  + IVDU, most recently today (methamphetamine).  no fever/chills.    now s/p decadron load and s/p spinal angiogram (neg) and T8-T9 lami decompression, intraoperative findings of subarachnoid clot s/p evacuation POD # 0 (9/15).    Subjective:  Pt has no acute complaints - is in good spirits. Denies SOB, CP. ROS is otherwise negative.     Allergies    No Known Allergies    Intolerances    Home meds: Biktarvy         MEDICATIONS  (STANDING):  acetaminophen   Tablet .. 1000 milliGRAM(s) Oral every 8 hours  bictegravir 50 mG/emtricitabine 200 mG/tenofovir alafenamide 25 mG (BIKTARVY) 1 Tablet(s) Oral daily  bisacodyl 5 milliGRAM(s) Oral at bedtime  chlorhexidine 2% Cloths 1 Application(s) Topical daily  dexAMETHasone     Tablet   Oral   dexAMETHasone     Tablet 2 milliGRAM(s) Oral every 12 hours  enoxaparin Injectable 40 milliGRAM(s) SubCutaneous at bedtime  influenza   Vaccine 0.5 milliLiter(s) IntraMuscular once  methocarbamol 500 milliGRAM(s) Oral every 8 hours  pantoprazole    Tablet 40 milliGRAM(s) Oral before breakfast  polyethylene glycol 3350 17 Gram(s) Oral daily  pregabalin 50 milliGRAM(s) Oral every 8 hours  senna 2 Tablet(s) Oral at bedtime    MEDICATIONS  (PRN):  HYDROmorphone   Tablet 2 milliGRAM(s) Oral every 4 hours PRN Severe Pain (7 - 10)  metoclopramide 10 milliGRAM(s) Oral every 8 hours PRN nausea/vomiting  traZODone 50 milliGRAM(s) Oral at bedtime PRN insomnia              Drug Dosing Weight  Height (cm): 175.3 (15 Sep 2021 13:18)  Weight (kg): 80.7 (15 Sep 2021 13:18)  BMI (kg/m2): 26.3 (15 Sep 2021 13:18)  BSA (m2): 1.97 (15 Sep 2021 13:18)    PAST MEDICAL & SURGICAL HISTORY:  Infection, HIV        FAMILY HISTORY:  no reported cardiac history    SOCIAL HISTORY: past hx of meth use    ADVANCE DIRECTIVES:    Vital Signs Last 24 Hrs  T(C): 36.6 (24 Sep 2021 09:07), Max: 36.7 (23 Sep 2021 16:00)  T(F): 97.8 (24 Sep 2021 09:07), Max: 98.1 (24 Sep 2021 04:45)  HR: 62 (24 Sep 2021 09:07) (62 - 69)  BP: 110/72 (24 Sep 2021 09:07) (110/72 - 133/82)  BP(mean): 91 (24 Sep 2021 04:45) (91 - 99)  RR: 15 (24 Sep 2021 09:07) (15 - 16)  SpO2: 98% (24 Sep 2021 09:07) (98% - 100%)        PHYSICAL EXAM:      Constitutional: NAD  Eyes: PERRLA  ENMT: MMM  Neck: supple  Back: midline  Respiratory: CTA b/l  Cardiovascular: rrr, s1s2, no m/r/g  Gastrointestinal: soft, NTND, + BS  Extremities: wwp  Vascular: + 2 pulses radial  Neurological: AAO x 4, b/l LE weakness (1/5 b/l)  Skin: no rash  Lymph Nodes: no LAD  Musculoskeletal: no joint swelling  Psychiatric: normal affect        LABS:                          14.1   11.22 )-----------( 316      ( 23 Sep 2021 07:22 )             40.9   09-23    135  |  98  |  24<H>  ----------------------------<  114<H>  4.4   |  29  |  0.87    Ca    9.3      23 Sep 2021 07:22  Phos  4.1     09-23  Mg     2.4     09-23          EKG:    ECHO, US:        RADIOLOGY:  < from: CT Lumbar Spine No Cont (09.15.21 @ 01:39) >  IMPRESSION:    1.  No central canal stenosis or significant neural foraminal narrowing in the thoracolumbar spine.  2.  Severe proctitis.    < end of copied text >      < from: MR Lumbar Spine w/wo IV Cont (09.16.21 @ 20:11) >  Impression: Interval appearance of pockets of lumbar subdural mixed hemorrhage. Diffuse subarachnoid hemorrhage. Interval increase in spinal canal stenosis to moderate to severe most prominent at the L5-S1 level.    < end of copied text >

## 2021-09-25 DIAGNOSIS — E87.1 HYPO-OSMOLALITY AND HYPONATREMIA: ICD-10-CM

## 2021-09-25 LAB
ANION GAP SERPL CALC-SCNC: 10 MMOL/L — SIGNIFICANT CHANGE UP (ref 5–17)
BUN SERPL-MCNC: 18 MG/DL — SIGNIFICANT CHANGE UP (ref 7–23)
CALCIUM SERPL-MCNC: 9.4 MG/DL — SIGNIFICANT CHANGE UP (ref 8.4–10.5)
CHLORIDE SERPL-SCNC: 93 MMOL/L — LOW (ref 96–108)
CHLORIDE UR-SCNC: 37 MMOL/L — SIGNIFICANT CHANGE UP
CO2 SERPL-SCNC: 27 MMOL/L — SIGNIFICANT CHANGE UP (ref 22–31)
CREAT ?TM UR-MCNC: 67 MG/DL — SIGNIFICANT CHANGE UP
CREAT SERPL-MCNC: 0.75 MG/DL — SIGNIFICANT CHANGE UP (ref 0.5–1.3)
GLUCOSE SERPL-MCNC: 108 MG/DL — HIGH (ref 70–99)
HCT VFR BLD CALC: 39.4 % — SIGNIFICANT CHANGE UP (ref 39–50)
HGB BLD-MCNC: 13.8 G/DL — SIGNIFICANT CHANGE UP (ref 13–17)
MAGNESIUM SERPL-MCNC: 2.1 MG/DL — SIGNIFICANT CHANGE UP (ref 1.6–2.6)
MCHC RBC-ENTMCNC: 31.4 PG — SIGNIFICANT CHANGE UP (ref 27–34)
MCHC RBC-ENTMCNC: 35 GM/DL — SIGNIFICANT CHANGE UP (ref 32–36)
MCV RBC AUTO: 89.5 FL — SIGNIFICANT CHANGE UP (ref 80–100)
NRBC # BLD: 0 /100 WBCS — SIGNIFICANT CHANGE UP (ref 0–0)
OSMOLALITY UR: 464 MOSM/KG — SIGNIFICANT CHANGE UP (ref 300–900)
PHOSPHATE SERPL-MCNC: 4.2 MG/DL — SIGNIFICANT CHANGE UP (ref 2.5–4.5)
PLATELET # BLD AUTO: 281 K/UL — SIGNIFICANT CHANGE UP (ref 150–400)
POTASSIUM SERPL-MCNC: 4.5 MMOL/L — SIGNIFICANT CHANGE UP (ref 3.5–5.3)
POTASSIUM SERPL-SCNC: 4.5 MMOL/L — SIGNIFICANT CHANGE UP (ref 3.5–5.3)
RBC # BLD: 4.4 M/UL — SIGNIFICANT CHANGE UP (ref 4.2–5.8)
RBC # FLD: 11.7 % — SIGNIFICANT CHANGE UP (ref 10.3–14.5)
SODIUM SERPL-SCNC: 130 MMOL/L — LOW (ref 135–145)
SODIUM UR-SCNC: 57 MMOL/L — SIGNIFICANT CHANGE UP
WBC # BLD: 10.47 K/UL — SIGNIFICANT CHANGE UP (ref 3.8–10.5)
WBC # FLD AUTO: 10.47 K/UL — SIGNIFICANT CHANGE UP (ref 3.8–10.5)

## 2021-09-25 PROCEDURE — 99232 SBSQ HOSP IP/OBS MODERATE 35: CPT

## 2021-09-25 PROCEDURE — 99024 POSTOP FOLLOW-UP VISIT: CPT

## 2021-09-25 RX ADMIN — Medication 50 MILLIGRAM(S): at 23:14

## 2021-09-25 RX ADMIN — Medication 1000 MILLIGRAM(S): at 05:36

## 2021-09-25 RX ADMIN — Medication 50 MILLIGRAM(S): at 21:22

## 2021-09-25 RX ADMIN — METHOCARBAMOL 500 MILLIGRAM(S): 500 TABLET, FILM COATED ORAL at 04:34

## 2021-09-25 RX ADMIN — Medication 50 MILLIGRAM(S): at 04:34

## 2021-09-25 RX ADMIN — Medication 1000 MILLIGRAM(S): at 14:42

## 2021-09-25 RX ADMIN — METHOCARBAMOL 500 MILLIGRAM(S): 500 TABLET, FILM COATED ORAL at 21:22

## 2021-09-25 RX ADMIN — PANTOPRAZOLE SODIUM 40 MILLIGRAM(S): 20 TABLET, DELAYED RELEASE ORAL at 04:34

## 2021-09-25 RX ADMIN — Medication 1000 MILLIGRAM(S): at 04:34

## 2021-09-25 RX ADMIN — Medication 1000 MILLIGRAM(S): at 21:22

## 2021-09-25 RX ADMIN — Medication 1000 MILLIGRAM(S): at 13:42

## 2021-09-25 RX ADMIN — Medication 5 MILLIGRAM(S): at 21:21

## 2021-09-25 RX ADMIN — POLYETHYLENE GLYCOL 3350 17 GRAM(S): 17 POWDER, FOR SOLUTION ORAL at 11:41

## 2021-09-25 RX ADMIN — HYDROMORPHONE HYDROCHLORIDE 2 MILLIGRAM(S): 2 INJECTION INTRAMUSCULAR; INTRAVENOUS; SUBCUTANEOUS at 03:00

## 2021-09-25 RX ADMIN — ENOXAPARIN SODIUM 40 MILLIGRAM(S): 100 INJECTION SUBCUTANEOUS at 21:21

## 2021-09-25 RX ADMIN — SENNA PLUS 2 TABLET(S): 8.6 TABLET ORAL at 21:21

## 2021-09-25 RX ADMIN — Medication 1000 MILLIGRAM(S): at 22:52

## 2021-09-25 RX ADMIN — HYDROMORPHONE HYDROCHLORIDE 2 MILLIGRAM(S): 2 INJECTION INTRAMUSCULAR; INTRAVENOUS; SUBCUTANEOUS at 02:03

## 2021-09-25 RX ADMIN — BICTEGRAVIR SODIUM, EMTRICITABINE, AND TENOFOVIR ALAFENAMIDE FUMARATE 1 TABLET(S): 30; 120; 15 TABLET ORAL at 11:42

## 2021-09-25 RX ADMIN — CHLORHEXIDINE GLUCONATE 1 APPLICATION(S): 213 SOLUTION TOPICAL at 11:42

## 2021-09-25 NOTE — PROGRESS NOTE ADULT - ASSESSMENT
36 M PMHx HIV (CD4 700s, VLUD on biktarvy), hx of IVDU and methamphetamine use, was at gym this afternoon lifting heavy weights, ie dead lifts; tonight began to develop pain in thoracolumbar region; over past couple of hours he began to develop paresthesias down both legs and buttock, then progressed to complete plegia of bilateral extremities with loss of sensation from T8 dermatomes and below found on MRI to have intradural/extramedullary lesion with mass effect on spinal cord displacing it to the left, now s/p decadron load and s/p spinal angiogram (neg) and T8-T9 lami decompression, intraoperative findings of subarachnoid clot s/p evacuation (9/15).

## 2021-09-25 NOTE — PROGRESS NOTE ADULT - PROBLEM SELECTOR PLAN 7
Euvolemic exam, with concentrated urine. Likely SIADH.   -Hold Salt tabs for now  -2L daily fluid restriction  -Trend Sodium

## 2021-09-25 NOTE — PROGRESS NOTE ADULT - SUBJECTIVE AND OBJECTIVE BOX
O/N Events:  Subjective/ROS: Denies HA, CP, SOB, n/v, changes in bowel/urinary habits.  12pt ROS otherwise negative.    VITALS  Vital Signs Last 24 Hrs  T(C): 36.8 (25 Sep 2021 09:05), Max: 36.9 (25 Sep 2021 04:28)  T(F): 98.3 (25 Sep 2021 09:05), Max: 98.4 (25 Sep 2021 04:28)  HR: 74 (25 Sep 2021 09:05) (67 - 77)  BP: 109/68 (25 Sep 2021 09:05) (109/68 - 147/93)  BP(mean): --  RR: 15 (25 Sep 2021 09:05) (15 - 18)  SpO2: 100% (25 Sep 2021 09:05) (100% - 100%)    CAPILLARY BLOOD GLUCOSE          PHYSICAL EXAM  General: A&Ox3; NAD  Head: MMM; PERRL; EOMI;  Neck: Supple; no JVD  Respiratory: CTA B/L; no wheezes/crackles   Cardiovascular: Regular rhythm/rate; S1/S2   Gastrointestinal: Soft; NTND; normoactive BS  Extremities: WWP; no edema/cyanosis  Neurological:  CNII-XII grossly intact; no obvious focal deficits    MEDICATIONS  (STANDING):  acetaminophen   Tablet .. 1000 milliGRAM(s) Oral every 8 hours  bictegravir 50 mG/emtricitabine 200 mG/tenofovir alafenamide 25 mG (BIKTARVY) 1 Tablet(s) Oral daily  bisacodyl 5 milliGRAM(s) Oral at bedtime  chlorhexidine 2% Cloths 1 Application(s) Topical daily  enoxaparin Injectable 40 milliGRAM(s) SubCutaneous at bedtime  influenza   Vaccine 0.5 milliLiter(s) IntraMuscular once  methocarbamol 500 milliGRAM(s) Oral every 8 hours  pantoprazole    Tablet 40 milliGRAM(s) Oral before breakfast  polyethylene glycol 3350 17 Gram(s) Oral daily  pregabalin 50 milliGRAM(s) Oral every 8 hours  senna 2 Tablet(s) Oral at bedtime    MEDICATIONS  (PRN):  HYDROmorphone   Tablet 2 milliGRAM(s) Oral every 4 hours PRN Severe Pain (7 - 10)  metoclopramide 10 milliGRAM(s) Oral every 8 hours PRN nausea/vomiting  traZODone 50 milliGRAM(s) Oral at bedtime PRN insomnia      No Known Allergies      LABS                        13.8   10.47 )-----------( 281      ( 25 Sep 2021 07:43 )             39.4     09-25    130<L>  |  93<L>  |  18  ----------------------------<  108<H>  4.5   |  27  |  0.75    Ca    9.4      25 Sep 2021 07:43  Phos  4.2     09-25  Mg     2.1     09-25

## 2021-09-25 NOTE — PROGRESS NOTE ADULT - SUBJECTIVE AND OBJECTIVE BOX
HPI:  CHIEF COMPLAINT/ REASON FOR CONSULTATION: possible spinal cord compression      HPI: 36 m PMHx HIV (CD4 700s, VLUD) was at gym this afternoon lifting heavy weights, ie dead lifts; tonight began to develop pain in thoracolumbar region; over past couple of hours he began to develop paresthesias down both legs and buttok. now can barely feel legs and unable to stand or move legs. Was brought in by ambulance after a passerby on the street witnessed him unable to rise from curb.  has not gone to bathroom, but thus far no bladder or bowel incontinence.  + IVDU, most recently today (methamphetamine).  no fever/chills.    Hospital Course:  9/15: s/p spinal angiogram (neg) and T8-T9 lami decompression, intraoperative findings of subarachnoid clot s/p evacuation POD # 0 (9/15) dilaudid x 1 for pain. diet advanced.   9/16: POD# 1: s/p spinal angiogram (neg) and T8-T9 lami decompression, intraoperative findings of subarachnoid clot s/p evacuation POD #1 (9/15) dilaudid x 1 for pain. diet advanced.   9/17: POD#2 s/p spinal angio, POD#2 s/p T8-9 lami decompression.  LE dopplers negative. MRI T/L spine read shows T7/T8 spinal cord edema consistent with infarction, L5-S1 increase in spinal canal stenosis w/ mixed SDH and diffuse SAH. Psych consulted for anxiety/depression. Pend SDU. Bile bag removed today  9/18: POD 3. T8-9 lami/decompression. Psych eval suggests adjustment d/o and outpatient follow up   9/19: POD4. MARCO overnight. Pending spinal rehab placement.   9/20: POD5 MARCO overnight  9/21: POD6, pending angio today. MARCO overnight, neuro stable. Angio results are negative.  9/22: POD7 angio and T8-9 lami decompression, POD1 negative angio. MARCO overnight, neuro stable. Pending dispo. S/p enema, had BM  9/23: POD8 angio and T8-9 lami decompression, POD2 negative angio. MARCO overnight, neuro stable. Pending dispo.  9/24: POD9 s/p angio and T8-9 lami decompression, POD3 negative angio. MARCO overnight, neuro stable. Pending multidisciplinary discussion regarding dispo today at 11am.   9/25: POD10 s/p T8-8 lami decompression, POD4 s/p neg angio. MARCO overnight. Neuro exam stable. Pending acute rehab       Vital Signs Last 24 Hrs  T(C): 36 (24 Sep 2021 20:38), Max: 36.7 (24 Sep 2021 04:45)  T(F): 96.8 (24 Sep 2021 20:38), Max: 98.1 (24 Sep 2021 04:45)  HR: 77 (24 Sep 2021 20:38) (62 - 77)  BP: 147/93 (24 Sep 2021 20:38) (110/72 - 147/93)  BP(mean): 91 (24 Sep 2021 04:45) (91 - 91)  RR: 16 (24 Sep 2021 20:38) (15 - 16)  SpO2: 100% (24 Sep 2021 20:38) (98% - 100%)    I&O's Summary    23 Sep 2021 07:01  -  24 Sep 2021 07:00  --------------------------------------------------------  IN: 1000 mL / OUT: 3750 mL / NET: -2750 mL    24 Sep 2021 07:01  -  24 Sep 2021 22:30  --------------------------------------------------------  IN: 0 mL / OUT: 1900 mL / NET: -1900 mL        PHYSICAL EXAM:  General: NAD, pt is comfortably laying in hospital bed, A&O x3, on RA  HEENT: CN II-XII grossly intact, PERRL 3mm, EOMI b/l, face symmetric, tongue midline, neck FROM  Cardiovascular: RRR, normal S1 and S2   Respiratory: lungs CTAB, no wheezing, rhonchi, or crackles   GI: normoactive BS to auscultation, abd soft, NTND   Neuro: no aphasia, speech clear, no dysmetria, no pronator drift  strength 5/5 b/l UE, some LLE toe movements otherwise 0/5 b/l LE, b/l LE flaccid, absent reflexes, no clonus, babinski neg b/l   RLE sensation (especially along L3/L4 dermatome) to light touch diminished compared to LLE   Extremities: R groin site C/D/I, no hematoma. Distal pulses 2+ x4   Incision/Wound: midline thoracic incision C/D/I     TUBES/LINES:  [] CVC  [] A-line  [] Lumbar Drain  [] Ventriculostomy    DIET:  [] NPO  [X] Mechanical  [] Tube feeds    LABS:                        14.1   11.22 )-----------( 316      ( 23 Sep 2021 07:22 )             40.9     09-23    135  |  98  |  24<H>  ----------------------------<  114<H>  4.4   |  29  |  0.87    Ca    9.3      23 Sep 2021 07:22  Phos  4.1     09-23  Mg     2.4     09-23              CAPILLARY BLOOD GLUCOSE          Drug Levels: [] N/A    CSF Analysis: [] N/A      Allergies    No Known Allergies    Intolerances      MEDICATIONS:  Antibiotics:  bictegravir 50 mG/emtricitabine 200 mG/tenofovir alafenamide 25 mG (BIKTARVY) 1 Tablet(s) Oral daily    Neuro:  acetaminophen   Tablet .. 1000 milliGRAM(s) Oral every 8 hours  HYDROmorphone   Tablet 2 milliGRAM(s) Oral every 4 hours PRN  methocarbamol 500 milliGRAM(s) Oral every 8 hours  pregabalin 50 milliGRAM(s) Oral every 8 hours  traZODone 50 milliGRAM(s) Oral at bedtime PRN    Anticoagulation:  enoxaparin Injectable 40 milliGRAM(s) SubCutaneous at bedtime    OTHER:  bisacodyl 5 milliGRAM(s) Oral at bedtime  chlorhexidine 2% Cloths 1 Application(s) Topical daily  influenza   Vaccine 0.5 milliLiter(s) IntraMuscular once  metoclopramide 10 milliGRAM(s) Oral every 8 hours PRN  pantoprazole    Tablet 40 milliGRAM(s) Oral before breakfast  polyethylene glycol 3350 17 Gram(s) Oral daily  senna 2 Tablet(s) Oral at bedtime    IVF:    CULTURES:    RADIOLOGY & ADDITIONAL TESTS:      ASSESSMENT:  36 M PMHx HIV (CD4 700s, VLUD on biktarvy), hx of IVDU and methamphetamine use, was at gym this afternoon lifting heavy weights, ie dead lifts; tonight began to develop pain in thoracolumbar region; over past couple of hours he began to develop paresthesias down both legs and buttock, then progressed to complete plegia of bilateral extremities with loss of sensation from T8 dermatomes and below found on MRI to have intradural/extramedullary lesion with mass effect on spinal cord displacing it to the left, now s/p decadron load and s/p spinal angiogram (neg) and T8-T9 lami decompression, intraoperative findings of subarachnoid clot s/p evacuation (9/15,) and s/p negative spinal angiogram (9/21).        FLACCID PARALYSIS OF LEG    Handoff    MEWS Score    Infection, HIV    Subdural hematoma    Subdural hematoma    Adjustment reaction    Angiogram, spine, selective    Lumbar laminectomy    Flaccid paralysis of legs    Spinal cord compression    HIV disease    Subdural hematoma    Cannabis abuse, daily use    Episodic methamphetamine abuse    Leukocytosis, unspecified type    Subarachnoidal hemorrhage    Postoperative state    Angiogram, spine, selective    Lumbar laminectomy    NUMBNESS    Spinal cord compression    Angiogram, spine, selective    Spinal cord compression    HIV disease    Subdural hematoma    Cannabis abuse, daily use    Episodic methamphetamine abuse    Leukocytosis, unspecified type    Subarachnoidal hemorrhage    SysAdmin_VisitLink      PLAN:   Neuro  - neuro and vitals checks q4hrs  - MRI post-op completed 9/16   - decadron taper 4q6 to off  - Oxy IR 5/10 for pain PRN  - psych consulted: adjustment disorder, reccs outpatient f/u  - s/p spinal angiogram AVF or AVM 9/21; negative    Cardio  - SBP < 140   - EKG WNL     Pulm  - RA   - encourage incentive spirometry use     GI   - regular   - bowel regimen   - protonix while on Decadron     /renal  - failed TOV on 9/16, straight cath q4h. No allen   - baseline labs     Heme  - SCDs, SQL  -LE dopplers negative 9/17    Endo  - ISS while on decadron   - baseline a1c 5.0      GOC: full code   Level of care: regional  Dispo: pending [employer] & private pay coverage    Assessment and plan discussed w/ Dr. D'Amico

## 2021-09-25 NOTE — PROGRESS NOTE ADULT - SUBJECTIVE AND OBJECTIVE BOX
Physical Medicine and Rehabilitation Progress Note:    Patient is a 36y old  Male who presents with a chief complaint of Cord compression (24 Sep 2021 11:13)      HPI:  CHIEF COMPLAINT/ REASON FOR CONSULTATION: possible spinal cord compression      HPI: 36 m PMHx HIV (CD4 700s, VLUD) was at gym this afternoon lifting heavy weights, ie dead lifts; tonight began to develop pain in thoracolumbar region; over past couple of hours he began to develop paresthesias down both legs and buttok. now can barely feel legs and unable to stand or move legs. Was brought in by ambulance after a passerby on the street witnessed him unable to rise from curb.  has not gone to bathroom, but thus far no bladder or bowel incontinence.  + IVDU, most recently today (methamphetamine).  no fever/chills.    PAST MEDICAL HISTORY   Infection, HIV      PAST SURGICAL HISTORY denies         MEDICATIONS:  Antibiotics:    Neuro:    Anticoagulation:    Other:  dexAMETHasone  IVPB 10 milliGRAM(s) IV Intermittent Once      SOCIAL HISTORY:   Occupation:   Marital Status:     FAMILY HISTORY:      REVIEW OF SYSTEMS:  Check here if all are normal other than Neurological [x]      PHYSICAL EXAMINATION:   T(C): 36.9 (09-14-21 @ 23:53), Max: 36.9 (09-14-21 @ 23:53)  HR: 89 (09-14-21 @ 23:53) (89 - 89)  BP: 158/100 (09-14-21 @ 23:53) (158/100 - 158/100)  RR: 17 (09-14-21 @ 23:53) (17 - 17)  SpO2: 100% (09-14-21 @ 23:53) (100% - 100%)  Wt(kg): --  Weight (kg): 80.7 (09-14 @ 23:53)                   (15 Sep 2021 01:25)                            13.8   10.47 )-----------( 281      ( 25 Sep 2021 07:43 )             39.4       09-25    130<L>  |  93<L>  |  18  ----------------------------<  108<H>  4.5   |  27  |  0.75    Ca    9.4      25 Sep 2021 07:43  Phos  4.2     09-25  Mg     2.1     09-25      Vital Signs Last 24 Hrs  T(C): 36.8 (25 Sep 2021 15:41), Max: 36.9 (25 Sep 2021 04:28)  T(F): 98.2 (25 Sep 2021 15:41), Max: 98.4 (25 Sep 2021 04:28)  HR: 63 (25 Sep 2021 15:41) (63 - 77)  BP: 119/71 (25 Sep 2021 15:41) (109/68 - 147/93)  BP(mean): --  RR: 17 (25 Sep 2021 15:41) (15 - 18)  SpO2: 100% (25 Sep 2021 15:41) (100% - 100%)    MEDICATIONS  (STANDING):  acetaminophen   Tablet .. 1000 milliGRAM(s) Oral every 8 hours  bictegravir 50 mG/emtricitabine 200 mG/tenofovir alafenamide 25 mG (BIKTARVY) 1 Tablet(s) Oral daily  bisacodyl 5 milliGRAM(s) Oral at bedtime  chlorhexidine 2% Cloths 1 Application(s) Topical daily  enoxaparin Injectable 40 milliGRAM(s) SubCutaneous at bedtime  influenza   Vaccine 0.5 milliLiter(s) IntraMuscular once  methocarbamol 500 milliGRAM(s) Oral every 8 hours  pantoprazole    Tablet 40 milliGRAM(s) Oral before breakfast  polyethylene glycol 3350 17 Gram(s) Oral daily  pregabalin 50 milliGRAM(s) Oral every 8 hours  senna 2 Tablet(s) Oral at bedtime    MEDICATIONS  (PRN):  HYDROmorphone   Tablet 2 milliGRAM(s) Oral every 4 hours PRN Severe Pain (7 - 10)  metoclopramide 10 milliGRAM(s) Oral every 8 hours PRN nausea/vomiting  traZODone 50 milliGRAM(s) Oral at bedtime PRN insomnia    Currently Undergoing Physical/ Occupational Therapy at bedside.    Functional Status Assessment:   9/24/2021      Therapeutic Interventions      Bed Mobility  Bed Mobility Training Rolling/Turning: minimum assist (75% patient effort);  1 person assist  Bed Mobility Training Scooting: contact guard  Bed Mobility Training Sit-to-Supine: minimum assist (75% patient effort);  contact guard;  2 person assist  Bed Mobility Training Supine-to-Sit: contact guard;  minimum assist (75% patient effort);  2 person assist  Bed Mobility Training Limitations: decreased strength;  impaired balance;  impaired ability to control trunk for mobility;  pain    Bed-Chair Transfer Training  Transfer Training Bed-to-Chair Transfer: minimum assist (75% patient effort);  2 person assist;  full weight-bearing  Transfer Training Chair-to-Bed Transfer: minimum assist (75% patient effort);  2 person assist;  full weight-bearing  Bed-to-Chair Transfer Training Transfer Safety Analysis: decreased strength;  impaired balance;  pain    Therapeutic Exercise  Therapeutic Exercise Detail: Sliding board transfer bed <>WC, [id number] navigation 300ft, close supervision Trunk Control at edge of bed-Dynamic reaching 15x each [id number], Center -P x10 each direction- Triceps push ups x5 each side PROM  BLE           PM&R Impression: as above    Current Disposition Plan Recommendations:    acute rehab placement

## 2021-09-25 NOTE — PROGRESS NOTE ADULT - ASSESSMENT
per Neurosurgery    37 yo M PMHx HIV (CD4 700s, VLUD on biktarvy), hx of IVDU and methamphetamine use, was at gym this afternoon lifting heavy weights, ie dead lifts; tonight began to develop pain in thoracolumbar region; over past couple of hours he began to develop paresthesias down both legs and buttock, then progressed to complete plegia of bilateral extremities with loss of sensation from T8 dermatomes and below found on MRI to have intradural/extramedullary lesion with mass effect on spinal cord displacing it to the left, now s/p decadron load and s/p spinal angiogram (neg) and T8-T9 lami decompression, intraoperative findings of subarachnoid clot s/p evacuation (9/15,) and s/p negative spinal angiogram (9/21).          PLAN:   Neuro  - neuro and vitals checks q4hrs  - MRI post-op completed 9/16   - decadron taper 4q6 to off  - Oxy IR 5/10 for pain PRN  - psych consulted: adjustment disorder, reccs outpatient f/u  - s/p spinal angiogram AVF or AVM 9/21; negative    Cardio  - SBP < 140   - EKG WNL     Pulm  - RA   - encourage incentive spirometry use     GI   - regular   - bowel regimen   - protonix while on Decadron     /renal  - failed TOV on 9/16, straight cath q4h. No allen   - baseline labs     Heme  - SCDs, SQL  -LE dopplers negative 9/17    Endo  - ISS while on decadron   - baseline a1c 5.0

## 2021-09-26 LAB
ANION GAP SERPL CALC-SCNC: 10 MMOL/L — SIGNIFICANT CHANGE UP (ref 5–17)
ANION GAP SERPL CALC-SCNC: 8 MMOL/L — SIGNIFICANT CHANGE UP (ref 5–17)
BUN SERPL-MCNC: 20 MG/DL — SIGNIFICANT CHANGE UP (ref 7–23)
BUN SERPL-MCNC: 21 MG/DL — SIGNIFICANT CHANGE UP (ref 7–23)
CALCIUM SERPL-MCNC: 8.9 MG/DL — SIGNIFICANT CHANGE UP (ref 8.4–10.5)
CALCIUM SERPL-MCNC: 9.4 MG/DL — SIGNIFICANT CHANGE UP (ref 8.4–10.5)
CHLORIDE SERPL-SCNC: 88 MMOL/L — LOW (ref 96–108)
CHLORIDE SERPL-SCNC: 90 MMOL/L — LOW (ref 96–108)
CO2 SERPL-SCNC: 27 MMOL/L — SIGNIFICANT CHANGE UP (ref 22–31)
CO2 SERPL-SCNC: 27 MMOL/L — SIGNIFICANT CHANGE UP (ref 22–31)
CREAT ?TM UR-MCNC: 60 MG/DL — SIGNIFICANT CHANGE UP
CREAT SERPL-MCNC: 0.65 MG/DL — SIGNIFICANT CHANGE UP (ref 0.5–1.3)
CREAT SERPL-MCNC: 0.66 MG/DL — SIGNIFICANT CHANGE UP (ref 0.5–1.3)
GLUCOSE SERPL-MCNC: 102 MG/DL — HIGH (ref 70–99)
GLUCOSE SERPL-MCNC: 108 MG/DL — HIGH (ref 70–99)
HCT VFR BLD CALC: 36.8 % — LOW (ref 39–50)
HGB BLD-MCNC: 12.9 G/DL — LOW (ref 13–17)
MCHC RBC-ENTMCNC: 31.8 PG — SIGNIFICANT CHANGE UP (ref 27–34)
MCHC RBC-ENTMCNC: 35.1 GM/DL — SIGNIFICANT CHANGE UP (ref 32–36)
MCV RBC AUTO: 90.6 FL — SIGNIFICANT CHANGE UP (ref 80–100)
NRBC # BLD: 0 /100 WBCS — SIGNIFICANT CHANGE UP (ref 0–0)
PLATELET # BLD AUTO: 258 K/UL — SIGNIFICANT CHANGE UP (ref 150–400)
POTASSIUM SERPL-MCNC: 4.3 MMOL/L — SIGNIFICANT CHANGE UP (ref 3.5–5.3)
POTASSIUM SERPL-MCNC: 4.5 MMOL/L — SIGNIFICANT CHANGE UP (ref 3.5–5.3)
POTASSIUM SERPL-SCNC: 4.3 MMOL/L — SIGNIFICANT CHANGE UP (ref 3.5–5.3)
POTASSIUM SERPL-SCNC: 4.5 MMOL/L — SIGNIFICANT CHANGE UP (ref 3.5–5.3)
RBC # BLD: 4.06 M/UL — LOW (ref 4.2–5.8)
RBC # FLD: 12 % — SIGNIFICANT CHANGE UP (ref 10.3–14.5)
SODIUM SERPL-SCNC: 125 MMOL/L — LOW (ref 135–145)
SODIUM SERPL-SCNC: 125 MMOL/L — LOW (ref 135–145)
SODIUM UR-SCNC: 71 MMOL/L — SIGNIFICANT CHANGE UP
WBC # BLD: 11.11 K/UL — HIGH (ref 3.8–10.5)
WBC # FLD AUTO: 11.11 K/UL — HIGH (ref 3.8–10.5)

## 2021-09-26 PROCEDURE — 99233 SBSQ HOSP IP/OBS HIGH 50: CPT

## 2021-09-26 PROCEDURE — 99024 POSTOP FOLLOW-UP VISIT: CPT

## 2021-09-26 RX ORDER — FLUDROCORTISONE ACETATE 0.1 MG/1
0.1 TABLET ORAL EVERY 12 HOURS
Refills: 0 | Status: DISCONTINUED | OUTPATIENT
Start: 2021-09-26 | End: 2021-09-28

## 2021-09-26 RX ORDER — SODIUM CHLORIDE 9 MG/ML
1000 INJECTION INTRAMUSCULAR; INTRAVENOUS; SUBCUTANEOUS ONCE
Refills: 0 | Status: DISCONTINUED | OUTPATIENT
Start: 2021-09-26 | End: 2021-09-26

## 2021-09-26 RX ORDER — SODIUM CHLORIDE 5 G/100ML
500 INJECTION, SOLUTION INTRAVENOUS
Refills: 0 | Status: DISCONTINUED | OUTPATIENT
Start: 2021-09-26 | End: 2021-09-26

## 2021-09-26 RX ORDER — SODIUM CHLORIDE 5 G/100ML
150 INJECTION, SOLUTION INTRAVENOUS
Refills: 0 | Status: DISCONTINUED | OUTPATIENT
Start: 2021-09-26 | End: 2021-09-27

## 2021-09-26 RX ORDER — SODIUM CHLORIDE 9 MG/ML
3 INJECTION INTRAMUSCULAR; INTRAVENOUS; SUBCUTANEOUS EVERY 6 HOURS
Refills: 0 | Status: DISCONTINUED | OUTPATIENT
Start: 2021-09-26 | End: 2021-09-28

## 2021-09-26 RX ORDER — SODIUM CHLORIDE 5 G/100ML
500 INJECTION, SOLUTION INTRAVENOUS
Refills: 0 | Status: DISCONTINUED | OUTPATIENT
Start: 2021-09-26 | End: 2021-09-27

## 2021-09-26 RX ADMIN — METHOCARBAMOL 500 MILLIGRAM(S): 500 TABLET, FILM COATED ORAL at 22:15

## 2021-09-26 RX ADMIN — SODIUM CHLORIDE 30 MILLILITER(S): 5 INJECTION, SOLUTION INTRAVENOUS at 17:28

## 2021-09-26 RX ADMIN — Medication 50 MILLIGRAM(S): at 22:15

## 2021-09-26 RX ADMIN — HYDROMORPHONE HYDROCHLORIDE 2 MILLIGRAM(S): 2 INJECTION INTRAMUSCULAR; INTRAVENOUS; SUBCUTANEOUS at 01:30

## 2021-09-26 RX ADMIN — HYDROMORPHONE HYDROCHLORIDE 2 MILLIGRAM(S): 2 INJECTION INTRAMUSCULAR; INTRAVENOUS; SUBCUTANEOUS at 02:30

## 2021-09-26 RX ADMIN — BICTEGRAVIR SODIUM, EMTRICITABINE, AND TENOFOVIR ALAFENAMIDE FUMARATE 1 TABLET(S): 30; 120; 15 TABLET ORAL at 11:19

## 2021-09-26 RX ADMIN — PANTOPRAZOLE SODIUM 40 MILLIGRAM(S): 20 TABLET, DELAYED RELEASE ORAL at 05:08

## 2021-09-26 RX ADMIN — SODIUM CHLORIDE 3 GRAM(S): 9 INJECTION INTRAMUSCULAR; INTRAVENOUS; SUBCUTANEOUS at 17:28

## 2021-09-26 RX ADMIN — POLYETHYLENE GLYCOL 3350 17 GRAM(S): 17 POWDER, FOR SOLUTION ORAL at 11:19

## 2021-09-26 RX ADMIN — SODIUM CHLORIDE 150 MILLILITER(S): 5 INJECTION, SOLUTION INTRAVENOUS at 23:57

## 2021-09-26 RX ADMIN — ENOXAPARIN SODIUM 40 MILLIGRAM(S): 100 INJECTION SUBCUTANEOUS at 22:16

## 2021-09-26 NOTE — PROGRESS NOTE ADULT - PROBLEM SELECTOR PLAN 7
Euvolemic exam, with concentrated urine. Likely SIADH.   -Start salt tabs as worsening sodium, would also consider hypertonic saline, repeat BMP overnight to ensure no overcorrection  -2L daily fluid restriction  -Trend Sodium level, would repeat this evening to ensure correction  -Would repeat urine studies and BMP in morning, please include serum uric acid and urine uric acid and creatinine   -FeUreate>11% would be consistent with SIADH/CSW and would only normalize to <11% in SIADH after correction of sodium

## 2021-09-26 NOTE — PROGRESS NOTE ADULT - SUBJECTIVE AND OBJECTIVE BOX
HPI:  CHIEF COMPLAINT/ REASON FOR CONSULTATION: possible spinal cord compression      HPI: 36 m PMHx HIV (CD4 700s, VLUD) was at gym this afternoon lifting heavy weights, ie dead lifts; tonight began to develop pain in thoracolumbar region; over past couple of hours he began to develop paresthesias down both legs and buttok. now can barely feel legs and unable to stand or move legs. Was brought in by ambulance after a passerby on the street witnessed him unable to rise from curb.  has not gone to bathroom, but thus far no bladder or bowel incontinence.  + IVDU, most recently today (methamphetamine).  no fever/chills.    Hospital Course:  9/15: s/p spinal angiogram (neg) and T8-T9 lami decompression, intraoperative findings of subarachnoid clot s/p evacuation POD # 0 (9/15) dilaudid x 1 for pain. diet advanced.   9/16: POD# 1: s/p spinal angiogram (neg) and T8-T9 lami decompression, intraoperative findings of subarachnoid clot s/p evacuation POD #1 (9/15) dilaudid x 1 for pain. diet advanced.   9/17: POD#2 s/p spinal angio, POD#2 s/p T8-9 lami decompression.  LE dopplers negative. MRI T/L spine read shows T7/T8 spinal cord edema consistent with infarction, L5-S1 increase in spinal canal stenosis w/ mixed SDH and diffuse SAH. Psych consulted for anxiety/depression. Pend SDU. Bile bag removed today  9/18: POD 3. T8-9 lami/decompression. Psych eval suggests adjustment d/o and outpatient follow up   9/19: POD4. MARCO overnight. Pending spinal rehab placement.   9/20: POD5 MARCO overnight  9/21: POD6, pending angio today. MARCO overnight, neuro stable. Angio results are negative.  9/22: POD7 angio and T8-9 lami decompression, POD1 negative angio. MARCO overnight, neuro stable. Pending dispo. S/p enema, had BM  9/23: POD8 angio and T8-9 lami decompression, POD2 negative angio. MARCO overnight, neuro stable. Pending dispo.  9/24: POD9 s/p angio and T8-9 lami decompression, POD3 negative angio. MARCO overnight, neuro stable. Pending multidisciplinary discussion regarding dispo today at 11am.   9/25: POD10 s/p spinal angio  and T8-9 lami decompression, POD4 s/p neg spinal angio. MARCO overnight. Neuro exam stable. Pending acute rehab   9/26: POD11 s/p spinal angio  and T8-9 lami decompression, POD5 s/p neg spinal angio. Pt reports posterior neck pain and frontal headaches, worse when laying flat. States it may be his usual tension headaches. Advised to use warm packs as needed. Neuro exam stable.    Vital Signs Last 24 Hrs  T(C): 36.7 (25 Sep 2021 20:36), Max: 36.9 (25 Sep 2021 04:28)  T(F): 98.1 (25 Sep 2021 20:36), Max: 98.4 (25 Sep 2021 04:28)  HR: 66 (25 Sep 2021 20:36) (63 - 74)  BP: 122/73 (25 Sep 2021 20:36) (109/68 - 122/73)  BP(mean): 89 (25 Sep 2021 20:36) (89 - 89)  RR: 16 (25 Sep 2021 20:36) (15 - 18)  SpO2: 99% (25 Sep 2021 20:36) (99% - 100%)    I&O's Summary    24 Sep 2021 07:01  -  25 Sep 2021 07:00  --------------------------------------------------------  IN: 0 mL / OUT: 3800 mL / NET: -3800 mL    25 Sep 2021 07:01  -  26 Sep 2021 00:24  --------------------------------------------------------  IN: 980 mL / OUT: 1695 mL / NET: -715 mL      PHYSICAL EXAM:  General: NAD, pt is comfortably laying in hospital bed, A&O x3, on RA  HEENT: CN II-XII grossly intact, PERRL 3mm, EOMI b/l, face symmetric, tongue midline, neck FROM  Cardiovascular: RRR, normal S1 and S2   Respiratory: lungs CTAB, no wheezing, rhonchi, or crackles   GI: normoactive BS to auscultation, abd soft, NTND   Neuro: no aphasia, speech clear, no dysmetria, no pronator drift  strength 5/5 b/l UE, some LLE toe movements otherwise 0/5 b/l LE, b/l LE flaccid, absent reflexes, no clonus, babinski neg b/l   RLE sensation (especially along L3/L4 dermatome) to light touch diminished compared to LLE   Extremities: R groin site C/D/I, no hematoma. Distal pulses 2+ x4   Incision/Wound: midline thoracic incision C/D/I     TUBES/LINES:  [] CVC  [] A-line  [] Lumbar Drain  [] Ventriculostomy    DIET:  [] NPO  [X] Mechanical  [] Tube feeds    LABS:                        13.8   10.47 )-----------( 281      ( 25 Sep 2021 07:43 )             39.4     09-25    130<L>  |  93<L>  |  18  ----------------------------<  108<H>  4.5   |  27  |  0.75    Ca    9.4      25 Sep 2021 07:43  Phos  4.2     09-25  Mg     2.1     09-25              CAPILLARY BLOOD GLUCOSE          Drug Levels: [] N/A    CSF Analysis: [] N/A      Allergies    No Known Allergies    Intolerances      MEDICATIONS:  Antibiotics:  bictegravir 50 mG/emtricitabine 200 mG/tenofovir alafenamide 25 mG (BIKTARVY) 1 Tablet(s) Oral daily    Neuro:  acetaminophen   Tablet .. 1000 milliGRAM(s) Oral every 8 hours  HYDROmorphone   Tablet 2 milliGRAM(s) Oral every 4 hours PRN  methocarbamol 500 milliGRAM(s) Oral every 8 hours  pregabalin 50 milliGRAM(s) Oral every 8 hours  traZODone 50 milliGRAM(s) Oral at bedtime PRN    Anticoagulation:  enoxaparin Injectable 40 milliGRAM(s) SubCutaneous at bedtime    OTHER:  bisacodyl 5 milliGRAM(s) Oral at bedtime  chlorhexidine 2% Cloths 1 Application(s) Topical daily  influenza   Vaccine 0.5 milliLiter(s) IntraMuscular once  metoclopramide 10 milliGRAM(s) Oral every 8 hours PRN  pantoprazole    Tablet 40 milliGRAM(s) Oral before breakfast  polyethylene glycol 3350 17 Gram(s) Oral daily  senna 2 Tablet(s) Oral at bedtime    IVF:    CULTURES:    RADIOLOGY & ADDITIONAL TESTS:      ASSESSMENT:  36 M PMHx HIV (CD4 700s, VLUD on biktarvy), hx of IVDU and methamphetamine use, was at gym this afternoon lifting heavy weights, ie dead lifts; tonight began to develop pain in thoracolumbar region; over past couple of hours he began to develop paresthesias down both legs and buttock, then progressed to complete plegia of bilateral extremities with loss of sensation from T8 dermatomes and below found on MRI to have intradural/extramedullary lesion with mass effect on spinal cord displacing it to the left, now s/p decadron load and s/p spinal angiogram (neg) and T8-T9 lami decompression, intraoperative findings of subarachnoid clot s/p evacuation (9/15,) and s/p negative spinal angiogram (9/21).      FLACCID PARALYSIS OF LEG    Handoff    MEWS Score    Infection, HIV    Subdural hematoma    Subdural hematoma    Adjustment reaction    Angiogram, spine, selective    Lumbar laminectomy    Flaccid paralysis of legs    Spinal cord compression    HIV disease    Subdural hematoma    Cannabis abuse, daily use    Episodic methamphetamine abuse    Leukocytosis, unspecified type    Subarachnoidal hemorrhage    Postoperative state    Hyponatremia    Angiogram, spine, selective    Lumbar laminectomy    NUMBNESS    Spinal cord compression    Angiogram, spine, selective    Spinal cord compression    HIV disease    Subdural hematoma    Cannabis abuse, daily use    Episodic methamphetamine abuse    Leukocytosis, unspecified type    Subarachnoidal hemorrhage    SysAdmin_VisitLink      PLAN:   Neuro  - neuro and vitals checks q4hrs  - MRI post-op completed 9/16   - decadron taper 4q6 to off  - pain control: tylenol, dilaudid prn, lyrica, robaxin  - psych consulted: adjustment disorder, reccs outpatient f/u  - s/p spinal angiogram AVF or AVM 9/21; negative    Cardio  - SBP < 140   - EKG WNL     Pulm  - RA   - encourage incentive spirometry use     GI   - regular   - bowel regimen   - protonix while on Decadron     /renal  - failed TOV on 9/16, straight cath q4h. No allen   - baseline labs     Heme  - SCDs, SQL  -LE dopplers negative 9/17    Endo  - ISS while on decadron   - baseline a1c 5.0      GOC: full code   Level of care: regional  Dispo: pending [employer] & private pay coverage    Assessment and plan discussed w/ Dr. D'Amico

## 2021-09-26 NOTE — PROGRESS NOTE ADULT - SUBJECTIVE AND OBJECTIVE BOX
O/N Events: MARCO  Subjective/ROS: States he adhered to the fluid restriction overnight. Denies HA, CP, SOB, n/v, changes in bowel/urinary habits.  12pt ROS otherwise negative.    VITALS  Vital Signs Last 24 Hrs  T(C): 37.1 (26 Sep 2021 15:29), Max: 37.1 (26 Sep 2021 15:29)  T(F): 98.7 (26 Sep 2021 15:29), Max: 98.7 (26 Sep 2021 15:29)  HR: 81 (26 Sep 2021 15:29) (65 - 81)  BP: 116/73 (26 Sep 2021 15:29) (113/69 - 122/76)  BP(mean): 91 (26 Sep 2021 04:40) (89 - 91)  RR: 18 (26 Sep 2021 15:29) (16 - 18)  SpO2: 100% (26 Sep 2021 15:29) (99% - 100%)    CAPILLARY BLOOD GLUCOSE          PHYSICAL EXAM  General: A&Ox3; NAD  Head: NC/AT; MMM; PERRL; EOMI;  Neck: Supple; no JVD  Respiratory: CTA B/L; no wheezes/crackles   Cardiovascular: Regular rhythm/rate; S1/S2   Gastrointestinal: Soft; NTND; normoactive BS  Extremities: WWP; no edema/cyanosis  Neurological:  CNII-XII grossly intact; no obvious focal deficits    MEDICATIONS  (STANDING):  acetaminophen   Tablet .. 1000 milliGRAM(s) Oral every 8 hours  bictegravir 50 mG/emtricitabine 200 mG/tenofovir alafenamide 25 mG (BIKTARVY) 1 Tablet(s) Oral daily  bisacodyl 5 milliGRAM(s) Oral at bedtime  chlorhexidine 2% Cloths 1 Application(s) Topical daily  enoxaparin Injectable 40 milliGRAM(s) SubCutaneous at bedtime  influenza   Vaccine 0.5 milliLiter(s) IntraMuscular once  methocarbamol 500 milliGRAM(s) Oral every 8 hours  pantoprazole    Tablet 40 milliGRAM(s) Oral before breakfast  polyethylene glycol 3350 17 Gram(s) Oral daily  pregabalin 50 milliGRAM(s) Oral every 8 hours  senna 2 Tablet(s) Oral at bedtime    MEDICATIONS  (PRN):  HYDROmorphone   Tablet 2 milliGRAM(s) Oral every 4 hours PRN Severe Pain (7 - 10)  metoclopramide 10 milliGRAM(s) Oral every 8 hours PRN nausea/vomiting  traZODone 50 milliGRAM(s) Oral at bedtime PRN insomnia      No Known Allergies      LABS                        12.9   11.11 )-----------( 258      ( 26 Sep 2021 15:23 )             36.8     09-26    125<L>  |  88<L>  |  21  ----------------------------<  102<H>  4.3   |  27  |  0.65    Ca    9.4      26 Sep 2021 15:23  Phos  4.2     09-25  Mg     2.1     09-25                IMAGING/EKG/ETC  EKG:  Xray:  CT:  MRI:

## 2021-09-27 LAB
ANION GAP SERPL CALC-SCNC: 6 MMOL/L — SIGNIFICANT CHANGE UP (ref 5–17)
ANION GAP SERPL CALC-SCNC: 7 MMOL/L — SIGNIFICANT CHANGE UP (ref 5–17)
ANION GAP SERPL CALC-SCNC: 8 MMOL/L — SIGNIFICANT CHANGE UP (ref 5–17)
BUN SERPL-MCNC: 15 MG/DL — SIGNIFICANT CHANGE UP (ref 7–23)
BUN SERPL-MCNC: 16 MG/DL — SIGNIFICANT CHANGE UP (ref 7–23)
BUN SERPL-MCNC: 18 MG/DL — SIGNIFICANT CHANGE UP (ref 7–23)
CALCIUM SERPL-MCNC: 8.6 MG/DL — SIGNIFICANT CHANGE UP (ref 8.4–10.5)
CALCIUM SERPL-MCNC: 8.6 MG/DL — SIGNIFICANT CHANGE UP (ref 8.4–10.5)
CALCIUM SERPL-MCNC: 9 MG/DL — SIGNIFICANT CHANGE UP (ref 8.4–10.5)
CHLORIDE SERPL-SCNC: 100 MMOL/L — SIGNIFICANT CHANGE UP (ref 96–108)
CHLORIDE SERPL-SCNC: 98 MMOL/L — SIGNIFICANT CHANGE UP (ref 96–108)
CHLORIDE SERPL-SCNC: 98 MMOL/L — SIGNIFICANT CHANGE UP (ref 96–108)
CO2 SERPL-SCNC: 23 MMOL/L — SIGNIFICANT CHANGE UP (ref 22–31)
CO2 SERPL-SCNC: 24 MMOL/L — SIGNIFICANT CHANGE UP (ref 22–31)
CO2 SERPL-SCNC: 25 MMOL/L — SIGNIFICANT CHANGE UP (ref 22–31)
CREAT SERPL-MCNC: 0.64 MG/DL — SIGNIFICANT CHANGE UP (ref 0.5–1.3)
CREAT SERPL-MCNC: 0.65 MG/DL — SIGNIFICANT CHANGE UP (ref 0.5–1.3)
CREAT SERPL-MCNC: 0.78 MG/DL — SIGNIFICANT CHANGE UP (ref 0.5–1.3)
GLUCOSE SERPL-MCNC: 103 MG/DL — HIGH (ref 70–99)
GLUCOSE SERPL-MCNC: 91 MG/DL — SIGNIFICANT CHANGE UP (ref 70–99)
GLUCOSE SERPL-MCNC: 92 MG/DL — SIGNIFICANT CHANGE UP (ref 70–99)
HCT VFR BLD CALC: 35.1 % — LOW (ref 39–50)
HGB BLD-MCNC: 12.3 G/DL — LOW (ref 13–17)
MAGNESIUM SERPL-MCNC: 2.1 MG/DL — SIGNIFICANT CHANGE UP (ref 1.6–2.6)
MCHC RBC-ENTMCNC: 32.3 PG — SIGNIFICANT CHANGE UP (ref 27–34)
MCHC RBC-ENTMCNC: 35 GM/DL — SIGNIFICANT CHANGE UP (ref 32–36)
MCV RBC AUTO: 92.1 FL — SIGNIFICANT CHANGE UP (ref 80–100)
NRBC # BLD: 0 /100 WBCS — SIGNIFICANT CHANGE UP (ref 0–0)
PHOSPHATE SERPL-MCNC: 3.3 MG/DL — SIGNIFICANT CHANGE UP (ref 2.5–4.5)
PLATELET # BLD AUTO: 257 K/UL — SIGNIFICANT CHANGE UP (ref 150–400)
POTASSIUM SERPL-MCNC: 3.8 MMOL/L — SIGNIFICANT CHANGE UP (ref 3.5–5.3)
POTASSIUM SERPL-MCNC: 4 MMOL/L — SIGNIFICANT CHANGE UP (ref 3.5–5.3)
POTASSIUM SERPL-MCNC: 4.6 MMOL/L — SIGNIFICANT CHANGE UP (ref 3.5–5.3)
POTASSIUM SERPL-SCNC: 3.8 MMOL/L — SIGNIFICANT CHANGE UP (ref 3.5–5.3)
POTASSIUM SERPL-SCNC: 4 MMOL/L — SIGNIFICANT CHANGE UP (ref 3.5–5.3)
POTASSIUM SERPL-SCNC: 4.6 MMOL/L — SIGNIFICANT CHANGE UP (ref 3.5–5.3)
RBC # BLD: 3.81 M/UL — LOW (ref 4.2–5.8)
RBC # FLD: 12.2 % — SIGNIFICANT CHANGE UP (ref 10.3–14.5)
SARS-COV-2 RNA SPEC QL NAA+PROBE: SIGNIFICANT CHANGE UP
SODIUM SERPL-SCNC: 128 MMOL/L — LOW (ref 135–145)
SODIUM SERPL-SCNC: 129 MMOL/L — LOW (ref 135–145)
SODIUM SERPL-SCNC: 132 MMOL/L — LOW (ref 135–145)
URATE SERPL-MCNC: 3.2 MG/DL — LOW (ref 3.4–8.8)
WBC # BLD: 8.54 K/UL — SIGNIFICANT CHANGE UP (ref 3.8–10.5)
WBC # FLD AUTO: 8.54 K/UL — SIGNIFICANT CHANGE UP (ref 3.8–10.5)

## 2021-09-27 PROCEDURE — 93970 EXTREMITY STUDY: CPT | Mod: 26

## 2021-09-27 PROCEDURE — 99024 POSTOP FOLLOW-UP VISIT: CPT

## 2021-09-27 PROCEDURE — 99233 SBSQ HOSP IP/OBS HIGH 50: CPT | Mod: GC

## 2021-09-27 RX ORDER — SENNA PLUS 8.6 MG/1
2 TABLET ORAL
Qty: 0 | Refills: 0 | DISCHARGE
Start: 2021-09-27

## 2021-09-27 RX ORDER — ACETAMINOPHEN 500 MG
2 TABLET ORAL
Qty: 0 | Refills: 0 | DISCHARGE
Start: 2021-09-27

## 2021-09-27 RX ORDER — POLYETHYLENE GLYCOL 3350 17 G/17G
17 POWDER, FOR SOLUTION ORAL
Qty: 0 | Refills: 0 | DISCHARGE
Start: 2021-09-27

## 2021-09-27 RX ADMIN — SODIUM CHLORIDE 3 GRAM(S): 9 INJECTION INTRAMUSCULAR; INTRAVENOUS; SUBCUTANEOUS at 18:44

## 2021-09-27 RX ADMIN — ENOXAPARIN SODIUM 40 MILLIGRAM(S): 100 INJECTION SUBCUTANEOUS at 21:59

## 2021-09-27 RX ADMIN — HYDROMORPHONE HYDROCHLORIDE 2 MILLIGRAM(S): 2 INJECTION INTRAMUSCULAR; INTRAVENOUS; SUBCUTANEOUS at 02:33

## 2021-09-27 RX ADMIN — Medication 50 MILLIGRAM(S): at 22:17

## 2021-09-27 RX ADMIN — PANTOPRAZOLE SODIUM 40 MILLIGRAM(S): 20 TABLET, DELAYED RELEASE ORAL at 05:22

## 2021-09-27 RX ADMIN — Medication 1000 MILLIGRAM(S): at 08:34

## 2021-09-27 RX ADMIN — FLUDROCORTISONE ACETATE 0.1 MILLIGRAM(S): 0.1 TABLET ORAL at 05:21

## 2021-09-27 RX ADMIN — METHOCARBAMOL 500 MILLIGRAM(S): 500 TABLET, FILM COATED ORAL at 22:17

## 2021-09-27 RX ADMIN — BICTEGRAVIR SODIUM, EMTRICITABINE, AND TENOFOVIR ALAFENAMIDE FUMARATE 1 TABLET(S): 30; 120; 15 TABLET ORAL at 16:01

## 2021-09-27 RX ADMIN — CHLORHEXIDINE GLUCONATE 1 APPLICATION(S): 213 SOLUTION TOPICAL at 11:30

## 2021-09-27 RX ADMIN — SODIUM CHLORIDE 3 GRAM(S): 9 INJECTION INTRAMUSCULAR; INTRAVENOUS; SUBCUTANEOUS at 12:18

## 2021-09-27 RX ADMIN — HYDROMORPHONE HYDROCHLORIDE 2 MILLIGRAM(S): 2 INJECTION INTRAMUSCULAR; INTRAVENOUS; SUBCUTANEOUS at 08:34

## 2021-09-27 RX ADMIN — SODIUM CHLORIDE 50 MILLILITER(S): 5 INJECTION, SOLUTION INTRAVENOUS at 01:07

## 2021-09-27 RX ADMIN — SODIUM CHLORIDE 3 GRAM(S): 9 INJECTION INTRAMUSCULAR; INTRAVENOUS; SUBCUTANEOUS at 00:11

## 2021-09-27 RX ADMIN — SODIUM CHLORIDE 3 GRAM(S): 9 INJECTION INTRAMUSCULAR; INTRAVENOUS; SUBCUTANEOUS at 05:23

## 2021-09-27 RX ADMIN — FLUDROCORTISONE ACETATE 0.1 MILLIGRAM(S): 0.1 TABLET ORAL at 18:44

## 2021-09-27 RX ADMIN — Medication 1000 MILLIGRAM(S): at 05:21

## 2021-09-27 NOTE — PROGRESS NOTE ADULT - PROBLEM SELECTOR PLAN 7
Euvolemic exam, with concentrated urine. Likely SIADH.   -Start salt tabs as worsening sodium, would also consider hypertonic saline, repeat BMP overnight to ensure no overcorrection  -2L daily fluid restriction  -Trend Sodium level, would repeat this evening to ensure correction  -Would repeat urine studies and BMP in morning, please include serum uric acid and urine uric acid and creatinine   -FeUreate>11% would be consistent with SIADH/CSW and would only normalize to <11% in SIADH after correction of sodium Euvolemic exam, with concentrated urine. Likely SIADH  -Na now improving - 125 --> 128  -s/p hypertonic saline, c/w salt tabs, fluid restriction  -2L daily fluid restriction  -Trend Sodium level, repeat urine studies; include serum uric acid and urine uric acid and creatinine   -FeUreate>11% would be consistent with SIADH/CSW and would only normalize to <11% in SIADH after correction of sodium  -UOP

## 2021-09-27 NOTE — PROGRESS NOTE ADULT - SUBJECTIVE AND OBJECTIVE BOX
HPI:  CHIEF COMPLAINT/ REASON FOR CONSULTATION: possible spinal cord compression      HPI: 36 m PMHx HIV (CD4 700s, VLUD) was at gym this afternoon lifting heavy weights, ie dead lifts; tonight began to develop pain in thoracolumbar region; over past couple of hours he began to develop paresthesias down both legs and buttok. now can barely feel legs and unable to stand or move legs. Was brought in by ambulance after a passerby on the street witnessed him unable to rise from curb.  has not gone to bathroom, but thus far no bladder or bowel incontinence.  + IVDU, most recently today (methamphetamine).  no fever/chills.    Hospital Course:  9/15: s/p spinal angiogram (neg) and T8-T9 lami decompression, intraoperative findings of subarachnoid clot s/p evacuation POD # 0 (9/15) dilaudid x 1 for pain. diet advanced.   9/16: POD# 1: s/p spinal angiogram (neg) and T8-T9 lami decompression, intraoperative findings of subarachnoid clot s/p evacuation POD #1 (9/15) dilaudid x 1 for pain. diet advanced.   9/17: POD#2 s/p spinal angio, POD#2 s/p T8-9 lami decompression.  LE dopplers negative. MRI T/L spine read shows T7/T8 spinal cord edema consistent with infarction, L5-S1 increase in spinal canal stenosis w/ mixed SDH and diffuse SAH. Psych consulted for anxiety/depression. Pend SDU. Bile bag removed today  9/18: POD 3. T8-9 lami/decompression. Psych eval suggests adjustment d/o and outpatient follow up   9/19: POD4. MARCO overnight. Pending spinal rehab placement.   9/20: POD5 MARCO overnight  9/21: POD6, pending angio today. MARCO overnight, neuro stable. Angio results are negative.  9/22: POD7 angio and T8-9 lami decompression, POD1 negative angio. MARCO overnight, neuro stable. Pending dispo. S/p enema, had BM  9/23: POD8 angio and T8-9 lami decompression, POD2 negative angio. MARCO overnight, neuro stable. Pending dispo.  9/24: POD9 s/p angio and T8-9 lami decompression, POD3 negative angio. MARCO overnight, neuro stable. Pending multidisciplinary discussion regarding dispo today at 11am.   9/25: POD10 s/p spinal angio  and T8-9 lami decompression, POD4 s/p neg spinal angio. MARCO overnight. Neuro exam stable. Pending acute rehab   9/26: POD11 s/p spinal angio  and T8-9 lami decompression, POD5 s/p neg spinal angio. Pt reports posterior neck pain and frontal headaches, worse when laying flat. States it may be his usual tension headaches. Advised to use warm packs as needed. Neuro exam stable.  9/27: POD 12/POD6 spinal angio. Upgraded to SDU for higher rates of hypertonics overnight. Exam stable. f/u urine lytes    Vital Signs Last 24 Hrs  T(C): 36.8 (27 Sep 2021 00:00), Max: 37.1 (26 Sep 2021 15:29)  T(F): 98.2 (27 Sep 2021 00:00), Max: 98.7 (26 Sep 2021 15:29)  HR: 60 (27 Sep 2021 00:00) (60 - 81)  BP: 119/75 (27 Sep 2021 00:00) (113/69 - 122/76)  BP(mean): 88 (27 Sep 2021 00:00) (88 - 91)  RR: 16 (27 Sep 2021 00:00) (16 - 18)  SpO2: 100% (27 Sep 2021 00:00) (99% - 100%)    I&O's Detail    25 Sep 2021 07:01  -  26 Sep 2021 07:00  --------------------------------------------------------  IN:    Oral Fluid: 980 mL  Total IN: 980 mL    OUT:    Intermittent Catheterization - Urethral (mL): 700 mL    Post-Void Residual per Intermittent Catheterization (mL): 1695 mL  Total OUT: 2395 mL    Total NET: -1415 mL      26 Sep 2021 07:01  -  27 Sep 2021 00:45  --------------------------------------------------------  IN:  Total IN: 0 mL    OUT:    Intermittent Catheterization - Urethral (mL): 2400 mL  Total OUT: 2400 mL    Total NET: -2400 mL        I&O's Summary    25 Sep 2021 07:01  -  26 Sep 2021 07:00  --------------------------------------------------------  IN: 980 mL / OUT: 2395 mL / NET: -1415 mL    26 Sep 2021 07:01  -  27 Sep 2021 00:45  --------------------------------------------------------  IN: 0 mL / OUT: 2400 mL / NET: -2400 mL        PHYSICAL EXAM:  General: NAD, pt is comfortably laying in hospital bed, A&O x3, on RA  HEENT: CN II-XII grossly intact, PERRL 3mm, EOMI b/l, face symmetric, tongue midline, neck FROM  Cardiovascular: RRR, normal S1 and S2   Respiratory: lungs CTAB, no wheezing, rhonchi, or crackles   GI: normoactive BS to auscultation, abd soft, NTND   Neuro: no aphasia, speech clear, no dysmetria, no pronator drift  strength 5/5 b/l UE, some LLE toe movements otherwise 0/5 b/l LE, b/l LE flaccid, absent reflexes, no clonus, babinski neg b/l   RLE sensation (especially along L3/L4 dermatome) to light touch diminished compared to LLE   Extremities: R groin site C/D/I, no hematoma. Distal pulses 2+ x4   Incision/Wound: midline thoracic incision C/D/I       TUBES/LINES:  [] CVC  [] A-line  [] Lumbar Drain  [] Ventriculostomy  [] Other    DIET:  [] NPO  [] Mechanical  [] Tube feeds    LABS:                        12.9   11.11 )-----------( 258      ( 26 Sep 2021 15:23 )             36.8     09-26    125<L>  |  90<L>  |  20  ----------------------------<  108<H>  4.5   |  27  |  0.66    Ca    8.9      26 Sep 2021 22:46  Phos  4.2     09-25  Mg     2.1     09-25              CAPILLARY BLOOD GLUCOSE          Drug Levels: [] N/A    CSF Analysis: [] N/A      Allergies    No Known Allergies    Intolerances      MEDICATIONS:  Antibiotics:  bictegravir 50 mG/emtricitabine 200 mG/tenofovir alafenamide 25 mG (BIKTARVY) 1 Tablet(s) Oral daily    Neuro:  acetaminophen   Tablet .. 1000 milliGRAM(s) Oral every 8 hours  HYDROmorphone   Tablet 2 milliGRAM(s) Oral every 4 hours PRN  methocarbamol 500 milliGRAM(s) Oral every 8 hours  pregabalin 50 milliGRAM(s) Oral every 8 hours  traZODone 50 milliGRAM(s) Oral at bedtime PRN    Anticoagulation:  enoxaparin Injectable 40 milliGRAM(s) SubCutaneous at bedtime    OTHER:  bisacodyl 5 milliGRAM(s) Oral at bedtime  chlorhexidine 2% Cloths 1 Application(s) Topical daily  fludroCORTISONE 0.1 milliGRAM(s) Oral every 12 hours  influenza   Vaccine 0.5 milliLiter(s) IntraMuscular once  metoclopramide 10 milliGRAM(s) Oral every 8 hours PRN  pantoprazole    Tablet 40 milliGRAM(s) Oral before breakfast  polyethylene glycol 3350 17 Gram(s) Oral daily  senna 2 Tablet(s) Oral at bedtime    IVF:  sodium chloride 3 Gram(s) Oral every 6 hours  sodium chloride 3%. 500 milliLiter(s) IV Continuous <Continuous>  sodium chloride 3%. 150 milliLiter(s) IV Continuous <Continuous>    CULTURES:    RADIOLOGY & ADDITIONAL TESTS:      ASSESSMENT:  36 M PMHx HIV (CD4 700s, VLUD on biktarvy), hx of IVDU and methamphetamine use, was at gym this afternoon lifting heavy weights, ie dead lifts; tonight began to develop pain in thoracolumbar region; over past couple of hours he began to develop paresthesias down both legs and buttock, then progressed to complete plegia of bilateral extremities with loss of sensation from T8 dermatomes and below found on MRI to have intradural/extramedullary lesion with mass effect on spinal cord displacing it to the left, now s/p decadron load and s/p spinal angiogram (neg) and T8-T9 lami decompression, intraoperative findings of subarachnoid clot s/p evacuation (9/15,) and s/p negative spinal angiogram (9/21).      FLACCID PARALYSIS OF LEG    Handoff    MEWS Score    Infection, HIV    Subdural hematoma    Subdural hematoma    Adjustment reaction    Angiogram, spine, selective    Lumbar laminectomy    Flaccid paralysis of legs    Spinal cord compression    HIV disease    Subdural hematoma    Cannabis abuse, daily use    Episodic methamphetamine abuse    Leukocytosis, unspecified type    Subarachnoidal hemorrhage    Postoperative state    Hyponatremia    Angiogram, spine, selective    Lumbar laminectomy    NUMBNESS    Spinal cord compression    Angiogram, spine, selective    Spinal cord compression    HIV disease    Subdural hematoma    Cannabis abuse, daily use    Episodic methamphetamine abuse    Leukocytosis, unspecified type    Subarachnoidal hemorrhage    [email or web address]        PLAN:   Neuro  - neuro and vitals checks q4hrs  - MRI post-op completed 9/16   - decadron taper 4q6 to off  - pain control: tylenol, dilaudid prn, lyrica, robaxin  - psych consulted: adjustment disorder, reccs outpatient f/u  - s/p spinal angiogram AVF or AVM 9/21; negative    Cardio  - SBP < 140   - EKG WNL     Pulm  - RA   - encourage incentive spirometry use     GI   - regular   - bowel regimen   - protonix while on Decadron     /renal  - failed TOV on 9/16, straight cath q4h. No allen   - salt tabs 3q6, 3%@50cc/hr  - flornief 0.1 BID  - normonatremia goal    Heme  - SCDs, SQL  -LE dopplers negative 9/17    Endo  - ISS while on decadron   - baseline a1c 5.0      GOC: full code   Level of care: regional  Dispo: pending [place] & private pay coverage    Assessment and plan discussed w/ Dr. D'Amico         Assessment:  Present when checked    []  GCS  E   V  M     Heart Failure: []Acute, [] acute on chronic , []chronic  Heart Failure:  [] Diastolic (HFpEF), [] Systolic (HFrEF), []Combined (HFpEF and HFrEF), [] RHF, [] Pulm HTN, [] Other    [] CAROLA, [] ATN, [] AIN, [] other  [] CKD1, [] CKD2, [] CKD 3, [] CKD 4, [] CKD 5, []ESRD    Encephalopathy: [] Metabolic, [] Hepatic, [] toxic, [] Neurological, [] Other    Abnormal Nurtitional Status: [] malnurtition (see nutrition note), [ ]underweight: BMI < 19, [] morbid obesity: BMI >40, [] Cachexia    [] Sepsis  [] hypovolemic shock,[] cardiogenic shock, [] hemorrhagic shock, [] neuogenic shock  [] Acute Respiratory Failure  []Cerebral edema, [] Brain compression/ herniation,   [] Functional quadriplegia  [] Acute blood loss anemia

## 2021-09-27 NOTE — PROGRESS NOTE ADULT - SUBJECTIVE AND OBJECTIVE BOX
NEUROSURGERY PAIN MANAGEMENT NOTE    Chief Complaint: b/e LE weakness    HPI:  CHIEF COMPLAINT/ REASON FOR CONSULTATION: possible spinal cord compression      HPI: 36 m PMHx HIV (CD4 700s, VLUD) was at gym this afternoon lifting heavy weights, ie dead lifts; tonight began to develop pain in thoracolumbar region; over past couple of hours he began to develop paresthesias down both legs and buttok. now can barely feel legs and unable to stand or move legs. Was brought in by ambulance after a passerby on the street witnessed him unable to rise from curb.  has not gone to bathroom, but thus far no bladder or bowel incontinence.  + IVDU, most recently today (methamphetamine).  no fever/chills.    PAST MEDICAL HISTORY   Infection, HIV      PAST SURGICAL HISTORY denies         MEDICATIONS:  Antibiotics:    Neuro:    Anticoagulation:    Other:  dexAMETHasone  IVPB 10 milliGRAM(s) IV Intermittent Once      SOCIAL HISTORY:   Occupation:   Marital Status:     FAMILY HISTORY:      REVIEW OF SYSTEMS:  Check here if all are normal other than Neurological [x]      PHYSICAL EXAMINATION:   T(C): 36.9 (09-14-21 @ 23:53), Max: 36.9 (09-14-21 @ 23:53)  HR: 89 (09-14-21 @ 23:53) (89 - 89)  BP: 158/100 (09-14-21 @ 23:53) (158/100 - 158/100)  RR: 17 (09-14-21 @ 23:53) (17 - 17)  SpO2: 100% (09-14-21 @ 23:53) (100% - 100%)  Wt(kg): --  Weight (kg): 80.7 (09-14 @ 23:53)                   (15 Sep 2021 01:25)      PAST MEDICAL & SURGICAL HISTORY:  Infection, HIV        FAMILY HISTORY:      SOCIAL HISTORY:  [ ] Denies Smoking, Alcohol, or Drug Use    HOME MEDICATIONS:   Please refer to initial HNP    PAIN HOME MEDICATIONS:    Allergies    No Known Allergies    Intolerances        PAIN MEDICATIONS:  acetaminophen   Tablet .. 1000 milliGRAM(s) Oral every 8 hours  HYDROmorphone   Tablet 2 milliGRAM(s) Oral every 4 hours PRN  methocarbamol 500 milliGRAM(s) Oral every 8 hours  pregabalin 50 milliGRAM(s) Oral every 8 hours  traZODone 50 milliGRAM(s) Oral at bedtime PRN    Heme:  enoxaparin Injectable 40 milliGRAM(s) SubCutaneous at bedtime    Antibiotics:  bictegravir 50 mG/emtricitabine 200 mG/tenofovir alafenamide 25 mG (BIKTARVY) 1 Tablet(s) Oral daily    Cardiovascular:    GI:  bisacodyl 5 milliGRAM(s) Oral at bedtime  metoclopramide 10 milliGRAM(s) Oral every 8 hours PRN  pantoprazole    Tablet 40 milliGRAM(s) Oral before breakfast  polyethylene glycol 3350 17 Gram(s) Oral daily  senna 2 Tablet(s) Oral at bedtime    Endocrine:  fludroCORTISONE 0.1 milliGRAM(s) Oral every 12 hours    All Other Medications:  chlorhexidine 2% Cloths 1 Application(s) Topical daily  influenza   Vaccine 0.5 milliLiter(s) IntraMuscular once  sodium chloride 3 Gram(s) Oral every 6 hours  sodium chloride 3%. 500 milliLiter(s) IV Continuous <Continuous>  sodium chloride 3%. 150 milliLiter(s) IV Continuous <Continuous>      Vital Signs Last 24 Hrs  T(C): 36.9 (27 Sep 2021 04:45), Max: 37.1 (26 Sep 2021 15:29)  T(F): 98.4 (27 Sep 2021 04:45), Max: 98.7 (26 Sep 2021 15:29)  HR: 66 (27 Sep 2021 04:05) (60 - 81)  BP: 121/84 (27 Sep 2021 04:05) (113/69 - 128/79)  BP(mean): 98 (27 Sep 2021 04:05) (88 - 98)  RR: 17 (27 Sep 2021 04:05) (16 - 18)  SpO2: 100% (27 Sep 2021 04:05) (99% - 100%)    LABS:                        12.3   8.54  )-----------( 257      ( 27 Sep 2021 06:56 )             35.1     09-27    128<L>  |  98  |  16  ----------------------------<  92  4.6   |  23  |  0.78    Ca    9.0      27 Sep 2021 06:56  Phos  3.3     09-27  Mg     2.1     09-27            RADIOLOGY:    Drug Screen:        REVIEW OF SYSTEMS:  CONSTITUTIONAL: No fever or fatigue O/N.   EYES: No eye pain, visual disturbances  ENMT:  No difficulty hearing. No throat pain  NECK: No pain or stiffness  RESPIRATORY: No cough, wheezing; No shortness of breath  CARDIOVASCULAR: No chest pain, palpitations.   GASTROINTESTINAL: Pt reports passing gas. No bowel movements. No abdominal or epigastric pain. No nausea, vomiting. GENITOURINARY: No dysuria, frequency, or incontinence  NEUROLOGICAL: No headaches, loss of strength, numbness, or tremors. No dizzinesss or lightheadedness with pain medications.   MUSCULOSKELETAL: Incisional back pain. No joint pain or swelling; No muscle, or extremity pain    PAIN ASSESSMENT: pain well controlled     PHYSICAL EXAM  GENERAL: Laying in bed, NAD  Neuro: CN II-XII PERRL, EOMI  Cranial nerves grossly intact  Motor exam: b/l lowers 0/5  Sensation intact to LT in UE/LE in 3 dermatomes  CHEST/LUNG: Clear to auscultation bilaterally; No rales, rhonchi, wheezing, or rubs  HEART: Regular rate and rhythm; No murmurs, rubs, or gallops  ABDOMEN: Soft, Nontender, Nondistended; Bowel sounds present  EXTREMITIES:  2+ Peripheral Pulses, No clubbing, cyanosis, or edema  SKIN: No rashes or lesions      ASSESSMENT:  36 M PMHx HIV (CD4 700s, VLUD on biktarvy), hx of IVDU and methamphetamine use, was at gym this afternoon lifting heavy weights, ie dead lifts; tonight began to develop pain in thoracolumbar region; over past couple of hours he began to develop paresthesias down both legs and buttock, then progressed to complete plegia of bilateral extremities with loss of sensation from T8 dermatomes and below found on MRI to have intradural/extramedullary lesion with mass effect on spinal cord displacing it to the left, now s/p decadron load and s/p spinal angiogram (neg) and T8-T9 lami decompression, intraoperative findings of subarachnoid clot s/p evacuation (9/15,) and s/p negative spinal angiogram (9/21)    PLAN:   - Pain:  Tylenol 1000mg every 8 hours  Lyrica 50mg every 8 hours  Robaxin 500mg every 8 hours  Dilaudid 2mg every 4 hours PRN for severe pain    - Bowel regimen: Senna    - Nausea ppx: Zofran standing  - Functional Goals: Pt will get OOB with PT today. Pt will resume previous level of activity without impairment from surgery.   - Additional Consults: None recommended.   - Additional Labs/Imaging:  None recommended.   - Follow up, Discharge Planning: pending rehab   - Pain Management follow up plan: will cont to follow    d/w Dr. Manriquez

## 2021-09-27 NOTE — PROGRESS NOTE ADULT - SUBJECTIVE AND OBJECTIVE BOX
O/N Events: MARCO    Subjective/ROS: States he adhered to the fluid restriction overnight. Denies HA, CP, SOB, n/v, changes in bowel/urinary habits.  12pt ROS otherwise negative.    VITALS  Vital Signs Last 24 Hrs  T(C): 37.1 (26 Sep 2021 15:29), Max: 37.1 (26 Sep 2021 15:29)  T(F): 98.7 (26 Sep 2021 15:29), Max: 98.7 (26 Sep 2021 15:29)  HR: 81 (26 Sep 2021 15:29) (65 - 81)  BP: 116/73 (26 Sep 2021 15:29) (113/69 - 122/76)  BP(mean): 91 (26 Sep 2021 04:40) (89 - 91)  RR: 18 (26 Sep 2021 15:29) (16 - 18)  SpO2: 100% (26 Sep 2021 15:29) (99% - 100%)    CAPILLARY BLOOD GLUCOSE          PHYSICAL EXAM  General: A&Ox3; NAD  Head: NC/AT; MMM; PERRL; EOMI;  Neck: Supple; no JVD  Respiratory: CTA B/L; no wheezes/crackles   Cardiovascular: Regular rhythm/rate; S1/S2   Gastrointestinal: Soft; NTND; normoactive BS  Extremities: WWP; no edema/cyanosis  Neurological:  CNII-XII grossly intact; no obvious focal deficits    MEDICATIONS  (STANDING):  acetaminophen   Tablet .. 1000 milliGRAM(s) Oral every 8 hours  bictegravir 50 mG/emtricitabine 200 mG/tenofovir alafenamide 25 mG (BIKTARVY) 1 Tablet(s) Oral daily  bisacodyl 5 milliGRAM(s) Oral at bedtime  chlorhexidine 2% Cloths 1 Application(s) Topical daily  enoxaparin Injectable 40 milliGRAM(s) SubCutaneous at bedtime  influenza   Vaccine 0.5 milliLiter(s) IntraMuscular once  methocarbamol 500 milliGRAM(s) Oral every 8 hours  pantoprazole    Tablet 40 milliGRAM(s) Oral before breakfast  polyethylene glycol 3350 17 Gram(s) Oral daily  pregabalin 50 milliGRAM(s) Oral every 8 hours  senna 2 Tablet(s) Oral at bedtime    MEDICATIONS  (PRN):  HYDROmorphone   Tablet 2 milliGRAM(s) Oral every 4 hours PRN Severe Pain (7 - 10)  metoclopramide 10 milliGRAM(s) Oral every 8 hours PRN nausea/vomiting  traZODone 50 milliGRAM(s) Oral at bedtime PRN insomnia      No Known Allergies      LABS                        12.9   11.11 )-----------( 258      ( 26 Sep 2021 15:23 )             36.8     09-26    125<L>  |  88<L>  |  21  ----------------------------<  102<H>  4.3   |  27  |  0.65    Ca    9.4      26 Sep 2021 15:23  Phos  4.2     09-25  Mg     2.1     09-25                IMAGING/EKG/ETC  EKG:  Xray:  CT:  MRI: O/N Events: MARCO    Subjective/ROS: States he adhered to the fluid restriction overnight. Denies HA, CP, SOB, n/v, changes in bowel/urinary habits.  12pt ROS otherwise negative.      Vital Signs Last 24 Hrs  T(C): 36.9 (27 Sep 2021 04:45), Max: 37.1 (26 Sep 2021 15:29)  T(F): 98.4 (27 Sep 2021 04:45), Max: 98.7 (26 Sep 2021 15:29)  HR: 74 (27 Sep 2021 08:26) (60 - 81)  BP: 132/82 (27 Sep 2021 08:26) (113/69 - 132/82)  BP(mean): 102 (27 Sep 2021 08:26) (88 - 102)  RR: 18 (27 Sep 2021 08:26) (16 - 18)  SpO2: 98% (27 Sep 2021 08:26) (98% - 100%)          PHYSICAL EXAM  General: A&Ox3; NAD  Head: NC/AT; MMM; PERRL; EOMI;  Neck: Supple; no JVD  Respiratory: CTA B/L; no wheezes/crackles   Cardiovascular: Regular rhythm/rate; S1/S2   Gastrointestinal: Soft; NTND; normoactive BS  Extremities: WWP; no edema/cyanosis  Neurological:  CNII-XII grossly intact; no obvious focal deficits        MEDICATIONS  (STANDING):  acetaminophen   Tablet .. 1000 milliGRAM(s) Oral every 8 hours  bictegravir 50 mG/emtricitabine 200 mG/tenofovir alafenamide 25 mG (BIKTARVY) 1 Tablet(s) Oral daily  bisacodyl 5 milliGRAM(s) Oral at bedtime  chlorhexidine 2% Cloths 1 Application(s) Topical daily  enoxaparin Injectable 40 milliGRAM(s) SubCutaneous at bedtime  fludroCORTISONE 0.1 milliGRAM(s) Oral every 12 hours  influenza   Vaccine 0.5 milliLiter(s) IntraMuscular once  methocarbamol 500 milliGRAM(s) Oral every 8 hours  pantoprazole    Tablet 40 milliGRAM(s) Oral before breakfast  polyethylene glycol 3350 17 Gram(s) Oral daily  pregabalin 50 milliGRAM(s) Oral every 8 hours  senna 2 Tablet(s) Oral at bedtime  sodium chloride 3 Gram(s) Oral every 6 hours    MEDICATIONS  (PRN):  HYDROmorphone   Tablet 2 milliGRAM(s) Oral every 4 hours PRN Severe Pain (7 - 10)  metoclopramide 10 milliGRAM(s) Oral every 8 hours PRN nausea/vomiting  traZODone 50 milliGRAM(s) Oral at bedtime PRN insomnia          No Known Allergies      LABS                                   12.3   8.54  )-----------( 257      ( 27 Sep 2021 06:56 )             35.1   09-27    128<L>  |  98  |  16  ----------------------------<  92  4.6   |  23  |  0.78    Ca    9.0      27 Sep 2021 06:56  Phos  3.3     09-27  Mg     2.1     09-27                  IMAGING/EKG/ETC  EKG:  Xray:  CT:  MRI:

## 2021-09-28 ENCOUNTER — TRANSCRIPTION ENCOUNTER (OUTPATIENT)
Age: 36
End: 2021-09-28

## 2021-09-28 ENCOUNTER — INPATIENT (INPATIENT)
Facility: HOSPITAL | Age: 36
LOS: 27 days | Discharge: HOME | DRG: 949 | End: 2021-10-26
Attending: PHYSICAL MEDICINE & REHABILITATION | Admitting: PHYSICAL MEDICINE & REHABILITATION
Payer: SELF-PAY

## 2021-09-28 VITALS
OXYGEN SATURATION: 100 % | HEIGHT: 68 IN | RESPIRATION RATE: 17 BRPM | HEART RATE: 86 BPM | DIASTOLIC BLOOD PRESSURE: 70 MMHG | WEIGHT: 154.98 LBS | TEMPERATURE: 98 F | SYSTOLIC BLOOD PRESSURE: 126 MMHG

## 2021-09-28 VITALS — TEMPERATURE: 98 F

## 2021-09-28 DIAGNOSIS — R74.01 ELEVATION OF LEVELS OF LIVER TRANSAMINASE LEVELS: ICD-10-CM

## 2021-09-28 DIAGNOSIS — R33.9 RETENTION OF URINE, UNSPECIFIED: ICD-10-CM

## 2021-09-28 DIAGNOSIS — Z47.89 ENCOUNTER FOR OTHER ORTHOPEDIC AFTERCARE: ICD-10-CM

## 2021-09-28 DIAGNOSIS — G47.00 INSOMNIA, UNSPECIFIED: ICD-10-CM

## 2021-09-28 DIAGNOSIS — B96.1 KLEBSIELLA PNEUMONIAE [K. PNEUMONIAE] AS THE CAUSE OF DISEASES CLASSIFIED ELSEWHERE: ICD-10-CM

## 2021-09-28 DIAGNOSIS — G82.22 PARAPLEGIA, INCOMPLETE: ICD-10-CM

## 2021-09-28 DIAGNOSIS — F43.20 ADJUSTMENT DISORDER, UNSPECIFIED: ICD-10-CM

## 2021-09-28 DIAGNOSIS — Z21 ASYMPTOMATIC HUMAN IMMUNODEFICIENCY VIRUS [HIV] INFECTION STATUS: ICD-10-CM

## 2021-09-28 DIAGNOSIS — E44.0 MODERATE PROTEIN-CALORIE MALNUTRITION: ICD-10-CM

## 2021-09-28 DIAGNOSIS — N39.0 URINARY TRACT INFECTION, SITE NOT SPECIFIED: ICD-10-CM

## 2021-09-28 DIAGNOSIS — N31.9 NEUROMUSCULAR DYSFUNCTION OF BLADDER, UNSPECIFIED: ICD-10-CM

## 2021-09-28 DIAGNOSIS — Z51.89 ENCOUNTER FOR OTHER SPECIFIED AFTERCARE: ICD-10-CM

## 2021-09-28 DIAGNOSIS — D64.9 ANEMIA, UNSPECIFIED: ICD-10-CM

## 2021-09-28 DIAGNOSIS — R32 UNSPECIFIED URINARY INCONTINENCE: ICD-10-CM

## 2021-09-28 DIAGNOSIS — R00.0 TACHYCARDIA, UNSPECIFIED: ICD-10-CM

## 2021-09-28 DIAGNOSIS — R15.9 FULL INCONTINENCE OF FECES: ICD-10-CM

## 2021-09-28 DIAGNOSIS — R25.2 CRAMP AND SPASM: ICD-10-CM

## 2021-09-28 DIAGNOSIS — G95.19 OTHER VASCULAR MYELOPATHIES: ICD-10-CM

## 2021-09-28 DIAGNOSIS — K59.2 NEUROGENIC BOWEL, NOT ELSEWHERE CLASSIFIED: ICD-10-CM

## 2021-09-28 DIAGNOSIS — R20.8 OTHER DISTURBANCES OF SKIN SENSATION: ICD-10-CM

## 2021-09-28 DIAGNOSIS — N45.1 EPIDIDYMITIS: ICD-10-CM

## 2021-09-28 PROBLEM — B20 HUMAN IMMUNODEFICIENCY VIRUS [HIV] DISEASE: Chronic | Status: ACTIVE | Noted: 2021-09-14

## 2021-09-28 LAB
ANION GAP SERPL CALC-SCNC: 5 MMOL/L — SIGNIFICANT CHANGE UP (ref 5–17)
ANION GAP SERPL CALC-SCNC: 9 MMOL/L — SIGNIFICANT CHANGE UP (ref 5–17)
BUN SERPL-MCNC: 13 MG/DL — SIGNIFICANT CHANGE UP (ref 7–23)
BUN SERPL-MCNC: 13 MG/DL — SIGNIFICANT CHANGE UP (ref 7–23)
CALCIUM SERPL-MCNC: 8.6 MG/DL — SIGNIFICANT CHANGE UP (ref 8.4–10.5)
CALCIUM SERPL-MCNC: 8.7 MG/DL — SIGNIFICANT CHANGE UP (ref 8.4–10.5)
CHLORIDE SERPL-SCNC: 95 MMOL/L — LOW (ref 96–108)
CHLORIDE SERPL-SCNC: 98 MMOL/L — SIGNIFICANT CHANGE UP (ref 96–108)
CO2 SERPL-SCNC: 24 MMOL/L — SIGNIFICANT CHANGE UP (ref 22–31)
CO2 SERPL-SCNC: 25 MMOL/L — SIGNIFICANT CHANGE UP (ref 22–31)
CREAT SERPL-MCNC: 0.65 MG/DL — SIGNIFICANT CHANGE UP (ref 0.5–1.3)
CREAT SERPL-MCNC: 0.7 MG/DL — SIGNIFICANT CHANGE UP (ref 0.5–1.3)
GLUCOSE SERPL-MCNC: 101 MG/DL — HIGH (ref 70–99)
GLUCOSE SERPL-MCNC: 122 MG/DL — HIGH (ref 70–99)
HCT VFR BLD CALC: 34 % — LOW (ref 39–50)
HGB BLD-MCNC: 12 G/DL — LOW (ref 13–17)
MAGNESIUM SERPL-MCNC: 1.9 MG/DL — SIGNIFICANT CHANGE UP (ref 1.6–2.6)
MCHC RBC-ENTMCNC: 32.3 PG — SIGNIFICANT CHANGE UP (ref 27–34)
MCHC RBC-ENTMCNC: 35.3 GM/DL — SIGNIFICANT CHANGE UP (ref 32–36)
MCV RBC AUTO: 91.6 FL — SIGNIFICANT CHANGE UP (ref 80–100)
NRBC # BLD: 0 /100 WBCS — SIGNIFICANT CHANGE UP (ref 0–0)
PHOSPHATE SERPL-MCNC: 2.7 MG/DL — SIGNIFICANT CHANGE UP (ref 2.5–4.5)
PLATELET # BLD AUTO: 248 K/UL — SIGNIFICANT CHANGE UP (ref 150–400)
POTASSIUM SERPL-MCNC: 3.6 MMOL/L — SIGNIFICANT CHANGE UP (ref 3.5–5.3)
POTASSIUM SERPL-MCNC: 3.9 MMOL/L — SIGNIFICANT CHANGE UP (ref 3.5–5.3)
POTASSIUM SERPL-SCNC: 3.6 MMOL/L — SIGNIFICANT CHANGE UP (ref 3.5–5.3)
POTASSIUM SERPL-SCNC: 3.9 MMOL/L — SIGNIFICANT CHANGE UP (ref 3.5–5.3)
RBC # BLD: 3.71 M/UL — LOW (ref 4.2–5.8)
RBC # FLD: 12.3 % — SIGNIFICANT CHANGE UP (ref 10.3–14.5)
SODIUM SERPL-SCNC: 127 MMOL/L — LOW (ref 135–145)
SODIUM SERPL-SCNC: 129 MMOL/L — LOW (ref 135–145)
URATE UR-MCNC: 51.8 MG/DL — SIGNIFICANT CHANGE UP
WBC # BLD: 8.84 K/UL — SIGNIFICANT CHANGE UP (ref 3.8–10.5)
WBC # FLD AUTO: 8.84 K/UL — SIGNIFICANT CHANGE UP (ref 3.8–10.5)

## 2021-09-28 PROCEDURE — 99024 POSTOP FOLLOW-UP VISIT: CPT

## 2021-09-28 PROCEDURE — 99233 SBSQ HOSP IP/OBS HIGH 50: CPT | Mod: GC

## 2021-09-28 RX ORDER — FLUDROCORTISONE ACETATE 0.1 MG/1
0.1 TABLET ORAL EVERY 12 HOURS
Refills: 0 | Status: DISCONTINUED | OUTPATIENT
Start: 2021-09-28 | End: 2021-09-30

## 2021-09-28 RX ORDER — TRAZODONE HCL 50 MG
25 TABLET ORAL AT BEDTIME
Refills: 0 | Status: DISCONTINUED | OUTPATIENT
Start: 2021-09-28 | End: 2021-10-26

## 2021-09-28 RX ORDER — HYDROMORPHONE HYDROCHLORIDE 2 MG/ML
2 INJECTION INTRAMUSCULAR; INTRAVENOUS; SUBCUTANEOUS EVERY 4 HOURS
Refills: 0 | Status: DISCONTINUED | OUTPATIENT
Start: 2021-09-28 | End: 2021-09-28

## 2021-09-28 RX ORDER — METHOCARBAMOL 500 MG/1
500 TABLET, FILM COATED ORAL EVERY 8 HOURS
Refills: 0 | Status: DISCONTINUED | OUTPATIENT
Start: 2021-09-28 | End: 2021-10-26

## 2021-09-28 RX ORDER — POLYETHYLENE GLYCOL 3350 17 G/17G
17 POWDER, FOR SOLUTION ORAL DAILY
Refills: 0 | Status: DISCONTINUED | OUTPATIENT
Start: 2021-09-28 | End: 2021-10-01

## 2021-09-28 RX ORDER — FLUDROCORTISONE ACETATE 0.1 MG/1
1 TABLET ORAL
Qty: 0 | Refills: 0 | DISCHARGE
Start: 2021-09-28

## 2021-09-28 RX ORDER — TRAZODONE HCL 50 MG
50 TABLET ORAL AT BEDTIME
Refills: 0 | Status: DISCONTINUED | OUTPATIENT
Start: 2021-09-28 | End: 2021-09-28

## 2021-09-28 RX ORDER — ENOXAPARIN SODIUM 100 MG/ML
40 INJECTION SUBCUTANEOUS AT BEDTIME
Refills: 0 | Status: DISCONTINUED | OUTPATIENT
Start: 2021-09-28 | End: 2021-10-26

## 2021-09-28 RX ORDER — HYDROMORPHONE HYDROCHLORIDE 2 MG/ML
2 INJECTION INTRAMUSCULAR; INTRAVENOUS; SUBCUTANEOUS EVERY 4 HOURS
Refills: 0 | Status: DISCONTINUED | OUTPATIENT
Start: 2021-09-28 | End: 2021-10-01

## 2021-09-28 RX ORDER — MAGNESIUM OXIDE 400 MG ORAL TABLET 241.3 MG
400 TABLET ORAL ONCE
Refills: 0 | Status: COMPLETED | OUTPATIENT
Start: 2021-09-28 | End: 2021-09-28

## 2021-09-28 RX ORDER — SODIUM CHLORIDE 9 MG/ML
3 INJECTION INTRAMUSCULAR; INTRAVENOUS; SUBCUTANEOUS EVERY 6 HOURS
Refills: 0 | Status: DISCONTINUED | OUTPATIENT
Start: 2021-09-28 | End: 2021-09-30

## 2021-09-28 RX ORDER — ACETAMINOPHEN 500 MG
975 TABLET ORAL EVERY 8 HOURS
Refills: 0 | Status: DISCONTINUED | OUTPATIENT
Start: 2021-09-28 | End: 2021-10-01

## 2021-09-28 RX ORDER — METOCLOPRAMIDE HCL 10 MG
10 TABLET ORAL EVERY 8 HOURS
Refills: 0 | Status: DISCONTINUED | OUTPATIENT
Start: 2021-09-28 | End: 2021-10-01

## 2021-09-28 RX ORDER — BICTEGRAVIR SODIUM, EMTRICITABINE, AND TENOFOVIR ALAFENAMIDE FUMARATE 30; 120; 15 MG/1; MG/1; MG/1
1 TABLET ORAL DAILY
Refills: 0 | Status: DISCONTINUED | OUTPATIENT
Start: 2021-09-29 | End: 2021-10-26

## 2021-09-28 RX ORDER — METHOCARBAMOL 500 MG/1
1 TABLET, FILM COATED ORAL
Qty: 0 | Refills: 0 | DISCHARGE
Start: 2021-09-28

## 2021-09-28 RX ORDER — ENOXAPARIN SODIUM 100 MG/ML
40 INJECTION SUBCUTANEOUS
Qty: 0 | Refills: 0 | DISCHARGE
Start: 2021-09-28

## 2021-09-28 RX ORDER — POTASSIUM CHLORIDE 20 MEQ
20 PACKET (EA) ORAL ONCE
Refills: 0 | Status: COMPLETED | OUTPATIENT
Start: 2021-09-28 | End: 2021-09-28

## 2021-09-28 RX ORDER — SENNA PLUS 8.6 MG/1
2 TABLET ORAL AT BEDTIME
Refills: 0 | Status: DISCONTINUED | OUTPATIENT
Start: 2021-09-28 | End: 2021-10-01

## 2021-09-28 RX ORDER — SODIUM CHLORIDE 9 MG/ML
3 INJECTION INTRAMUSCULAR; INTRAVENOUS; SUBCUTANEOUS
Qty: 0 | Refills: 0 | DISCHARGE
Start: 2021-09-28

## 2021-09-28 RX ORDER — TRAZODONE HCL 50 MG
1 TABLET ORAL
Qty: 0 | Refills: 0 | DISCHARGE
Start: 2021-09-28

## 2021-09-28 RX ORDER — METHOCARBAMOL 500 MG/1
500 TABLET, FILM COATED ORAL EVERY 8 HOURS
Refills: 0 | Status: DISCONTINUED | OUTPATIENT
Start: 2021-09-28 | End: 2021-09-28

## 2021-09-28 RX ORDER — HYDROMORPHONE HYDROCHLORIDE 2 MG/ML
1 INJECTION INTRAMUSCULAR; INTRAVENOUS; SUBCUTANEOUS
Qty: 0 | Refills: 0 | DISCHARGE
Start: 2021-09-28

## 2021-09-28 RX ADMIN — Medication 20 MILLIEQUIVALENT(S): at 07:57

## 2021-09-28 RX ADMIN — Medication 1000 MILLIGRAM(S): at 07:44

## 2021-09-28 RX ADMIN — FLUDROCORTISONE ACETATE 0.1 MILLIGRAM(S): 0.1 TABLET ORAL at 07:09

## 2021-09-28 RX ADMIN — BICTEGRAVIR SODIUM, EMTRICITABINE, AND TENOFOVIR ALAFENAMIDE FUMARATE 1 TABLET(S): 30; 120; 15 TABLET ORAL at 11:57

## 2021-09-28 RX ADMIN — SODIUM CHLORIDE 3 GRAM(S): 9 INJECTION INTRAMUSCULAR; INTRAVENOUS; SUBCUTANEOUS at 19:18

## 2021-09-28 RX ADMIN — SODIUM CHLORIDE 3 GRAM(S): 9 INJECTION INTRAMUSCULAR; INTRAVENOUS; SUBCUTANEOUS at 01:05

## 2021-09-28 RX ADMIN — ENOXAPARIN SODIUM 40 MILLIGRAM(S): 100 INJECTION SUBCUTANEOUS at 22:13

## 2021-09-28 RX ADMIN — Medication 25 MILLIGRAM(S): at 22:46

## 2021-09-28 RX ADMIN — SENNA PLUS 2 TABLET(S): 8.6 TABLET ORAL at 22:13

## 2021-09-28 RX ADMIN — Medication 50 MILLIGRAM(S): at 22:13

## 2021-09-28 RX ADMIN — SODIUM CHLORIDE 3 GRAM(S): 9 INJECTION INTRAMUSCULAR; INTRAVENOUS; SUBCUTANEOUS at 07:08

## 2021-09-28 RX ADMIN — SODIUM CHLORIDE 3 GRAM(S): 9 INJECTION INTRAMUSCULAR; INTRAVENOUS; SUBCUTANEOUS at 11:57

## 2021-09-28 RX ADMIN — PANTOPRAZOLE SODIUM 40 MILLIGRAM(S): 20 TABLET, DELAYED RELEASE ORAL at 07:09

## 2021-09-28 RX ADMIN — SODIUM CHLORIDE 3 GRAM(S): 9 INJECTION INTRAMUSCULAR; INTRAVENOUS; SUBCUTANEOUS at 22:13

## 2021-09-28 RX ADMIN — FLUDROCORTISONE ACETATE 0.1 MILLIGRAM(S): 0.1 TABLET ORAL at 19:18

## 2021-09-28 RX ADMIN — MAGNESIUM OXIDE 400 MG ORAL TABLET 400 MILLIGRAM(S): 241.3 TABLET ORAL at 07:56

## 2021-09-28 NOTE — PROGRESS NOTE ADULT - PROBLEM SELECTOR PROBLEM 1
Spinal cord compression

## 2021-09-28 NOTE — H&P ADULT - ATTENDING COMMENTS
36 year old male with hx of IVDU/methamphetamine use presented to [facility] on 9/15 with acute onset paraplegia after lifting heavy weights found to have SAH/ SDH of the spinal cord with hematoma causing cord compression s/p T7-8 lami decompression (9/16) now with residual paraplegia. Hospital course complicated by hyponatremia secondary to SIADH. Admitted to [facility] acute inpatient rehab on 9/28 for ADL, gait, and functional impairments.     Vital Signs Last 24 Hrs  T(C): 36.8 (29 Sep 2021 08:43), Max: 36.8 (28 Sep 2021 13:40)  T(F): 98.3 (29 Sep 2021 08:43), Max: 98.3 (29 Sep 2021 08:43)  HR: 61 (29 Sep 2021 08:43) (61 - 98)  BP: 124/79 (29 Sep 2021 08:43) (124/79 - 128/74)  BP(mean): --  RR: 18 (29 Sep 2021 08:43) (15 - 18)  SpO2: 100% (29 Sep 2021 08:43) (100% - 100%)                          12.3   8.81  )-----------( 236      ( 29 Sep 2021 06:10 )             34.9     09-29    135  |  101  |  14  ----------------------------<  98  3.9   |  27  |  0.66    Ca    8.2<L>      29 Sep 2021 06:10  Phos  2.7     09-28  Mg     1.9     09-28    TPro  5.6<L>  /  Alb  2.8<L>  /  TBili  0.4  /  DBili  x   /  AST  17  /  ALT  41  /  AlkPhos  59  09-29        PT/OT eval

## 2021-09-28 NOTE — PROGRESS NOTE ADULT - PROBLEM SELECTOR PROBLEM 6
Postoperative state

## 2021-09-28 NOTE — PROGRESS NOTE ADULT - TIME BILLING
Medical management

## 2021-09-28 NOTE — CHART NOTE - NSCHARTNOTEFT_GEN_A_CORE
Admitting Diagnosis:   Patient is a 36y old  Male who presents with a chief complaint of weakness (28 Sep 2021 09:51)    PAST MEDICAL & SURGICAL HISTORY:  Infection, HIV      Current Nutrition Order:  Diet, Regular:   1500mL Fluid Restriction ([id number]) (09-26-21 @ 18:21)    PO Intake: Good (%) [   ]  Fair (50-75%) [  x ] Poor (<25%) [   ]  -Pt with fair po intake, consuming >/= 50% of meals. Pt understanding importance of nutrition for recovery and healing so even if is not hungry, forces self to eat a least something in each tray.     GI Issues: +BM 9/28, abd-soft, + normal bowel sounds   Pt denies N/V or D/C.  Pain: controlled  Skin Integrity: surgical incision noted, no pressure breakdown     Labs:   09-28    129<L>  |  95<L>  |  13  ----------------------------<  122<H>  3.6   |  25  |  0.65    Ca    8.7      28 Sep 2021 10:52  Phos  2.7     09-28  Mg     1.9     09-28    Medications:  MEDICATIONS  (STANDING):  acetaminophen   Tablet .. 1000 milliGRAM(s) Oral every 8 hours  bictegravir 50 mG/emtricitabine 200 mG/tenofovir alafenamide 25 mG (BIKTARVY) 1 Tablet(s) Oral daily  bisacodyl 5 milliGRAM(s) Oral at bedtime  chlorhexidine 2% Cloths 1 Application(s) Topical daily  enoxaparin Injectable 40 milliGRAM(s) SubCutaneous at bedtime  fludroCORTISONE 0.1 milliGRAM(s) Oral every 12 hours  influenza   Vaccine 0.5 milliLiter(s) IntraMuscular once  methocarbamol 500 milliGRAM(s) Oral every 8 hours  polyethylene glycol 3350 17 Gram(s) Oral daily  pregabalin 50 milliGRAM(s) Oral every 8 hours  senna 2 Tablet(s) Oral at bedtime  sodium chloride 3 Gram(s) Oral every 6 hours    MEDICATIONS  (PRN):  HYDROmorphone   Tablet 2 milliGRAM(s) Oral every 4 hours PRN Severe Pain (7 - 10)  metoclopramide 10 milliGRAM(s) Oral every 8 hours PRN nausea/vomiting  traZODone 50 milliGRAM(s) Oral at bedtime PRN insomnia    Anthropometrics:  Ht: 175.3cm (69")  Wt: 80.7kg (9/21)        80.7kg (9/14)    IBW: 160# (72.7kg)   % IBW: 111%    Weight Change: Wt stable since admit    Nutrition Focused Physical Exam: Completed [   ]  Not Pertinent [ x  ]    Estimated energy needs: 80.7kg  *ABW for needs d/t IBW between %, hypermetabolic r/t acute illness   Calories: 2000-2400kcals (25-30kcals/kg)  Protein: 105-120g (1.3-1.5g/kg0  Fluid: 1ml/kcal    Subjective:   36M PMHx HIV (CD4 700s, VLUD on biktarvy), hx of IVDU and methamphetamine use, was at gym 9/15 afternoon lifting heavy weights, ie dead lifts; PM began to develop pain in thoracolumbar region; over past couple of hours he began to develop paresthesias down both legs and buttock, then progressed to complete plegia of bilateral extremities with loss of sensation from T8 dermatomes and below found on MRI to have intradural/extramedullary lesion with 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 Transferred to NSICU for further monitoring s/p sx now on floor.     Spoke with pt in room on 9/27 after breakfast. Reports pain controlled, working with therapy. Fluid restriction of 1500ml added to diet order d/t hyponatremia. Current sodium 129 L. Plan for pt to d/c to rehab when medically stable.     Previous Nutrition Diagnosis: 1 Increased Nutrient Needs R/T hypermetabolic, acute illness AEB 25-30kcals/kg and 1.3-1.5g/kg protein (ABW)    Active [  x ]  Resolved [   ]    Goal: Consistently meet >75% est needs.    Recommendations:   1. Continue regular diet  -1500ml fluid restriction per MD  -Encourage po intake, protein at every meal/ snack  2. Bowel and pain regimen per team   3. Cont to monitor lytes and replete prn   Education: Encourage protein at every meal/snack     Risk Level: High [   ] Moderate [  x ] Low [   ]  Will continue to monitor per protocol.  Cyn Ellis RD,CDN,,CNSC

## 2021-09-28 NOTE — PROGRESS NOTE ADULT - PROBLEM SELECTOR PROBLEM 3
Please sign orders for [facility]  
Episodic methamphetamine abuse

## 2021-09-28 NOTE — H&P ADULT - HISTORY OF PRESENT ILLNESS
Patient was evaluated by PM&R and therapy for functional deficits and gait/ ADL impairments and recommended acute rehabilitation. Patient was medically optimized for discharge to  to [facility] on ___  Ms. ALISSA ARAUJO is a 36 year old female with history of IVDU and methamphetamine use presented to [facility] on 9/15/21 with symptoms of acute onset lower extremity weakness and parethesias a few hours after lifting heavy weights in the gym.  She developed back pain and radiating pain to bilateral buttocks then progressed to paraplegia. She was brought in by ambulance after a passerby witnessed her unable to rise from the curb.  She did not have any bowel or bladder incontinence on admission.      On presentation to the ED, MRI performed and found to have ill defined intradural - extramedullary lesion with enhancement at level T8 with mass effect on the spinal cord with lefward displacement and minimal associated cord edema.  [facility] and started on decadron. Spinal angiogram performed which was negative. She underwent T8-9 lami decompression on 9/15 with Dr. D-Amico with intraoperative findings consistent with subarachnoid clot now s/p evacuation.  Post operative course was uncomplicated. Tolerated procedure well. She was seen by pain management who adjusted pain regimen (tylenol, lyrica, Robaxin, Dilaudid). Labs were significant for hyponatremia likely secondary to SIADH given euvolemia on exam and good UOP. she required short course of hypertonic saline then weaned off. Sodium stable with fluid restriction and addition of Na tabs. She was seen by behavioral health and psychology for adjustment disorder in the setting of new paralysis. She was determined to be low risk for suicide and emotional support provided. Trazodone was started for insomnia.     Patient was evaluated by PM&R and therapy for functional deficits and gait/ ADL impairments and recommended acute rehabilitation. Patient was medically optimized for discharge to  to [facility] on ___  Ms. ALISSA ARAUJO is a 36 year old male with history of IVDU and methamphetamine use presented to [facility] on 9/15/21 with symptoms of acute onset lower extremity weakness and parethesias a few hours after lifting heavy weights in the gym.  She developed back pain and radiating pain to bilateral buttocks then progressed to paraplegia. She was brought in by ambulance after a passerby witnessed her unable to rise from the curb.  She did not have any bowel or bladder incontinence on admission.      On presentation to the ED, MRI performed and found to have ill defined intradural - extramedullary lesion with enhancement at level T8 with mass effect on the spinal cord with lefward displacement and minimal associated cord edema.  She was admitted to neurosurgical [facility] and started on decadron. Spinal angiogram performed which was negative. She underwent T8-9 lami decompression on 9/15 with Dr. D-Amico with intraoperative findings consistent with subarachnoid clot now s/p evacuation.  Post operative course was uncomplicated. Tolerated procedure well. She was seen by pain management who adjusted pain regimen (tylenol, lyrica, Robaxin, Dilaudid). Labs were significant for hyponatremia likely secondary to SIADH given euvolemia on exam and good UOP. she required short course of hypertonic saline then weaned off. Sodium stable with fluid restriction and addition of Na tabs. She was seen by behavioral health and psychology for adjustment disorder in the setting of new paralysis. She was determined to be low risk for suicide and emotional support provided. Trazodone was started for insomnia.     Patient was evaluated by PM&R and therapy for functional deficits and gait/ ADL impairments and recommended acute rehabilitation. Patient was medically optimized for discharge to  to [facility] on 9/28 Ms. ALISSA ARAUJO is a 36 year old male with history of IVDU and methamphetamine use presented to [facility] on 9/15/21 with symptoms of acute onset lower extremity weakness and parethesias a few hours after lifting heavy weights in the gym.  He developed back pain and radiating pain to bilateral buttocks then progressed to paraplegia. He was brought in by ambulance after a passerby witnessed her unable to rise from the curb.  He did not have any bowel or bladder incontinence on admission.      On presentation to the ED, MRI performed and found to have ill defined intradural - extramedullary lesion with enhancement at level T8 with mass effect on the spinal cord with lefward displacement and minimal associated cord edema.  He was admitted to neurosurgical [facility]ice and started on decadron. Spinal angiogram performed which was negative. He underwent T8-9 lami decompression on 9/15 with Dr. D-Amico with intraoperative findings consistent with subarachnoid clot now s/p evacuation.  Post operative course was uncomplicated. Tolerated procedure well. Pt was seen by pain management who adjusted pain regimen (tylenol, lyrica, Robaxin, Dilaudid). Labs were significant for hyponatremia likely secondary to SIADH given euvolemia on exam and good UOP. Pt required short course of hypertonic saline then weaned off. Sodium stable with fluid restriction and addition of Na tabs. Pt was seen by behavioral health and psychology for adjustment disorder in the setting of new paralysis. He was determined to be low risk for suicide and emotional support provided. Trazodone was started for insomnia.     Patient was evaluated by PM&R and therapy for functional deficits and gait/ ADL impairments and recommended acute rehabilitation. Patient was medically optimized for discharge to  to [facility] on 9/28

## 2021-09-28 NOTE — PROGRESS NOTE ADULT - SUBJECTIVE AND OBJECTIVE BOX
NEUROSURGERY PAIN MANAGEMENT NOTE    Chief Complaint: b/l LE weakness    HPI:  CHIEF COMPLAINT/ REASON FOR CONSULTATION: possible spinal cord compression      HPI: 36 m PMHx HIV (CD4 700s, VLUD) was at gym this afternoon lifting heavy weights, ie dead lifts; tonight began to develop pain in thoracolumbar region; over past couple of hours he began to develop paresthesias down both legs and buttok. now can barely feel legs and unable to stand or move legs. Was brought in by ambulance after a passerby on the street witnessed him unable to rise from curb.  has not gone to bathroom, but thus far no bladder or bowel incontinence.  + IVDU, most recently today (methamphetamine).  no fever/chills.    PAST MEDICAL HISTORY   Infection, HIV      PAST SURGICAL HISTORY denies         MEDICATIONS:  Antibiotics:    Neuro:    Anticoagulation:    Other:  dexAMETHasone  IVPB 10 milliGRAM(s) IV Intermittent Once      SOCIAL HISTORY:   Occupation:   Marital Status:     FAMILY HISTORY:      REVIEW OF SYSTEMS:  Check here if all are normal other than Neurological [x]      PHYSICAL EXAMINATION:   T(C): 36.9 (09-14-21 @ 23:53), Max: 36.9 (09-14-21 @ 23:53)  HR: 89 (09-14-21 @ 23:53) (89 - 89)  BP: 158/100 (09-14-21 @ 23:53) (158/100 - 158/100)  RR: 17 (09-14-21 @ 23:53) (17 - 17)  SpO2: 100% (09-14-21 @ 23:53) (100% - 100%)  Wt(kg): --  Weight (kg): 80.7 (09-14 @ 23:53)                   (15 Sep 2021 01:25)      PAST MEDICAL & SURGICAL HISTORY:  Infection, HIV        FAMILY HISTORY:      SOCIAL HISTORY:  [ ] Denies Smoking, Alcohol, or Drug Use    HOME MEDICATIONS:   Please refer to initial HNP    PAIN HOME MEDICATIONS:    Allergies    No Known Allergies    Intolerances        PAIN MEDICATIONS:  acetaminophen   Tablet .. 1000 milliGRAM(s) Oral every 8 hours  HYDROmorphone   Tablet 2 milliGRAM(s) Oral every 4 hours PRN  methocarbamol 500 milliGRAM(s) Oral every 8 hours  pregabalin 50 milliGRAM(s) Oral every 8 hours  traZODone 50 milliGRAM(s) Oral at bedtime PRN    Heme:  enoxaparin Injectable 40 milliGRAM(s) SubCutaneous at bedtime    Antibiotics:  bictegravir 50 mG/emtricitabine 200 mG/tenofovir alafenamide 25 mG (BIKTARVY) 1 Tablet(s) Oral daily    Cardiovascular:    GI:  bisacodyl 5 milliGRAM(s) Oral at bedtime  metoclopramide 10 milliGRAM(s) Oral every 8 hours PRN  pantoprazole    Tablet 40 milliGRAM(s) Oral before breakfast  polyethylene glycol 3350 17 Gram(s) Oral daily  senna 2 Tablet(s) Oral at bedtime    Endocrine:  fludroCORTISONE 0.1 milliGRAM(s) Oral every 12 hours    All Other Medications:  chlorhexidine 2% Cloths 1 Application(s) Topical daily  influenza   Vaccine 0.5 milliLiter(s) IntraMuscular once  sodium chloride 3 Gram(s) Oral every 6 hours      Vital Signs Last 24 Hrs  T(C): 36.9 (28 Sep 2021 09:15), Max: 37.3 (27 Sep 2021 13:30)  T(F): 98.4 (28 Sep 2021 09:15), Max: 99.2 (27 Sep 2021 13:30)  HR: 90 (28 Sep 2021 08:29) (66 - 100)  BP: 112/75 (28 Sep 2021 08:29) (110/68 - 135/87)  BP(mean): 87 (28 Sep 2021 08:29) (84 - 107)  RR: 18 (28 Sep 2021 08:29) (18 - 20)  SpO2: 100% (28 Sep 2021 08:29) (100% - 100%)    LABS:                        12.0   8.84  )-----------( 248      ( 28 Sep 2021 06:44 )             34.0     09-28    127<L>  |  98  |  13  ----------------------------<  101<H>  3.9   |  24  |  0.70    Ca    8.6      28 Sep 2021 06:44  Phos  2.7     09-28  Mg     1.9     09-28            RADIOLOGY:    Drug Screen:        REVIEW OF SYSTEMS:  CONSTITUTIONAL: No fever or fatigue O/N.   EYES: No eye pain, visual disturbances  ENMT:  No difficulty hearing. No throat pain  NECK: No pain or stiffness  RESPIRATORY: No cough, wheezing; No shortness of breath  CARDIOVASCULAR: No chest pain, palpitations.   GASTROINTESTINAL: Pt reports passing gas. No bowel movements. No abdominal or epigastric pain. No nausea, vomiting. GENITOURINARY: No dysuria, frequency, or incontinence  NEUROLOGICAL: No headaches, loss of strength, numbness, or tremors. No dizzinesss or lightheadedness with pain medications.   MUSCULOSKELETAL: Incisional back pain. No joint pain or swelling; No muscle, or extremity pain    PAIN ASSESSMENT: pain well controlled    PHYSICAL EXAM  GENERAL: Laying in bed, NAD  Neuro: CN II-XII PERRL, EOMI  Cranial nerves grossly intact  Motor exam: LLE toes 1/5, RLE 0/5  Sensation intact to LT in UE/LE in 3 dermatomes  CHEST/LUNG: Clear to auscultation bilaterally; No rales, rhonchi, wheezing, or rubs  HEART: Regular rate and rhythm; No murmurs, rubs, or gallops  ABDOMEN: Soft, Nontender, Nondistended; Bowel sounds present  EXTREMITIES:  2+ Peripheral Pulses, No clubbing, cyanosis, or edema  SKIN: No rashes or lesions      ASSESSMENT:   36 M PMHx HIV (CD4 700s, VLUD on biktarvy), hx of IVDU and methamphetamine use, was at gym this afternoon lifting heavy weights, ie dead lifts; tonight began to develop pain in thoracolumbar region; over past couple of hours he began to develop paresthesias down both legs and buttock, then progressed to complete plegia of bilateral extremities with loss of sensation from T8 dermatomes and below found on MRI to have intradural/extramedullary lesion with mass effect on spinal cord displacing it to the left, now s/p decadron load and s/p spinal angiogram (neg) and T8-T9 lami decompression, intraoperative findings of subarachnoid clot s/p evacuation (9/15,) and s/p negative spinal angiogram (9/21)    PLAN:   - Pain:  Tylenol 1000mg every 8 hours  Lyrica 50mg every 8 hours  Robaxin 500mg every 8 hours  Dilaudid 2mg every 4 hours PRN for severe pain    - Bowel regimen: Senna    - Nausea ppx: Zofran standing  - Functional Goals: Pt will get OOB with PT today. Pt will resume previous level of activity without impairment from surgery.   - Additional Consults: None recommended.   - Additional Labs/Imaging:  None recommended.   - Follow up, Discharge Planning: pending rehab  - Pain Management follow up plan: will cont to follow    d/w Dr. Manriquez

## 2021-09-28 NOTE — H&P ADULT - ASSESSMENT
Admitted to [facility] acute inpatient rehab on ___ for ADL, gait, and functional impairments.       - Comprehensive Multidisciplinary Rehab Program     3 hours a day, 5 days a week with PT/OT/SLP     P&O as needed    Pain Management:  - Tylenol PRN    GI/Bowel:  - Senna QHS, Miralax daily PRN  - GI ppx:    /Bladder:   - Encourage timed voids every 4 hours while awake       Skin/Pressure Injury:  - Skin assessment on admission admission: ***  - Monitor Incisions: ***  - Turn every 2 hours while in bed    Diet:   - Diet Consistency/Modifications: ***  - Aspiration Precautions  - SLP consult for swallow function evaluation and treatment    DVT ppx:  - ***  - SCDs  - Last Doppler on ***    Restrictions/Precautions:  - Weight bearing status: ****  - ROM restrictions: ***  - Precautions:    ---------------  Outpatient Follow-up:      --------------    Goals: Safe discharge to home  Estimated Length of Stay: 10-14 days  Rehab Potential: Good  Medical Prognosis: Good  Estimated Disposition: Home with Home Care  ---------------    PRESCREEN COMPARISON:  I have reviewed the prescreen information and I have found no relevant changes between the preadmission screening and my post admission evaluation.    RATIONALE FOR INPATIENT ADMISSION: Patient demonstrates the following:  [X] Medically appropriate for rehabilitation admission  [X] Has attainable rehab goals with an appropriate initial discharge plan  [X]Has rehabilitation potential (expected to make a significant improvement within a reasonable period of time)  [X] Requires close medical management by a rehab physician, rehab nursing care, Hospitalist and comprehensive interdisciplinary team (including PT, OT and/or SLP, Prosthetics and Orthotics)  36 year old female with hx of IVDU/methamphetamine use presented to [facility] on 9/15 with acute onset paraplegia after lifting heavy weights found to have SAH/ SDH of the spinal cord with hematoma causing cord compression s/p T7-8 lami decompression (9/16) now with residual paraplegia. Hospital course complicated by hyponatremia secondary to SIADH. Admitted to [facility] acute inpatient rehab on 9/28 for ADL, gait, and functional impairments.       Spinal Cord Compression secondary to spinal subdural hematoma s/p evacuation and decompression of T7-8  - Primary surgeon Dr. Randy D'Amico  - s/p decadron  - Comprehensive Multidisciplinary Rehab Program     3 hours a day, 5 days a week with PT/OT     P&O as needed  - Has outpt appt on 10/6 -- will need to call and reschedule while in rehab or suggest telemedicine  - Per Neurosurgery instructions: leave incision open to air, OK to shower    Hyponatremia, likely SIADH  - hospitalist to follow   - continue Cassie Aleman tabs -- wean as necessary  - cont Fluid restriction 1500 ml  - monitor BMP daily x3 then routine if stable    Psych:  h/o substance abuse: IVDU, methamphetamine  Adjustment disorder  Insomnia  - Neuropsych evaluation and treat for adjustment, coping, and counseling  - will need to follow up with psych outpatient: counseling centers provided   - Trazodone as needed       Pain Management:  - Seen by pain management at [facility] recommended:    Dilaudid PO 2mg q4h Severe    Lyrica 50 q8h    Tylenol 1000 scheduled (monitor LFTs on CMP) - wean as necessary    Robaxin 500 q8h  - bowel regimen as below    GI/Bowel:  - Senna QHS, Miralax daily, dulcolax    /Bladder:  Urinary retention  - Encourage timed voids every 4 hours while awake - on bedpan then transition to commode    Skin/Pressure Injury:  - Skin assessment on admission admission: ***  - Monitor Incisions: spine incision open to air  - Turn every 2 hours while in bed, at risk due to paraplegia    Diet:   - Diet Consistency/Modifications: Reg + thins - fluid restriction 1500ml    DVT ppx:  - Lovenox  - SCDs  - Last Doppler on 9/27 neg    Restrictions/Precautions:  - Precautions: Spine precautions, falls    ---------------  Outpatient Follow-up:  Neurosurgery - Dr. Randy D'Amico  Psychological services/ counseling   PCP?    --------------    Goals: Safe discharge to home  Estimated Length of Stay: 10-14 days  Rehab Potential: Good  Medical Prognosis: Good  Estimated Disposition: Home with Home Care  ---------------    PRESCREEN COMPARISON:  I have reviewed the prescreen information and I have found no relevant changes between the preadmission screening and my post admission evaluation.    RATIONALE FOR INPATIENT ADMISSION: Patient demonstrates the following:  [X] Medically appropriate for rehabilitation admission  [X] Has attainable rehab goals with an appropriate initial discharge plan  [X]Has rehabilitation potential (expected to make a significant improvement within a reasonable period of time)  [X] Requires close medical management by a rehab physician, rehab nursing care, Hospitalist and comprehensive interdisciplinary team (including PT, OT and/or SLP, Prosthetics and Orthotics)  36 year old female with hx of IVDU/methamphetamine use presented to [facility] on 9/15 with acute onset paraplegia after lifting heavy weights found to have SAH/ SDH of the spinal cord with hematoma causing cord compression s/p T7-8 lami decompression (9/16) now with residual paraplegia. Hospital course complicated by hyponatremia secondary to SIADH. Admitted to [facility] acute inpatient rehab on 9/28 for ADL, gait, and functional impairments.       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 Randy D'Amico  - s/p decadron  - Comprehensive Multidisciplinary Rehab Program     3 hours a day, 5 days a week with PT/OT     P&O as needed  - Has outpt appt on 10/6 -- will need to call and reschedule while in rehab or suggest telemedicine  - Per Neurosurgery instructions: leave incision open to air, OK to shower    Hyponatremia, likely SIADH  - hospitalist to follow   - continue Cassie Aleman tabs -- wean as necessary  - cont Fluid restriction 1500 ml  - monitor BMP daily x3 then routine if stable    Psych:  h/o substance abuse: IVDU, methamphetamine  Adjustment disorder  Insomnia  - Neuropsych evaluation and treat for adjustment, coping, and counseling  - will need to follow up with psych outpatient: counseling centers provided   - Trazodone as needed       Pain Management:  - Seen by pain management at [facility] recommended:    Dilaudid PO 2mg q4h Severe    Lyrica 50 q8h    Tylenol 1000 scheduled (monitor LFTs on CMP) - wean as necessary    Robaxin 500 q8h  - bowel regimen as below  - Left leg spasms - consider tizanidine/baclofen if increasing   - Neuropathic pain/ hyperalgesia -- desensitization therapy    GI/Bowel:  Neurogenic bowel with incontinence, decreased rectal tone  - Senna QHS, Miralax daily, dulcolax  - encourage timed toileting, to be discussed with nursing     /Bladder:  Neurogenic bladder with Urinary retention  - Encourage timed voids every 4 hours while awake - on bedpan then transition to commode  - bladder scans [id number]; SC for >400cc     pt with good hand function and motivated to learn to SC if necessary    Skin/Pressure Injury:  - Skin assessment on admission admission: no pressure injuries noted  - Monitor Incisions: spine incision open to air  - Turn every 2 hours while in bed, at risk due to paraplegia    Diet:   - Diet Consistency/Modifications: Reg + thins - fluid restriction 1500ml    DVT ppx:  - Lovenox  - SCDs  - Last Doppler on 9/27 neg    Restrictions/Precautions:  - Precautions: Spine precautions, falls    ---------------  Outpatient Follow-up:  Neurosurgery - Dr. Randy D'Amico  Psychological services/ counseling   PCP?    --------------    Goals: Safe discharge to home  Estimated Length of Stay: 10-14 days  Rehab Potential: Good  Medical Prognosis: Good  Estimated Disposition: Home with Home Care  ---------------    PRESCREEN COMPARISON:  I have reviewed the prescreen information and I have found no relevant changes between the preadmission screening and my post admission evaluation.    RATIONALE FOR INPATIENT ADMISSION: Patient demonstrates the following:  [X] Medically appropriate for rehabilitation admission  [X] Has attainable rehab goals with an appropriate initial discharge plan  [X]Has rehabilitation potential (expected to make a significant improvement within a reasonable period of time)  [X] Requires close medical management by a rehab physician, rehab nursing care, Hospitalist and comprehensive interdisciplinary team (including PT, OT and/or SLP, Prosthetics and Orthotics)  36 year old male with hx of IVDU/methamphetamine use presented to [facility] on 9/15 with acute onset paraplegia after lifting heavy weights found to have SAH/ SDH of the spinal cord with hematoma causing cord compression s/p T7-8 lami decompression (9/16) now with residual paraplegia. Hospital course complicated by hyponatremia secondary to SIADH. Admitted to [facility] acute inpatient rehab on 9/28 for ADL, gait, and functional impairments.       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 Randy D'Amico  - s/p decadron  - Comprehensive Multidisciplinary Rehab Program     3 hours a day, 5 days a week with PT/OT     P&O as needed  - Has outpt appt on 10/6 -- will need to call and reschedule while in rehab or suggest telemedicine  - Per Neurosurgery instructions: leave incision open to air, OK to shower    Hyponatremia, likely SIADH  - hospitalist to follow   - continue Cassie Aleman tabs -- wean as necessary  - cont Fluid restriction 1500 ml  - monitor BMP daily x3 then routine if stable    Psych:  h/o substance abuse: IVDU, methamphetamine  Adjustment disorder  Insomnia  - Neuropsych evaluation and treat for adjustment, coping, and counseling  - will need to follow up with psych outpatient: counseling centers provided   - Trazodone as needed       Pain Management:  - Seen by pain management at [facility] recommended:    Dilaudid PO 2mg q4h Severe    Lyrica 50 q8h    Tylenol 1000 scheduled (monitor LFTs on CMP) - wean as necessary    Robaxin 500 q8h  - bowel regimen as below  - Left leg spasms - consider tizanidine/baclofen if increasing   - Neuropathic pain/ hyperalgesia -- desensitization therapy    GI/Bowel:  Neurogenic bowel with incontinence, decreased rectal tone  - Senna QHS, Miralax daily, dulcolax  - encourage timed toileting, to be discussed with nursing     /Bladder:  Neurogenic bladder with Urinary retention  - Encourage timed voids every 4 hours while awake - on bedpan then transition to commode  - bladder scans [id number]; SC for >400cc     pt with good hand function and motivated to learn to SC if necessary    Skin/Pressure Injury:  - Skin assessment on admission admission: no pressure injuries noted  - Monitor Incisions: spine incision open to air  - Turn every 2 hours while in bed, at risk due to paraplegia    Diet:   - Diet Consistency/Modifications: Reg + thins - fluid restriction 1500ml    DVT ppx:  - Lovenox  - SCDs  - Last Doppler on 9/27 neg    Restrictions/Precautions:  - Precautions: Spine precautions, falls    ---------------  Outpatient Follow-up:  Neurosurgery - Dr. Randy D'Amico  Psychological services/ counseling   PCP?    --------------    Goals: Safe discharge to home  Estimated Length of Stay: 10-14 days  Rehab Potential: Good  Medical Prognosis: Good  Estimated Disposition: Home with Home Care  ---------------    PRESCREEN COMPARISON:  I have reviewed the prescreen information and I have found no relevant changes between the preadmission screening and my post admission evaluation.    RATIONALE FOR INPATIENT ADMISSION: Patient demonstrates the following:  [X] Medically appropriate for rehabilitation admission  [X] Has attainable rehab goals with an appropriate initial discharge plan  [X]Has rehabilitation potential (expected to make a significant improvement within a reasonable period of time)  [X] Requires close medical management by a rehab physician, rehab nursing care, Hospitalist and comprehensive interdisciplinary team (including PT, OT and/or SLP, Prosthetics and Orthotics)  36 year old male with hx of IVDU/methamphetamine use presented to [facility] on 9/15 with acute onset paraplegia after lifting heavy weights found to have SAH/ SDH of the spinal cord with hematoma causing cord compression s/p T7-8 lami decompression (9/16) now with residual paraplegia. Hospital course complicated by hyponatremia secondary to SIADH. Admitted to [facility] acute inpatient rehab on 9/28 for ADL, gait, and functional impairments.       T7 sensory incomplete Spinal Cord Compression   - secondary to spinal subdural hematoma s/p evacuation and decompression of T7-8  - Primary surgeon Dr. Randy D'Amico  - s/p decadron  - Comprehensive Multidisciplinary Rehab Program     3 hours a day, 5 days a week with PT/OT     P&O as needed  - Has outpt appt on 10/6 -- will need to call and reschedule while in rehab or suggest telemedicine  - Per Neurosurgery instructions: leave incision open to air, OK to shower    Hyponatremia, likely SIADH  - hospitalist to follow   - continue Cassie Aleman tabs -- wean as necessary  - cont Fluid restriction 1500 ml  - monitor BMP daily x3 then routine if stable  - consider renal consult    Psych:  h/o substance abuse: IVDU, methamphetamine  Adjustment disorder  Insomnia  - Neuropsych evaluation and treat for adjustment, coping, and counseling  - will need to follow up with psych outpatient: counseling centers provided   - Trazodone as needed at bedtime      Pain Management:  - Seen by pain management at [facility] recommended:    Dilaudid PO 2mg q4h Severe    Lyrica 50 q8h    Tylenol 1000 scheduled (monitor LFTs on CMP) - wean as necessary    Robaxin 500 q8h -- changed to PRN  - bowel regimen as below  - Left leg spasms - consider tizanidine/baclofen if increasing   - Neuropathic pain/ hyperalgesia -- desensitization therapy    GI/Bowel:  Neurogenic bowel with incontinence, decreased voluntary anal contraction  - Senna QHS, Miralax daily, dulcolax  - encourage timed toileting, to be discussed with nursing     /Bladder:  Neurogenic bladder with Urinary retention  - Encourage timed voids every 4 hours while awake - on bedpan then transition to commode  - bladder scans [id number]; SC for >400cc     pt with good hand function and motivated to learn to SC if necessary    Skin/Pressure Injury:  - Skin assessment on admission admission: no pressure injuries noted  - Monitor Incisions: spine incision open to air  - Turn every 2 hours while in bed, at risk due to paraplegia    Diet:   - Diet Consistency/Modifications: Reg + thins - fluid restriction 1500ml    DVT ppx:  - Lovenox  - SCDs  - Last Doppler on 9/27 neg    Restrictions/Precautions:  - Precautions: Spine precautions, falls    ---------------  Outpatient Follow-up:  Neurosurgery - Dr. Randy D'Amico  Psychological services/ counseling   PCP?    --------------    Goals: Safe discharge to home  Estimated Length of Stay: 10-14 days  Rehab Potential: Good  Medical Prognosis: Good  Estimated Disposition: Home with Home Care  ---------------    PRESCREEN COMPARISON:  I have reviewed the prescreen information and I have found no relevant changes between the preadmission screening and my post admission evaluation.    RATIONALE FOR INPATIENT ADMISSION: Patient demonstrates the following:  [X] Medically appropriate for rehabilitation admission  [X] Has attainable rehab goals with an appropriate initial discharge plan  [X]Has rehabilitation potential (expected to make a significant improvement within a reasonable period of time)  [X] Requires close medical management by a rehab physician, rehab nursing care, Hospitalist and comprehensive interdisciplinary team (including PT, OT and/or SLP, Prosthetics and Orthotics)

## 2021-09-28 NOTE — DISCHARGE NOTE NURSING/CASE MANAGEMENT/SOCIAL WORK - NSDCPEEMAIL_GEN_ALL_CORE
[facility] email [email or web address]

## 2021-09-28 NOTE — PROGRESS NOTE ADULT - SUBJECTIVE AND OBJECTIVE BOX
O/N Events: MARCO    Subjective/ROS: States he adhered to the fluid restriction overnight. Denies HA, CP, SOB, n/v, changes in bowel/urinary habits.  12pt ROS otherwise negative.      Vital Signs Last 24 Hrs  T(C): 36.9 (28 Sep 2021 09:15), Max: 37.3 (27 Sep 2021 13:30)  T(F): 98.4 (28 Sep 2021 09:15), Max: 99.2 (27 Sep 2021 13:30)  HR: 90 (28 Sep 2021 08:29) (66 - 100)  BP: 112/75 (28 Sep 2021 08:29) (110/68 - 135/87)  BP(mean): 87 (28 Sep 2021 08:29) (84 - 107)  RR: 18 (28 Sep 2021 08:29) (18 - 20)  SpO2: 100% (28 Sep 2021 08:29) (100% - 100%)          PHYSICAL EXAM  General: A&Ox3; NAD  Head: NC/AT; MMM; PERRL; EOMI;  Neck: Supple; no JVD  Respiratory: CTA B/L; no wheezes/crackles   Cardiovascular: Regular rhythm/rate; S1/S2   Gastrointestinal: Soft; NTND; normoactive BS  Extremities: WWP; no edema/cyanosis  Neurological:  CNII-XII grossly intact; no obvious focal deficits        MEDICATIONS  (STANDING):  acetaminophen   Tablet .. 1000 milliGRAM(s) Oral every 8 hours  bictegravir 50 mG/emtricitabine 200 mG/tenofovir alafenamide 25 mG (BIKTARVY) 1 Tablet(s) Oral daily  bisacodyl 5 milliGRAM(s) Oral at bedtime  chlorhexidine 2% Cloths 1 Application(s) Topical daily  enoxaparin Injectable 40 milliGRAM(s) SubCutaneous at bedtime  fludroCORTISONE 0.1 milliGRAM(s) Oral every 12 hours  influenza   Vaccine 0.5 milliLiter(s) IntraMuscular once  methocarbamol 500 milliGRAM(s) Oral every 8 hours  pantoprazole    Tablet 40 milliGRAM(s) Oral before breakfast  polyethylene glycol 3350 17 Gram(s) Oral daily  pregabalin 50 milliGRAM(s) Oral every 8 hours  senna 2 Tablet(s) Oral at bedtime  sodium chloride 3 Gram(s) Oral every 6 hours    MEDICATIONS  (PRN):  HYDROmorphone   Tablet 2 milliGRAM(s) Oral every 4 hours PRN Severe Pain (7 - 10)  metoclopramide 10 milliGRAM(s) Oral every 8 hours PRN nausea/vomiting  traZODone 50 milliGRAM(s) Oral at bedtime PRN insomnia            No Known Allergies      LABS                                   12.0   8.84  )-----------( 248      ( 28 Sep 2021 06:44 )             34.0   09-28    127<L>  |  98  |  13  ----------------------------<  101<H>  3.9   |  24  |  0.70    Ca    8.6      28 Sep 2021 06:44  Phos  2.7     09-28  Mg     1.9     09-28                      IMAGING/EKG/ETC  EKG:  Xray:  CT:  MRI:

## 2021-09-28 NOTE — H&P ADULT - NSHPPHYSICALEXAM_GEN_ALL_CORE
Constitutional - NAD, Comfortable  HEENT - NCAT, EOMI  Neck - Supple, No limited ROM  Chest - good chest expansion, good respiratory effort, CTAB   Cardio - warm and well perfused, RRR, no murmur  Abdomen -  Soft, NTND  Extremities - No peripheral edema, No calf tenderness   Neurologic Exam:                    Cognitive -             Orientation: Awake, Alert, AAO to self, place, date, year, situation            Attention:  Days of week, recall 3 objects without cuing            Memory: Recent/ remote memory intact            Thought: process, content appropriate     Speech - Fluent, Comprehensible, No dysarthria, No aphasia      Cranial Nerves - No facial asymmetry, Tongue midline, EOMI, Shoulder shrug intact     Motor -                      LEFT    UE - ShAB 5/5, EF 5/5, EE 5/5, WE 5/5,  WNL                    RIGHT UE - ShAB 5/5, EF 5/5, EE 5/5, WE 5/5,  WNL                    LEFT    LE - HF 5/5, KE 5/5, DF 5/5, PF 5/5                    RIGHT LE - HF 5/5, KE 5/5, DF 5/5, PF 5/5        Sensory - Intact to LT bilateral     Reflexes - DTR _ / 4 , neg Christine's b/l, neg Babinski's b/l     Coordination - FTN / HTS intact     OculoVestibular -  No nystagmus  Psychiatric - Mood stable, Affect WNL  Skin on admission: Constitutional - NAD, Comfortable  HEENT - NCAT, EOMI  Neck - Supple, No limited ROM  Chest - good chest expansion, good respiratory effort  Cardio - warm and well perfused, RRR, no murmur  Abdomen -  Soft, mildly distended with suprapubic pressure  Extremities - No peripheral edema, No calf tenderness   Neurologic Exam:                    Cognitive -             Orientation: Awake, Alert, AAO to self, place, date, year, situation            Memory: Recent/ remote memory intact     Cranial Nerves - No facial asymmetry, Tongue midline, EOMI, Shoulder shrug intact     Motor -                      LEFT    UE - ShAB 5/5, EF 5/5, EE 5/5, WE 5/5,  WNL                    RIGHT UE - ShAB 5/5, EF 5/5, EE 5/5, WE 5/5,  WNL                    LEFT    LE - HF 0/5, KE 2/5, DF 5/5, PF 5/5, EHL 2/5                    RIGHT LE - HF 0/5, KE 0/5, DF 0/5, PF 0/5        Sensory - see below     Reflexes - Babinski upgoing bilateral, Hyperreflexic on L, areflexic on R, no clonus, no spasticity noted  Psychiatric - Mood stable, Affect WNL  Skin on admission: Thoracic incision intact open to air, appears benign      MIRELLA ISNCSCI Exam:  RIGHT     LT sensory level: T7    PP Sensory level: T10     LEFT    LT sensory level: T9    PP Sensory level: T9    DAP=Y  VAC= N  Anal wink present Constitutional - NAD, Comfortable  HEENT - NCAT, EOMI  Neck - Supple, No limited ROM  Chest - good chest expansion, good respiratory effort  Cardio - warm and well perfused, RRR, no murmur  Abdomen -  Soft, mildly distended with suprapubic pressure  Extremities - No peripheral edema, No calf tenderness   Neurologic Exam:                    Cognitive -             Orientation: Awake, Alert, AAO to self, place, date, year, situation            Memory: Recent/ remote memory intact     Cranial Nerves - No facial asymmetry, Tongue midline, EOMI, Shoulder shrug intact     Motor -                      LEFT    UE - ShAB 5/5, EF 5/5, EE 5/5, WE 5/5,  WNL                    RIGHT UE - ShAB 5/5, EF 5/5, EE 5/5, WE 5/5,  WNL                    LEFT    LE - HF 0/5, KE 2/5, DF 5/5, PF 5/5, EHL 2/5                    RIGHT LE - HF 0/5, KE 0/5, DF 0/5, PF 0/5        Sensory - see below     Reflexes - Babinski upgoing bilateral, Hyperreflexic on L, areflexic on R, no clonus, no spasticity noted  Psychiatric - Mood stable, Affect WNL  Skin on admission: Thoracic incision intact open to air, appears benign      MIRELLA ISNCSCI Exam:  RIGHT     LT sensory level: T7    PP Sensory level: T10     LEFT    LT sensory level: T9    PP Sensory level: T9    Motor strength as above    DAP=Y  VAC= N  Anal wink present

## 2021-09-28 NOTE — PROGRESS NOTE ADULT - PROBLEM SELECTOR PLAN 3
Counseling provided

## 2021-09-28 NOTE — PROGRESS NOTE ADULT - PROBLEM SELECTOR PLAN 4
Likely reactive  -No overt infectious signs

## 2021-09-28 NOTE — PROGRESS NOTE ADULT - PROBLEM SELECTOR PLAN 1
Management as per primary team  -S/p OR    -Daily PT    -S/p angio
Management as per primary team  -S/p OR    -Daily PT    -For Angio today
Management as per primary team  -S/p OR    -Daily PT    -S/p angio

## 2021-09-28 NOTE — DISCHARGE NOTE NURSING/CASE MANAGEMENT/SOCIAL WORK - PATIENT PORTAL LINK FT
You can access the FollowMyHealth Patient Portal offered by [facility] by registering at the following website: [email or web address]. By joining [employer]’s FollowMyHealth portal, you will also be able to view your health information using other applications (apps) compatible with our system.

## 2021-09-28 NOTE — DISCHARGE NOTE NURSING/CASE MANAGEMENT/SOCIAL WORK - NSDCFUADDAPPT_GEN_ALL_CORE_FT
Please follow up with Dr. D'Amico on 10/6/21 at 1pm     Please follow up with psychiatry at any of the following locations:  Aftercare Recommendations  Encourage engagement in psychotherapy at acute rehab.  Once at home, can provide information for:    [facility]  [address]  [phone]    [facility]  80 [facility]  [phone]    formerly [facility]  [address]  [phone]    [facility]  [address]  [phone]    [facility]  [address]  [phone]    [facility]  [address]  [phone]    [facility]  [facility] [place]  197 [facility]    Please follow up with your primary care doctor

## 2021-09-28 NOTE — PROGRESS NOTE ADULT - PROVIDER SPECIALTY LIST ADULT
Hospitalist
Neurosurgery
Pain Medicine
Pain Medicine
Neurosurgery
Neurosurgery
Pain Medicine
Rehab Medicine
NSICU
NSICU
Neurosurgery
Rehab Medicine
Rehab Medicine
NSICU
NSICU
Hospitalist

## 2021-09-28 NOTE — PROGRESS NOTE ADULT - PROBLEM SELECTOR PROBLEM 5
Subarachnoidal hemorrhage

## 2021-09-28 NOTE — PROGRESS NOTE ADULT - REASON FOR ADMISSION
Paraplegia
weakness
Cord compression
Cord compression
LE weakness
Paraplegia
Paraplegia
possible spinal cord compression
Cord compression
Paraplegia
Cord compression
weakness

## 2021-09-28 NOTE — H&P ADULT - NSHPLABSRESULTS_GEN_ALL_CORE
MR Lumbar Spine w/wo IV Cont (09.16.21 @ 20:11)   Impression: Interval appearance of pockets of lumbar subdural mixed hemorrhage. Diffuse subarachnoid hemorrhage. Interval increase in spinal canal stenosis to moderate to severe most prominent at the L5-S1 level.    MR Thoracic Spine w/wo IV Cont (09.15.21 @ 03:35)   There is diffuse T1 iso to hyperintense signal and heterogeneous T2 signal throughout the spinal canal of the thoracic and lumbar spine which is most compatible with spinal canal hemorrhage. There is more focal hematoma in the spinal canal at the T8-T9 level on the right which measures 0.7 x 0.6 cm with mass effect on the cord which is deviated to the left.    There is focal T2 hyperintense/T1 hypointense signal within the ventral portion of the spinal canal from L1 through L5-S1 most likely representing subdural hematoma. There is diffuse mild to moderate spinal canal stenosis in the lumbar spine secondary to hemorrhage with more prominent spinal canal stenosis at L5-S1 due to the hemorrhage as well as prominence of the epidural fat.    There is presacral heterogeneous fluid which may represent edema and/or hemorrhage.

## 2021-09-28 NOTE — PROGRESS NOTE ADULT - ASSESSMENT
per Neurosurgery    35 yo M PMHx HIV (CD4 700s, VLUD on biktarvy), hx of IVDU and methamphetamine use, was at gym this afternoon lifting heavy weights, ie dead lifts; tonight began to develop pain in thoracolumbar region; over past couple of hours he began to develop paresthesias down both legs and buttock, then progressed to complete plegia of bilateral extremities with loss of sensation from T8 dermatomes and below found on MRI to have intradural/extramedullary lesion with mass effect on spinal cord displacing it to the left, now s/p decadron load and s/p spinal angiogram (neg) and T8-T9 lami decompression, intraoperative findings of subarachnoid clot s/p evacuation (9/15,) and s/p negative spinal angiogram (9/21).        PLAN:  Neuro  - neuro and vitals checks q4hrs  - MRI post-op completed 9/16   - decadron taper completed  - pain control: tylenol, dilaudid prn, lyrica, robaxin  - psych consulted: adjustment disorder, reccs outpatient f/u  - s/p spinal angiogram AVF or AVM 9/21; negative    Cardio  - SBP < 140   - EKG WNL     Pulm  - RA   - encourage incentive spirometry use     GI   - regular   - bowel regimen   - protonix while on Decadron     /renal  - failed TOV on 9/16, straight cath q4h. No allen   - salt tabs 3q6  - flornief 0.1 BID  - normonatremia goal    Heme  - SCDs, SQL  -LE dopplers negative 9/17    Endo  - No active issues   - ISS d/c'ed  - baseline a1c 5.0

## 2021-09-28 NOTE — PROGRESS NOTE ADULT - SUBJECTIVE AND OBJECTIVE BOX
Physical Medicine and Rehabilitation Progress Note:    Patient is a 36y old  Male who presents with a chief complaint of weakness (28 Sep 2021 09:51)      HPI:  CHIEF COMPLAINT/ REASON FOR CONSULTATION: possible spinal cord compression      HPI: 36 m PMHx HIV (CD4 700s, VLUD) was at gym this afternoon lifting heavy weights, ie dead lifts; tonight began to develop pain in thoracolumbar region; over past couple of hours he began to develop paresthesias down both legs and buttok. now can barely feel legs and unable to stand or move legs. Was brought in by ambulance after a passerby on the street witnessed him unable to rise from curb.  has not gone to bathroom, but thus far no bladder or bowel incontinence.  + IVDU, most recently today (methamphetamine).  no fever/chills.    PAST MEDICAL HISTORY   Infection, HIV      PAST SURGICAL HISTORY denies         MEDICATIONS:  Antibiotics:    Neuro:    Anticoagulation:    Other:  dexAMETHasone  IVPB 10 milliGRAM(s) IV Intermittent Once      SOCIAL HISTORY:   Occupation:   Marital Status:     FAMILY HISTORY:      REVIEW OF SYSTEMS:  Check here if all are normal other than Neurological [x]      PHYSICAL EXAMINATION:   T(C): 36.9 (09-14-21 @ 23:53), Max: 36.9 (09-14-21 @ 23:53)  HR: 89 (09-14-21 @ 23:53) (89 - 89)  BP: 158/100 (09-14-21 @ 23:53) (158/100 - 158/100)  RR: 17 (09-14-21 @ 23:53) (17 - 17)  SpO2: 100% (09-14-21 @ 23:53) (100% - 100%)  Wt(kg): --  Weight (kg): 80.7 (09-14 @ 23:53)                   (15 Sep 2021 01:25)                            12.0   8.84  )-----------( 248      ( 28 Sep 2021 06:44 )             34.0       09-28    129<L>  |  95<L>  |  13  ----------------------------<  122<H>  3.6   |  25  |  0.65    Ca    8.7      28 Sep 2021 10:52  Phos  2.7     09-28  Mg     1.9     09-28      Vital Signs Last 24 Hrs  T(C): 36.8 (28 Sep 2021 13:40), Max: 37.2 (27 Sep 2021 17:19)  T(F): 98.2 (28 Sep 2021 13:40), Max: 99 (27 Sep 2021 21:12)  HR: 90 (28 Sep 2021 08:29) (72 - 100)  BP: 112/75 (28 Sep 2021 08:29) (112/75 - 135/87)  BP(mean): 87 (28 Sep 2021 08:29) (87 - 107)  RR: 18 (28 Sep 2021 08:29) (18 - 20)  SpO2: 100% (28 Sep 2021 08:29) (100% - 100%)    MEDICATIONS  (STANDING):  acetaminophen   Tablet .. 1000 milliGRAM(s) Oral every 8 hours  bictegravir 50 mG/emtricitabine 200 mG/tenofovir alafenamide 25 mG (BIKTARVY) 1 Tablet(s) Oral daily  bisacodyl 5 milliGRAM(s) Oral at bedtime  chlorhexidine 2% Cloths 1 Application(s) Topical daily  enoxaparin Injectable 40 milliGRAM(s) SubCutaneous at bedtime  fludroCORTISONE 0.1 milliGRAM(s) Oral every 12 hours  influenza   Vaccine 0.5 milliLiter(s) IntraMuscular once  methocarbamol 500 milliGRAM(s) Oral every 8 hours  polyethylene glycol 3350 17 Gram(s) Oral daily  pregabalin 50 milliGRAM(s) Oral every 8 hours  senna 2 Tablet(s) Oral at bedtime  sodium chloride 3 Gram(s) Oral every 6 hours    MEDICATIONS  (PRN):  HYDROmorphone   Tablet 2 milliGRAM(s) Oral every 4 hours PRN Severe Pain (7 - 10)  metoclopramide 10 milliGRAM(s) Oral every 8 hours PRN nausea/vomiting  traZODone 50 milliGRAM(s) Oral at bedtime PRN insomnia    Currently Undergoing Physical/ Occupational Therapy at bedside.    Functional Status Assessment:   9/27/2021    Therapeutic Interventions      Bed Mobility  Bed Mobility Training Rolling/Turning: minimum assist (75% patient effort);  verbal cues;  1 person assist;  bed rails  Bed Mobility Training Sit-to-Supine: Not assessed, pt returned in wheelchair with rehab tech Chris mcmahon, BARBARA garcias  Bed Mobility Training Supine-to-Sit: moderate assist (50% patient effort);  verbal cues;  1 person assist;  bed rails;  HOB raised 20 degrees  Bed Mobility Training Limitations: decreased ability to use legs for bridging/pushing;  impaired ability to control trunk for mobility;  decreased ROM;  decreased sensation;  decreased strength;  impaired balance;  impaired postural control;  decreased aerobic capacity/endurance     Bed-Chair Transfer Training  Transfer Training Bed-to-Chair Transfer: contact guard;  minimum assist (75% patient effort);  verbal cues;  1 person assist;  weight-bearing as tolerated   Slideboard   Transfer Training Chair-to-Bed Transfer: NOt assessed, pt returned seated in the wheelchair with rehab tech BARBARA Perez;  weight-bearing as tolerated   Slideboard   Bed-to-Chair Transfer Training Transfer Safety Analysis: decreased balance;  decreased sequencing ability;  decreased step length;  decreased weight-shifting ability;  abnormal muscle tone;  decreased ROM;  decreased sensation;  decreased strength;  impaired balance;  impaired postural control;  decreased aeroibc capacity/endurance ;  Slideboard     Therapeutic Exercise  Therapeutic Exercise Detail: Independent navigation of Wheelchair x 300 feet           PM&R Impression: as above    Current Disposition Plan:    [facility]

## 2021-09-28 NOTE — PROGRESS NOTE ADULT - SUBJECTIVE AND OBJECTIVE BOX
HPI:  CHIEF COMPLAINT/ REASON FOR CONSULTATION: possible spinal cord compression      HPI: 36 m PMHx HIV (CD4 700s, VLUD) was at gym this afternoon lifting heavy weights, ie dead lifts; tonight began to develop pain in thoracolumbar region; over past couple of hours he began to develop paresthesias down both legs and buttok. now can barely feel legs and unable to stand or move legs. Was brought in by ambulance after a passerby on the street witnessed him unable to rise from curb.  has not gone to bathroom, but thus far no bladder or bowel incontinence.  + IVDU, most recently today (methamphetamine).  no fever/chills.    Hospital Course:  9/15: s/p spinal angiogram (neg) and T8-T9 lami decompression, intraoperative findings of subarachnoid clot s/p evacuation POD # 0 (9/15) dilaudid x 1 for pain. diet advanced.   9/16: POD# 1: s/p spinal angiogram (neg) and T8-T9 lami decompression, intraoperative findings of subarachnoid clot s/p evacuation POD #1 (9/15) dilaudid x 1 for pain. diet advanced.   9/17: POD#2 s/p spinal angio, POD#2 s/p T8-9 lami decompression.  LE dopplers negative. MRI T/L spine read shows T7/T8 spinal cord edema consistent with infarction, L5-S1 increase in spinal canal stenosis w/ mixed SDH and diffuse SAH. Psych consulted for anxiety/depression. Pend SDU. Bile bag removed today  9/18: POD 3. T8-9 lami/decompression. Psych eval suggests adjustment d/o and outpatient follow up   9/19: POD4. MARCO overnight. Pending spinal rehab placement.   9/20: POD5 AMRCO overnight  9/21: POD6, pending angio today. MARCO overnight, neuro stable. Angio results are negative.  9/22: POD7 angio and T8-9 lami decompression, POD1 negative angio. MARCO overnight, neuro stable. Pending dispo. S/p enema, had BM  9/23: POD8 angio and T8-9 lami decompression, POD2 negative angio. MARCO overnight, neuro stable. Pending dispo.  9/24: POD9 s/p angio and T8-9 lami decompression, POD3 negative angio. MARCO overnight, neuro stable. Pending multidisciplinary discussion regarding dispo today at 11am.   9/25: POD10 s/p spinal angio  and T8-9 lami decompression, POD4 s/p neg spinal angio. MARCO overnight. Neuro exam stable. Pending acute rehab   9/26: POD11 s/p spinal angio  and T8-9 lami decompression, POD5 s/p neg spinal angio. Pt reports posterior neck pain and frontal headaches, worse when laying flat. States it may be his usual tension headaches. Advised to use warm packs as needed. Neuro exam stable.  9/27: POD 12/POD6 spinal angio. Upgraded to SDU for higher rates of hypertonics overnight. Exam stable. f/u urine lytes  9/28: POD 13/POD7. MARCO overnight. Off hypertonics. Na improved to 132 yesterday evening, continue to monitor. Pending rehab.    Vital Signs Last 24 Hrs  T(C): 37.2 (27 Sep 2021 21:12), Max: 37.3 (27 Sep 2021 13:30)  T(F): 99 (27 Sep 2021 21:12), Max: 99.2 (27 Sep 2021 13:30)  HR: 84 (28 Sep 2021 00:20) (66 - 100)  BP: 128/80 (28 Sep 2021 00:20) (110/68 - 135/87)  BP(mean): 95 (28 Sep 2021 00:20) (84 - 107)  RR: 18 (28 Sep 2021 00:20) (17 - 18)  SpO2: 100% (28 Sep 2021 00:20) (98% - 100%)    I&O's Detail    26 Sep 2021 07:01  -  27 Sep 2021 07:00  --------------------------------------------------------  IN:    Oral Fluid: 780 mL    sodium chloride 3%: 120 mL    sodium chloride 3%: 150 mL    sodium chloride 3%: 350 mL  Total IN: 1400 mL    OUT:    Intermittent Catheterization - Urethral (mL): 3050 mL  Total OUT: 3050 mL    Total NET: -1650 mL      27 Sep 2021 07:01  -  28 Sep 2021 01:44  --------------------------------------------------------  IN:    Oral Fluid: 1200 mL  Total IN: 1200 mL    OUT:    Intermittent Catheterization - Urethral (mL): 2500 mL  Total OUT: 2500 mL    Total NET: -1300 mL        I&O's Summary    26 Sep 2021 07:01  -  27 Sep 2021 07:00  --------------------------------------------------------  IN: 1400 mL / OUT: 3050 mL / NET: -1650 mL    27 Sep 2021 07:01  -  28 Sep 2021 01:44  --------------------------------------------------------  IN: 1200 mL / OUT: 2500 mL / NET: -1300 mL        PHYSICAL EXAM:  General: NAD, pt is comfortably laying in hospital bed, A&O x3, on RA  HEENT: CN II-XII grossly intact, PERRL 3mm, EOMI b/l, face symmetric, tongue midline, neck FROM  Cardiovascular: RRR, normal S1 and S2   Respiratory: lungs CTAB, no wheezing, rhonchi, or crackles   GI: normoactive BS to auscultation, abd soft, NTND   Neuro: no aphasia, speech clear, no dysmetria, no pronator drift  strength 5/5 b/l UE, some LLE toe movements otherwise 0/5 b/l LE, b/l LE flaccid, absent reflexes, no clonus, babinski neg b/l   RLE sensation (especially along L3/L4 dermatome) to light touch diminished compared to LLE   Extremities: R groin site C/D/I, no hematoma. Distal pulses 2+ x4   Incision/Wound: midline thoracic incision C/D/I     TUBES/LINES:  [] CVC  [] A-line  [] Lumbar Drain  [] Ventriculostomy  [] Other    DIET:  [] NPO  [] Mechanical  [] Tube feeds    LABS:                        12.3   8.54  )-----------( 257      ( 27 Sep 2021 06:56 )             35.1     09-27    132<L>  |  100  |  15  ----------------------------<  103<H>  3.8   |  24  |  0.64    Ca    8.6      27 Sep 2021 22:35  Phos  3.3     09-27  Mg     2.1     09-27              CAPILLARY BLOOD GLUCOSE          Drug Levels: [] N/A    CSF Analysis: [] N/A      Allergies    No Known Allergies    Intolerances      MEDICATIONS:  Antibiotics:  bictegravir 50 mG/emtricitabine 200 mG/tenofovir alafenamide 25 mG (BIKTARVY) 1 Tablet(s) Oral daily    Neuro:  acetaminophen   Tablet .. 1000 milliGRAM(s) Oral every 8 hours  HYDROmorphone   Tablet 2 milliGRAM(s) Oral every 4 hours PRN  methocarbamol 500 milliGRAM(s) Oral every 8 hours  pregabalin 50 milliGRAM(s) Oral every 8 hours  traZODone 50 milliGRAM(s) Oral at bedtime PRN    Anticoagulation:  enoxaparin Injectable 40 milliGRAM(s) SubCutaneous at bedtime    OTHER:  bisacodyl 5 milliGRAM(s) Oral at bedtime  chlorhexidine 2% Cloths 1 Application(s) Topical daily  fludroCORTISONE 0.1 milliGRAM(s) Oral every 12 hours  influenza   Vaccine 0.5 milliLiter(s) IntraMuscular once  metoclopramide 10 milliGRAM(s) Oral every 8 hours PRN  pantoprazole    Tablet 40 milliGRAM(s) Oral before breakfast  polyethylene glycol 3350 17 Gram(s) Oral daily  senna 2 Tablet(s) Oral at bedtime    IVF:  sodium chloride 3 Gram(s) Oral every 6 hours    CULTURES:    RADIOLOGY & ADDITIONAL TESTS:      ASSESSMENT:  36 M PMHx HIV (CD4 700s, VLUD on biktarvy), hx of IVDU and methamphetamine use, was at gym this afternoon lifting heavy weights, ie dead lifts; tonight began to develop pain in thoracolumbar region; over past couple of hours he began to develop paresthesias down both legs and buttock, then progressed to complete plegia of bilateral extremities with loss of sensation from T8 dermatomes and below found on MRI to have intradural/extramedullary lesion with mass effect on spinal cord displacing it to the left, now s/p decadron load and s/p spinal angiogram (neg) and T8-T9 lami decompression, intraoperative findings of subarachnoid clot s/p evacuation (9/15,) and s/p negative spinal angiogram (9/21).      FLACCID PARALYSIS OF LEG    Handoff    MEWS Score    Infection, HIV    Subdural hematoma    Subdural hematoma    Adjustment reaction    Angiogram, spine, selective    Lumbar laminectomy    Flaccid paralysis of legs    Spinal cord compression    HIV disease    Subdural hematoma    Cannabis abuse, daily use    Episodic methamphetamine abuse    Leukocytosis, unspecified type    Subarachnoidal hemorrhage    Postoperative state    Hyponatremia    Angiogram, spine, selective    Lumbar laminectomy    NUMBNESS    Spinal cord compression    Angiogram, spine, selective    Spinal cord compression    HIV disease    Subdural hematoma    Cannabis abuse, daily use    Episodic methamphetamine abuse    Leukocytosis, unspecified type    Subarachnoidal hemorrhage    [email or web address]        PLAN:  Neuro  - neuro and vitals checks q4hrs  - MRI post-op completed 9/16   - decadron taper completed  - pain control: tylenol, dilaudid prn, lyrica, robaxin  - psych consulted: adjustment disorder, reccs outpatient f/u  - s/p spinal angiogram AVF or AVM 9/21; negative    Cardio  - SBP < 140   - EKG WNL     Pulm  - RA   - encourage incentive spirometry use     GI   - regular   - bowel regimen   - protonix while on Decadron     /renal  - failed TOV on 9/16, straight cath q4h. No allen   - salt tabs 3q6  - flornief 0.1 BID  - normonatremia goal    Heme  - SCDs, SQL  -LE dopplers negative 9/17    Endo  - No active issues   - ISS d/c'ed  - baseline a1c 5.0      GOC: full code   Level of care: regional  Dispo: pending [facility] & private pay coverage    Assessment and plan discussed w/ Dr. D'Amico           Assessment:  Present when checked    []  GCS  E   V  M     Heart Failure: []Acute, [] acute on chronic , []chronic  Heart Failure:  [] Diastolic (HFpEF), [] Systolic (HFrEF), []Combined (HFpEF and HFrEF), [] RHF, [] Pulm HTN, [] Other    [] CAROLA, [] ATN, [] AIN, [] other  [] CKD1, [] CKD2, [] CKD 3, [] CKD 4, [] CKD 5, []ESRD    Encephalopathy: [] Metabolic, [] Hepatic, [] toxic, [] Neurological, [] Other    Abnormal Nurtitional Status: [] malnurtition (see nutrition note), [ ]underweight: BMI < 19, [] morbid obesity: BMI >40, [] Cachexia    [] Sepsis  [] hypovolemic shock,[] cardiogenic shock, [] hemorrhagic shock, [] neuogenic shock  [] Acute Respiratory Failure  []Cerebral edema, [] Brain compression/ herniation,   [] Functional quadriplegia  [] Acute blood loss anemia

## 2021-09-28 NOTE — PROGRESS NOTE ADULT - PROBLEM SELECTOR PLAN 7
Euvolemic exam, with concentrated urine. Likely SIADH  -Na now appears stable although decreased from 132 overnight, repeat BMP  -c/w salt tabs, fluid restriction  -Trend Sodium level, repeat urine studies; include serum uric acid and urine uric acid and creatinine   -FeUreate>11% would be consistent with SIADH/CSW and would only normalize to <11% in SIADH after correction of sodium  -UOP has been ~ 2L per day, not c/w CSW    Suspect that patient has SIADH given new spinal cord injury - if Na stable on repeat can d/c w/ f/u of BMP

## 2021-09-28 NOTE — PROGRESS NOTE ADULT - PROBLEM SELECTOR PROBLEM 4
Leukocytosis, unspecified type

## 2021-09-28 NOTE — H&P ADULT - NSHPREVIEWOFSYSTEMS_GEN_ALL_CORE
REVIEW OF SYSTEMS  Constitutional: No fever, No Chills, No fatigue  HEENT: No eye pain, No visual disturbances, No difficulty hearing  Pulm: No cough,  No shortness of breath  Cardio: No chest pain, No palpitations  GI:  No abdominal pain, No nausea, No vomiting, No diarrhea, No constipation  : No dysuria, No frequency, No hematuria  Neuro: No headaches, No memory loss, No loss of strength, No numbness, No tremors  Skin: No itching, No rashes, No lesions   Endo: No temperature intolerance  MSK: No joint pain, No joint swelling, No muscle pain, No Neck or back pain  Psych:  No depression, No anxiety Patient seen at bedside. Prefers to be called "Yoan". Pleasant and cooperative with exam. Friend is at bedside as well.    REVIEW OF SYSTEMS  Constitutional: No fever, No Chills, No fatigue  HEENT: No eye pain, No visual disturbances, No difficulty hearing  Pulm: No cough,  No shortness of breath  Cardio: No chest pain, No palpitations  GI: + incontinent of bowel  : +urinary retention   Neuro: +loss of strength in both legs, +spasms left leg, +burning/hypersensitivity  Skin: No itching, No rashes, No lesions   MSK: + back pain  Psych:  No depression, No anxiety

## 2021-09-28 NOTE — PROGRESS NOTE ADULT - PROBLEM SELECTOR PLAN 6
IS, pain control, mobilization (daily PT), bowel regimen

## 2021-09-28 NOTE — H&P ADULT - NSHPSOCIALHISTORY_GEN_ALL_CORE
SOCIAL HISTORY  Smoking -   EtOH -   Marital Status -     FUNCTIONAL HISTORY      Previous Functional Status:  Independent in ambulation, ADL's, transfers prior to hospitalization    Current Functional Status:  Bed mobility:  Transfers:  Gait / ambulation:  ADL's:  Speech: SOCIAL HISTORY  Smoking - +cannibus  EtOH -   Marital Status - single    FUNCTIONAL HISTORY         -  Home Living Status:  lives with his father in an elevator accessible apartment building         -  Prior Home Care Services:   none         -  Family support: family         -  Occupation:     Previous Functional Status:  Independent in ambulation, ADL's, transfers prior to hospitalization    Current Functional Status:  Bed mobility: Min assist x1  Transfers: Bed to chair Min assist x2  Gait / ambulation: SOCIAL HISTORY  Smoking - +cannibus  Marital Status - single    FUNCTIONAL HISTORY         -  Home Living Status:  lives with his father in an elevator accessible apartment building         -  Prior Home Care Services:   none         -  Family support: family         -  Occupation:     Previous Functional Status:  Independent in ambulation, ADL's, transfers prior to hospitalization    Current Functional Status:  Bed mobility: Min assist x1  Transfers: Bed to chair Min assist x2  Gait / ambulation:

## 2021-09-29 LAB
ALBUMIN SERPL ELPH-MCNC: 2.8 G/DL — LOW (ref 3.3–5)
ALP SERPL-CCNC: 59 U/L — SIGNIFICANT CHANGE UP (ref 40–120)
ALT FLD-CCNC: 41 U/L — SIGNIFICANT CHANGE UP (ref 10–45)
ANION GAP SERPL CALC-SCNC: 7 MMOL/L — SIGNIFICANT CHANGE UP (ref 5–17)
AST SERPL-CCNC: 17 U/L — SIGNIFICANT CHANGE UP (ref 10–40)
BASOPHILS # BLD AUTO: 0.03 K/UL — SIGNIFICANT CHANGE UP (ref 0–0.2)
BASOPHILS NFR BLD AUTO: 0.3 % — SIGNIFICANT CHANGE UP (ref 0–2)
BILIRUB SERPL-MCNC: 0.4 MG/DL — SIGNIFICANT CHANGE UP (ref 0.2–1.2)
BUN SERPL-MCNC: 14 MG/DL — SIGNIFICANT CHANGE UP (ref 7–23)
CALCIUM SERPL-MCNC: 8.2 MG/DL — LOW (ref 8.4–10.5)
CHLORIDE SERPL-SCNC: 101 MMOL/L — SIGNIFICANT CHANGE UP (ref 96–108)
CO2 SERPL-SCNC: 27 MMOL/L — SIGNIFICANT CHANGE UP (ref 22–31)
COVID-19 SPIKE DOMAIN AB INTERP: POSITIVE
COVID-19 SPIKE DOMAIN ANTIBODY RESULT: >250 U/ML — HIGH
CREAT SERPL-MCNC: 0.66 MG/DL — SIGNIFICANT CHANGE UP (ref 0.5–1.3)
EOSINOPHIL # BLD AUTO: 0.02 K/UL — SIGNIFICANT CHANGE UP (ref 0–0.5)
EOSINOPHIL NFR BLD AUTO: 0.2 % — SIGNIFICANT CHANGE UP (ref 0–6)
GLUCOSE SERPL-MCNC: 98 MG/DL — SIGNIFICANT CHANGE UP (ref 70–99)
HCT VFR BLD CALC: 34.9 % — LOW (ref 39–50)
HGB BLD-MCNC: 12.3 G/DL — LOW (ref 13–17)
IMM GRANULOCYTES NFR BLD AUTO: 0.7 % — SIGNIFICANT CHANGE UP (ref 0–1.5)
LYMPHOCYTES # BLD AUTO: 1.76 K/UL — SIGNIFICANT CHANGE UP (ref 1–3.3)
LYMPHOCYTES # BLD AUTO: 20 % — SIGNIFICANT CHANGE UP (ref 13–44)
MCHC RBC-ENTMCNC: 32.3 PG — SIGNIFICANT CHANGE UP (ref 27–34)
MCHC RBC-ENTMCNC: 35.2 GM/DL — SIGNIFICANT CHANGE UP (ref 32–36)
MCV RBC AUTO: 91.6 FL — SIGNIFICANT CHANGE UP (ref 80–100)
MONOCYTES # BLD AUTO: 0.68 K/UL — SIGNIFICANT CHANGE UP (ref 0–0.9)
MONOCYTES NFR BLD AUTO: 7.7 % — SIGNIFICANT CHANGE UP (ref 2–14)
NEUTROPHILS # BLD AUTO: 6.26 K/UL — SIGNIFICANT CHANGE UP (ref 1.8–7.4)
NEUTROPHILS NFR BLD AUTO: 71.1 % — SIGNIFICANT CHANGE UP (ref 43–77)
NRBC # BLD: 0 /100 WBCS — SIGNIFICANT CHANGE UP (ref 0–0)
PLATELET # BLD AUTO: 236 K/UL — SIGNIFICANT CHANGE UP (ref 150–400)
POTASSIUM SERPL-MCNC: 3.9 MMOL/L — SIGNIFICANT CHANGE UP (ref 3.5–5.3)
POTASSIUM SERPL-SCNC: 3.9 MMOL/L — SIGNIFICANT CHANGE UP (ref 3.5–5.3)
PREALB SERPL-MCNC: 31 MG/DL — SIGNIFICANT CHANGE UP (ref 20–40)
PROT SERPL-MCNC: 5.6 G/DL — LOW (ref 6–8.3)
RBC # BLD: 3.81 M/UL — LOW (ref 4.2–5.8)
RBC # FLD: 12.2 % — SIGNIFICANT CHANGE UP (ref 10.3–14.5)
SARS-COV-2 IGG+IGM SERPL QL IA: >250 U/ML — HIGH
SARS-COV-2 IGG+IGM SERPL QL IA: POSITIVE
SODIUM SERPL-SCNC: 135 MMOL/L — SIGNIFICANT CHANGE UP (ref 135–145)
WBC # BLD: 8.81 K/UL — SIGNIFICANT CHANGE UP (ref 3.8–10.5)
WBC # FLD AUTO: 8.81 K/UL — SIGNIFICANT CHANGE UP (ref 3.8–10.5)

## 2021-09-29 PROCEDURE — 99223 1ST HOSP IP/OBS HIGH 75: CPT | Mod: GC

## 2021-09-29 PROCEDURE — 99255 IP/OBS CONSLTJ NEW/EST HI 80: CPT

## 2021-09-29 RX ADMIN — SODIUM CHLORIDE 3 GRAM(S): 9 INJECTION INTRAMUSCULAR; INTRAVENOUS; SUBCUTANEOUS at 11:49

## 2021-09-29 RX ADMIN — SODIUM CHLORIDE 3 GRAM(S): 9 INJECTION INTRAMUSCULAR; INTRAVENOUS; SUBCUTANEOUS at 06:17

## 2021-09-29 RX ADMIN — SODIUM CHLORIDE 3 GRAM(S): 9 INJECTION INTRAMUSCULAR; INTRAVENOUS; SUBCUTANEOUS at 23:10

## 2021-09-29 RX ADMIN — ENOXAPARIN SODIUM 40 MILLIGRAM(S): 100 INJECTION SUBCUTANEOUS at 21:55

## 2021-09-29 RX ADMIN — Medication 975 MILLIGRAM(S): at 21:55

## 2021-09-29 RX ADMIN — SENNA PLUS 2 TABLET(S): 8.6 TABLET ORAL at 21:55

## 2021-09-29 RX ADMIN — FLUDROCORTISONE ACETATE 0.1 MILLIGRAM(S): 0.1 TABLET ORAL at 06:17

## 2021-09-29 RX ADMIN — Medication 975 MILLIGRAM(S): at 14:00

## 2021-09-29 RX ADMIN — BICTEGRAVIR SODIUM, EMTRICITABINE, AND TENOFOVIR ALAFENAMIDE FUMARATE 1 TABLET(S): 30; 120; 15 TABLET ORAL at 11:49

## 2021-09-29 RX ADMIN — Medication 975 MILLIGRAM(S): at 22:09

## 2021-09-29 RX ADMIN — METHOCARBAMOL 500 MILLIGRAM(S): 500 TABLET, FILM COATED ORAL at 21:55

## 2021-09-29 RX ADMIN — FLUDROCORTISONE ACETATE 0.1 MILLIGRAM(S): 0.1 TABLET ORAL at 18:50

## 2021-09-29 RX ADMIN — SODIUM CHLORIDE 3 GRAM(S): 9 INJECTION INTRAMUSCULAR; INTRAVENOUS; SUBCUTANEOUS at 18:50

## 2021-09-29 RX ADMIN — Medication 975 MILLIGRAM(S): at 13:23

## 2021-09-29 NOTE — DIETITIAN INITIAL EVALUATION ADULT. - FACTORS AFF FOOD INTAKE
States Fair Intake - Previous Poor with Some Improvement Recently (Per Patient)/persistent lack of appetite

## 2021-09-29 NOTE — DIETITIAN INITIAL EVALUATION ADULT. - OTHER INFO
Initial Nutrition Assessment   36yr Old Male   Dx: Spinal Cord Compression  Diet - Regular Diet w/ Thin Liquids & 1,500ml/day Fluid Restriction   (Declines Nutrition Supplementation)  Denies Food Allergy/Intolerance  Tolerates Diet Well - No Current Chewing/Swallowing Complications (Per Patient)  Surgical Incision on Mid-Backx2 & Groin and No Pressure Ulcers (as Per Nursing Flow Sheets)  No Edema Noted (as Per Nursing Flow Sheets)  No Recent Nausea/Vomiting/Diarrhea/Constipation (as Per Patient)

## 2021-09-29 NOTE — DIETITIAN INITIAL EVALUATION ADULT. - PHYSCIAL ASSESSMENT
Temporalis, Orbital Fat Pads, Buccal Fat Pads, Gastrocnemius(Calf) Wasting Observed  (Per Nutrition Focused Physical Exam)

## 2021-09-29 NOTE — DIETITIAN INITIAL EVALUATION ADULT. - ETIOLOGY
Anesthesia Transfer of Care Note    Patient: Arianne Johnson    Procedure(s) Performed: Procedure(s) (LRB):  COLONOSCOPY (N/A)    Patient location: PACU    Anesthesia Type: general    Transport from OR: Transported from OR on room air with adequate spontaneous ventilation    Post pain: adequate analgesia    Post assessment: no apparent anesthetic complications and tolerated procedure well    Post vital signs: stable    Level of consciousness: awake    Nausea/Vomiting: no nausea/vomiting    Complications: none          Last vitals:   Visit Vitals    BP (!) 145/67 (BP Location: Right arm, Patient Position: Lying, BP Method: Automatic)    Pulse 77    Temp 36.6 °C (97.9 °F) (Skin)    Resp 16    Ht 5' (1.524 m)    Wt 63.5 kg (140 lb)    SpO2 98%    Breastfeeding No    BMI 27.34 kg/m2     
Recent Illness/Hospitalization

## 2021-09-29 NOTE — DIETITIAN INITIAL EVALUATION ADULT. - ORAL INTAKE PTA/DIET HISTORY
Patient Does Not Follow Diet @Home, Consumes 2 Meals a Day & Does Take Vitamin/Supplements @Home (Horse Grass & Other Herbal Supplements)    Regular Diet w/ Thin Liquids 1,500ml/day Fluid Restriction   Declines Nutrition Supplementation   Education Provided on Proper Nutrition Patient Does Not Follow Diet @Home, Consumes 2 Meals a Day & Does Take Vitamin/Supplements @Home (Horse Grass & Other Herbal Supplements)    Regular Diet w/ Thin Liquids 1,500ml/day Fluid Restriction   Declines Nutrition Supplementation (x2 Protein @Meals)  Education Provided on Proper Nutrition

## 2021-09-29 NOTE — CONSULT NOTE ADULT - SUBJECTIVE AND OBJECTIVE BOX
Patient is a 36y old  Male who presents with a chief complaint of Spinal Cord Compression (29 Sep 2021 08:55)    HPI:  Ms. ALISSA ARAUJO is a 36 year old male with history of IVDU and methamphetamine use presented to [facility] on 9/15/21 with symptoms of acute onset lower extremity weakness and parethesias a few hours after lifting heavy weights in the gym.  He developed back pain and radiating pain to bilateral buttocks then progressed to paraplegia. He was brought in by ambulance after a passerby witnessed her unable to rise from the curb.  He did not have any bowel or bladder incontinence on admission.      On presentation to the ED, MRI performed and found to have ill defined intradural - extramedullary lesion with enhancement at level T8 with mass effect on the spinal cord with lefward displacement and minimal associated cord edema.  He was admitted to neurosurgical survice and started on decadron. Spinal angiogram performed which was negative. He underwent T8-9 lami decompression on 9/15 with Dr. D-Amico with intraoperative findings consistent with subarachnoid clot now s/p evacuation.  Post operative course was uncomplicated. Tolerated procedure well. Pt was seen by pain management who adjusted pain regimen (tylenol, lyrica, Robaxin, Dilaudid). Labs were significant for hyponatremia likely secondary to SIADH given euvolemia on exam and good UOP. Pt required short course of hypertonic saline then weaned off. Sodium stable with fluid restriction and addition of Na tabs. Pt was seen by behavioral health and psychology for adjustment disorder in the setting of new paralysis. He was determined to be low risk for suicide and emotional support provided. Trazodone was started for insomnia.     Patient was evaluated by PM&R and therapy for functional deficits and gait/ ADL impairments and recommended acute rehabilitation. Patient was medically optimized for discharge to  to [facility] on  (28 Sep 2021 13:56)    Patient seen and examined at bedside. Drawing at bedside, in good spirits. Reported after dead lifting he went to have a few drinks with his friends and noticed bilateral lower extremity weakness. Was unable to get off the curb prompting ED evaluation. Reports sensation overall intact, unable to move his lower extremities. States he has no control of bowel or bladder function. Requiring straight cath and uses a diaper. Slept well. Admits to occasional muscle spasms.  Denies back pain, incision to air.      PAST MEDICAL & SURGICAL HISTORY:  Infection, HIV      SOCIAL HISTORY: Lives with sister. Prior to hospitalization independent ADLs, ambulated with assistance. 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 Marijuana user. Denies cigarette use. hx of IV drug use with meth, last use 2020    FAMILY HISTORY: Father alive, age 66, no PMH. Mother  age 60, hx of lung cancer    ALLERGIES:  No Known Allergies    MEDICATIONS  (STANDING):  acetaminophen   Tablet .. 975 milliGRAM(s) Oral every 8 hours  bictegravir 50 mG/emtricitabine 200 mG/tenofovir alafenamide 25 mG (BIKTARVY) 1 Tablet(s) Oral daily  enoxaparin Injectable 40 milliGRAM(s) SubCutaneous at bedtime  fludroCORTISONE 0.1 milliGRAM(s) Oral every 12 hours  polyethylene glycol 3350 17 Gram(s) Oral daily  pregabalin 50 milliGRAM(s) Oral every 8 hours  senna 2 Tablet(s) Oral at bedtime  sodium chloride 3 Gram(s) Oral every 6 hours    MEDICATIONS  (PRN):  HYDROmorphone   Tablet 2 milliGRAM(s) Oral every 4 hours PRN Severe Pain (7 - 10)  methocarbamol 500 milliGRAM(s) Oral every 8 hours PRN Muscle Spasm  metoclopramide 10 milliGRAM(s) Oral every 8 hours PRN nausea/vomiting  traZODone 25 milliGRAM(s) Oral at bedtime PRN insomnia    Review of Systems: Refer to HPI for pertinent positives and negatives. All other ROS reviewed and negative except as otherwise stated above.    Vital Signs Last 24 Hrs  T(F): 98.3 (29 Sep 2021 08:43), Max: 98.3 (29 Sep 2021 08:43)  HR: 61 (29 Sep 2021 08:43) (61 - 98)  BP: 124/79 (29 Sep 2021 08:43) (124/79 - 128/74)  RR: 18 (29 Sep 2021 08:43) (15 - 18)  SpO2: 100% (29 Sep 2021 08:43) (100% - 100%)  I&O's Summary    28 Sep 2021 07:01  -  29 Sep 2021 07:00  --------------------------------------------------------  IN: 0 mL / OUT: 2040 mL / NET: -2040 mL      PHYSICAL EXAM:  GENERAL: NAD, well-groomed, well-developed  HEAD:  Atraumatic, Normocephalic  EYES: EOMI, PERRL, conjunctiva and sclera clear  ENMT: Moist mucous membranes, Good dentition  NECK: Supple, No JVD  CHEST/LUNG: Clear to auscultation bilaterally, non-labored breathing, good air entry  HEART: RRR; S1/S2, No murmur  ABDOMEN: Soft, Nontender, Nondistended; Bowel sounds present  VASCULAR: Normal pulses, Normal capillary refill  EXTREMITIES: No cyanosis, No edema  LYMPH: No lymphadenopathy noted  SKIN: Warm, Intact  PSYCH: Normal mood and affect  NERVOUS SYSTEM:  A/O x3, 5/5 muscle strength in bilateral upper extremities, 0/5 muscle strength in bilateral lower extremities, sensation grossly intact     LABS:                        12.3   8.81  )-----------( 236      ( 29 Sep 2021 06:10 )             34.9         135  |  101  |  14  ----------------------------<  98  3.9   |  27  |  0.66    Ca    8.2      29 Sep 2021 06:10  Phos  2.7       Mg     1.9         TPro  5.6  /  Alb  2.8  /  TBili  0.4  /  DBili  x   /  AST  17  /  ALT  41  /  AlkPhos  59      eGFR if Non African American: 123 mL/min/1.73M2 (21 @ 06:10)  eGFR if African American: 143 mL/min/1.73M2 (21 @ 06:10)      COVID-19 PCR: NotDetec (21 @ 18:49)  COVID-19 PCR: NotDetec (21 @ 19:01)  COVID-19 PCR: Negative (09-15-21 @ 01:05)    RADIOLOGY & ADDITIONAL TESTS: chart reviewed previous images    Care Discussed with Consultants/Other Providers: Dr Pérez physiatrist  RN, PT/OT

## 2021-09-30 LAB
ALBUMIN SERPL ELPH-MCNC: 2.8 G/DL — LOW (ref 3.3–5)
ALP SERPL-CCNC: 68 U/L — SIGNIFICANT CHANGE UP (ref 40–120)
ALT FLD-CCNC: 47 U/L — HIGH (ref 10–45)
ANION GAP SERPL CALC-SCNC: 5 MMOL/L — SIGNIFICANT CHANGE UP (ref 5–17)
AST SERPL-CCNC: 15 U/L — SIGNIFICANT CHANGE UP (ref 10–40)
BILIRUB SERPL-MCNC: 0.3 MG/DL — SIGNIFICANT CHANGE UP (ref 0.2–1.2)
BUN SERPL-MCNC: 15 MG/DL — SIGNIFICANT CHANGE UP (ref 7–23)
CALCIUM SERPL-MCNC: 8.3 MG/DL — LOW (ref 8.4–10.5)
CHLORIDE SERPL-SCNC: 107 MMOL/L — SIGNIFICANT CHANGE UP (ref 96–108)
CO2 SERPL-SCNC: 29 MMOL/L — SIGNIFICANT CHANGE UP (ref 22–31)
CREAT SERPL-MCNC: 0.85 MG/DL — SIGNIFICANT CHANGE UP (ref 0.5–1.3)
GLUCOSE SERPL-MCNC: 92 MG/DL — SIGNIFICANT CHANGE UP (ref 70–99)
HCT VFR BLD CALC: 37.2 % — LOW (ref 39–50)
HGB BLD-MCNC: 12.7 G/DL — LOW (ref 13–17)
MCHC RBC-ENTMCNC: 32.3 PG — SIGNIFICANT CHANGE UP (ref 27–34)
MCHC RBC-ENTMCNC: 34.1 GM/DL — SIGNIFICANT CHANGE UP (ref 32–36)
MCV RBC AUTO: 94.7 FL — SIGNIFICANT CHANGE UP (ref 80–100)
PLATELET # BLD AUTO: 239 K/UL — SIGNIFICANT CHANGE UP (ref 150–400)
POTASSIUM SERPL-MCNC: 4.5 MMOL/L — SIGNIFICANT CHANGE UP (ref 3.5–5.3)
POTASSIUM SERPL-SCNC: 4.5 MMOL/L — SIGNIFICANT CHANGE UP (ref 3.5–5.3)
PROT SERPL-MCNC: 5.6 G/DL — LOW (ref 6–8.3)
RBC # BLD: 3.93 M/UL — LOW (ref 4.2–5.8)
RBC # FLD: 12.4 % — SIGNIFICANT CHANGE UP (ref 10.3–14.5)
SODIUM SERPL-SCNC: 141 MMOL/L — SIGNIFICANT CHANGE UP (ref 135–145)
WBC # BLD: 7.96 K/UL — SIGNIFICANT CHANGE UP (ref 3.8–10.5)
WBC # FLD AUTO: 7.96 K/UL — SIGNIFICANT CHANGE UP (ref 3.8–10.5)

## 2021-09-30 PROCEDURE — 99232 SBSQ HOSP IP/OBS MODERATE 35: CPT

## 2021-09-30 PROCEDURE — 99232 SBSQ HOSP IP/OBS MODERATE 35: CPT | Mod: GC

## 2021-09-30 RX ORDER — FLUDROCORTISONE ACETATE 0.1 MG/1
0.1 TABLET ORAL DAILY
Refills: 0 | Status: DISCONTINUED | OUTPATIENT
Start: 2021-09-30 | End: 2021-10-05

## 2021-09-30 RX ORDER — SODIUM CHLORIDE 9 MG/ML
1 INJECTION INTRAMUSCULAR; INTRAVENOUS; SUBCUTANEOUS
Refills: 0 | Status: DISCONTINUED | OUTPATIENT
Start: 2021-09-30 | End: 2021-10-01

## 2021-09-30 RX ADMIN — Medication 50 MILLIGRAM(S): at 22:03

## 2021-09-30 RX ADMIN — Medication 10 MILLIGRAM(S): at 06:30

## 2021-09-30 RX ADMIN — FLUDROCORTISONE ACETATE 0.1 MILLIGRAM(S): 0.1 TABLET ORAL at 06:08

## 2021-09-30 RX ADMIN — SODIUM CHLORIDE 1 GRAM(S): 9 INJECTION INTRAMUSCULAR; INTRAVENOUS; SUBCUTANEOUS at 17:25

## 2021-09-30 RX ADMIN — Medication 975 MILLIGRAM(S): at 06:07

## 2021-09-30 RX ADMIN — Medication 50 MILLIGRAM(S): at 14:57

## 2021-09-30 RX ADMIN — Medication 975 MILLIGRAM(S): at 13:57

## 2021-09-30 RX ADMIN — BICTEGRAVIR SODIUM, EMTRICITABINE, AND TENOFOVIR ALAFENAMIDE FUMARATE 1 TABLET(S): 30; 120; 15 TABLET ORAL at 13:16

## 2021-09-30 RX ADMIN — Medication 975 MILLIGRAM(S): at 07:16

## 2021-09-30 RX ADMIN — SODIUM CHLORIDE 3 GRAM(S): 9 INJECTION INTRAMUSCULAR; INTRAVENOUS; SUBCUTANEOUS at 06:08

## 2021-09-30 RX ADMIN — Medication 975 MILLIGRAM(S): at 13:17

## 2021-09-30 RX ADMIN — ENOXAPARIN SODIUM 40 MILLIGRAM(S): 100 INJECTION SUBCUTANEOUS at 22:03

## 2021-09-30 NOTE — PROGRESS NOTE ADULT - ASSESSMENT
36M with PMH IVDU / methamphetamine use presented to [facility] on 9/15 with acute onset paraplegia after lifting heavy weights found to have SAH / SDH of the spinal cord with hematoma causing cord compression s/p T7-8 lami decompression (9/16) now with residual paraplegia. Hospital course complicated by hyponatremia secondary to SIADH. Admitted to [facility] inpatient [facility] on 9/28 for initiation of multidisciplinary rehab program. ADL, gait, and functional impairments.     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  -s/p evacuation and decompression of T7-8  -Continue comprehensive rehab program  -Pain well controlled with Dilaudid PRN for severe pain, Tylenol q 8 hours standing  -Continue Lyrica and Robaxin PRN   -Follow up with outpatient neurosurgery    #Hyponatremia, likely SIADH - resolved  -Continue Florinef  -Decrease NaCl to BID dosing  -Can liberate fluid restriction  -Follow up AM BMP after adjustments     #Anemia  ?acute blood loss anemia post op  -No overt signs of bleeding  -H/H stable  -Follow up routine CBC    #hx of IV drug use  #Methamphetamine use  -Supportive care  -Neuropsych consult    #Neurogenic bladder  #Acute urinary retention  -Bladder scan, straight cath per primary  -Encourage timed toileting     #HIV  -Continue Biktarvy    #Prophylactic Measure  DVT ppx: Lovenox  -Bowel regimen     36M with PMH IVDU / methamphetamine use presented to [facility] on 9/15 with acute onset paraplegia after lifting heavy weights found to have SAH / SDH of the spinal cord with hematoma causing cord compression s/p T7-8 lami decompression (9/16) now with residual paraplegia. Hospital course complicated by hyponatremia secondary to SIADH. Admitted to [place] acute inpatient rehab on 9/28 for initiation of multidisciplinary rehab program. ADL, gait, and functional impairments.     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 MIERLLA B  -s/p evacuation and decompression of T7-8  -Continue comprehensive rehab program  -Pain well controlled with Dilaudid PRN for severe pain, Tylenol q 8 hours standing  -Continue Lyrica and Robaxin PRN   -Follow up with outpatient neurosurgery    #Hyponatremia, likely SIADH - resolved  -Continue Florinef  -Agree with decrease NaCl to BID dosing  -Can liberate fluid restriction  -Follow up AM BMP after adjustments     #Anemia  ?acute blood loss anemia post op  -No overt signs of bleeding  -H/H stable  -Follow up routine CBC    #hx of IV drug use  #Methamphetamine use  -Supportive care  -Neuropsych consult    #Neurogenic bladder  #Acute urinary retention  -Bladder scan, straight cath per primary  -Encourage timed toileting     #HIV  -Continue Biktarvy    #Prophylactic Measure  DVT ppx: Lovenox  -Bowel regimen

## 2021-09-30 NOTE — PROGRESS NOTE ADULT - ASSESSMENT
36 year old male with hx of IVDU/methamphetamine use presented to [facility] on 9/15 with acute onset paraplegia after lifting heavy weights found to have SAH/ SDH of the spinal cord with hematoma causing cord compression s/p T7-8 lami decompression (9/16) now with residual paraplegia. Hospital course complicated by hyponatremia secondary to SIADH. Admitted to [facility] inpatient [facility] on 9/28 for ADL, gait, and functional impairments.       T7 sensory incomplete Spinal Cord Compression   - secondary to spinal subdural hematoma s/p evacuation and decompression of T7-8  - Primary surgeon Dr. Randy D'Amico  - s/p decadron  - Comprehensive Multidisciplinary Rehab Program     3 hours a day, 5 days a week with PT/OT     P&O as needed  - Has outpt appt on 10/6 -- will need to call and reschedule while in rehab or suggest telemedicine  - Per Neurosurgery instructions: leave incision open to air, OK to shower    Hyponatremia, likely SIADH, resovled  - hospitalist to follow   - continue Cassie Aleman -- weaning   - cont Fluid restriction 1500 ml -- liberalized 2000cc  - BMP in AM, monitor BP    Psych:  h/o substance abuse: IVDU, methamphetamine  Adjustment disorder  Insomnia  - Neuropsych evaluation and treat for adjustment, coping, and counseling  - will need to follow up with psych outpatient: counseling centers provided   - Trazodone as needed at bedtime      Pain Management:  - Seen by pain management at [facility] recommended:    Dilaudid PO 2mg q4h Severe    Lyrica 50 q8h    Tylenol 1000 scheduled (monitor LFTs on CMP) - wean as necessary    Robaxin 500 q8h -- changed to PRN  - bowel regimen as below  - Left leg spasms - consider tizanidine/baclofen if increasing   - Neuropathic pain/ hyperalgesia -- desensitization therapy    GI/Bowel:  Neurogenic bowel with incontinence, decreased voluntary anal contraction  - Senna QHS, Miralax daily, dulcolax  - encourage timed toileting, to be discussed with nursing     /Bladder:  Neurogenic bladder with Urinary retention  - bladder scans changed to q6hrs    pt with good hand function and motivated to learn to SC -- discussed with nursing    Skin/Pressure Injury:  - Skin assessment on admission admission: no pressure injuries noted  - Monitor Incisions: spine incision open to air  - Turn every 2 hours while in bed, at risk due to paraplegia    Diet:   - Diet Consistency/Modifications: Reg + thins - fluid restriction 1500ml -- liberalized    DVT ppx:  - Lovenox  - SCDs  - Last Doppler on 9/27 neg    Restrictions/Precautions:  - Precautions: Spine precautions, falls    ---------------  Outpatient Follow-up:  Neurosurgery - Dr. Randy D'Amico  Psychological services/ counseling   PCP?    --------------

## 2021-09-30 NOTE — PROGRESS NOTE ADULT - SUBJECTIVE AND OBJECTIVE BOX
Patient is a 36y old  Male who presents with a chief complaint of Spinal Cord Compression (30 Sep 2021 09:09)    HPI:  Ms. ALISSA ARAUJO is a 36 year old male with history of IVDU and methamphetamine use presented to [facility] on 9/15/21 with symptoms of acute onset lower extremity weakness and parethesias a few hours after lifting heavy weights in the gym.  He developed back pain and radiating pain to bilateral buttocks then progressed to paraplegia. He was brought in by ambulance after a passerby witnessed her unable to rise from the curb.  He did not have any bowel or bladder incontinence on admission.      On presentation to the ED, MRI performed and found to have ill defined intradural - extramedullary lesion with enhancement at level T8 with mass effect on the spinal cord with lefward displacement and minimal associated cord edema.  He was admitted to neurosurgical survice and started on decadron. Spinal angiogram performed which was negative. He underwent T8-9 lami decompression on 9/15 with Dr. D-Amico with intraoperative findings consistent with subarachnoid clot now s/p evacuation.  Post operative course was uncomplicated. Tolerated procedure well. Pt was seen by pain management who adjusted pain regimen (tylenol, lyrica, Robaxin, Dilaudid). Labs were significant for hyponatremia likely secondary to SIADH given euvolemia on exam and good UOP. Pt required short course of hypertonic saline then weaned off. Sodium stable with fluid restriction and addition of Na tabs. Pt was seen by behavioral health and psychology for adjustment disorder in the setting of new paralysis. He was determined to be low risk for suicide and emotional support provided. Trazodone was started for insomnia.     Patient was evaluated by PM&R and therapy for functional deficits and gait/ ADL impairments and recommended acute rehabilitation. Patient was medically optimized for discharge to  to [facility] on 9/28 (28 Sep 2021 13:56)    ----------------------------------------------------------------------    SUBJECTIVE:  Patient seen and evaluated at bedside this AM.  No acute complaints. Has large BM this AM with another episode of incontinence. Nursing starting to teach SC. Motivated for therapies.  Pain well controlled. Has not complained of spasms today.    ROS:  Denies: headache, lightheadedness, CP, SOB, abdominal pain, dysuria, nausea, constipation      ----------------------------------------------------------------------  PHYSICAL EXAM:    Vital Signs Last 24 Hrs  T(C): 36.8 (30 Sep 2021 08:43), Max: 36.8 (30 Sep 2021 08:43)  T(F): 98.3 (30 Sep 2021 08:43), Max: 98.3 (30 Sep 2021 08:43)  HR: 98 (30 Sep 2021 08:43) (77 - 98)  BP: 134/82 (30 Sep 2021 08:43) (119/76 - 134/82)  BP(mean): --  RR: 15 (30 Sep 2021 08:43) (15 - 17)  SpO2: 100% (30 Sep 2021 08:43) (100% - 100%)  Daily     Daily     PHYSICAL EXAM  General: NAD, comfortable  Cardio: RRR, S1/S2+  Resp: no respiratory difficulty or distress  Abdomen: soft, NTND  Neuro:  T7 incomplete paraplegia  Extrem: no edema, no calf tenderness   Skin: thoracic incision open to air      ----------------------------------------------------------------------  RECENT LABS:                          12.7   7.96  )-----------( 239      ( 30 Sep 2021 05:45 )             37.2     09-30    141  |  107  |  15  ----------------------------<  92  4.5   |  29  |  0.85    Ca    8.3<L>      30 Sep 2021 05:45    TPro  5.6<L>  /  Alb  2.8<L>  /  TBili  0.3  /  DBili  x   /  AST  15  /  ALT  47<H>  /  AlkPhos  68  09-30    LIVER FUNCTIONS - ( 30 Sep 2021 05:45 )  Alb: 2.8 g/dL / Pro: 5.6 g/dL / ALK PHOS: 68 U/L / ALT: 47 U/L / AST: 15 U/L / GGT: x               CAPILLARY BLOOD GLUCOSE        ----------------------------------------------------------------------  RECENT IMAGING:    ***  ----------------------------------------------------------------------  MEDICATIONS:  MEDICATIONS  (STANDING):  acetaminophen   Tablet .. 975 milliGRAM(s) Oral every 8 hours  bictegravir 50 mG/emtricitabine 200 mG/tenofovir alafenamide 25 mG (BIKTARVY) 1 Tablet(s) Oral daily  enoxaparin Injectable 40 milliGRAM(s) SubCutaneous at bedtime  fludroCORTISONE 0.1 milliGRAM(s) Oral daily  polyethylene glycol 3350 17 Gram(s) Oral daily  pregabalin 50 milliGRAM(s) Oral every 8 hours  senna 2 Tablet(s) Oral at bedtime  sodium chloride 1 Gram(s) Oral two times a day    MEDICATIONS  (PRN):  bisacodyl Suppository 10 milliGRAM(s) Rectal daily PRN Constipation  HYDROmorphone   Tablet 2 milliGRAM(s) Oral every 4 hours PRN Severe Pain (7 - 10)  methocarbamol 500 milliGRAM(s) Oral every 8 hours PRN Muscle Spasm  metoclopramide 10 milliGRAM(s) Oral every 8 hours PRN nausea/vomiting  traZODone 25 milliGRAM(s) Oral at bedtime PRN insomnia    ----------------------------------------------------------------------

## 2021-09-30 NOTE — PROGRESS NOTE ADULT - SUBJECTIVE AND OBJECTIVE BOX
Patient is a 36y old  Male who presents with a chief complaint of Spinal Cord Compression (29 Sep 2021 08:55)      Patient seen and examined at bedside.    REVIEW OF SYSTEMS:  CONSTITUTIONAL: No fever or chills  HEENT:  No headache, no sore throat  RESPIRATORY: No cough, wheezing, or shortness of breath  CARDIOVASCULAR: No chest pain, palpitations  GASTROINTESTINAL: No abd pain, incontinent of bowel   GENITOURINARY: incontinent of bladder  NEUROLOGICAL: no muscle strength in lower extremities     ALLERGIES:  No Known Allergies    MEDICATIONS  (STANDING):  acetaminophen   Tablet .. 975 milliGRAM(s) Oral every 8 hours  bictegravir 50 mG/emtricitabine 200 mG/tenofovir alafenamide 25 mG (BIKTARVY) 1 Tablet(s) Oral daily  enoxaparin Injectable 40 milliGRAM(s) SubCutaneous at bedtime  fludroCORTISONE 0.1 milliGRAM(s) Oral every 12 hours  polyethylene glycol 3350 17 Gram(s) Oral daily  pregabalin 50 milliGRAM(s) Oral every 8 hours  senna 2 Tablet(s) Oral at bedtime  sodium chloride 1 Gram(s) Oral two times a day    MEDICATIONS  (PRN):  bisacodyl Suppository 10 milliGRAM(s) Rectal daily PRN Constipation  HYDROmorphone   Tablet 2 milliGRAM(s) Oral every 4 hours PRN Severe Pain (7 - 10)  methocarbamol 500 milliGRAM(s) Oral every 8 hours PRN Muscle Spasm  metoclopramide 10 milliGRAM(s) Oral every 8 hours PRN nausea/vomiting  traZODone 25 milliGRAM(s) Oral at bedtime PRN insomnia    Vital Signs Last 24 Hrs  T(F): 98.3 (30 Sep 2021 08:43), Max: 98.3 (30 Sep 2021 08:43)  HR: 98 (30 Sep 2021 08:43) (77 - 98)  BP: 134/82 (30 Sep 2021 08:43) (119/76 - 134/82)  RR: 15 (30 Sep 2021 08:43) (15 - 17)  SpO2: 100% (30 Sep 2021 08:43) (100% - 100%)  I&O's Summary    29 Sep 2021 07:01  -  30 Sep 2021 07:00  --------------------------------------------------------  IN: 360 mL / OUT: 2100 mL / NET: -1740 mL      BMI (kg/m2): 23.6 (09-28-21 @ 17:03)    PHYSICAL EXAM:  General: NAD, A/O x 3  ENT: MMM, no scleral icterus  Neck: Supple, No JVD  Lungs: Clear to auscultation bilaterally, no wheezes, rales, rhonchi  Cardio: RRR, S1/S2, No murmurs  Abdomen: Soft, Nontender, Nondistended; Bowel sounds present  Extremities: No calf tenderness, No pitting edema  Neuro: sensation grossly intact in all extremities, 0/5 muscle strength in bilateral lower extremities     LABS:                        12.7   7.96  )-----------( 239      ( 30 Sep 2021 05:45 )             37.2       09-30    141  |  107  |  15  ----------------------------<  92  4.5   |  29  |  0.85    Ca    8.3      30 Sep 2021 05:45  Phos  2.7     09-28  Mg     1.9     09-28    TPro  5.6  /  Alb  2.8  /  TBili  0.3  /  DBili  x   /  AST  15  /  ALT  47  /  AlkPhos  68  09-30     eGFR if Non African American: 111 mL/min/1.73M2 (09-30-21 @ 05:45)  eGFR if African American: 129 mL/min/1.73M2 (09-30-21 @ 05:45)                                   COVID-19 PCR: NotDetec (09-27-21 @ 18:49)  COVID-19 PCR: NotDetec (09-20-21 @ 19:01)  COVID-19 PCR: Negative (09-15-21 @ 01:05)      RADIOLOGY & ADDITIONAL TESTS:    Care Discussed with Consultants/Other Providers: Dr. Pérez (physiatry), SW, RN  PT/OT noted reviewed   Patient is a 36y old  Male who presents with a chief complaint of Spinal Cord Compression (29 Sep 2021 08:55)      Patient seen and examined at bedside. Participating in therapy, reports he stood with equipment during therapy and was excited.  Had multiple BMs today after suppository.     REVIEW OF SYSTEMS:  CONSTITUTIONAL: No fever or chills  HEENT:  No headache, no sore throat  RESPIRATORY: No cough, wheezing, or shortness of breath  CARDIOVASCULAR: No chest pain, palpitations  GASTROINTESTINAL: No abd pain, incontinent of bowel   GENITOURINARY: incontinent of bladder  NEUROLOGICAL: no muscle strength in lower extremities     ALLERGIES:  No Known Allergies    MEDICATIONS  (STANDING):  acetaminophen   Tablet .. 975 milliGRAM(s) Oral every 8 hours  bictegravir 50 mG/emtricitabine 200 mG/tenofovir alafenamide 25 mG (BIKTARVY) 1 Tablet(s) Oral daily  enoxaparin Injectable 40 milliGRAM(s) SubCutaneous at bedtime  fludroCORTISONE 0.1 milliGRAM(s) Oral every 12 hours  polyethylene glycol 3350 17 Gram(s) Oral daily  pregabalin 50 milliGRAM(s) Oral every 8 hours  senna 2 Tablet(s) Oral at bedtime  sodium chloride 1 Gram(s) Oral two times a day    MEDICATIONS  (PRN):  bisacodyl Suppository 10 milliGRAM(s) Rectal daily PRN Constipation  HYDROmorphone   Tablet 2 milliGRAM(s) Oral every 4 hours PRN Severe Pain (7 - 10)  methocarbamol 500 milliGRAM(s) Oral every 8 hours PRN Muscle Spasm  metoclopramide 10 milliGRAM(s) Oral every 8 hours PRN nausea/vomiting  traZODone 25 milliGRAM(s) Oral at bedtime PRN insomnia    Vital Signs Last 24 Hrs  T(F): 98.3 (30 Sep 2021 08:43), Max: 98.3 (30 Sep 2021 08:43)  HR: 98 (30 Sep 2021 08:43) (77 - 98)  BP: 134/82 (30 Sep 2021 08:43) (119/76 - 134/82)  RR: 15 (30 Sep 2021 08:43) (15 - 17)  SpO2: 100% (30 Sep 2021 08:43) (100% - 100%)  I&O's Summary    29 Sep 2021 07:01  -  30 Sep 2021 07:00  --------------------------------------------------------  IN: 360 mL / OUT: 2100 mL / NET: -1740 mL      BMI (kg/m2): 23.6 (09-28-21 @ 17:03)    PHYSICAL EXAM:  General: NAD, A/O x 3  ENT: MMM, no scleral icterus  Neck: Supple, No JVD  Lungs: Clear to auscultation bilaterally, no wheezes, rales, rhonchi  Cardio: RRR, S1/S2, No murmurs  Abdomen: Soft, Nontender, Nondistended; Bowel sounds present  Extremities: No calf tenderness, No pitting edema  Neuro: sensation grossly intact in all extremities, 0/5 muscle strength in bilateral lower extremities     LABS:                        12.7   7.96  )-----------( 239      ( 30 Sep 2021 05:45 )             37.2       09-30    141  |  107  |  15  ----------------------------<  92  4.5   |  29  |  0.85    Ca    8.3      30 Sep 2021 05:45  Phos  2.7     09-28  Mg     1.9     09-28    TPro  5.6  /  Alb  2.8  /  TBili  0.3  /  DBili  x   /  AST  15  /  ALT  47  /  AlkPhos  68  09-30     eGFR if Non African American: 111 mL/min/1.73M2 (09-30-21 @ 05:45)  eGFR if African American: 129 mL/min/1.73M2 (09-30-21 @ 05:45)               COVID-19 PCR: NotDetec (09-27-21 @ 18:49)  COVID-19 PCR: NotDetec (09-20-21 @ 19:01)  COVID-19 PCR: Negative (09-15-21 @ 01:05)      RADIOLOGY & ADDITIONAL TESTS:    Care Discussed with Consultants/Other Providers: Dr. Pérez (physiatry), SW, RN  PT/OT noted reviewed

## 2021-10-01 ENCOUNTER — TRANSCRIPTION ENCOUNTER (OUTPATIENT)
Age: 36
End: 2021-10-01

## 2021-10-01 DIAGNOSIS — I60.50: ICD-10-CM

## 2021-10-01 DIAGNOSIS — M54.9 DORSALGIA, UNSPECIFIED: ICD-10-CM

## 2021-10-01 DIAGNOSIS — G82.20 PARAPLEGIA, UNSPECIFIED: ICD-10-CM

## 2021-10-01 DIAGNOSIS — G95.20 UNSPECIFIED CORD COMPRESSION: ICD-10-CM

## 2021-10-01 DIAGNOSIS — Z21 ASYMPTOMATIC HUMAN IMMUNODEFICIENCY VIRUS [HIV] INFECTION STATUS: ICD-10-CM

## 2021-10-01 DIAGNOSIS — G95.29 OTHER CORD COMPRESSION: ICD-10-CM

## 2021-10-01 LAB
ANION GAP SERPL CALC-SCNC: 8 MMOL/L — SIGNIFICANT CHANGE UP (ref 5–17)
BUN SERPL-MCNC: 15 MG/DL — SIGNIFICANT CHANGE UP (ref 7–23)
CALCIUM SERPL-MCNC: 8.1 MG/DL — LOW (ref 8.4–10.5)
CHLORIDE SERPL-SCNC: 103 MMOL/L — SIGNIFICANT CHANGE UP (ref 96–108)
CO2 SERPL-SCNC: 29 MMOL/L — SIGNIFICANT CHANGE UP (ref 22–31)
CREAT SERPL-MCNC: 0.89 MG/DL — SIGNIFICANT CHANGE UP (ref 0.5–1.3)
GLUCOSE SERPL-MCNC: 94 MG/DL — SIGNIFICANT CHANGE UP (ref 70–99)
POTASSIUM SERPL-MCNC: 4.2 MMOL/L — SIGNIFICANT CHANGE UP (ref 3.5–5.3)
POTASSIUM SERPL-SCNC: 4.2 MMOL/L — SIGNIFICANT CHANGE UP (ref 3.5–5.3)
SODIUM SERPL-SCNC: 140 MMOL/L — SIGNIFICANT CHANGE UP (ref 135–145)

## 2021-10-01 PROCEDURE — 99232 SBSQ HOSP IP/OBS MODERATE 35: CPT

## 2021-10-01 PROCEDURE — 99232 SBSQ HOSP IP/OBS MODERATE 35: CPT | Mod: GC

## 2021-10-01 RX ORDER — ACETAMINOPHEN 500 MG
650 TABLET ORAL EVERY 12 HOURS
Refills: 0 | Status: DISCONTINUED | OUTPATIENT
Start: 2021-10-01 | End: 2021-10-19

## 2021-10-01 RX ORDER — HYDROMORPHONE HYDROCHLORIDE 2 MG/ML
2 INJECTION INTRAMUSCULAR; INTRAVENOUS; SUBCUTANEOUS EVERY 8 HOURS
Refills: 0 | Status: DISCONTINUED | OUTPATIENT
Start: 2021-10-01 | End: 2021-10-08

## 2021-10-01 RX ORDER — SENNA PLUS 8.6 MG/1
2 TABLET ORAL AT BEDTIME
Refills: 0 | Status: DISCONTINUED | OUTPATIENT
Start: 2021-10-01 | End: 2021-10-04

## 2021-10-01 RX ORDER — SODIUM CHLORIDE 9 MG/ML
1 INJECTION INTRAMUSCULAR; INTRAVENOUS; SUBCUTANEOUS DAILY
Refills: 0 | Status: DISCONTINUED | OUTPATIENT
Start: 2021-10-01 | End: 2021-10-04

## 2021-10-01 RX ADMIN — ENOXAPARIN SODIUM 40 MILLIGRAM(S): 100 INJECTION SUBCUTANEOUS at 21:56

## 2021-10-01 RX ADMIN — SODIUM CHLORIDE 1 GRAM(S): 9 INJECTION INTRAMUSCULAR; INTRAVENOUS; SUBCUTANEOUS at 05:51

## 2021-10-01 RX ADMIN — Medication 50 MILLIGRAM(S): at 21:56

## 2021-10-01 RX ADMIN — FLUDROCORTISONE ACETATE 0.1 MILLIGRAM(S): 0.1 TABLET ORAL at 05:51

## 2021-10-01 RX ADMIN — BICTEGRAVIR SODIUM, EMTRICITABINE, AND TENOFOVIR ALAFENAMIDE FUMARATE 1 TABLET(S): 30; 120; 15 TABLET ORAL at 11:12

## 2021-10-01 RX ADMIN — SODIUM CHLORIDE 1 GRAM(S): 9 INJECTION INTRAMUSCULAR; INTRAVENOUS; SUBCUTANEOUS at 11:12

## 2021-10-01 NOTE — PROGRESS NOTE ADULT - SUBJECTIVE AND OBJECTIVE BOX
Patient is a 36y old  Male who presents with a chief complaint of Spinal Cord Compression (01 Oct 2021 08:33)    HPI:  Ms. ALISSA ARAUJO is a 36 year old male with history of IVDU and methamphetamine use presented to [facility] on 9/15/21 with symptoms of acute onset lower extremity weakness and parethesias a few hours after lifting heavy weights in the gym.  He developed back pain and radiating pain to bilateral buttocks then progressed to paraplegia. He was brought in by ambulance after a passerby witnessed her unable to rise from the curb.  He did not have any bowel or bladder incontinence on admission.      On presentation to the ED, MRI performed and found to have ill defined intradural - extramedullary lesion with enhancement at level T8 with mass effect on the spinal cord with lefward displacement and minimal associated cord edema.  He was admitted to neurosurgical survice and started on decadron. Spinal angiogram performed which was negative. He underwent T8-9 lami decompression on 9/15 with Dr. D-Amico with intraoperative findings consistent with subarachnoid clot now s/p evacuation.  Post operative course was uncomplicated. Tolerated procedure well. Pt was seen by pain management who adjusted pain regimen (tylenol, lyrica, Robaxin, Dilaudid). Labs were significant for hyponatremia likely secondary to SIADH given euvolemia on exam and good UOP. Pt required short course of hypertonic saline then weaned off. Sodium stable with fluid restriction and addition of Na tabs. Pt was seen by behavioral health and psychology for adjustment disorder in the setting of new paralysis. He was determined to be low risk for suicide and emotional support provided. Trazodone was started for insomnia.     Patient was evaluated by PM&R and therapy for functional deficits and gait/ ADL impairments and recommended acute rehabilitation. Patient was medically optimized for discharge to  to [facility] on 9/28 (28 Sep 2021 13:56)    ----------------------------------------------------------------------    SUBJECTIVE:  Patient seen and evaluated in therapy gym.  Reports he is doing well overall. Denies any pain. Reports dysesthesias in legs but denies discomfort. Sleeping well at night.  Reports bowel incontinence yesterday during the day after morning suppository. Had some back pain yesterday due to working with PT/OT back to back.    Learning to SC with nursing.  Appears to be in good spirits.     ROS:  Denies: headache, lightheadedness, CP, SOB, abdominal pain, dysuria, nausea, constipation      ----------------------------------------------------------------------  PHYSICAL EXAM:    Vital Signs Last 24 Hrs  T(C): 36.7 (01 Oct 2021 08:43), Max: 36.8 (30 Sep 2021 20:11)  T(F): 98.1 (01 Oct 2021 08:43), Max: 98.3 (30 Sep 2021 20:11)  HR: 95 (01 Oct 2021 08:43) (95 - 96)  BP: 129/80 (01 Oct 2021 08:43) (129/80 - 137/85)  BP(mean): --  RR: 16 (01 Oct 2021 08:43) (15 - 16)  SpO2: 98% (01 Oct 2021 08:43) (98% - 99%)  Daily     Daily     PHYSICAL EXAM  General: NAD, comfortable  Cardio: RRR, S1/S2+  Resp: no respiratory difficulty or distress  Abdomen: soft, NTND  Neuro:  T7 incomplete paraplegia  Extrem: no edema, no calf tenderness   Skin: thoracic incision open to air      ----------------------------------------------------------------------  RECENT LABS:                          12.7   7.96  )-----------( 239      ( 30 Sep 2021 05:45 )             37.2     10-01    140  |  103  |  15  ----------------------------<  94  4.2   |  29  |  0.89    Ca    8.1<L>      01 Oct 2021 06:04    TPro  5.6<L>  /  Alb  2.8<L>  /  TBili  0.3  /  DBili  x   /  AST  15  /  ALT  47<H>  /  AlkPhos  68  09-30    LIVER FUNCTIONS - ( 30 Sep 2021 05:45 )  Alb: 2.8 g/dL / Pro: 5.6 g/dL / ALK PHOS: 68 U/L / ALT: 47 U/L / AST: 15 U/L / GGT: x               CAPILLARY BLOOD GLUCOSE        ----------------------------------------------------------------------  RECENT IMAGING:    ***  ----------------------------------------------------------------------  MEDICATIONS:  MEDICATIONS  (STANDING):  acetaminophen   Tablet .. 650 milliGRAM(s) Oral every 12 hours  bictegravir 50 mG/emtricitabine 200 mG/tenofovir alafenamide 25 mG (BIKTARVY) 1 Tablet(s) Oral daily  enoxaparin Injectable 40 milliGRAM(s) SubCutaneous at bedtime  fludroCORTISONE 0.1 milliGRAM(s) Oral daily  pregabalin 50 milliGRAM(s) Oral every 8 hours  senna 2 Tablet(s) Oral at bedtime  sodium chloride 1 Gram(s) Oral daily    MEDICATIONS  (PRN):  bisacodyl Suppository 10 milliGRAM(s) Rectal daily PRN Constipation  HYDROmorphone   Tablet 2 milliGRAM(s) Oral every 4 hours PRN Severe Pain (7 - 10)  methocarbamol 500 milliGRAM(s) Oral every 8 hours PRN Muscle Spasm  traZODone 25 milliGRAM(s) Oral at bedtime PRN insomnia    ----------------------------------------------------------------------

## 2021-10-01 NOTE — DISCHARGE NOTE PROVIDER - NSFOLLOWUPCLINICS_GEN_ALL_ED_FT
[facility]rology [facility]  Urology  [address], 3rd [address]  [place], [place] [address]  Phone: [phone]  Fax:

## 2021-10-01 NOTE — DISCHARGE NOTE PROVIDER - NSDCCPCAREPLAN_GEN_ALL_CORE_FT
PRINCIPAL DISCHARGE DIAGNOSIS  Diagnosis: Spinal cord compression  Assessment and Plan of Treatment: Wound Care:  - you may shower  - please leave incision open to air  Activity:  - fatigue is common after surgery, rest if you feel tired   - no bending, lifting, twisting or heavy lifting   - walking is recommended, ambulate as tolerated  - you may shower when you get home, keep your incision dry  - no bathing   - no driving within 24 hours of anesthesia or while taking prescription pain medications   - keep hydrated, drink plenty of water          SECONDARY DISCHARGE DIAGNOSES  Diagnosis: Neurogenic bowel  Assessment and Plan of Treatment: Continue bowel reigmen as prescribed    Diagnosis: Neurogenic bladder  Assessment and Plan of Treatment: Continue straight catheterizations as instructed. Follow up with urology for formal UDS for further management    Diagnosis: Depressive episode  Assessment and Plan of Treatment: Adjustment and coping from new functional impairments.  Please follow up with psychiatry at any of the following locations:  Aftercare Recommendations  Encourage engagement in psychotherapy at acute rehab.  Once at home, can provide information for:  [facility]  [address]  [phone]  [facility]  [address] [facility]  [phone]  [facility]  [address]  [phone]  [facility]  [address]  [phone]  [facility]  [address]  [phone]  [facility]  [address]  [phone]  [facility]  Educational [place]  [address] [facility]       PRINCIPAL DISCHARGE DIAGNOSIS  Diagnosis: Spinal cord compression  Assessment and Plan of Treatment: Wound Care:  - you may shower  Activity:  - fatigue is common after surgery, rest if you feel tired   - no bending, lifting, twisting or heavy lifting   - walking is recommended, ambulate as tolerated - Doing well with assistive device  - you may shower when you get home, keep your incision dry  - no driving within 24 hours of anesthesia or while taking prescription pain medications   - keep hydrated, drink plenty of water          SECONDARY DISCHARGE DIAGNOSES  Diagnosis: Neurogenic bowel  Assessment and Plan of Treatment: Continue bowel reigmen as prescribed    Diagnosis: Neurogenic bladder  Assessment and Plan of Treatment: Doing well on oxybutynin.  Emptying bladder without straight catheter need.  Follow up with urology for formal urodynamic study for further management    Diagnosis: Depressive episode  Assessment and Plan of Treatment: Adjustment and coping from new functional impairments.  Please follow up with psychiatry at any of the following locations:  Aftercare Recommendations  Encourage engagement in psychotherapy at acute rehab.  Once at home, can provide information for:  Post [facility]  [address]  [phone]  [address]  [phone]  [facility]  [address]  [phone]  [facility]  [address]  [phone]  [facility]  [address]  [phone]  [facility]  [address]  [phone]  [facility]  Educational [place]  197 [facility]

## 2021-10-01 NOTE — DISCHARGE NOTE PROVIDER - NSDCMRMEDTOKEN_GEN_ALL_CORE_FT
acetaminophen 500 mg oral tablet: 2 tab(s) orally every 8 hours, As Needed  Biktarvy oral tablet: 1 tab(s) orally once a day  bisacodyl 5 mg oral delayed release tablet: 1 tab(s) orally once a day (at bedtime)  enoxaparin: 40 milligram(s) subcutaneous once a day (at bedtime)  fludrocortisone 0.1 mg oral tablet: 1 tab(s) orally every 12 hours  HYDROmorphone 2 mg oral tablet: 1 tab(s) orally every 4 hours, As needed, Severe Pain (7 - 10)  methocarbamol 500 mg oral tablet: 1 tab(s) orally every 8 hours, As Needed  polyethylene glycol 3350 oral powder for reconstitution: 17 gram(s) orally once a day  pregabalin 50 mg oral capsule: 1 cap(s) orally every 8 hours  senna oral tablet: 2 tab(s) orally once a day (at bedtime)  sodium chloride 1 g oral tablet: 3 tab(s) orally every 6 hours  traZODone 50 mg oral tablet: 1 tab(s) orally once a day (at bedtime), As needed, insomnia

## 2021-10-01 NOTE — DISCHARGE NOTE PROVIDER - HOSPITAL COURSE
Ms. ALISSA ARAUJO is a 36 year old male with history of IVDU and methamphetamine use presented to [facility] on 9/15/21 with symptoms of acute onset lower extremity weakness and parethesias a few hours after lifting heavy weights in the gym.  He developed back pain and radiating pain to bilateral buttocks then progressed to paraplegia. He was brought in by ambulance after a passerby witnessed her unable to rise from the curb.  He did not have any bowel or bladder incontinence on admission.      On presentation to the ED, MRI performed and found to have ill defined intradural - extramedullary lesion with enhancement at level T8 with mass effect on the spinal cord with lefward displacement and minimal associated cord edema.  He was admitted to neurosurgical survice and started on decadron. Spinal angiogram performed which was negative. He underwent T8-9 lami decompression on 9/15 with Dr. D-Amico with intraoperative findings consistent with subarachnoid clot now s/p evacuation.  Post operative course was uncomplicated. Tolerated procedure well. Pt was seen by pain management who adjusted pain regimen (tylenol, lyrica, Robaxin, Dilaudid). Labs were significant for hyponatremia likely secondary to SIADH given euvolemia on exam and good UOP. Pt required short course of hypertonic saline then weaned off. Sodium stable with fluid restriction and addition of Na tabs. Pt was seen by behavioral health and psychology for adjustment disorder in the setting of new paralysis. He was determined to be low risk for suicide and emotional support provided. Trazodone was started for insomnia.     Patient was evaluated by PM&R and therapy for functional deficits and gait/ ADL impairments and recommended acute rehabilitation. Patient was medically optimized for discharge to  to [facility] on 9/28 (28 Sep 2021 13:56)      Pt was stable upon rehab admission to  [facility]. Admitted with gait instabilty, ADL, and functional impairments.     Pt tolerated course of inpatient PT/OT/SLP rehab with significant functional improvements and met rehab goals prior to discharge. Pain was well controlled, allowing to wean down on pain medications. Neuropathic symptoms stable with lyrica. He learned to Self catheterize for urinary retention.  Hyponatremia (likely SIADH) improved, able to wean off salt tabs and fluid restriction.  Florinef discontinued.  Counseling was provided and recommended outpatient counseling for follow up.    On the day of discharge, the patient was seen and examined. Symptoms improved. Vital signs are stable. Labs and imaging reviewed. Patient is medically optimized and hemodynamically stable. Return precautions discussed, medication teach back done, and importance of physician followup emphasized. The patient verbalized understanding.    Pt was medically cleared on ___  for discharged to ___     Pt will follow up with the following: Ms. ALISSA ARAUJO is a 36 year old male with history of IVDU and methamphetamine use presented to [facility] on 9/15/21 with symptoms of acute onset lower extremity weakness and parethesias a few hours after lifting heavy weights in the gym.  He developed back pain and radiating pain to bilateral buttocks then progressed to paraplegia. He was brought in by ambulance after a passerby witnessed her unable to rise from the curb.  He did not have any bowel or bladder incontinence on admission.      On presentation to the ED, MRI performed and found to have ill defined intradural - extramedullary lesion with enhancement at level T8 with mass effect on the spinal cord with lefward displacement and minimal associated cord edema.  He was admitted to neurosurgical survice and started on decadron. Spinal angiogram performed which was negative. He underwent T8-9 lami decompression on 9/15 with Dr. D-Amico with intraoperative findings consistent with subarachnoid clot now s/p evacuation.  Post operative course was uncomplicated. Tolerated procedure well. Pt was seen by pain management who adjusted pain regimen (tylenol, lyrica, Robaxin, Dilaudid). Labs were significant for hyponatremia likely secondary to SIADH given euvolemia on exam and good UOP. Pt required short course of hypertonic saline then weaned off. Sodium stable with fluid restriction and addition of Na tabs. Pt was seen by behavioral health and psychology for adjustment disorder in the setting of new paralysis. He was determined to be low risk for suicide and emotional support provided. Trazodone was started for insomnia.     Patient was evaluated by PM&R and therapy for functional deficits and gait/ ADL impairments and recommended acute rehabilitation. Patient was medically optimized for discharge to  to [facility] on 9/28 (28 Sep 2021 13:56)    Pt was stable upon rehab admission to  [facility]. Admitted with gait instabilty, ADL, and functional impairments.     Pt tolerated course of inpatient PT/OT/SLP rehab with significant functional improvements and met rehab goals prior to discharge. Pain was well controlled, allowing to wean down on pain medications. Neuropathic symptoms stable with lyrica. He learned to Self catheterize for urinary retention.  Hyponatremia (likely SIADH) improved, able to wean off salt tabs and fluid restriction.  Florinef discontinued.  Counseling was provided and recommended outpatient counseling for follow up.    On the day of discharge, the patient was seen and examined. Symptoms improved. Vital signs are stable. Labs and imaging reviewed. Patient is medically optimized and hemodynamically stable. Return precautions discussed, medication teach back done, and importance of physician followup emphasized. The patient verbalized understanding.    Pt was medically cleared on 10/26 for discharged to home with homecare    Pt will follow up with the following:   DAmico, Randy S (MD)  Neurosurgery  [address] [place] [address]  Phone: [phone]  Fax: [phone]

## 2021-10-01 NOTE — DISCHARGE NOTE PROVIDER - PROVIDER TOKENS
PROVIDER:[TOKEN:[[id number]],FOLLOWUP:[2 weeks]],PROVIDER:[TOKEN:[[id number]],FOLLOWUP:[Routine]]

## 2021-10-01 NOTE — PROGRESS NOTE ADULT - SUBJECTIVE AND OBJECTIVE BOX
Patient is a 36y old  Male who presents with a chief complaint of Spinal Cord Compression (30 Sep 2021 11:10)      Patient seen and examined at bedside. In good spirits, participating in therapy. Having breakfast.     REVIEW OF SYSTEMS:  CONSTITUTIONAL: No fever or chills  HEENT:  No headache, no sore throat  RESPIRATORY: No cough, wheezing, or shortness of breath  CARDIOVASCULAR: No chest pain, palpitations  GASTROINTESTINAL: No abd pain, nausea, vomiting    ALLERGIES:  No Known Allergies    MEDICATIONS  (STANDING):  acetaminophen   Tablet .. 975 milliGRAM(s) Oral every 8 hours  bictegravir 50 mG/emtricitabine 200 mG/tenofovir alafenamide 25 mG (BIKTARVY) 1 Tablet(s) Oral daily  enoxaparin Injectable 40 milliGRAM(s) SubCutaneous at bedtime  fludroCORTISONE 0.1 milliGRAM(s) Oral daily  polyethylene glycol 3350 17 Gram(s) Oral daily  pregabalin 50 milliGRAM(s) Oral every 8 hours  senna 2 Tablet(s) Oral at bedtime  sodium chloride 1 Gram(s) Oral two times a day    MEDICATIONS  (PRN):  bisacodyl Suppository 10 milliGRAM(s) Rectal daily PRN Constipation  HYDROmorphone   Tablet 2 milliGRAM(s) Oral every 4 hours PRN Severe Pain (7 - 10)  methocarbamol 500 milliGRAM(s) Oral every 8 hours PRN Muscle Spasm  metoclopramide 10 milliGRAM(s) Oral every 8 hours PRN nausea/vomiting  traZODone 25 milliGRAM(s) Oral at bedtime PRN insomnia    Vital Signs Last 24 Hrs  T(F): 98.3 (30 Sep 2021 20:11), Max: 98.3 (30 Sep 2021 08:43)  HR: 96 (30 Sep 2021 20:11) (96 - 98)  BP: 137/85 (30 Sep 2021 20:11) (134/82 - 137/85)  RR: 15 (30 Sep 2021 20:11) (15 - 15)  SpO2: 99% (30 Sep 2021 20:11) (99% - 100%)  I&O's Summary    30 Sep 2021 07:01  -  01 Oct 2021 07:00  --------------------------------------------------------  IN: 220 mL / OUT: 1400 mL / NET: -1180 mL      BMI (kg/m2): 23.6 (09-28-21 @ 17:03)    PHYSICAL EXAM:  General: NAD, A/O x 3  ENT: MMM, no scleral icterus  Neck: Supple, No JVD  Lungs: Clear to auscultation bilaterally, no wheezes, rales, rhonchi  Cardio: RRR, S1/S2, No murmurs  Abdomen: Soft, Nontender, Nondistended; Bowel sounds present  Extremities: No calf tenderness, No pitting edema, bilateral lower extremity paraplegia     LABS:                        12.7   7.96  )-----------( 239      ( 30 Sep 2021 05:45 )             37.2       10-01    140  |  103  |  15  ----------------------------<  94  4.2   |  29  |  0.89    Ca    8.1      01 Oct 2021 06:04    TPro  5.6  /  Alb  2.8  /  TBili  0.3  /  DBili  x   /  AST  15  /  ALT  47  /  AlkPhos  68  09-30     eGFR if Non African American: 109 mL/min/1.73M2 (10-01-21 @ 06:04)  eGFR if African American: 127 mL/min/1.73M2 (10-01-21 @ 06:04)                                   COVID-19 PCR: NotDetec (09-27-21 @ 18:49)  COVID-19 PCR: NotDetec (09-20-21 @ 19:01)  COVID-19 PCR: Negative (09-15-21 @ 01:05)      RADIOLOGY & ADDITIONAL TESTS:    Care Discussed with Consultants/Other Providers: Dr. Pérez (physiatry), SW, RN  PT/OT noted reviewed   Patient is a 36y old  Male who presents with a chief complaint of Spinal Cord Compression (30 Sep 2021 11:10)      Patient seen and examined at bedside. In good spirits, participating in therapy. Having breakfast.   Reports frequent BMs yesterday and multiple today.     REVIEW OF SYSTEMS:  CONSTITUTIONAL: No fever or chills  HEENT:  No headache, no sore throat  RESPIRATORY: No cough, wheezing, or shortness of breath  CARDIOVASCULAR: No chest pain, palpitations  GASTROINTESTINAL: No abd pain, nausea, vomiting    ALLERGIES:  No Known Allergies    MEDICATIONS  (STANDING):  acetaminophen   Tablet .. 975 milliGRAM(s) Oral every 8 hours  bictegravir 50 mG/emtricitabine 200 mG/tenofovir alafenamide 25 mG (BIKTARVY) 1 Tablet(s) Oral daily  enoxaparin Injectable 40 milliGRAM(s) SubCutaneous at bedtime  fludroCORTISONE 0.1 milliGRAM(s) Oral daily  polyethylene glycol 3350 17 Gram(s) Oral daily  pregabalin 50 milliGRAM(s) Oral every 8 hours  senna 2 Tablet(s) Oral at bedtime  sodium chloride 1 Gram(s) Oral two times a day    MEDICATIONS  (PRN):  bisacodyl Suppository 10 milliGRAM(s) Rectal daily PRN Constipation  HYDROmorphone   Tablet 2 milliGRAM(s) Oral every 4 hours PRN Severe Pain (7 - 10)  methocarbamol 500 milliGRAM(s) Oral every 8 hours PRN Muscle Spasm  metoclopramide 10 milliGRAM(s) Oral every 8 hours PRN nausea/vomiting  traZODone 25 milliGRAM(s) Oral at bedtime PRN insomnia    Vital Signs Last 24 Hrs  T(F): 98.3 (30 Sep 2021 20:11), Max: 98.3 (30 Sep 2021 08:43)  HR: 96 (30 Sep 2021 20:11) (96 - 98)  BP: 137/85 (30 Sep 2021 20:11) (134/82 - 137/85)  RR: 15 (30 Sep 2021 20:11) (15 - 15)  SpO2: 99% (30 Sep 2021 20:11) (99% - 100%)  I&O's Summary    30 Sep 2021 07:01  -  01 Oct 2021 07:00  --------------------------------------------------------  IN: 220 mL / OUT: 1400 mL / NET: -1180 mL      BMI (kg/m2): 23.6 (09-28-21 @ 17:03)    PHYSICAL EXAM:  General: NAD, A/O x 3  ENT: MMM, no scleral icterus  Neck: Supple, No JVD  Lungs: Clear to auscultation bilaterally, no wheezes, rales, rhonchi  Cardio: RRR, S1/S2, No murmurs  Abdomen: Soft, Nontender, Nondistended; Bowel sounds present  Extremities: No calf tenderness, No pitting edema, bilateral lower extremity paraplegia     LABS:                        12.7   7.96  )-----------( 239      ( 30 Sep 2021 05:45 )             37.2       10-01    140  |  103  |  15  ----------------------------<  94  4.2   |  29  |  0.89    Ca    8.1      01 Oct 2021 06:04    TPro  5.6  /  Alb  2.8  /  TBili  0.3  /  DBili  x   /  AST  15  /  ALT  47  /  AlkPhos  68  09-30     eGFR if Non African American: 109 mL/min/1.73M2 (10-01-21 @ 06:04)  eGFR if African American: 127 mL/min/1.73M2 (10-01-21 @ 06:04)                   COVID-19 PCR: NotDetec (09-27-21 @ 18:49)  COVID-19 PCR: NotDetec (09-20-21 @ 19:01)  COVID-19 PCR: Negative (09-15-21 @ 01:05)      RADIOLOGY & ADDITIONAL TESTS:    Care Discussed with Consultants/Other Providers: Dr. Pérez (physiatry), SW, RN  PT/OT noted reviewed

## 2021-10-01 NOTE — PROGRESS NOTE ADULT - ASSESSMENT
36M with PMH IVDU / methamphetamine use presented to [facility] on 9/15 with acute onset paraplegia after lifting heavy weights found to have SAH / SDH of the spinal cord with hematoma causing cord compression s/p T7-8 lami decompression (9/16) now with residual paraplegia. Hospital course complicated by hyponatremia secondary to SIADH. Admitted to [place] acute inpatient rehab on 9/28 for initiation of multidisciplinary rehab program. ADL, gait, and functional impairments.     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  -s/p evacuation and decompression of T7-8  -Continue comprehensive rehab program  -Pain management with Dilaudid PRN for severe pain, Tylenol q 8 hours standing - can consider switching to PRN dosing for Tylenol  -Continue Lyrica and Robaxin PRN   -Follow up with outpatient neurosurgery    #Hyponatremia, likely SIADH - resolved  -Decreased to Florinef .1mg daily - continue for now, plan to taper to discontinuation after off salt tabs   -Continue to decrease NaCl to daily dosing  -Off fluid restriction  -Follow up AM BMP after adjustments     #Anemia  ?acute blood loss anemia post op  -No overt signs of bleeding  -H/H stable/ improving  -Follow up routine CBC    #hx of IV drug use  #Methamphetamine use  -Supportive care    #Neurogenic bladder  #Acute urinary retention  -Bladder scan, straight cath per primary  -Encourage timed toileting     #HIV  -Continue Biktarvy    #Prophylactic Measure  DVT ppx: Lovenox  -Bowel regimen per primary     36M with PMH IVDU / methamphetamine use presented to [facility] on 9/15 with acute onset paraplegia after lifting heavy weights found to have SAH / SDH of the spinal cord with hematoma causing cord compression s/p T7-8 lami decompression (9/16) now with residual paraplegia. Hospital course complicated by hyponatremia secondary to SIADH. Admitted to [place] acute inpatient rehab on 9/28 for initiation of multidisciplinary rehab program. ADL, gait, and functional impairments.     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  -s/p evacuation and decompression of T7-8  -Continue comprehensive rehab program  -Pain management with Dilaudid PRN for severe pain, Tylenol q 8 hours standing - can consider switching to PRN dosing for Tylenol  -Continue Lyrica and Robaxin PRN   -Follow up with outpatient neurosurgery    #Hyponatremia, likely SIADH - resolved  -Decreased to Florinef .1mg daily - continue for now, plan to taper to discontinuation after off salt tabs   -Continue to decrease NaCl to daily dosing  -Off fluid restriction  -Follow up AM BMP after adjustments     #Anemia  ?acute blood loss anemia post op  -No overt signs of bleeding  -H/H stable/ improving  -Follow up routine CBC    #hx of IV drug use  #Methamphetamine use  -Supportive care    #Neurogenic bladder  #Acute urinary retention  -Bladder scan, straight cath per primary  -Encourage timed toileting     #HIV  -Continue Biktarvy    #Prophylactic Measure  DVT ppx: Lovenox  -Recommend stopping bowel regimen for now as patient having frequent BMs

## 2021-10-01 NOTE — PROGRESS NOTE ADULT - ASSESSMENT
36 year old male with hx of IVDU/methamphetamine use presented to [facility] on 9/15 with acute onset paraplegia after lifting heavy weights found to have SAH/ SDH of the spinal cord with hematoma causing cord compression s/p T7-8 lami decompression (9/16) now with residual paraplegia. Hospital course complicated by hyponatremia secondary to SIADH. Admitted to [place] acute inpatient rehab on 9/28 for ADL, gait, and functional impairments.       T7 sensory incomplete Spinal Cord Compression   - secondary to spinal subdural hematoma s/p evacuation and decompression of T7-8  - Primary surgeon Dr. Randy D'Amico  - s/p decadron  - Comprehensive Multidisciplinary Rehab Program     3 hours a day, 5 days a week with PT/OT     P&O as needed  - Has outpt appt on 10/6 -- will need to call and reschedule while in rehab or suggest telemedicine  - leave incision open to air, OK to shower  - Per Neurosurgery: ok to start slow gradual, progressive translational movements in therapy as tolerated.     Hyponatremia, likely SIADH, resolved  - hospitalist to follow   - continue Cassie Aleman -- weaning off   - discontinued fluid restriction  - monitor BP    Psych:  h/o substance abuse: IVDU, methamphetamine  Adjustment disorder  Insomnia  - Neuropsych evaluation and treat for adjustment, coping, and counseling  - will need to follow up with psych outpatient: counseling centers provided   - Trazodone as needed at bedtime      Pain Management:  - Seen by pain management at [facility] recommended:    Dilaudid PO 2mg q4h Severe - start to wean    Lyrica 50 q8h    Tylenol weaned to 650 BID     Robaxin 500 q8h -- changed to PRN  - bowel regimen as below  - Left leg spasms - consider tizanidine/baclofen if increasing - stable   - Neuropathic pain/ hyperalgesia -- desensitization therapy - stable     GI/Bowel:  Neurogenic bowel with incontinence, decreased voluntary anal contraction  - Senna QHS  - d/c Miralax due to daytime incontinence and leaking  - cont AM suppository for full bowel evacuation  - encourage timed toileting, to be discussed with nursing     /Bladder:  Neurogenic bladder with Urinary retention  - bladder scans changed to q6hrs    pt with good hand function and motivated to learn to SC -- discussed with nursing    Skin/Pressure Injury:  - Skin assessment on admission admission: no pressure injuries noted  - Monitor Incisions: spine incision open to air  - Turn every 2 hours while in bed, at risk due to paraplegia    Diet:   - Diet Consistency/Modifications: Reg + thins - fluid restriction discontinued    DVT ppx:  - Lovenox  - SCDs  - Last Doppler on 9/27 neg    Restrictions/Precautions:  - Precautions: Spine precautions (as above), falls      ---------------  Outpatient Follow-up:  Neurosurgery - Dr. Randy D'Amico  Psychological services/ counseling   PCP?    --------------

## 2021-10-01 NOTE — DISCHARGE NOTE PROVIDER - CARE PROVIDER_API CALL
DAmico, Randy S (MD)  Neurosurgery  [address] [place] [address]  Phone: [phone]  Fax: [phone]  Follow Up Time: 2 weeks    Srinath Ward)  PhysicalRehab Medicine; Spinal Cord Injury Medicine  [address] [place] [address] [address] [place] [address]  Phone: [phone]  Fax: [phone]  Follow Up Time: Routine

## 2021-10-02 DIAGNOSIS — I60.9 NONTRAUMATIC SUBARACHNOID HEMORRHAGE, UNSPECIFIED: ICD-10-CM

## 2021-10-02 DIAGNOSIS — F19.10 OTHER PSYCHOACTIVE SUBSTANCE ABUSE, UNCOMPLICATED: ICD-10-CM

## 2021-10-02 DIAGNOSIS — Z29.9 ENCOUNTER FOR PROPHYLACTIC MEASURES, UNSPECIFIED: ICD-10-CM

## 2021-10-02 DIAGNOSIS — E22.2 SYNDROME OF INAPPROPRIATE SECRETION OF ANTIDIURETIC HORMONE: ICD-10-CM

## 2021-10-02 DIAGNOSIS — G95.20 UNSPECIFIED CORD COMPRESSION: ICD-10-CM

## 2021-10-02 DIAGNOSIS — B20 HUMAN IMMUNODEFICIENCY VIRUS [HIV] DISEASE: ICD-10-CM

## 2021-10-02 DIAGNOSIS — N31.9 NEUROMUSCULAR DYSFUNCTION OF BLADDER, UNSPECIFIED: ICD-10-CM

## 2021-10-02 DIAGNOSIS — D64.9 ANEMIA, UNSPECIFIED: ICD-10-CM

## 2021-10-02 DIAGNOSIS — S06.5X9A TRAUMATIC SUBDURAL HEMORRHAGE WITH LOSS OF CONSCIOUSNESS OF UNSPECIFIED DURATION, INITIAL ENCOUNTER: ICD-10-CM

## 2021-10-02 LAB
ANION GAP SERPL CALC-SCNC: 8 MMOL/L — SIGNIFICANT CHANGE UP (ref 5–17)
BUN SERPL-MCNC: 17 MG/DL — SIGNIFICANT CHANGE UP (ref 7–23)
CALCIUM SERPL-MCNC: 8.3 MG/DL — LOW (ref 8.4–10.5)
CHLORIDE SERPL-SCNC: 105 MMOL/L — SIGNIFICANT CHANGE UP (ref 96–108)
CO2 SERPL-SCNC: 29 MMOL/L — SIGNIFICANT CHANGE UP (ref 22–31)
CREAT SERPL-MCNC: 0.84 MG/DL — SIGNIFICANT CHANGE UP (ref 0.5–1.3)
GLUCOSE SERPL-MCNC: 92 MG/DL — SIGNIFICANT CHANGE UP (ref 70–99)
POTASSIUM SERPL-MCNC: 4.4 MMOL/L — SIGNIFICANT CHANGE UP (ref 3.5–5.3)
POTASSIUM SERPL-SCNC: 4.4 MMOL/L — SIGNIFICANT CHANGE UP (ref 3.5–5.3)
SODIUM SERPL-SCNC: 142 MMOL/L — SIGNIFICANT CHANGE UP (ref 135–145)

## 2021-10-02 PROCEDURE — 99232 SBSQ HOSP IP/OBS MODERATE 35: CPT

## 2021-10-02 RX ADMIN — BICTEGRAVIR SODIUM, EMTRICITABINE, AND TENOFOVIR ALAFENAMIDE FUMARATE 1 TABLET(S): 30; 120; 15 TABLET ORAL at 12:01

## 2021-10-02 RX ADMIN — Medication 650 MILLIGRAM(S): at 06:45

## 2021-10-02 RX ADMIN — Medication 650 MILLIGRAM(S): at 05:45

## 2021-10-02 RX ADMIN — SODIUM CHLORIDE 1 GRAM(S): 9 INJECTION INTRAMUSCULAR; INTRAVENOUS; SUBCUTANEOUS at 12:01

## 2021-10-02 RX ADMIN — ENOXAPARIN SODIUM 40 MILLIGRAM(S): 100 INJECTION SUBCUTANEOUS at 22:48

## 2021-10-02 RX ADMIN — Medication 50 MILLIGRAM(S): at 22:49

## 2021-10-02 RX ADMIN — FLUDROCORTISONE ACETATE 0.1 MILLIGRAM(S): 0.1 TABLET ORAL at 05:40

## 2021-10-02 NOTE — PROGRESS NOTE ADULT - SUBJECTIVE AND OBJECTIVE BOX
Cc: Gait dysfunction    HPI: No acute events overnight. Patient seen and examined at bedside. Had BM this AM.   Pain controlled, no chest pain, no N/V, no Fevers/Chills. No other new ROS  Has been tolerating rehabilitation program.    acetaminophen   Tablet .. 650 milliGRAM(s) Oral every 12 hours  bictegravir 50 mG/emtricitabine 200 mG/tenofovir alafenamide 25 mG (BIKTARVY) 1 Tablet(s) Oral daily  bisacodyl Suppository 10 milliGRAM(s) Rectal daily  enoxaparin Injectable 40 milliGRAM(s) SubCutaneous at bedtime  fludroCORTISONE 0.1 milliGRAM(s) Oral daily  HYDROmorphone   Tablet 2 milliGRAM(s) Oral every 8 hours PRN  methocarbamol 500 milliGRAM(s) Oral every 8 hours PRN  pregabalin 50 milliGRAM(s) Oral every 8 hours PRN  senna 2 Tablet(s) Oral at bedtime PRN  sodium chloride 1 Gram(s) Oral daily  traZODone 25 milliGRAM(s) Oral at bedtime PRN      T(C): 36.8 (10-02-21 @ 08:07), Max: 36.9 (10-01-21 @ 22:45)  HR: 89 (10-02-21 @ 08:07) (89 - 89)  BP: 130/74 (10-02-21 @ 08:07) (123/78 - 130/74)  RR: 16 (10-02-21 @ 08:07) (15 - 16)  SpO2: 98% (10-02-21 @ 08:07) (98% - 98%)    In NAD  HEENT- EOMI  Heart- S1S2  Lungs- CTA bl.  Abd- + BS, NT  Ext- No calf pain  Neuro- Exam unchanged          Imp: Patient with diagnosis of SAH/SDH of spinal cord s/p laminectomy/decompression admitted for comprehensive acute rehabilitation.    Plan:  - Continue PT/OT/SLP as indicated  - DVT prophylaxis  - Skin- Turn q2h, check skin daily  - Continue current medications  -Active issues- Following Na/BP, currently stable, will wean Florinef (currently at 0.1mg/QD), continue pain control  - Patient is stable to continue current rehabilitation program.

## 2021-10-02 NOTE — PROGRESS NOTE ADULT - SUBJECTIVE AND OBJECTIVE BOX
Patient is a 36y old  Male who presents with a chief complaint of Spinal Cord Compression (01 Oct 2021 15:10)      Patient seen and examined at bedside.  No overnight events  No complaints this morning. Slept well.     ALLERGIES:  No Known Allergies    MEDICATIONS  (STANDING):  acetaminophen   Tablet .. 650 milliGRAM(s) Oral every 12 hours  bictegravir 50 mG/emtricitabine 200 mG/tenofovir alafenamide 25 mG (BIKTARVY) 1 Tablet(s) Oral daily  bisacodyl Suppository 10 milliGRAM(s) Rectal daily  enoxaparin Injectable 40 milliGRAM(s) SubCutaneous at bedtime  fludroCORTISONE 0.1 milliGRAM(s) Oral daily  pregabalin 50 milliGRAM(s) Oral every 8 hours  sodium chloride 1 Gram(s) Oral daily    MEDICATIONS  (PRN):  HYDROmorphone   Tablet 2 milliGRAM(s) Oral every 8 hours PRN Severe Pain (7 - 10)  methocarbamol 500 milliGRAM(s) Oral every 8 hours PRN Muscle Spasm  senna 2 Tablet(s) Oral at bedtime PRN Constipation  traZODone 25 milliGRAM(s) Oral at bedtime PRN insomnia    Vital Signs Last 24 Hrs  T(F): 98.3 (02 Oct 2021 08:07), Max: 98.4 (01 Oct 2021 22:45)  HR: 89 (02 Oct 2021 08:07) (89 - 89)  BP: 130/74 (02 Oct 2021 08:07) (123/78 - 130/74)  RR: 16 (02 Oct 2021 08:07) (15 - 16)  SpO2: 98% (02 Oct 2021 08:07) (98% - 98%)  I&O's Summary    01 Oct 2021 07:01  -  02 Oct 2021 07:00  --------------------------------------------------------  IN: 0 mL / OUT: 1800 mL / NET: -1800 mL      BMI (kg/m2): 23.6 (09-28-21 @ 17:03)    PHYSICAL EXAM:  GENERAL: NAD  HENT:  Atraumatic, Normocephalic; No tonsillar erythema, exudates, or enlargement; Moist mucous membranes;   EYES: EOMI, PERRLA, conjunctiva and sclera clear, no lid-lag  NECK: Supple, No JVD, Normal thyroid  CHEST/LUNG: Clear to percussion bilaterally; No rales, rhonchi, wheezing, or rubs; normal respiratory effort, no intercostal retractions  HEART: Regular rate and rhythm; No murmurs, rubs, or gallops; No pitting edema  ABDOMEN: Soft, Nontender, Nondistended; Bowel sounds present; No HSM  MUSCULOSKELETAL/EXTREMITIES:  2+ Peripheral Pulses, No clubbing or digital cyanosis  PSYCH: Appropriate affect, Alert & Awake    LABS:                        12.7   7.96  )-----------( 239      ( 30 Sep 2021 05:45 )             37.2       10-02    142  |  105  |  17  ----------------------------<  92  4.4   |  29  |  0.84    Ca    8.3      02 Oct 2021 07:08    TPro  5.6  /  Alb  2.8  /  TBili  0.3  /  DBili  x   /  AST  15  /  ALT  47  /  AlkPhos  68  09-30     eGFR if African American: 130 mL/min/1.73M2 (10-02-21 @ 07:08)  eGFR if Non African American: 112 mL/min/1.73M2 (10-02-21 @ 07:08)    COVID-19 PCR: NotDetec (09-27-21 @ 18:49)  COVID-19 PCR: NotDetec (09-20-21 @ 19:01)  COVID-19 PCR: Negative (09-15-21 @ 01:05)      Care Discussed with Rehab Attending and Other Providers

## 2021-10-02 NOTE — PROGRESS NOTE ADULT - ASSESSMENT
36M with PMH IVDU / methamphetamine use presented to [facility] on 9/15 with acute onset paraplegia after lifting heavy weights found to have SAH / SDH of the spinal cord with hematoma causing cord compression s/p T7-8 lami decompression (9/16) now with residual paraplegia. Hospital course complicated by hyponatremia secondary to SIADH. Admitted to [place] acute inpatient rehab on 9/28 for initiation of multidisciplinary rehab program. ADL, gait, and functional impairments.     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  -s/p evacuation and decompression of T7-8  -Continue comprehensive rehab program  -Pain management with Dilaudid PRN for severe pain, Tylenol q 8 hours standing - can consider switching to PRN dosing for Tylenol  -Continue Lyrica and Robaxin PRN   -Follow up with outpatient neurosurgery    #Hyponatremia, likely SIADH - resolved  -Decreased to Florinef .1mg daily - continue for now, plan to taper to discontinuation after off salt tabs   -Continue to decrease NaCl to daily dosing  -Off fluid restriction  -Follow up BMP after adjustments     Normocytic Anemia  ?acute blood loss anemia post op  -No overt signs of bleeding  -H/H stable/ improving  -Follow up routine CBC    #hx of IV drug use  #Methamphetamine use  -Supportive care    #Neurogenic bladder  #Acute urinary retention  -Bladder scan, straight cath per primary  -Encourage timed toileting     #HIV  -Continue Biktarvy    #Prophylactic Measure  DVT ppx: Lovenox

## 2021-10-03 PROCEDURE — 99232 SBSQ HOSP IP/OBS MODERATE 35: CPT

## 2021-10-03 RX ADMIN — ENOXAPARIN SODIUM 40 MILLIGRAM(S): 100 INJECTION SUBCUTANEOUS at 21:32

## 2021-10-03 RX ADMIN — Medication 50 MILLIGRAM(S): at 22:55

## 2021-10-03 RX ADMIN — FLUDROCORTISONE ACETATE 0.1 MILLIGRAM(S): 0.1 TABLET ORAL at 06:49

## 2021-10-03 RX ADMIN — Medication 650 MILLIGRAM(S): at 07:18

## 2021-10-03 RX ADMIN — SODIUM CHLORIDE 1 GRAM(S): 9 INJECTION INTRAMUSCULAR; INTRAVENOUS; SUBCUTANEOUS at 11:36

## 2021-10-03 RX ADMIN — Medication 650 MILLIGRAM(S): at 06:50

## 2021-10-03 RX ADMIN — BICTEGRAVIR SODIUM, EMTRICITABINE, AND TENOFOVIR ALAFENAMIDE FUMARATE 1 TABLET(S): 30; 120; 15 TABLET ORAL at 11:36

## 2021-10-03 NOTE — PROGRESS NOTE ADULT - SUBJECTIVE AND OBJECTIVE BOX
Cc: Gait dysfunction    HPI: No acute events overnight. Patient seen and examined at bedside.   Pain controlled, no chest pain, no N/V, no Fevers/Chills. No other new ROS  Has been tolerating rehabilitation program.    acetaminophen   Tablet .. 650 milliGRAM(s) Oral every 12 hours  bictegravir 50 mG/emtricitabine 200 mG/tenofovir alafenamide 25 mG (BIKTARVY) 1 Tablet(s) Oral daily  bisacodyl Suppository 10 milliGRAM(s) Rectal daily  enoxaparin Injectable 40 milliGRAM(s) SubCutaneous at bedtime  fludroCORTISONE 0.1 milliGRAM(s) Oral daily  HYDROmorphone   Tablet 2 milliGRAM(s) Oral every 8 hours PRN  methocarbamol 500 milliGRAM(s) Oral every 8 hours PRN  pregabalin 50 milliGRAM(s) Oral every 8 hours PRN  senna 2 Tablet(s) Oral at bedtime PRN  sodium chloride 1 Gram(s) Oral daily  traZODone 25 milliGRAM(s) Oral at bedtime PRN      T(C): 36.7 (10-03-21 @ 08:42), Max: 36.7 (10-02-21 @ 22:49)  HR: 85 (10-03-21 @ 08:42) (85 - 90)  BP: 128/75 (10-03-21 @ 08:42) (127/76 - 128/75)  RR: 16 (10-03-21 @ 08:42) (15 - 16)  SpO2: 98% (10-03-21 @ 08:42) (98% - 98%)    In NAD  HEENT- EOMI  Heart- S1S2  Lungs- CTA bl.  Abd- + BS, NT  Ext- No calf pain  Neuro- Exam unchanged      Imp: Patient with diagnosis of SAH/SDH of spinal cord s/p laminectomy/decompression admitted for comprehensive acute rehabilitation.    Plan:  - Continue PT/OT/SLP as indicated  - DVT prophylaxis  - Skin- Turn q2h, check skin daily  - Continue current medications  -Active issues- Following Na/BP, currently stable, will wean Florinef (currently at 0.1mg/QD), continue pain control, continue current bowel regimen, has regular BMs  - Patient is stable to continue current rehabilitation program.

## 2021-10-04 PROBLEM — Z00.00 ENCOUNTER FOR PREVENTIVE HEALTH EXAMINATION: Status: ACTIVE | Noted: 2021-10-04

## 2021-10-04 LAB
ALBUMIN SERPL ELPH-MCNC: 2.8 G/DL — LOW (ref 3.3–5)
ALP SERPL-CCNC: 59 U/L — SIGNIFICANT CHANGE UP (ref 40–120)
ALT FLD-CCNC: 98 U/L — HIGH (ref 10–45)
ANION GAP SERPL CALC-SCNC: 6 MMOL/L — SIGNIFICANT CHANGE UP (ref 5–17)
AST SERPL-CCNC: 46 U/L — HIGH (ref 10–40)
BILIRUB SERPL-MCNC: 0.2 MG/DL — SIGNIFICANT CHANGE UP (ref 0.2–1.2)
BUN SERPL-MCNC: 19 MG/DL — SIGNIFICANT CHANGE UP (ref 7–23)
CALCIUM SERPL-MCNC: 8.3 MG/DL — LOW (ref 8.4–10.5)
CHLORIDE SERPL-SCNC: 107 MMOL/L — SIGNIFICANT CHANGE UP (ref 96–108)
CO2 SERPL-SCNC: 30 MMOL/L — SIGNIFICANT CHANGE UP (ref 22–31)
CREAT SERPL-MCNC: 0.98 MG/DL — SIGNIFICANT CHANGE UP (ref 0.5–1.3)
GLUCOSE SERPL-MCNC: 98 MG/DL — SIGNIFICANT CHANGE UP (ref 70–99)
HCT VFR BLD CALC: 35.3 % — LOW (ref 39–50)
HGB BLD-MCNC: 12.1 G/DL — LOW (ref 13–17)
MCHC RBC-ENTMCNC: 32 PG — SIGNIFICANT CHANGE UP (ref 27–34)
MCHC RBC-ENTMCNC: 34.3 GM/DL — SIGNIFICANT CHANGE UP (ref 32–36)
MCV RBC AUTO: 93.4 FL — SIGNIFICANT CHANGE UP (ref 80–100)
NRBC # BLD: 0 /100 WBCS — SIGNIFICANT CHANGE UP (ref 0–0)
PLATELET # BLD AUTO: 230 K/UL — SIGNIFICANT CHANGE UP (ref 150–400)
POTASSIUM SERPL-MCNC: 4.4 MMOL/L — SIGNIFICANT CHANGE UP (ref 3.5–5.3)
POTASSIUM SERPL-SCNC: 4.4 MMOL/L — SIGNIFICANT CHANGE UP (ref 3.5–5.3)
PROT SERPL-MCNC: 5.8 G/DL — LOW (ref 6–8.3)
RBC # BLD: 3.78 M/UL — LOW (ref 4.2–5.8)
RBC # FLD: 12.4 % — SIGNIFICANT CHANGE UP (ref 10.3–14.5)
SODIUM SERPL-SCNC: 143 MMOL/L — SIGNIFICANT CHANGE UP (ref 135–145)
WBC # BLD: 6.7 K/UL — SIGNIFICANT CHANGE UP (ref 3.8–10.5)
WBC # FLD AUTO: 6.7 K/UL — SIGNIFICANT CHANGE UP (ref 3.8–10.5)

## 2021-10-04 PROCEDURE — 99232 SBSQ HOSP IP/OBS MODERATE 35: CPT | Mod: GC

## 2021-10-04 PROCEDURE — 99232 SBSQ HOSP IP/OBS MODERATE 35: CPT

## 2021-10-04 RX ORDER — SENNA PLUS 8.6 MG/1
2 TABLET ORAL AT BEDTIME
Refills: 0 | Status: DISCONTINUED | OUTPATIENT
Start: 2021-10-04 | End: 2021-10-26

## 2021-10-04 RX ADMIN — ENOXAPARIN SODIUM 40 MILLIGRAM(S): 100 INJECTION SUBCUTANEOUS at 22:20

## 2021-10-04 RX ADMIN — Medication 650 MILLIGRAM(S): at 07:35

## 2021-10-04 RX ADMIN — Medication 650 MILLIGRAM(S): at 06:19

## 2021-10-04 RX ADMIN — BICTEGRAVIR SODIUM, EMTRICITABINE, AND TENOFOVIR ALAFENAMIDE FUMARATE 1 TABLET(S): 30; 120; 15 TABLET ORAL at 12:38

## 2021-10-04 RX ADMIN — SENNA PLUS 2 TABLET(S): 8.6 TABLET ORAL at 22:19

## 2021-10-04 RX ADMIN — METHOCARBAMOL 500 MILLIGRAM(S): 500 TABLET, FILM COATED ORAL at 22:19

## 2021-10-04 RX ADMIN — FLUDROCORTISONE ACETATE 0.1 MILLIGRAM(S): 0.1 TABLET ORAL at 06:19

## 2021-10-04 NOTE — PROGRESS NOTE ADULT - SUBJECTIVE AND OBJECTIVE BOX
Patient is a 36y old  Male who presents with a chief complaint of Spinal Cord Compression (01 Oct 2021 08:33)    HPI:  Ms. ALISSA ARAUJO is a 36 year old male with history of IVDU and methamphetamine use presented to [facility] on 9/15/21 with symptoms of acute onset lower extremity weakness and parethesias a few hours after lifting heavy weights in the gym.  He developed back pain and radiating pain to bilateral buttocks then progressed to paraplegia. He was brought in by ambulance after a passerby witnessed her unable to rise from the curb.  He did not have any bowel or bladder incontinence on admission.      On presentation to the ED, MRI performed and found to have ill defined intradural - extramedullary lesion with enhancement at level T8 with mass effect on the spinal cord with lefward displacement and minimal associated cord edema.  He was admitted to neurosurgical survice and started on decadron. Spinal angiogram performed which was negative. He underwent T8-9 lami decompression on 9/15 with Dr. D-Amico with intraoperative findings consistent with subarachnoid clot now s/p evacuation.  Post operative course was uncomplicated. Tolerated procedure well. Pt was seen by pain management who adjusted pain regimen (tylenol, lyrica, Robaxin, Dilaudid). Labs were significant for hyponatremia likely secondary to SIADH given euvolemia on exam and good UOP. Pt required short course of hypertonic saline then weaned off. Sodium stable with fluid restriction and addition of Na tabs. Pt was seen by behavioral health and psychology for adjustment disorder in the setting of new paralysis. He was determined to be low risk for suicide and emotional support provided. Trazodone was started for insomnia.     Patient was evaluated by PM&R and therapy for functional deficits and gait/ ADL impairments and recommended acute rehabilitation. Patient was medically optimized for discharge to  to [facility] on 9/28 (28 Sep 2021 13:56)    ----------------------------------------------------------------------    SUBJECTIVE:  Patient seen and evaluated at the bedside this AM. Patient overwall endorses to doing well. He has currently been using his SCby himself with some assistance. As for his BM, he had an accident this morning while sitting in the shower chair but stated that he has had one BM per day for the past 2 days and stated that he was able to feel his BM yesterday. Has not been using his laxatives and stated that he will like to hold off on suppository and to reassess tmr. patient endorses to LLE stronger than RLE and now able to wiggle his toes on his RLE. Currently endorses to pain being under control -- has not needed to take Dilaudid for the past few days.      ROS:  Denies: headache, lightheadedness, CP, SOB, abdominal pain, dysuria, nausea, constipation      ----------------------------------------------------------------------  PHYSICAL EXAM:    Vital Signs Last 24 Hrs  T(C): 36.9 (04 Oct 2021 07:49), Max: 36.9 (03 Oct 2021 21:43)  T(F): 98.4 (04 Oct 2021 07:49), Max: 98.4 (03 Oct 2021 21:43)  HR: 91 (04 Oct 2021 07:49) (80 - 91)  BP: 130/88 (04 Oct 2021 07:49) (123/77 - 130/88)  BP(mean): --  RR: 16 (04 Oct 2021 07:49) (15 - 16)  SpO2: 100% (04 Oct 2021 07:49) (99% - 100%)    PHYSICAL EXAM  General: NAD, comfortable  Cardio: RRR, S1/S2+  Resp: no respiratory difficulty or distress  Abdomen: soft, NTND  Neuro:  T7 incomplete paraplegia  2/5 on LLE, some toes wiggling on the RLE   Extrem: no edema, no calf tenderness   Skin: thoracic incision open to air      ----------------------------------------------------------------------  RECENT LABS:    RECENT LABS/IMAGING                        12.1   6.70  )-----------( 230      ( 04 Oct 2021 05:30 )             35.3     10-04    143  |  107  |  19  ----------------------------<  98  4.4   |  30  |  0.98    Ca    8.3<L>      04 Oct 2021 05:30    TPro  5.8<L>  /  Alb  2.8<L>  /  TBili  0.2  /  DBili  x   /  AST  46<H>  /  ALT  98<H>  /  AlkPhos  59  10-04      ----------------------------------------------------------------------  RECENT IMAGING:    ----------------------------------------------------------------------  MEDICATIONS:  MEDICATIONS  (STANDING):  acetaminophen   Tablet .. 650 milliGRAM(s) Oral every 12 hours  bictegravir 50 mG/emtricitabine 200 mG/tenofovir alafenamide 25 mG (BIKTARVY) 1 Tablet(s) Oral daily  bisacodyl Suppository 10 milliGRAM(s) Rectal daily  enoxaparin Injectable 40 milliGRAM(s) SubCutaneous at bedtime  fludroCORTISONE 0.1 milliGRAM(s) Oral daily  senna 2 Tablet(s) Oral at bedtime  sodium chloride 1 Gram(s) Oral daily    MEDICATIONS  (PRN):  HYDROmorphone   Tablet 2 milliGRAM(s) Oral every 8 hours PRN Severe Pain (7 - 10)  methocarbamol 500 milliGRAM(s) Oral every 8 hours PRN Muscle Spasm  pregabalin 50 milliGRAM(s) Oral every 8 hours PRN Pain  traZODone 25 milliGRAM(s) Oral at bedtime PRN insomnia      ----------------------------------------------------------------------

## 2021-10-04 NOTE — PROGRESS NOTE ADULT - ASSESSMENT
36M with PMH IVDU / methamphetamine use presented to [facility] on 9/15 with acute onset paraplegia after lifting heavy weights found to have SAH / SDH of the spinal cord with hematoma causing cord compression s/p T7-8 lami decompression (9/16) now with residual paraplegia. Hospital course complicated by hyponatremia secondary to SIADH. Admitted to [place] acute inpatient rehab on 9/28 for initiation of multidisciplinary rehab program. ADL, gait, and functional impairments.     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  -s/p evacuation and decompression of T7-8  -Continue comprehensive rehab program  -Pain management per primary  -Continue Lyrica and Robaxin PRN   -Follow up with outpatient neurosurgery    #Hyponatremia, likely SIADH - resolved  -Stop NaCl  -Plan to stop Florinef in AM  -Off fluid restriction  -Follow up AM BMP after adjustments     #Anemia  ?acute blood loss anemia post op  -No overt signs of bleeding  -H/H stable/ improving  -Follow up routine CBC    #Transaminitis  -Continue to monitor    #hx of IV drug use  #Methamphetamine use  -Supportive care    #Neurogenic bladder  #Acute urinary retention  -Bladder scan, straight cath per primary    #HIV  -Continue Biktarvy    #Prophylactic Measure  DVT ppx: Lovenox  -Bowel regimen per primary

## 2021-10-04 NOTE — CHART NOTE - NSCHARTNOTEFT_GEN_A_CORE
Nutrition Follow Up Note  Hospital Course   (Per Electronic Medical Record)    Source:  Patient [X]  Medical Record [X]      Diet:   Regular Diet w/ Thin Liquids - Fluid Restriction DC'd on 9/30  Tolerates Diet Consistency Well  No Chewing/Swallowing Difficulties  No Recent Nausea, Vomiting, Diarrhea or Constipation (as Per Patient)  Consumes 100% of Meals (as Per Documentation) - States Good PO Intake/Appetite   Declined Nutrition Supplementation   Obtained Food Preferences from Patient  Education Provided on Need for Increased Fluids    Enteral/Parenteral Nutrition: Not Applicable    Current Weight: 141.7lb on 10/3  Obtain New Weight  Obtain New Weight to Confirm    Pertinent Medications: MEDICATIONS  (STANDING):  acetaminophen   Tablet .. 650 milliGRAM(s) Oral every 12 hours  bictegravir 50 mG/emtricitabine 200 mG/tenofovir alafenamide 25 mG (BIKTARVY) 1 Tablet(s) Oral daily  bisacodyl Suppository 10 milliGRAM(s) Rectal daily  enoxaparin Injectable 40 milliGRAM(s) SubCutaneous at bedtime  fludroCORTISONE 0.1 milliGRAM(s) Oral daily  sodium chloride 1 Gram(s) Oral daily    MEDICATIONS  (PRN):  HYDROmorphone   Tablet 2 milliGRAM(s) Oral every 8 hours PRN Severe Pain (7 - 10)  methocarbamol 500 milliGRAM(s) Oral every 8 hours PRN Muscle Spasm  pregabalin 50 milliGRAM(s) Oral every 8 hours PRN Pain  senna 2 Tablet(s) Oral at bedtime PRN Constipation  traZODone 25 milliGRAM(s) Oral at bedtime PRN insomnia    Pertinent Labs:  10-04 Na143 mmol/L Glu 98 mg/dL K+ 4.4 mmol/L Cr  0.98 mg/dL BUN 19 mg/dL 09-28 Phos 2.7 mg/dL 10-04 Alb 2.8 g/dL<L> 09-29 PAB 31 mg/dL    Skin: No Pressure Ulcers  Multiple Surgical Incisions  (as Per Nursing Flow Sheet)     Edema: None Noted (as Per Documentation)     Last Bowel Movement: on 10/3    Estimated Needs:   [X] No Change Since Previous Assessment    Previous Nutrition Diagnosis:   Moderate Malnutrition     Nutrition Diagnosis is [X] Ongoing - Declines Nutrition Supplementation     New Nutrition Diagnosis: [X] Not Applicable    Interventions:   1. Education Provided on Need for Increased Fluids  2. Recommend Continue Nutrition Plan of Care     Monitoring & Evaluation:   [X] Weights   [X] PO Intake   [X] Skin Integrity   [X] Follow Up (Per Protocol)  [X] Tolerance to Diet Prescription   [X] Other: Labs    Registered Dietitian/Nutritionist Remains Available.  Joe Chicas RDN    Pager #[phone]  Phone# [phone]

## 2021-10-04 NOTE — PROGRESS NOTE ADULT - SUBJECTIVE AND OBJECTIVE BOX
Patient is a 36y old  Male who presents with a chief complaint of Spinal Cord Compression (04 Oct 2021 10:01)      Patient seen and examined at bedside. Reports improvement with muscle strength in lower legs with therapy.   Continues to have bowel incontinence     REVIEW OF SYSTEMS:  CONSTITUTIONAL: No fever or chills  HEENT:  No headache, no sore throat  RESPIRATORY: No cough, wheezing, or shortness of breath  CARDIOVASCULAR: No chest pain, palpitations    ALLERGIES:  No Known Allergies    MEDICATIONS  (STANDING):  acetaminophen   Tablet .. 650 milliGRAM(s) Oral every 12 hours  bictegravir 50 mG/emtricitabine 200 mG/tenofovir alafenamide 25 mG (BIKTARVY) 1 Tablet(s) Oral daily  bisacodyl Suppository 10 milliGRAM(s) Rectal daily  enoxaparin Injectable 40 milliGRAM(s) SubCutaneous at bedtime  fludroCORTISONE 0.1 milliGRAM(s) Oral daily  senna 2 Tablet(s) Oral at bedtime  sodium chloride 1 Gram(s) Oral daily    MEDICATIONS  (PRN):  HYDROmorphone   Tablet 2 milliGRAM(s) Oral every 8 hours PRN Severe Pain (7 - 10)  methocarbamol 500 milliGRAM(s) Oral every 8 hours PRN Muscle Spasm  pregabalin 50 milliGRAM(s) Oral every 8 hours PRN Pain  traZODone 25 milliGRAM(s) Oral at bedtime PRN insomnia    Vital Signs Last 24 Hrs  T(F): 98.4 (04 Oct 2021 07:49), Max: 98.4 (03 Oct 2021 21:43)  HR: 91 (04 Oct 2021 07:49) (80 - 91)  BP: 130/88 (04 Oct 2021 07:49) (123/77 - 130/88)  RR: 16 (04 Oct 2021 07:49) (15 - 16)  SpO2: 100% (04 Oct 2021 07:49) (99% - 100%)  I&O's Summary        PHYSICAL EXAM:  General: NAD, A/O x 3  ENT: MMM, no scleral icterus  Neck: Supple, No JVD  Lungs: Clear to auscultation bilaterally, no wheezes, rales, rhonchi  Cardio: RRR, S1/S2, No murmurs  Abdomen: Soft, Nontender, Nondistended; Bowel sounds present  Extremities: No calf tenderness, No pitting edema; bilateral lower extremity paraplegia     LABS:                        12.1   6.70  )-----------( 230      ( 04 Oct 2021 05:30 )             35.3       10-04    143  |  107  |  19  ----------------------------<  98  4.4   |  30  |  0.98    Ca    8.3      04 Oct 2021 05:30    TPro  5.8  /  Alb  2.8  /  TBili  0.2  /  DBili  x   /  AST  46  /  ALT  98  /  AlkPhos  59  10-04     eGFR if Non African American: 98 mL/min/1.73M2 (10-04-21 @ 05:30)  eGFR if African American: 114 mL/min/1.73M2 (10-04-21 @ 05:30)               COVID-19 PCR: NotDetec (09-27-21 @ 18:49)  COVID-19 PCR: NotDetec (09-20-21 @ 19:01)  COVID-19 PCR: Negative (09-15-21 @ 01:05)      RADIOLOGY & ADDITIONAL TESTS:    Care Discussed with Consultants/Other Providers: Dr. Pérez (physiatry), SW, RN  PT/OT notes reviewed

## 2021-10-04 NOTE — PROGRESS NOTE ADULT - ASSESSMENT
36 year old male with hx of IVDU/methamphetamine use presented to [facility] on 9/15 with acute onset paraplegia after lifting heavy weights found to have SAH/ SDH of the spinal cord with hematoma causing cord compression s/p T7-8 lami decompression (9/16) now with residual paraplegia. Hospital course complicated by hyponatremia secondary to SIADH. Admitted to [facility] acute inpatient rehab on 9/28 for ADL, gait, and functional impairments.       T7 sensory incomplete Spinal Cord Compression   - secondary to spinal subdural hematoma s/p evacuation and decompression of T7-8  - Primary surgeon Dr. Randy D'Amico  - s/p decadron  - Comprehensive Multidisciplinary Rehab Program     3 hours a day, 5 days a week with PT/OT     P&O as needed  - Has outpt appt on 10/6 -- will need to call and reschedule while in rehab or suggest telemedicine  - leave incision open to air, OK to shower  - Per Neurosurgery: ok to start slow gradual, progressive translational movements in therapy as tolerated.     Hyponatremia, likely SIADH, resolved  - hospitalist to follow   - continue Cassie Aleman -- weaning off   - discontinued fluid restriction  - monitor BP    Psych:  h/o substance abuse: IVDU, methamphetamine  Adjustment disorder  Insomnia  - Neuropsych evaluation and treat for adjustment, coping, and counseling  - will need to follow up with psych outpatient: counseling centers provided   - Trazodone as needed at bedtime      Pain Management:  - Seen by pain management at [facility] recommended:    Dilaudid PO 2mg q8h Severe - has not needed to take     Lyrica 50 q8h PRN     Tylenol weaned to 650 BID     Robaxin 500 q8h -- changed to PRN  - bowel regimen as below  - Left leg spasms - consider tizanidine/baclofen if increasing - stable   - Neuropathic pain/ hyperalgesia -- desensitization therapy - stable     GI/Bowel:  Neurogenic bowel with incontinence, decreased voluntary anal contraction  - Senna QHS  - d/c Miralax due to daytime incontinence and leaking  - cont AM suppository for full bowel evacuation -- would like to hold off for now and to reassess 10/5   - encourage timed toileting, to be discussed with nursing     /Bladder:  Neurogenic bladder with Urinary retention  - bladder scans changed to q6hrs    pt with good hand function and motivated to learn to SC -- discussed with nursing    Skin/Pressure Injury:  - Skin assessment on admission admission: no pressure injuries noted  - Monitor Incisions: spine incision open to air  - Turn every 2 hours while in bed, at risk due to paraplegia    Diet:   - Diet Consistency/Modifications: Reg + thins - fluid restriction discontinued    DVT ppx:  - Lovenox  - SCDs  - Last Doppler on 9/27 neg    Restrictions/Precautions:  - Precautions: Spine precautions (as above), falls      ---------------  Outpatient Follow-up:  Neurosurgery - Dr. Randy D'Amico  Psychological services/ counseling   PCP?    --------------     36 year old male with hx of IVDU/methamphetamine use presented to [facility] on 9/15 with acute onset paraplegia after lifting heavy weights found to have SAH/ SDH of the spinal cord with hematoma causing cord compression s/p T7-8 lami decompression (9/16) now with residual paraplegia. Hospital course complicated by hyponatremia secondary to SIADH. Admitted to [facility] acute inpatient rehab on 9/28 for ADL, gait, and functional impairments.       T7 sensory incomplete Spinal Cord Compression   - secondary to spinal subdural hematoma s/p evacuation and decompression of T7-8  - Primary surgeon Dr. Randy D'Amico  - s/p decadron  - Comprehensive Multidisciplinary Rehab Program     3 hours a day, 5 days a week with PT/OT     P&O as needed  - Has outpt appt on 10/6 -- will need to call and reschedule while in rehab or suggest telemedicine  - leave incision open to air, OK to shower  - Per Neurosurgery: ok to start slow gradual, progressive translational movements in therapy as tolerated.     Hyponatremia, likely SIADH, resolved  - hospitalist to follow   - continue Florinef, Na tabs -- NaCl dc'd by hospitalist 10/4, plans to dc florinef on 10/5   - discontinued fluid restriction  - monitor BP    Psych:  h/o substance abuse: IVDU, methamphetamine  Adjustment disorder  Insomnia  - Neuropsych evaluation and treat for adjustment, coping, and counseling  - will need to follow up with psych outpatient: counseling centers provided   - Trazodone as needed at bedtime      Pain Management:  - Seen by pain management at [facility] recommended:    Dilaudid PO 2mg q8h Severe - has not needed to take     Lyrica 50 q8h PRN     Tylenol weaned to 650 BID     Robaxin 500 q8h -- changed to PRN  - bowel regimen as below  - Left leg spasms - consider tizanidine/baclofen if increasing - stable   - Neuropathic pain/ hyperalgesia -- desensitization therapy - stable     GI/Bowel:  Neurogenic bowel with incontinence, decreased voluntary anal contraction  - Senna QHS  - d/c Miralax due to daytime incontinence and leaking  - cont AM suppository for full bowel evacuation -- would like to hold off for now and to reassess 10/5   - encourage timed toileting, to be discussed with nursing     /Bladder:  Neurogenic bladder with Urinary retention  - bladder scans changed to q6hrs    pt with good hand function and motivated to learn to SC -- discussed with nursing    Skin/Pressure Injury:  - Skin assessment on admission admission: no pressure injuries noted  - Monitor Incisions: spine incision open to air  - Turn every 2 hours while in bed, at risk due to paraplegia    Diet:   - Diet Consistency/Modifications: Reg + thins - fluid restriction discontinued    DVT ppx:  - Lovenox  - SCDs  - Last Doppler on 9/27 neg    Restrictions/Precautions:  - Precautions: Spine precautions (as above), falls      ---------------  Outpatient Follow-up:  Neurosurgery - Dr. Randy D'Amico  Psychological services/ counseling   PCP?    --------------

## 2021-10-05 PROBLEM — E87.1 HYPONATREMIA: Status: ACTIVE | Noted: 2021-10-05

## 2021-10-05 PROBLEM — F15.10: Status: RESOLVED | Noted: 2021-10-05 | Resolved: 2021-10-05

## 2021-10-05 PROBLEM — F19.90 IVDU (INTRAVENOUS DRUG USER): Status: RESOLVED | Noted: 2021-10-05 | Resolved: 2021-10-05

## 2021-10-05 LAB — SARS-COV-2 RNA SPEC QL NAA+PROBE: SIGNIFICANT CHANGE UP

## 2021-10-05 PROCEDURE — 99232 SBSQ HOSP IP/OBS MODERATE 35: CPT | Mod: GC

## 2021-10-05 PROCEDURE — 99232 SBSQ HOSP IP/OBS MODERATE 35: CPT

## 2021-10-05 RX ADMIN — ENOXAPARIN SODIUM 40 MILLIGRAM(S): 100 INJECTION SUBCUTANEOUS at 21:54

## 2021-10-05 RX ADMIN — BICTEGRAVIR SODIUM, EMTRICITABINE, AND TENOFOVIR ALAFENAMIDE FUMARATE 1 TABLET(S): 30; 120; 15 TABLET ORAL at 11:20

## 2021-10-05 RX ADMIN — Medication 50 MILLIGRAM(S): at 21:54

## 2021-10-05 RX ADMIN — SENNA PLUS 2 TABLET(S): 8.6 TABLET ORAL at 21:53

## 2021-10-05 RX ADMIN — Medication 650 MILLIGRAM(S): at 08:18

## 2021-10-05 RX ADMIN — Medication 650 MILLIGRAM(S): at 09:00

## 2021-10-05 RX ADMIN — FLUDROCORTISONE ACETATE 0.1 MILLIGRAM(S): 0.1 TABLET ORAL at 08:18

## 2021-10-05 NOTE — HISTORY OF PRESENT ILLNESS
[FreeTextEntry1] : 35 y/o male with PMHx HIV (CD4 700s, VLUD on bikarvy), hx of IVDU and methamphetamine use and recent [facility] admission 9/15/21 s/p spinal angiogram (neg) and T8-T9 lami decompression. \par \par 9/15/21 patient was at the gym lifting heavy weights (ie dead lifts) and later that day developed pain in his thoracolumbar region, along with paraesthesias down both his legs and buttock that progressed to complete plegia of bilateral lower extremities with loss of sensation from T8 dermatomes and below. MRI showed intradual/extramedullar lesion with mass effect on spinal cod displacing it to the left. Now s/p decadron load and s/p spinal angiogram (neg) and T8-T9 lami decomrpession, intraoperative findings of subarachnoid clot s/p evac on 9/15. \par \par Hospital course: \par 9/15: s/p spinal angiogram (neg) and T8-T9 lami decompression, intraoperative \par findings of subarachnoid clot s/p evacuation POD # 0 (9/15) dilaudid x 1 for \par pain. diet advanced. \par 9/16: POD# 1: s/p spinal angiogram (neg) and T8-T9 lami decompression, \par intraoperative findings of subarachnoid clot s/p evacuation POD #1 (9/15) \par dilaudid x 1 for pain. diet advanced. \par 9/17: POD#2 s/p spinal angio, POD#2 s/p T8-9 lami decompression.  LE dopplers \par negative. MRI T/L spine read shows T7/T8 spinal cord edema consistent with \par infarction, L5-S1 increase in spinal canal stenosis w/ mixed SDH and diffuse \par SAH. Psych consulted for anxiety/depression. Pend SDU. Bile bag removed today \par 9/18: POD 3. T8-9 lami/decompression. Psych eval suggests adjustment d/o and \par outpatient follow up \par 9/19: POD4. MARCO overnight. Pending spinal rehab placement. \par 9/20: POD5 MARCO overnight \par 9/21: POD6, pending angio today. MARCO overnight, neuro stable. Angio results are \par negative. \par 9/22: POD7 angio and T8-9 lami decompression, POD1 negative angio. MARCO \par overnight, neuro stable. Pending dispo \par 9/23: POD8 angio and T8-9 lami decompression, POD2 negative angio. MARCO \par overnight, neuro stable. Pending dispo. \par 9/24: POD9 s/p angio and T8-9 lami decompression, POD3 negative angio. MARCO \par overnight, neuro stable. Pending multidisciplinary discussion regarding dispo \par today at 11am. \par 9/25: POD10 s/p spinal angio  and T8-9 lami decompression, POD4 s/p neg spinal \par angio. MARCO overnight. Neuro exam stable. Pending acute rehab \par 9/26: POD11 s/p spinal angio  and T8-9 lami decompression, POD5 s/p neg spinal \par angio. Pt reports posterior neck pain and frontal headaches, worse when laying \par flat. States it may be his usual tension headaches. Advised to use warm packs \par as needed. Neuro exam stable. \par 9/27: POD 12/POD6 spinal angio. Upgraded to SDU for higher rates of hypertonics \par overnight. Exam stable. f/u urine lytes \par 9/28 Patient dc to [facility]\par \par Exam on day of discharge:  \par Neuro: no aphasia, speech clear, no dysmetria, no pronator drift \par strength 5/5 b/l UE, some LLE toe movements otherwise 0/5 b/l LE, b/l LE \par flaccid, absent reflexes, no clonus, babinski neg b/l \par RLE sensation (especially along L3/L4 dermatome) to light touch diminished \par compared to LLE \par Extremities: R groin site C/D/I, no hematoma. Distal pulses 2+ x4 \par Incision/Wound: midline thoracic incision C/D/I \par \par Hospital course c/b Hyponatremia (discharge on Florinef, Salt tabs, Fluid \par restriction) Sodium on 9/28/21 129; instructed to check BMP at rehab and monitor sodium; continue 1.5 L fluid restriction at rehab\par ______\par \par Returns to clinic today for 3 week post op visit:\par \par \par \par \par

## 2021-10-05 NOTE — SURGICAL HISTORY
[de-identified] : 9/15/21 S/P T8-T9 lami decompression, intraoperative findings of subarachnoid clot s/p evacuation

## 2021-10-05 NOTE — PROGRESS NOTE ADULT - ASSESSMENT
36M with PMH IVDU / methamphetamine use presented to [facility] on 9/15 with acute onset paraplegia after lifting heavy weights found to have SAH / SDH of the spinal cord with hematoma causing cord compression s/p T7-8 lami decompression (9/16) now with residual paraplegia. Hospital course complicated by hyponatremia secondary to SIADH. Admitted to [facility] acute inpatient rehab on 9/28 for initiation of multidisciplinary rehab program. ADL, gait, and functional impairments.     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  -s/p evacuation and decompression of T7-8  -Continue comprehensive rehab program  -Pain control  -Continue Lyrica and Robaxin PRN   -Follow up with outpatient neurosurgery    #Hyponatremia, likely SIADH - resolved  -Off of NaCl, Florinef, and fluid restriction  -Follow up routine BMP    #Anemia  ?acute blood loss anemia post op  -H/H stable/ improving  -Follow up routine CBC    #Transaminitis  -Continue to monitor    #hx of IV drug use  #Methamphetamine use  -Supportive care    #Neurogenic bladder  #Acute urinary retention  -Bladder scan, straight cath per primary  -Patient learning to straight cath himself with nursing    #HIV  -Continue Biktarvy    #Prophylactic Measure  DVT ppx: Lovenox  Bowel regimen adjustments per primary - patient reports improvement in controlling his bowel function would like to hold off on further stool softeners

## 2021-10-05 NOTE — PROGRESS NOTE ADULT - ASSESSMENT
36 year old male with hx of IVDU/methamphetamine use presented to [facility] on 9/15 with acute onset paraplegia after lifting heavy weights found to have SAH/ SDH of the spinal cord with hematoma causing cord compression s/p T7-8 lami decompression (9/16) now with residual paraplegia. Hospital course complicated by hyponatremia secondary to SIADH. Admitted to [facility] acute inpatient rehab on 9/28 for ADL, gait, and functional impairments.       T7 sensory incomplete Spinal Cord Compression   - secondary to spinal subdural hematoma s/p evacuation and decompression of T7-8  - Primary surgeon Dr. Randy D'Amico  - s/p decadron  - Comprehensive Multidisciplinary Rehab Program     3 hours a day, 5 days a week with PT/OT     P&O as needed  - leave incision open to air, OK to shower  - Per Neurosurgery: ok to start slow gradual, progressive translational movements in therapy as tolerated.     Hyponatremia, likely SIADH, resolved  - hospitalist to follow   - continue Florinef, Na tabs -- NaCl dc'd by hospitalist 10/4, florinef discontinued 10/5 as per hospitalist recs   - discontinued fluid restriction  - monitor BP    Psych:  h/o substance abuse: IVDU, methamphetamine  Adjustment disorder  Insomnia  - Neuropsych evaluation and treat for adjustment, coping, and counseling  - will need to follow up with psych outpatient: counseling centers provided   - Trazodone as needed at bedtime      Pain Management:  - Seen by pain management at [facility] recommended:    Dilaudid PO 2mg q8h Severe - has not needed to take     Lyrica 50 q8h PRN     Tylenol weaned to 650 BID     Robaxin 500 q8h -- changed to PRN  - bowel regimen as below  - Left leg spasms - consider tizanidine/baclofen if increasing - stable   - Neuropathic pain/ hyperalgesia -- desensitization therapy - stable     GI/Bowel:  Neurogenic bowel with incontinence, decreased voluntary anal contraction  - Senna QHS  - d/c Miralax due to daytime incontinence and leaking  - cont AM suppository for full bowel evacuation -- would like to hold off for now and to reassess 10/6   - encourage timed toileting, to be discussed with nursing     /Bladder:  Neurogenic bladder with Urinary retention  - bladder scans changed to q6hrs    pt with good hand function and motivated to learn to SC -- discussed with nursing    Skin/Pressure Injury:  - Skin assessment on admission admission: no pressure injuries noted  - Monitor Incisions: spine incision open to air  - Turn every 2 hours while in bed, at risk due to paraplegia    Diet:   - Diet Consistency/Modifications: Reg + thins - fluid restriction discontinued    DVT ppx:  - Lovenox  - SCDs  - Last Doppler on 9/27 neg    Restrictions/Precautions:  - Precautions: Spine precautions (as above), falls      ---------------  Outpatient Follow-up:  Neurosurgery - Dr. Randy D'Amico  Psychological services/ counseling   PCP?    --------------

## 2021-10-05 NOTE — PROGRESS NOTE ADULT - SUBJECTIVE AND OBJECTIVE BOX
Patient is a 36y old  Male who presents with a chief complaint of Spinal Cord Compression (01 Oct 2021 08:33)    HPI:  Ms. ALISSA ARAUJO is a 36 year old male with history of IVDU and methamphetamine use presented to [facility] on 9/15/21 with symptoms of acute onset lower extremity weakness and parethesias a few hours after lifting heavy weights in the gym.  He developed back pain and radiating pain to bilateral buttocks then progressed to paraplegia. He was brought in by ambulance after a passerby witnessed her unable to rise from the curb.  He did not have any bowel or bladder incontinence on admission.      On presentation to the ED, MRI performed and found to have ill defined intradural - extramedullary lesion with enhancement at level T8 with mass effect on the spinal cord with lefward displacement and minimal associated cord edema.  He was admitted to neurosurgical survice and started on decadron. Spinal angiogram performed which was negative. He underwent T8-9 lami decompression on 9/15 with Dr. D-Amico with intraoperative findings consistent with subarachnoid clot now s/p evacuation.  Post operative course was uncomplicated. Tolerated procedure well. Pt was seen by pain management who adjusted pain regimen (tylenol, lyrica, Robaxin, Dilaudid). Labs were significant for hyponatremia likely secondary to SIADH given euvolemia on exam and good UOP. Pt required short course of hypertonic saline then weaned off. Sodium stable with fluid restriction and addition of Na tabs. Pt was seen by behavioral health and psychology for adjustment disorder in the setting of new paralysis. He was determined to be low risk for suicide and emotional support provided. Trazodone was started for insomnia.     Patient was evaluated by PM&R and therapy for functional deficits and gait/ ADL impairments and recommended acute rehabilitation. Patient was medically optimized for discharge to  to [facility] on 9/28 (28 Sep 2021 13:56)    ----------------------------------------------------------------------    SUBJECTIVE:  Patient seen and evaluated at the bedside this AM. Doing well overall. He states that he was able to have a controlled BM yesterday, and nothing this AM yet. He would like to still hold off on laxatives and suppositories in the meantime. He is doing SC on himself without issues and denies any leaks in between. No acute medical issues noted overnight.     ROS:  Denies: headache, lightheadedness, CP, SOB, abdominal pain, dysuria, nausea, constipation      ----------------------------------------------------------------------  PHYSICAL EXAM:    Vital Signs Last 24 Hrs  T(C): 36.9 (05 Oct 2021 08:45), Max: 37 (04 Oct 2021 19:59)  T(F): 98.4 (05 Oct 2021 08:45), Max: 98.6 (04 Oct 2021 19:59)  HR: 95 (05 Oct 2021 08:45) (88 - 95)  BP: 128/79 (05 Oct 2021 08:45) (128/70 - 128/79)  BP(mean): --  RR: 15 (05 Oct 2021 08:45) (15 - 16)  SpO2: 96% (05 Oct 2021 08:45) (96% - 100%)    PHYSICAL EXAM  General: NAD, comfortable  Cardio: RRR, S1/S2+  Resp: no respiratory difficulty or distress  Abdomen: soft, NTND  Neuro:  T7 incomplete paraplegia  2/5 on LLE, some toes wiggling on the RLE and some adduction as well   Extrem: no edema, no calf tenderness   Skin: thoracic incision open to air      ----------------------------------------------------------------------  RECENT LABS:        ----------------------------------------------------------------------  RECENT IMAGING:    ----------------------------------------------------------------------  MEDICATIONS:  MEDICATIONS  (STANDING):  acetaminophen   Tablet .. 650 milliGRAM(s) Oral every 12 hours  bictegravir 50 mG/emtricitabine 200 mG/tenofovir alafenamide 25 mG (BIKTARVY) 1 Tablet(s) Oral daily  bisacodyl Suppository 10 milliGRAM(s) Rectal daily  enoxaparin Injectable 40 milliGRAM(s) SubCutaneous at bedtime  senna 2 Tablet(s) Oral at bedtime    MEDICATIONS  (PRN):  HYDROmorphone   Tablet 2 milliGRAM(s) Oral every 8 hours PRN Severe Pain (7 - 10)  methocarbamol 500 milliGRAM(s) Oral every 8 hours PRN Muscle Spasm  pregabalin 50 milliGRAM(s) Oral every 8 hours PRN Pain  traZODone 25 milliGRAM(s) Oral at bedtime PRN insomnia      ----------------------------------------------------------------------

## 2021-10-05 NOTE — PROGRESS NOTE ADULT - SUBJECTIVE AND OBJECTIVE BOX
Patient is a 36y old  Male who presents with a chief complaint of Spinal Cord Compression (05 Oct 2021 09:32)      Patient seen and examined at bedside. Reports continued motor strength improvement with therapy.  Had large BM this AM.    REVIEW OF SYSTEMS:  CONSTITUTIONAL: No fever or chills  HEENT:  No headache, no sore throat  RESPIRATORY: No cough, wheezing, or shortness of breath  CARDIOVASCULAR: No chest pain, palpitations  GASTROINTESTINAL: No abd pain, nausea, vomiting  GENITOURINARY: using straight cath     ALLERGIES:  No Known Allergies    MEDICATIONS  (STANDING):  acetaminophen   Tablet .. 650 milliGRAM(s) Oral every 12 hours  bictegravir 50 mG/emtricitabine 200 mG/tenofovir alafenamide 25 mG (BIKTARVY) 1 Tablet(s) Oral daily  bisacodyl Suppository 10 milliGRAM(s) Rectal daily  enoxaparin Injectable 40 milliGRAM(s) SubCutaneous at bedtime  senna 2 Tablet(s) Oral at bedtime    MEDICATIONS  (PRN):  HYDROmorphone   Tablet 2 milliGRAM(s) Oral every 8 hours PRN Severe Pain (7 - 10)  methocarbamol 500 milliGRAM(s) Oral every 8 hours PRN Muscle Spasm  pregabalin 50 milliGRAM(s) Oral every 8 hours PRN Pain  traZODone 25 milliGRAM(s) Oral at bedtime PRN insomnia    Vital Signs Last 24 Hrs  T(F): 98.4 (05 Oct 2021 08:45), Max: 98.6 (04 Oct 2021 19:59)  HR: 95 (05 Oct 2021 08:45) (88 - 95)  BP: 128/79 (05 Oct 2021 08:45) (128/70 - 128/79)  RR: 15 (05 Oct 2021 08:45) (15 - 16)  SpO2: 96% (05 Oct 2021 08:45) (96% - 100%)  I&O's Summary        PHYSICAL EXAM:  General: NAD, A/O x 3  ENT: MMM, no scleral icterus  Neck: Supple, No JVD  Lungs: Clear to auscultation bilaterally, no wheezes, rales, rhonchi  Cardio: RRR, S1/S2, No murmurs  Abdomen: Soft, Nontender, Nondistended; Bowel sounds present  Extremities: No calf tenderness, No pitting edema; bilateral lower extremity paraplegia     LABS:                        12.1   6.70  )-----------( 230      ( 04 Oct 2021 05:30 )             35.3       10-04    143  |  107  |  19  ----------------------------<  98  4.4   |  30  |  0.98    Ca    8.3      04 Oct 2021 05:30    TPro  5.8  /  Alb  2.8  /  TBili  0.2  /  DBili  x   /  AST  46  /  ALT  98  /  AlkPhos  59  10-04     eGFR if Non African American: 98 mL/min/1.73M2 (10-04-21 @ 05:30)  eGFR if African American: 114 mL/min/1.73M2 (10-04-21 @ 05:30)             COVID-19 PCR: NotDetec (10-05-21 @ 07:00)  COVID-19 PCR: NotDetec (09-27-21 @ 18:49)  COVID-19 PCR: NotDetec (09-20-21 @ 19:01)  COVID-19 PCR: Negative (09-15-21 @ 01:05)      RADIOLOGY & ADDITIONAL TESTS:    Care Discussed with Consultants/Other Providers: Dr. Pérez (physiatry), SW, RN  PT/OT notes reviewed

## 2021-10-06 ENCOUNTER — APPOINTMENT (OUTPATIENT)
Dept: NEUROSURGERY | Facility: CLINIC | Age: 36
End: 2021-10-06

## 2021-10-06 DIAGNOSIS — F15.10 OTHER STIMULANT ABUSE, UNCOMPLICATED: ICD-10-CM

## 2021-10-06 DIAGNOSIS — E87.1 HYPO-OSMOLALITY AND HYPONATREMIA: ICD-10-CM

## 2021-10-06 DIAGNOSIS — F19.90 OTHER PSYCHOACTIVE SUBSTANCE USE, UNSPECIFIED, UNCOMPLICATED: ICD-10-CM

## 2021-10-06 LAB
APPEARANCE UR: CLEAR — SIGNIFICANT CHANGE UP
BILIRUB UR-MCNC: NEGATIVE — SIGNIFICANT CHANGE UP
COLOR SPEC: YELLOW — SIGNIFICANT CHANGE UP
DIFF PNL FLD: NEGATIVE — SIGNIFICANT CHANGE UP
GLUCOSE UR QL: NEGATIVE — SIGNIFICANT CHANGE UP
KETONES UR-MCNC: NEGATIVE — SIGNIFICANT CHANGE UP
LEUKOCYTE ESTERASE UR-ACNC: NEGATIVE — SIGNIFICANT CHANGE UP
NITRITE UR-MCNC: NEGATIVE — SIGNIFICANT CHANGE UP
PH UR: 7 — SIGNIFICANT CHANGE UP (ref 5–8)
PROT UR-MCNC: NEGATIVE — SIGNIFICANT CHANGE UP
SP GR SPEC: 1.01 — SIGNIFICANT CHANGE UP (ref 1.01–1.02)
UROBILINOGEN FLD QL: NEGATIVE — SIGNIFICANT CHANGE UP

## 2021-10-06 PROCEDURE — 87635 SARS-COV-2 COVID-19 AMP PRB: CPT

## 2021-10-06 PROCEDURE — 97110 THERAPEUTIC EXERCISES: CPT

## 2021-10-06 PROCEDURE — 84550 ASSAY OF BLOOD/URIC ACID: CPT

## 2021-10-06 PROCEDURE — 93970 EXTREMITY STUDY: CPT

## 2021-10-06 PROCEDURE — C1887: CPT

## 2021-10-06 PROCEDURE — 36415 COLL VENOUS BLD VENIPUNCTURE: CPT

## 2021-10-06 PROCEDURE — 83735 ASSAY OF MAGNESIUM: CPT

## 2021-10-06 PROCEDURE — C1889: CPT

## 2021-10-06 PROCEDURE — 82962 GLUCOSE BLOOD TEST: CPT

## 2021-10-06 PROCEDURE — C1894: CPT

## 2021-10-06 PROCEDURE — 84560 ASSAY OF URINE/URIC ACID: CPT

## 2021-10-06 PROCEDURE — 76000 FLUOROSCOPY <1 HR PHYS/QHP: CPT

## 2021-10-06 PROCEDURE — 86850 RBC ANTIBODY SCREEN: CPT

## 2021-10-06 PROCEDURE — 84100 ASSAY OF PHOSPHORUS: CPT

## 2021-10-06 PROCEDURE — 72128 CT CHEST SPINE W/O DYE: CPT | Mod: MG

## 2021-10-06 PROCEDURE — 85652 RBC SED RATE AUTOMATED: CPT

## 2021-10-06 PROCEDURE — 97535 SELF CARE MNGMENT TRAINING: CPT

## 2021-10-06 PROCEDURE — 99285 EMERGENCY DEPT VISIT HI MDM: CPT | Mod: 25

## 2021-10-06 PROCEDURE — 97164 PT RE-EVAL EST PLAN CARE: CPT

## 2021-10-06 PROCEDURE — 80053 COMPREHEN METABOLIC PANEL: CPT

## 2021-10-06 PROCEDURE — U0005: CPT

## 2021-10-06 PROCEDURE — 81001 URINALYSIS AUTO W/SCOPE: CPT

## 2021-10-06 PROCEDURE — 86901 BLOOD TYPING SEROLOGIC RH(D): CPT

## 2021-10-06 PROCEDURE — 96365 THER/PROPH/DIAG IV INF INIT: CPT

## 2021-10-06 PROCEDURE — 82436 ASSAY OF URINE CHLORIDE: CPT

## 2021-10-06 PROCEDURE — 85025 COMPLETE CBC W/AUTO DIFF WBC: CPT

## 2021-10-06 PROCEDURE — 84300 ASSAY OF URINE SODIUM: CPT

## 2021-10-06 PROCEDURE — C1760: CPT

## 2021-10-06 PROCEDURE — 97161 PT EVAL LOW COMPLEX 20 MIN: CPT

## 2021-10-06 PROCEDURE — 71045 X-RAY EXAM CHEST 1 VIEW: CPT

## 2021-10-06 PROCEDURE — 99232 SBSQ HOSP IP/OBS MODERATE 35: CPT

## 2021-10-06 PROCEDURE — G1004: CPT

## 2021-10-06 PROCEDURE — 85730 THROMBOPLASTIN TIME PARTIAL: CPT

## 2021-10-06 PROCEDURE — 72157 MRI CHEST SPINE W/O & W/DYE: CPT | Mod: MG

## 2021-10-06 PROCEDURE — U0003: CPT

## 2021-10-06 PROCEDURE — 72131 CT LUMBAR SPINE W/O DYE: CPT | Mod: MG

## 2021-10-06 PROCEDURE — 85027 COMPLETE CBC AUTOMATED: CPT

## 2021-10-06 PROCEDURE — 86769 SARS-COV-2 COVID-19 ANTIBODY: CPT

## 2021-10-06 PROCEDURE — C1769: CPT

## 2021-10-06 PROCEDURE — 85610 PROTHROMBIN TIME: CPT

## 2021-10-06 PROCEDURE — 97163 PT EVAL HIGH COMPLEX 45 MIN: CPT

## 2021-10-06 PROCEDURE — C9399: CPT

## 2021-10-06 PROCEDURE — 97112 NEUROMUSCULAR REEDUCATION: CPT

## 2021-10-06 PROCEDURE — 82570 ASSAY OF URINE CREATININE: CPT

## 2021-10-06 PROCEDURE — 99232 SBSQ HOSP IP/OBS MODERATE 35: CPT | Mod: GC

## 2021-10-06 PROCEDURE — 86900 BLOOD TYPING SEROLOGIC ABO: CPT

## 2021-10-06 PROCEDURE — 88305 TISSUE EXAM BY PATHOLOGIST: CPT

## 2021-10-06 PROCEDURE — 72158 MRI LUMBAR SPINE W/O & W/DYE: CPT

## 2021-10-06 PROCEDURE — 80048 BASIC METABOLIC PNL TOTAL CA: CPT

## 2021-10-06 PROCEDURE — 83036 HEMOGLOBIN GLYCOSYLATED A1C: CPT

## 2021-10-06 PROCEDURE — 83935 ASSAY OF URINE OSMOLALITY: CPT

## 2021-10-06 PROCEDURE — 97530 THERAPEUTIC ACTIVITIES: CPT

## 2021-10-06 PROCEDURE — A9585: CPT

## 2021-10-06 PROCEDURE — 86140 C-REACTIVE PROTEIN: CPT

## 2021-10-06 RX ADMIN — SENNA PLUS 2 TABLET(S): 8.6 TABLET ORAL at 22:07

## 2021-10-06 RX ADMIN — ENOXAPARIN SODIUM 40 MILLIGRAM(S): 100 INJECTION SUBCUTANEOUS at 22:07

## 2021-10-06 RX ADMIN — Medication 650 MILLIGRAM(S): at 08:35

## 2021-10-06 RX ADMIN — BICTEGRAVIR SODIUM, EMTRICITABINE, AND TENOFOVIR ALAFENAMIDE FUMARATE 1 TABLET(S): 30; 120; 15 TABLET ORAL at 12:04

## 2021-10-06 RX ADMIN — Medication 650 MILLIGRAM(S): at 09:24

## 2021-10-06 NOTE — PROGRESS NOTE ADULT - SUBJECTIVE AND OBJECTIVE BOX
Patient is a 36y old  Male who presents with a chief complaint of Spinal Cord Compression (05 Oct 2021 11:05)      Patient seen and examined at bedside. No overnight events.   Participating in therapy.     REVIEW OF SYSTEMS:  CONSTITUTIONAL: No fever or chills  HEENT:  No headache, no sore throat  RESPIRATORY: No cough, wheezing, or shortness of breath  CARDIOVASCULAR: No chest pain, palpitations  GASTROINTESTINAL: No abd pain, nausea, vomiting  GENITOURINARY: learning to self straight cath    ALLERGIES:  No Known Allergies    MEDICATIONS  (STANDING):  acetaminophen   Tablet .. 650 milliGRAM(s) Oral every 12 hours  bictegravir 50 mG/emtricitabine 200 mG/tenofovir alafenamide 25 mG (BIKTARVY) 1 Tablet(s) Oral daily  bisacodyl Suppository 10 milliGRAM(s) Rectal daily  enoxaparin Injectable 40 milliGRAM(s) SubCutaneous at bedtime  senna 2 Tablet(s) Oral at bedtime    MEDICATIONS  (PRN):  HYDROmorphone   Tablet 2 milliGRAM(s) Oral every 8 hours PRN Severe Pain (7 - 10)  methocarbamol 500 milliGRAM(s) Oral every 8 hours PRN Muscle Spasm  pregabalin 50 milliGRAM(s) Oral every 8 hours PRN Pain  traZODone 25 milliGRAM(s) Oral at bedtime PRN insomnia      Vitals:  Temp 98.4  HR 95  /79  RR  15  SpO2 96% RA    05 Oct 2021 07:01  -  06 Oct 2021 07:00  --------------------------------------------------------  IN: 0 mL / OUT: 2000 mL / NET: -2000 mL          PHYSICAL EXAM:  General: NAD, A/O x 3  ENT: MMM, no scleral icterus  Neck: Supple, No JVD  Lungs: Clear to auscultation bilaterally, no wheezes, rales, rhonchi  Cardio: RRR, S1/S2, No murmurs  Abdomen: Soft, Nontender, Nondistended; Bowel sounds present  Extremities: No calf tenderness, No pitting edema  Neuro: bilateral lower extremity paraplegia     LABS:                        12.1   6.70  )-----------( 230      ( 04 Oct 2021 05:30 )             35.3       10-04    143  |  107  |  19  ----------------------------<  98  4.4   |  30  |  0.98    Ca    8.3      04 Oct 2021 05:30    TPro  5.8  /  Alb  2.8  /  TBili  0.2  /  DBili  x   /  AST  46  /  ALT  98  /  AlkPhos  59  10-04     eGFR if Non African American: 98 mL/min/1.73M2 (10-04-21 @ 05:30)  eGFR if African American: 114 mL/min/1.73M2 (10-04-21 @ 05:30)               COVID-19 PCR: NotDetec (10-05-21 @ 07:00)  COVID-19 PCR: NotDetec (09-27-21 @ 18:49)  COVID-19 PCR: NotDetec (09-20-21 @ 19:01)  COVID-19 PCR: Negative (09-15-21 @ 01:05)      RADIOLOGY & ADDITIONAL TESTS:    Care Discussed with Consultants/Other Providers: Dr. Pérez (physiatry), SW, RN  PT/OT notes reviewed

## 2021-10-06 NOTE — PROGRESS NOTE ADULT - SUBJECTIVE AND OBJECTIVE BOX
Patient is a 36y old  Male who presents with a chief complaint of Spinal Cord Compression (01 Oct 2021 08:33)    HPI:  Ms. ALISSA ARAUJO is a 36 year old male with history of IVDU and methamphetamine use presented to [facility] on 9/15/21 with symptoms of acute onset lower extremity weakness and parethesias a few hours after lifting heavy weights in the gym.  He developed back pain and radiating pain to bilateral buttocks then progressed to paraplegia. He was brought in by ambulance after a passerby witnessed her unable to rise from the curb.  He did not have any bowel or bladder incontinence on admission.      On presentation to the ED, MRI performed and found to have ill defined intradural - extramedullary lesion with enhancement at level T8 with mass effect on the spinal cord with lefward displacement and minimal associated cord edema.  He was admitted to neurosurgical survice and started on decadron. Spinal angiogram performed which was negative. He underwent T8-9 lami decompression on 9/15 with Dr. D-Amico with intraoperative findings consistent with subarachnoid clot now s/p evacuation.  Post operative course was uncomplicated. Tolerated procedure well. Pt was seen by pain management who adjusted pain regimen (tylenol, lyrica, Robaxin, Dilaudid). Labs were significant for hyponatremia likely secondary to SIADH given euvolemia on exam and good UOP. Pt required short course of hypertonic saline then weaned off. Sodium stable with fluid restriction and addition of Na tabs. Pt was seen by behavioral health and psychology for adjustment disorder in the setting of new paralysis. He was determined to be low risk for suicide and emotional support provided. Trazodone was started for insomnia.     Patient was evaluated by PM&R and therapy for functional deficits and gait/ ADL impairments and recommended acute rehabilitation. Patient was medically optimized for discharge to  to [facility] on 9/28 (28 Sep 2021 13:56)    ----------------------------------------------------------------------    SUBJECTIVE:  Patient seen and evaluated at the bedside.  Sleeping well.  Was excited to mildly feel urinary flow - still need intermittent straight cath, which he's been doing himself.  LBM today.  He has noted bump to left wrist near previous A-line site.  Has tingling sensation to legs - able to wiggle toes, no dorsi flexion.      ROS:  Denies: headache, lightheadedness, CP, SOB, abdominal pain, dysuria, nausea, constipation      ----------------------------------------------------------------------  PHYSICAL EXAM:    Vital Signs Last 24 Hrs - None for 10/6 yet  T(C): 36.9 (05 Oct 2021 08:45), Max: 37 (04 Oct 2021 19:59)  T(F): 98.4 (05 Oct 2021 08:45), Max: 98.6 (04 Oct 2021 19:59)  HR: 95 (05 Oct 2021 08:45) (88 - 95)  BP: 128/79 (05 Oct 2021 08:45) (128/70 - 128/79)  BP(mean): --  RR: 15 (05 Oct 2021 08:45) (15 - 16)  SpO2: 96% (05 Oct 2021 08:45) (96% - 100%)    PHYSICAL EXAM  General: NAD, comfortable  Cardio: RRR, S1/S2+  Resp: no respiratory difficulty or distress  Abdomen: soft, NTND  Neuro:  T7 incomplete paraplegia  2/5 on LLE, some toes wiggling on the RLE and some adduction as well   Extrem: no edema, no calf tenderness   Skin: thoracic incision open to air.  Mobile less than pea size bump to left wrist proximal to A-line site      ----------------------------------------------------------------------  RECENT LABS:    ----------------------------------------------------------------------  RECENT IMAGING:    ----------------------------------------------------------------------  MEDICATIONS:  MEDICATIONS  (STANDING):  acetaminophen   Tablet .. 650 milliGRAM(s) Oral every 12 hours  bictegravir 50 mG/emtricitabine 200 mG/tenofovir alafenamide 25 mG (BIKTARVY) 1 Tablet(s) Oral daily  bisacodyl Suppository 10 milliGRAM(s) Rectal daily  enoxaparin Injectable 40 milliGRAM(s) SubCutaneous at bedtime  senna 2 Tablet(s) Oral at bedtime    MEDICATIONS  (PRN):  HYDROmorphone   Tablet 2 milliGRAM(s) Oral every 8 hours PRN Severe Pain (7 - 10)  methocarbamol 500 milliGRAM(s) Oral every 8 hours PRN Muscle Spasm  pregabalin 50 milliGRAM(s) Oral every 8 hours PRN Pain  traZODone 25 milliGRAM(s) Oral at bedtime PRN insomnia    ----------------------------------------------------------------------

## 2021-10-06 NOTE — PROGRESS NOTE ADULT - ASSESSMENT
36M with PMH IVDU / methamphetamine use presented to [facility] on 9/15 with acute onset paraplegia after lifting heavy weights found to have SAH / SDH of the spinal cord with hematoma causing cord compression s/p T7-8 lami decompression (9/16) now with residual paraplegia. Hospital course complicated by hyponatremia secondary to SIADH. Admitted to [facility] on 9/28 for initiation of multidisciplinary rehab program. ADL, gait, and functional impairments.     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  -s/p evacuation and decompression of T7-8  -Continue comprehensive rehab program  -Pain well controlled with current management  -Continue Lyrica and Robaxin PRN   -Follow up with outpatient neurosurgery    #Hyponatremia, likely SIADH - resolved  -Off of NaCl, Florinef, and fluid restriction  -Follow up AM BMP    #Anemia - improving  ?acute blood loss anemia post op  -Follow up AM CBC    #Transaminitis  -stable, follow up AM CMP     #hx of IV drug use  #Methamphetamine use  -Supportive care    #Neurogenic bladder  #Acute urinary retention  -Patient learning to self straight cath    #HIV  -Continue Biktarvy    #Prophylactic Measure  -DVT ppx: Lovenox  -Bowel regimen

## 2021-10-06 NOTE — PROGRESS NOTE ADULT - ASSESSMENT
36 year old male with hx of IVDU/methamphetamine use presented to [facility] on 9/15 with acute onset paraplegia after lifting heavy weights found to have SAH/ SDH of the spinal cord with hematoma causing cord compression s/p T7-8 lami decompression (9/16) now with residual paraplegia. Hospital course complicated by hyponatremia secondary to SIADH. Admitted to [facility] acute inpatient rehab on 9/28 for ADL, gait, and functional impairments.       T7 sensory incomplete Spinal Cord Compression   - secondary to spinal subdural hematoma s/p evacuation and decompression of T7-8  - Primary surgeon Dr. Randy D'Amico  - s/p decadron  - Comprehensive Multidisciplinary Rehab Program     3 hours a day, 5 days a week with PT/OT     P&O as needed  - leave incision open to air, OK to shower  - Per Neurosurgery: ok to start slow gradual, progressive translational movements in therapy as tolerated.     Hyponatremia, likely SIADH, resolved  - hospitalist to follow   - Florinef, Na tabs -- NaCl dc'd by hospitalist 10/4, florinef discontinued 10/5 as per hospitalist recs   - discontinued fluid restriction  - monitor BP    Psych:  h/o substance abuse: IVDU, methamphetamine  Adjustment disorder  Insomnia  - Neuropsych evaluation and treat for adjustment, coping, and counseling  - will need to follow up with psych outpatient: counseling centers provided   - Trazodone as needed at bedtime      Pain Management:  - Seen by pain management at [facility] recommended:    Dilaudid PO 2mg q8h Severe - has not needed to take     Lyrica 50 q8h PRN     Tylenol weaned to 650 BID     Robaxin 500 q8h -- changed to PRN  - bowel regimen as below  - Left leg spasms - consider tizanidine/baclofen if increasing - stable   - Neuropathic pain/ hyperalgesia -- desensitization therapy - stable     GI/Bowel:  Neurogenic bowel with incontinence, decreased voluntary anal contraction  - Senna QHS  - d/c Miralax due to daytime incontinence and leaking  - cont AM suppository for full bowel evacuation -- would like to hold off for now and to reassess 10/6   - encourage timed toileting, to be discussed with nursing     /Bladder:  Neurogenic bladder with Urinary retention  - bladder scans changed to q6hrs    pt with good hand function and motivated to learn to SC -- has been doing self catheterizing    Skin/Pressure Injury:  - Skin assessment on admission admission: no pressure injuries noted  - Monitor Incisions: spine incision open to air  - Turn every 2 hours while in bed, at risk due to paraplegia    Diet:   - Diet Consistency/Modifications: Reg + thins - fluid restriction discontinued    DVT ppx:  - Lovenox  - SCDs  - Last Doppler on 9/27 neg    Restrictions/Precautions:  - Precautions: Spine precautions (as above), falls      ---------------  Outpatient Follow-up:  Neurosurgery - Dr. Randy D'Amico  Psychological services/ counseling   PCP?    --------------

## 2021-10-07 LAB
ALBUMIN SERPL ELPH-MCNC: 3 G/DL — LOW (ref 3.3–5)
ALP SERPL-CCNC: 68 U/L — SIGNIFICANT CHANGE UP (ref 40–120)
ALT FLD-CCNC: 126 U/L — HIGH (ref 10–45)
ANION GAP SERPL CALC-SCNC: 9 MMOL/L — SIGNIFICANT CHANGE UP (ref 5–17)
AST SERPL-CCNC: 51 U/L — HIGH (ref 10–40)
BILIRUB SERPL-MCNC: 0.2 MG/DL — SIGNIFICANT CHANGE UP (ref 0.2–1.2)
BUN SERPL-MCNC: 14 MG/DL — SIGNIFICANT CHANGE UP (ref 7–23)
CALCIUM SERPL-MCNC: 8.5 MG/DL — SIGNIFICANT CHANGE UP (ref 8.4–10.5)
CHLORIDE SERPL-SCNC: 107 MMOL/L — SIGNIFICANT CHANGE UP (ref 96–108)
CO2 SERPL-SCNC: 28 MMOL/L — SIGNIFICANT CHANGE UP (ref 22–31)
CREAT SERPL-MCNC: 0.88 MG/DL — SIGNIFICANT CHANGE UP (ref 0.5–1.3)
CULTURE RESULTS: NO GROWTH — SIGNIFICANT CHANGE UP
GLUCOSE SERPL-MCNC: 93 MG/DL — SIGNIFICANT CHANGE UP (ref 70–99)
HCT VFR BLD CALC: 38.4 % — LOW (ref 39–50)
HGB BLD-MCNC: 12.8 G/DL — LOW (ref 13–17)
MCHC RBC-ENTMCNC: 32.3 PG — SIGNIFICANT CHANGE UP (ref 27–34)
MCHC RBC-ENTMCNC: 33.3 GM/DL — SIGNIFICANT CHANGE UP (ref 32–36)
MCV RBC AUTO: 97 FL — SIGNIFICANT CHANGE UP (ref 80–100)
NRBC # BLD: 0 /100 WBCS — SIGNIFICANT CHANGE UP (ref 0–0)
PLATELET # BLD AUTO: 229 K/UL — SIGNIFICANT CHANGE UP (ref 150–400)
POTASSIUM SERPL-MCNC: 4.3 MMOL/L — SIGNIFICANT CHANGE UP (ref 3.5–5.3)
POTASSIUM SERPL-SCNC: 4.3 MMOL/L — SIGNIFICANT CHANGE UP (ref 3.5–5.3)
PROT SERPL-MCNC: 6 G/DL — SIGNIFICANT CHANGE UP (ref 6–8.3)
RBC # BLD: 3.96 M/UL — LOW (ref 4.2–5.8)
RBC # FLD: 12.7 % — SIGNIFICANT CHANGE UP (ref 10.3–14.5)
SODIUM SERPL-SCNC: 144 MMOL/L — SIGNIFICANT CHANGE UP (ref 135–145)
SPECIMEN SOURCE: SIGNIFICANT CHANGE UP
WBC # BLD: 6.97 K/UL — SIGNIFICANT CHANGE UP (ref 3.8–10.5)
WBC # FLD AUTO: 6.97 K/UL — SIGNIFICANT CHANGE UP (ref 3.8–10.5)

## 2021-10-07 PROCEDURE — 99232 SBSQ HOSP IP/OBS MODERATE 35: CPT | Mod: GC

## 2021-10-07 RX ADMIN — Medication 650 MILLIGRAM(S): at 09:09

## 2021-10-07 RX ADMIN — Medication 650 MILLIGRAM(S): at 08:09

## 2021-10-07 RX ADMIN — ENOXAPARIN SODIUM 40 MILLIGRAM(S): 100 INJECTION SUBCUTANEOUS at 21:48

## 2021-10-07 RX ADMIN — BICTEGRAVIR SODIUM, EMTRICITABINE, AND TENOFOVIR ALAFENAMIDE FUMARATE 1 TABLET(S): 30; 120; 15 TABLET ORAL at 12:02

## 2021-10-07 RX ADMIN — SENNA PLUS 2 TABLET(S): 8.6 TABLET ORAL at 21:48

## 2021-10-07 NOTE — PROGRESS NOTE ADULT - SUBJECTIVE AND OBJECTIVE BOX
Patient is a 36y old  Male who presents with a chief complaint of Spinal Cord Compression (01 Oct 2021 08:33)    HPI:  Ms. ALISSA ARAUJO is a 36 year old male with history of IVDU and methamphetamine use presented to [facility] on 9/15/21 with symptoms of acute onset lower extremity weakness and parethesias a few hours after lifting heavy weights in the gym.  He developed back pain and radiating pain to bilateral buttocks then progressed to paraplegia. He was brought in by ambulance after a passerby witnessed her unable to rise from the curb.  He did not have any bowel or bladder incontinence on admission.      On presentation to the ED, MRI performed and found to have ill defined intradural - extramedullary lesion with enhancement at level T8 with mass effect on the spinal cord with lefward displacement and minimal associated cord edema.  He was admitted to neurosurgical survice and started on decadron. Spinal angiogram performed which was negative. He underwent T8-9 lami decompression on 9/15 with Dr. D-Amico with intraoperative findings consistent with subarachnoid clot now s/p evacuation.  Post operative course was uncomplicated. Tolerated procedure well. Pt was seen by pain management who adjusted pain regimen (tylenol, lyrica, Robaxin, Dilaudid). Labs were significant for hyponatremia likely secondary to SIADH given euvolemia on exam and good UOP. Pt required short course of hypertonic saline then weaned off. Sodium stable with fluid restriction and addition of Na tabs. Pt was seen by behavioral health and psychology for adjustment disorder in the setting of new paralysis. He was determined to be low risk for suicide and emotional support provided. Trazodone was started for insomnia.     Patient was evaluated by PM&R and therapy for functional deficits and gait/ ADL impairments and recommended acute rehabilitation. Patient was medically optimized for discharge to  to [facility] on  (28 Sep 2021 13:56)    ----------------------------------------------------------------------    SUBJECTIVE:  Patient seen and evaluated at the bedside. Endorsed to doing well overall. Stated that being SC at 3:30am disrupts his sleep. He endorsed to some dysuria and some blood tinged urine yesterday. UA was done and negative. No BM yet.   ROS:  Denies: headache, lightheadedness, CP, SOB, abdominal pain, nausea, constipation      ----------------------------------------------------------------------  PHYSICAL EXAM:    Vital Signs Last 24 Hrs  T(C): 36.8 (07 Oct 2021 08:20), Max: 36.9 (06 Oct 2021 22:06)  T(F): 98.3 (07 Oct 2021 08:20), Max: 98.5 (06 Oct 2021 22:06)  HR: 75 (07 Oct 2021 08:20) (75 - 79)  BP: 116/79 (07 Oct 2021 08:20) (116/75 - 116/79)  BP(mean): --  RR: 15 (07 Oct 2021 08:20) (15 - 15)  SpO2: 99% (07 Oct 2021 08:20) (99% - 99%)    PHYSICAL EXAM  General: NAD, comfortable  Cardio: RRR, S1/S2+  Resp: no respiratory difficulty or distress  Abdomen: soft, NTND  Neuro:  T7 incomplete paraplegia  2/5 on LLE, some toes wiggling on the RLE and some adduction as well   Extrem: no edema, no calf tenderness   Skin: thoracic incision open to air.  Mobile less than pea size bump to left wrist proximal to A-line site      ----------------------------------------------------------------------  RECENT LABS:    RECENT LABS/IMAGING                        12.8   6.97  )-----------( 229      ( 07 Oct 2021 05:45 )             38.4     10-07    144  |  107  |  14  ----------------------------<  93  4.3   |  28  |  0.88    Ca    8.5      07 Oct 2021 05:45    TPro  6.0  /  Alb  3.0<L>  /  TBili  0.2  /  DBili  x   /  AST  51<H>  /  ALT  126<H>  /  AlkPhos  68  10-07      Urinalysis Basic - ( 06 Oct 2021 18:45 )    Color: Yellow / Appearance: Clear / S.015 / pH: x  Gluc: x / Ketone: Negative  / Bili: Negative / Urobili: Negative   Blood: x / Protein: Negative / Nitrite: Negative   Leuk Esterase: Negative / RBC: x / WBC x   Sq Epi: x / Non Sq Epi: x / Bacteria: x      ----------------------------------------------------------------------  RECENT IMAGING:    ----------------------------------------------------------------------  MEDICATIONS  (STANDING):  acetaminophen   Tablet .. 650 milliGRAM(s) Oral every 12 hours  bictegravir 50 mG/emtricitabine 200 mG/tenofovir alafenamide 25 mG (BIKTARVY) 1 Tablet(s) Oral daily  bisacodyl Suppository 10 milliGRAM(s) Rectal daily  enoxaparin Injectable 40 milliGRAM(s) SubCutaneous at bedtime  senna 2 Tablet(s) Oral at bedtime    MEDICATIONS  (PRN):  HYDROmorphone   Tablet 2 milliGRAM(s) Oral every 8 hours PRN Severe Pain (7 - 10)  methocarbamol 500 milliGRAM(s) Oral every 8 hours PRN Muscle Spasm  pregabalin 50 milliGRAM(s) Oral every 8 hours PRN Pain  traZODone 25 milliGRAM(s) Oral at bedtime PRN insomnia    ----------------------------------------------------------------------

## 2021-10-07 NOTE — PROGRESS NOTE ADULT - ASSESSMENT
36 year old male with hx of IVDU/methamphetamine use presented to [facility] on 9/15 with acute onset paraplegia after lifting heavy weights found to have SAH/ SDH of the spinal cord with hematoma causing cord compression s/p T7-8 lami decompression (9/16) now with residual paraplegia. Hospital course complicated by hyponatremia secondary to SIADH. Admitted to [facility] on 9/28 for ADL, gait, and functional impairments.       T7 sensory incomplete Spinal Cord Compression   - secondary to spinal subdural hematoma s/p evacuation and decompression of T7-8  - Primary surgeon Dr. Randy D'Amico  - s/p decadron  - Comprehensive Multidisciplinary Rehab Program     3 hours a day, 5 days a week with PT/OT     P&O as needed  - leave incision open to air, OK to shower  - Per Neurosurgery: ok to start slow gradual, progressive translational movements in therapy as tolerated.     Hyponatremia, likely SIADH, resolved  - hospitalist to follow   - Florinef, Na tabs -- NaCl dc'd by hospitalist 10/4, florinef discontinued 10/5 as per hospitalist recs   - discontinued fluid restriction  - monitor BP    Psych:  h/o substance abuse: IVDU, methamphetamine  Adjustment disorder  Insomnia  - Neuropsych evaluation and treat for adjustment, coping, and counseling  - will need to follow up with psych outpatient: counseling centers provided   - Trazodone as needed at bedtime      Pain Management:  - Seen by pain management at [facility] recommended:    Dilaudid PO 2mg q8h Severe - has not needed to take     Lyrica 50 q8h PRN     Tylenol weaned to 650 BID     Robaxin 500 q8h -- changed to PRN  - bowel regimen as below  - Left leg spasms - consider tizanidine/baclofen if increasing - stable   - Neuropathic pain/ hyperalgesia -- desensitization therapy - stable     GI/Bowel:  Neurogenic bowel with incontinence, decreased voluntary anal contraction  - Senna QHS  - d/c Miralax due to daytime incontinence and leaking  - cont AM suppository for full bowel evacuation -- would like to hold off for now   - encourage timed toileting, to be discussed with nursing     /Bladder:  Neurogenic bladder with Urinary retention  - bladder scans changed to q6hrs    pt with good hand function and motivated to learn to SC -- has been doing self catheterizing --SC changed to be done at 5:30am, 10:30am, 4:30pm and 10:30 pm     Skin/Pressure Injury:  - Skin assessment on admission admission: no pressure injuries noted  - Monitor Incisions: spine incision open to air  - Turn every 2 hours while in bed, at risk due to paraplegia    Diet:   - Diet Consistency/Modifications: Reg + thins - fluid restriction discontinued    DVT ppx:  - Lovenox  - SCDs  - Last Doppler on 9/27 neg    Restrictions/Precautions:  - Precautions: Spine precautions (as above), falls      ---------------  Outpatient Follow-up:  Neurosurgery - Dr. Randy D'Amico  Psychological services/ counseling   PCP?    --------------    Tentative DC: 10/19   36 year old male with hx of IVDU/methamphetamine use presented to [facility] on 9/15 with acute onset paraplegia after lifting heavy weights found to have SAH/ SDH of the spinal cord with hematoma causing cord compression s/p T7-8 lami decompression (9/16) now with residual paraplegia. Hospital course complicated by hyponatremia secondary to SIADH. Admitted to [facility] on 9/28 for ADL, gait, and functional impairments.       T7 sensory incomplete Spinal Cord Compression   - secondary to spinal subdural hematoma s/p evacuation and decompression of T7-8  - Primary surgeon Dr. Randy D'Amico  - s/p decadron  - Comprehensive Multidisciplinary Rehab Program     3 hours a day, 5 days a week with PT/OT     P&O as needed  - leave incision open to air, OK to shower  - Per Neurosurgery: ok to start slow gradual, progressive translational movements in therapy as tolerated.     Hyponatremia, likely SIADH, resolved  - hospitalist to follow   - Florinef, Na tabs -- NaCl dc'd by hospitalist 10/4, florinef discontinued 10/5 as per hospitalist recs   - discontinued fluid restriction  - monitor BP    Psych:  h/o substance abuse: IVDU, methamphetamine  Adjustment disorder  Insomnia  - Neuropsych evaluation and treat for adjustment, coping, and counseling  - will need to follow up with psych outpatient: counseling centers provided   - Trazodone as needed at bedtime      Pain Management:  - Seen by pain management at [facility] recommended:    Dilaudid PO 2mg q8h Severe - has not needed to take     Lyrica 50 q8h PRN     Tylenol weaned to 650 BID     Robaxin 500 q8h -- changed to PRN  - bowel regimen as below  - Left leg spasms - consider tizanidine/baclofen if increasing - stable   - Neuropathic pain/ hyperalgesia -- desensitization therapy - stable     GI/Bowel:  Neurogenic bowel with incontinence, decreased voluntary anal contraction  - Senna QHS  - d/c Miralax due to daytime incontinence and leaking  - cont AM suppository for full bowel evacuation -- would like to hold off for now   - encourage timed toileting, to be discussed with nursing     /Bladder:  Neurogenic bladder with Urinary retention  - bladder scans changed to q6hrs    pt with good hand function and motivated to learn to SC -- has been doing self catheterizing --SC changed to be done at 5:30am, 10:30am, 4:30pm and 10:30 pm     Skin/Pressure Injury:  - Skin assessment on admission admission: no pressure injuries noted  - Monitor Incisions: spine incision open to air  - Turn every 2 hours while in bed, at risk due to paraplegia    Diet:   - Diet Consistency/Modifications: Reg + thins - fluid restriction discontinued    DVT ppx:  - Lovenox  - SCDs  - Last Doppler on 9/27 neg    Restrictions/Precautions:  - Precautions: Spine precautions (as above), falls      ---------------  Outpatient Follow-up:  Neurosurgery - Dr. Randy D'Amico  Psychological services/ counseling   PCP?    --------------    Team meeting 10/7:  OT: supervision with ADLs while in bed, min A from bed to drop arm commode  PT: independent mobility with WC   Tentative DC: 10/19

## 2021-10-08 LAB
ALBUMIN SERPL ELPH-MCNC: 3.2 G/DL — LOW (ref 3.3–5)
ALP SERPL-CCNC: 72 U/L — SIGNIFICANT CHANGE UP (ref 40–120)
ALT FLD-CCNC: 106 U/L — HIGH (ref 10–45)
ANION GAP SERPL CALC-SCNC: 7 MMOL/L — SIGNIFICANT CHANGE UP (ref 5–17)
APPEARANCE UR: CLEAR — SIGNIFICANT CHANGE UP
AST SERPL-CCNC: 26 U/L — SIGNIFICANT CHANGE UP (ref 10–40)
BASOPHILS # BLD AUTO: 0.01 K/UL — SIGNIFICANT CHANGE UP (ref 0–0.2)
BASOPHILS NFR BLD AUTO: 0.1 % — SIGNIFICANT CHANGE UP (ref 0–2)
BILIRUB SERPL-MCNC: 0.4 MG/DL — SIGNIFICANT CHANGE UP (ref 0.2–1.2)
BILIRUB UR-MCNC: NEGATIVE — SIGNIFICANT CHANGE UP
BUN SERPL-MCNC: 16 MG/DL — SIGNIFICANT CHANGE UP (ref 7–23)
CALCIUM SERPL-MCNC: 8.3 MG/DL — LOW (ref 8.4–10.5)
CHLORIDE SERPL-SCNC: 103 MMOL/L — SIGNIFICANT CHANGE UP (ref 96–108)
CO2 SERPL-SCNC: 29 MMOL/L — SIGNIFICANT CHANGE UP (ref 22–31)
COLOR SPEC: YELLOW — SIGNIFICANT CHANGE UP
CREAT SERPL-MCNC: 0.87 MG/DL — SIGNIFICANT CHANGE UP (ref 0.5–1.3)
DIFF PNL FLD: ABNORMAL
EOSINOPHIL # BLD AUTO: 0.03 K/UL — SIGNIFICANT CHANGE UP (ref 0–0.5)
EOSINOPHIL NFR BLD AUTO: 0.3 % — SIGNIFICANT CHANGE UP (ref 0–6)
GLUCOSE SERPL-MCNC: 114 MG/DL — HIGH (ref 70–99)
GLUCOSE UR QL: NEGATIVE — SIGNIFICANT CHANGE UP
HCT VFR BLD CALC: 35.5 % — LOW (ref 39–50)
HGB BLD-MCNC: 12.4 G/DL — LOW (ref 13–17)
IMM GRANULOCYTES NFR BLD AUTO: 0.2 % — SIGNIFICANT CHANGE UP (ref 0–1.5)
KETONES UR-MCNC: NEGATIVE — SIGNIFICANT CHANGE UP
LACTATE SERPL-SCNC: 1.5 MMOL/L — SIGNIFICANT CHANGE UP (ref 0.7–2)
LEUKOCYTE ESTERASE UR-ACNC: NEGATIVE — SIGNIFICANT CHANGE UP
LYMPHOCYTES # BLD AUTO: 1.31 K/UL — SIGNIFICANT CHANGE UP (ref 1–3.3)
LYMPHOCYTES # BLD AUTO: 14.3 % — SIGNIFICANT CHANGE UP (ref 13–44)
MCHC RBC-ENTMCNC: 32.5 PG — SIGNIFICANT CHANGE UP (ref 27–34)
MCHC RBC-ENTMCNC: 34.9 GM/DL — SIGNIFICANT CHANGE UP (ref 32–36)
MCV RBC AUTO: 92.9 FL — SIGNIFICANT CHANGE UP (ref 80–100)
MONOCYTES # BLD AUTO: 0.36 K/UL — SIGNIFICANT CHANGE UP (ref 0–0.9)
MONOCYTES NFR BLD AUTO: 3.9 % — SIGNIFICANT CHANGE UP (ref 2–14)
NEUTROPHILS # BLD AUTO: 7.45 K/UL — HIGH (ref 1.8–7.4)
NEUTROPHILS NFR BLD AUTO: 81.2 % — HIGH (ref 43–77)
NITRITE UR-MCNC: POSITIVE
NRBC # BLD: 0 /100 WBCS — SIGNIFICANT CHANGE UP (ref 0–0)
PH UR: 7 — SIGNIFICANT CHANGE UP (ref 5–8)
PLATELET # BLD AUTO: 191 K/UL — SIGNIFICANT CHANGE UP (ref 150–400)
POTASSIUM SERPL-MCNC: 3.5 MMOL/L — SIGNIFICANT CHANGE UP (ref 3.5–5.3)
POTASSIUM SERPL-SCNC: 3.5 MMOL/L — SIGNIFICANT CHANGE UP (ref 3.5–5.3)
PROT SERPL-MCNC: 6.2 G/DL — SIGNIFICANT CHANGE UP (ref 6–8.3)
PROT UR-MCNC: NEGATIVE — SIGNIFICANT CHANGE UP
RBC # BLD: 3.82 M/UL — LOW (ref 4.2–5.8)
RBC # FLD: 12.6 % — SIGNIFICANT CHANGE UP (ref 10.3–14.5)
SODIUM SERPL-SCNC: 139 MMOL/L — SIGNIFICANT CHANGE UP (ref 135–145)
SP GR SPEC: 1.01 — SIGNIFICANT CHANGE UP (ref 1.01–1.02)
UROBILINOGEN FLD QL: NEGATIVE — SIGNIFICANT CHANGE UP
WBC # BLD: 9.18 K/UL — SIGNIFICANT CHANGE UP (ref 3.8–10.5)
WBC # FLD AUTO: 9.18 K/UL — SIGNIFICANT CHANGE UP (ref 3.8–10.5)

## 2021-10-08 PROCEDURE — 71045 X-RAY EXAM CHEST 1 VIEW: CPT | Mod: 26

## 2021-10-08 PROCEDURE — 99232 SBSQ HOSP IP/OBS MODERATE 35: CPT | Mod: GC

## 2021-10-08 PROCEDURE — 99232 SBSQ HOSP IP/OBS MODERATE 35: CPT

## 2021-10-08 RX ORDER — HYDROMORPHONE HYDROCHLORIDE 2 MG/ML
2 INJECTION INTRAMUSCULAR; INTRAVENOUS; SUBCUTANEOUS EVERY 8 HOURS
Refills: 0 | Status: DISCONTINUED | OUTPATIENT
Start: 2021-10-08 | End: 2021-10-14

## 2021-10-08 RX ORDER — ACETAMINOPHEN 500 MG
650 TABLET ORAL ONCE
Refills: 0 | Status: COMPLETED | OUTPATIENT
Start: 2021-10-08 | End: 2021-10-08

## 2021-10-08 RX ADMIN — Medication 650 MILLIGRAM(S): at 08:08

## 2021-10-08 RX ADMIN — BICTEGRAVIR SODIUM, EMTRICITABINE, AND TENOFOVIR ALAFENAMIDE FUMARATE 1 TABLET(S): 30; 120; 15 TABLET ORAL at 12:26

## 2021-10-08 RX ADMIN — SENNA PLUS 2 TABLET(S): 8.6 TABLET ORAL at 23:22

## 2021-10-08 RX ADMIN — Medication 650 MILLIGRAM(S): at 22:00

## 2021-10-08 RX ADMIN — Medication 650 MILLIGRAM(S): at 21:10

## 2021-10-08 RX ADMIN — ENOXAPARIN SODIUM 40 MILLIGRAM(S): 100 INJECTION SUBCUTANEOUS at 21:11

## 2021-10-08 RX ADMIN — Medication 650 MILLIGRAM(S): at 08:53

## 2021-10-08 NOTE — PROGRESS NOTE ADULT - SUBJECTIVE AND OBJECTIVE BOX
Patient is a 36y old  Male who presents with a chief complaint of Spinal Cord Compression (01 Oct 2021 08:33)    HPI:  Ms. ALISSA ARAUJO is a 36 year old male with history of IVDU and methamphetamine use presented to [facility] on 9/15/21 with symptoms of acute onset lower extremity weakness and parethesias a few hours after lifting heavy weights in the gym.  He developed back pain and radiating pain to bilateral buttocks then progressed to paraplegia. He was brought in by ambulance after a passerby witnessed her unable to rise from the curb.  He did not have any bowel or bladder incontinence on admission.      On presentation to the ED, MRI performed and found to have ill defined intradural - extramedullary lesion with enhancement at level T8 with mass effect on the spinal cord with lefward displacement and minimal associated cord edema.  He was admitted to neurosurgical survice and started on decadron. Spinal angiogram performed which was negative. He underwent T8-9 lami decompression on 9/15 with Dr. D-Amico with intraoperative findings consistent with subarachnoid clot now s/p evacuation.  Post operative course was uncomplicated. Tolerated procedure well. Pt was seen by pain management who adjusted pain regimen (tylenol, lyrica, Robaxin, Dilaudid). Labs were significant for hyponatremia likely secondary to SIADH given euvolemia on exam and good UOP. Pt required short course of hypertonic saline then weaned off. Sodium stable with fluid restriction and addition of Na tabs. Pt was seen by behavioral health and psychology for adjustment disorder in the setting of new paralysis. He was determined to be low risk for suicide and emotional support provided. Trazodone was started for insomnia.     Patient was evaluated by PM&R and therapy for functional deficits and gait/ ADL impairments and recommended acute rehabilitation. Patient was medically optimized for discharge to  to [facility] on  (28 Sep 2021 13:56)    ----------------------------------------------------------------------    SUBJECTIVE:  Patient seen and evaluated at bedside. Slept well.  Endorses that with gradual increase sensation, intermittent straight cath is a little painful now - urine with noted blood from likely.  BM x 2 yesterday.  Notes progress and increased movement and sensation to L>R LE.  Informed of tentative discharge date, 10/19.    ROS:  Denies: headache, lightheadedness, CP, SOB, abdominal pain, nausea, constipation      ----------------------------------------------------------------------  PHYSICAL EXAM:    Vital Signs Last 24 Hrs  T(C): 36.8 (08 Oct 2021 07:45), Max: 37 (07 Oct 2021 21:50)  T(F): 98.2 (08 Oct 2021 07:45), Max: 98.6 (07 Oct 2021 21:50)  HR: 83 (08 Oct 2021 07:45) (83 - 84)  BP: 121/69 (08 Oct 2021 07:45) (118/64 - 121/69)  BP(mean): --  RR: 15 (08 Oct 2021 07:45) (15 - 16)  SpO2: 100% (08 Oct 2021 07:45) (98% - 100%)    PHYSICAL EXAM  General: NAD, comfortable  Cardio: RRR, S1/S2+  Resp: no respiratory difficulty or distress  Abdomen: soft, NTND  Neuro:  T7 incomplete paraplegia  2/5 on LLE, some toes wiggling on the RLE and some adduction as well   Extrem: no edema, no calf tenderness   Skin: thoracic incision open to air.  Mobile less than pea size bump to left wrist proximal to A-line site      ----------------------------------------------------------------------  RECENT LABS:    RECENT LABS/IMAGING                        12.8   6.97  )-----------( 229      ( 07 Oct 2021 05:45 )             38.4     10-    144  |  107  |  14  ----------------------------<  93  4.3   |  28  |  0.88    Ca    8.5      07 Oct 2021 05:45    TPro  6.0  /  Alb  3.0<L>  /  TBili  0.2  /  DBili  x   /  AST  51<H>  /  ALT  126<H>  /  AlkPhos  68  10-07      Urinalysis Basic - ( 06 Oct 2021 18:45 )    Color: Yellow / Appearance: Clear / S.015 / pH: x  Gluc: x / Ketone: Negative  / Bili: Negative / Urobili: Negative   Blood: x / Protein: Negative / Nitrite: Negative   Leuk Esterase: Negative / RBC: x / WBC x   Sq Epi: x / Non Sq Epi: x / Bacteria: x      ----------------------------------------------------------------------  RECENT IMAGING:    ----------------------------------------------------------------------  MEDICATIONS  (STANDING):  acetaminophen   Tablet .. 650 milliGRAM(s) Oral every 12 hours  bictegravir 50 mG/emtricitabine 200 mG/tenofovir alafenamide 25 mG (BIKTARVY) 1 Tablet(s) Oral daily  bisacodyl Suppository 10 milliGRAM(s) Rectal daily  enoxaparin Injectable 40 milliGRAM(s) SubCutaneous at bedtime  senna 2 Tablet(s) Oral at bedtime    MEDICATIONS  (PRN):  HYDROmorphone   Tablet 2 milliGRAM(s) Oral every 8 hours PRN Severe Pain (7 - 10)  methocarbamol 500 milliGRAM(s) Oral every 8 hours PRN Muscle Spasm  pregabalin 50 milliGRAM(s) Oral every 8 hours PRN Pain  traZODone 25 milliGRAM(s) Oral at bedtime PRN insomnia    ----------------------------------------------------------------------

## 2021-10-08 NOTE — PROGRESS NOTE ADULT - ASSESSMENT
36M with PMH IVDU / methamphetamine use presented to [facility] on 9/15 with acute onset paraplegia after lifting heavy weights found to have SAH / SDH of the spinal cord with hematoma causing cord compression s/p T7-8 lami decompression (9/16) now with residual paraplegia. Hospital course complicated by hyponatremia secondary to SIADH. Admitted to [facility] acute inpatient rehab on 9/28 for initiation of multidisciplinary rehab program. ADL, gait, and functional impairments.     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 Pérez

## 2021-10-08 NOTE — PROGRESS NOTE ADULT - ASSESSMENT
36 year old male with hx of IVDU/methamphetamine use presented to [facility] on 9/15 with acute onset paraplegia after lifting heavy weights found to have SAH/ SDH of the spinal cord with hematoma causing cord compression s/p T7-8 lami decompression (9/16) now with residual paraplegia. Hospital course complicated by hyponatremia secondary to SIADH. Admitted to [facility] acute inpatient rehab on 9/28 for ADL, gait, and functional impairments.     T7 sensory incomplete Spinal Cord Compression   - secondary to spinal subdural hematoma s/p evacuation and decompression of T7-8  - Primary surgeon Dr. Randy D'Amico  - s/p decadron  - Comprehensive Multidisciplinary Rehab Program     3 hours a day, 5 days a week with PT/OT     P&O as needed  - leave incision open to air, OK to shower  - Per Neurosurgery: ok to start slow gradual, progressive translational movements in therapy as tolerated.     Hyponatremia, likely SIADH, resolved  - hospitalist to follow   - Florinef, Na tabs -- NaCl dc'd by hospitalist 10/4, florinef discontinued 10/5 as per hospitalist recs   - discontinued fluid restriction  - monitor BP    Psych:  h/o substance abuse: IVDU, methamphetamine  Adjustment disorder  Insomnia  - Neuropsych evaluation and treat for adjustment, coping, and counseling  - will need to follow up with psych outpatient: counseling centers provided   - Trazodone as needed at bedtime    Pain Management:  - Seen by pain management at [facility] recommended:    Dilaudid PO 2mg q8h Severe - has not needed to take     Lyrica 50 q8h PRN     Tylenol weaned to 650 BID     Robaxin 500 q8h -- changed to PRN  - bowel regimen as below  - Left leg spasms - consider tizanidine/baclofen if increasing - stable   - Neuropathic pain/ hyperalgesia -- desensitization therapy - stable     GI/Bowel:  Neurogenic bowel with incontinence, decreased voluntary anal contraction  - Senna QHS  - d/c Miralax due to daytime incontinence and leaking  - cont AM suppository for full bowel evacuation -- would like to hold off for now   - encourage timed toileting, to be discussed with nursing     /Bladder:  Neurogenic bladder with Urinary retention  - bladder scans changed to q6hrs    pt with good hand function and motivated to learn to SC -- has been doing self catheterizing --SC changed to be done at 5:30am, 10:30am, 4:30pm and 10:30 pm     Skin/Pressure Injury:  - Skin assessment on admission admission: no pressure injuries noted  - Monitor Incisions: spine incision open to air  - Turn every 2 hours while in bed, at risk due to paraplegia    Diet:   - Diet Consistency/Modifications: Reg + thins - fluid restriction discontinued    DVT ppx:  - Lovenox  - SCDs  - Last Doppler on 9/27 neg    Restrictions/Precautions:  - Precautions: Spine precautions (as above), falls      ---------------  Outpatient Follow-up:  Neurosurgery - Dr. Randy D'Amico  Psychological services/ counseling   PCP?    --------------    Team meeting 10/7:  OT: supervision with ADLs while in bed, min A from bed to drop arm commode  PT: independent mobility with WC   Tentative DC: 10/19

## 2021-10-08 NOTE — CHART NOTE - NSCHARTNOTEFT_GEN_A_CORE
Called by RN for rectal temp of 102F. Patient seen and examined at bedside. Patient notes feeling chills starting at 4pm today which has been worsening over the past hour. He notes a slight burning sensation with urination when he self catheterizes. Denies any HA, cough, SOB, CP, n/v, abdominal pain.    Vital Signs Last 24 Hrs  T(F): 102F  HR: 105 (08 Oct 2021 20:56) (83 - 105)  BP: 125/80 (08 Oct 2021 20:56) (118/64 - 125/80)  BP(mean): --  RR: 18 (08 Oct 2021 20:56) (15 - 18)  SpO2: 99% (08 Oct 2021 20:56) (98% - 100%)    PHYSICAL EXAM:  GENERAL: NAD, lying in bed comfortably, anxious  HEAD:  Atraumatic, Normocephalic  EYES: EOMI, PERRLA, conjunctiva and sclera clear  ENT: Moist mucous membranes  NECK: Supple  CHEST/LUNG: Decreased BS bilateral bases; No rales, rhonchi, wheezing, or rubs. Unlabored respirations  HEART: (+) tachycardia. Regular rhythm; No murmurs, rubs, or gallops  ABDOMEN: Bowel sounds present; Soft, Nontender, Nondistended. No hepatomegally  EXTREMITIES:  No clubbing, cyanosis, or edema  NERVOUS SYSTEM:  Alert & Oriented X3, speech clear. No deficits     A+P  36 year old male with hx of IVDU/methamphetamine use here with residual paraplegia from spinal cord hematoma s/p T7-8 lami decompression (9/16), now with new onset fever and tachycardia    -?infectious etiology vs autonomic dysreflexia 2/2 SCI  -Stat CXR, UA, Ucx, Bcx x2, CBC, CMP ordered  -Tylenol for fever  -Straight cathed at bedside  -Monitor off abx pending workup  -Will continue to follow, RN to call for any changes    KELSIE Huston DO. PGY2 Called by RN for rectal temp of 102F. Patient seen and examined at bedside. Patient notes feeling chills starting at 4pm today which has been worsening over the past hour. He notes a slight burning sensation with urination when he self catheterizes. Denies any HA, cough, SOB, CP, n/v, abdominal pain.    Vital Signs Last 24 Hrs  T(F): 102F  HR: 105 (08 Oct 2021 20:56) (83 - 105)  BP: 125/80 (08 Oct 2021 20:56) (118/64 - 125/80)  BP(mean): --  RR: 18 (08 Oct 2021 20:56) (15 - 18)  SpO2: 99% (08 Oct 2021 20:56) (98% - 100%)    PHYSICAL EXAM:  GENERAL: NAD, lying in bed comfortably, anxious  HEAD:  Atraumatic, Normocephalic  EYES: EOMI, PERRLA, conjunctiva and sclera clear  ENT: Moist mucous membranes  NECK: Supple  CHEST/LUNG: Decreased BS bilateral bases; No rales, rhonchi, wheezing, or rubs. Unlabored respirations  HEART: (+) tachycardia. Regular rhythm; No murmurs, rubs, or gallops  ABDOMEN: Bowel sounds present; Soft, Nontender, Nondistended. No hepatomegally  EXTREMITIES:  No clubbing, cyanosis, or edema  NERVOUS SYSTEM:  Alert & Oriented X3, speech clear. No deficits     A+P  36 year old male with hx of IVDU/methamphetamine use here with residual paraplegia from spinal cord hematoma s/p T7-8 lami decompression (9/16), now with new onset fever and tachycardia    -?infectious etiology vs autonomic dysreflexia 2/2 SCI  -Stat CXR, UA, Ucx, Bcx x2, CBC, CMP, lactate ordered  -Tylenol for fever  -Straight cathed at bedside  -Monitor off abx pending workup  -Will continue to follow, RN to call for any changes    KELSIE Huston DO. PGY2 Called by RN for rectal temp of 102F. Patient seen and examined at bedside. Patient notes feeling chills starting at 4pm today which has been worsening over the past hour. He notes a slight discomfort with self catheterization, however denies any dysuria. Denies any HA, cough, SOB, CP, n/v, abdominal pain.    Vital Signs Last 24 Hrs  T(F): 102F  HR: 105 (08 Oct 2021 20:56) (83 - 105)  BP: 125/80 (08 Oct 2021 20:56) (118/64 - 125/80)  BP(mean): --  RR: 18 (08 Oct 2021 20:56) (15 - 18)  SpO2: 99% (08 Oct 2021 20:56) (98% - 100%)    PHYSICAL EXAM:  GENERAL: NAD, lying in bed comfortably, anxious  HEAD:  Atraumatic, Normocephalic  EYES: EOMI, PERRLA, conjunctiva and sclera clear  ENT: Moist mucous membranes  NECK: Supple  CHEST/LUNG: Decreased BS bilateral bases; No rales, rhonchi, wheezing, or rubs. Unlabored respirations  HEART: (+) tachycardia. Regular rhythm; No murmurs, rubs, or gallops  ABDOMEN: Bowel sounds present; Soft, Nontender, Nondistended. No hepatomegally  EXTREMITIES:  No clubbing, cyanosis, or edema  NERVOUS SYSTEM:  Alert & Oriented X3, speech clear. No deficits     A+P  36 year old male with hx of IVDU/methamphetamine use here with residual paraplegia from spinal cord hematoma s/p T7-8 lami decompression (9/16), now with new onset fever and tachycardia    -?infectious etiology vs autonomic dysreflexia 2/2 SCI  -Stat CXR, UA, Ucx, Bcx x2, CBC, CMP, lactate ordered  -Tylenol for fever  -Straight cathed at bedside  -Monitor off abx pending workup  -Will continue to follow, RN to call for any changes    KELSIE Huston DO. PGY2        ADDENDUM  -CXR wet read shows no active cardiopulmonary disease  -CBC, CMP, lactate normal  -UA shows (+) bacteria and nitrites  -Will continue to monitor off abx as only clinical sx is discomfort with catheterizations  -Infectious etiology less likely, suspect 2/2 autonomic dysreflexia  -F/u Ucx, Bcx  -Trend temps

## 2021-10-09 LAB — SARS-COV-2 RNA SPEC QL NAA+PROBE: SIGNIFICANT CHANGE UP

## 2021-10-09 PROCEDURE — 99233 SBSQ HOSP IP/OBS HIGH 50: CPT

## 2021-10-09 RX ORDER — CEFTRIAXONE 500 MG/1
INJECTION, POWDER, FOR SOLUTION INTRAMUSCULAR; INTRAVENOUS
Refills: 0 | Status: DISCONTINUED | OUTPATIENT
Start: 2021-10-09 | End: 2021-10-10

## 2021-10-09 RX ORDER — ACETAMINOPHEN 500 MG
650 TABLET ORAL ONCE
Refills: 0 | Status: COMPLETED | OUTPATIENT
Start: 2021-10-09 | End: 2021-10-09

## 2021-10-09 RX ORDER — CEFTRIAXONE 500 MG/1
1000 INJECTION, POWDER, FOR SOLUTION INTRAMUSCULAR; INTRAVENOUS ONCE
Refills: 0 | Status: COMPLETED | OUTPATIENT
Start: 2021-10-09 | End: 2021-10-09

## 2021-10-09 RX ORDER — IBUPROFEN 200 MG
600 TABLET ORAL ONCE
Refills: 0 | Status: COMPLETED | OUTPATIENT
Start: 2021-10-09 | End: 2021-10-09

## 2021-10-09 RX ORDER — SODIUM CHLORIDE 9 MG/ML
1000 INJECTION INTRAMUSCULAR; INTRAVENOUS; SUBCUTANEOUS
Refills: 0 | Status: DISCONTINUED | OUTPATIENT
Start: 2021-10-09 | End: 2021-10-23

## 2021-10-09 RX ORDER — CEFTRIAXONE 500 MG/1
1000 INJECTION, POWDER, FOR SOLUTION INTRAMUSCULAR; INTRAVENOUS EVERY 24 HOURS
Refills: 0 | Status: DISCONTINUED | OUTPATIENT
Start: 2021-10-10 | End: 2021-10-10

## 2021-10-09 RX ADMIN — Medication 650 MILLIGRAM(S): at 02:05

## 2021-10-09 RX ADMIN — SODIUM CHLORIDE 75 MILLILITER(S): 9 INJECTION INTRAMUSCULAR; INTRAVENOUS; SUBCUTANEOUS at 14:40

## 2021-10-09 RX ADMIN — BICTEGRAVIR SODIUM, EMTRICITABINE, AND TENOFOVIR ALAFENAMIDE FUMARATE 1 TABLET(S): 30; 120; 15 TABLET ORAL at 11:50

## 2021-10-09 RX ADMIN — SENNA PLUS 2 TABLET(S): 8.6 TABLET ORAL at 21:56

## 2021-10-09 RX ADMIN — ENOXAPARIN SODIUM 40 MILLIGRAM(S): 100 INJECTION SUBCUTANEOUS at 21:57

## 2021-10-09 RX ADMIN — Medication 650 MILLIGRAM(S): at 20:37

## 2021-10-09 RX ADMIN — Medication 650 MILLIGRAM(S): at 02:54

## 2021-10-09 RX ADMIN — Medication 600 MILLIGRAM(S): at 11:50

## 2021-10-09 RX ADMIN — Medication 650 MILLIGRAM(S): at 21:20

## 2021-10-09 RX ADMIN — CEFTRIAXONE 1000 MILLIGRAM(S): 500 INJECTION, POWDER, FOR SOLUTION INTRAMUSCULAR; INTRAVENOUS at 09:07

## 2021-10-09 RX ADMIN — Medication 600 MILLIGRAM(S): at 12:50

## 2021-10-09 NOTE — PROGRESS NOTE ADULT - SUBJECTIVE AND OBJECTIVE BOX
Cc: Gait dysfunction    HPI: Patient with a fever overnight (102).  Patient also with tachycardia but otherwise hemodynamically stable.  Labs stable  CXR pending  Patient with positive UA, UCx pending, BCx pending    Patient denies LUTS, no changes in bowel movements, no new weakness, numbness, paresthesia.  No worsening back or neck pain.  Patient denies cough, shortness of breath, palpitations, chest pain, HA or LH.  Patient denies abdominal pain, nausea or vomiting.  Patient denies lower extremity swelling or pain.  Has been tolerating rehabilitation program.    acetaminophen   Tablet .. 650 milliGRAM(s) Oral every 12 hours  bictegravir 50 mG/emtricitabine 200 mG/tenofovir alafenamide 25 mG (BIKTARVY) 1 Tablet(s) Oral daily  bisacodyl Suppository 10 milliGRAM(s) Rectal daily  cefTRIAXone   IVPB      enoxaparin Injectable 40 milliGRAM(s) SubCutaneous at bedtime  HYDROmorphone   Tablet 2 milliGRAM(s) Oral every 8 hours PRN  ibuprofen  Tablet. 600 milliGRAM(s) Oral once  methocarbamol 500 milliGRAM(s) Oral every 8 hours PRN  pregabalin 50 milliGRAM(s) Oral every 8 hours PRN  senna 2 Tablet(s) Oral at bedtime  traZODone 25 milliGRAM(s) Oral at bedtime PRN      T(C): 38.4 (10-09-21 @ 10:56), Max: 38.9 (10-08-21 @ 20:56)  HR: 101 (10-09-21 @ 08:05) (88 - 110)  BP: 107/63 (10-09-21 @ 08:05) (107/63 - 125/80)  RR: 15 (10-09-21 @ 08:05) (15 - 18)  SpO2: 100% (10-09-21 @ 08:05) (96% - 100%)    In NAD  HEENT- EOMI  Heart- RRR, S1S2  Lungs- CTA bl, no wheeze, crackles or rales  Abd- + BS, NT, ND, soft  Back: surgical incision appears c/d/i, no new rashes, no new skin lesions, non-tender to palpation  Ext- Negative homen, minimal swelling, full PROM  Neuro- Exam unchanged    Imp: Patient with diagnosis of SAH/SDH s/p T7-T8 decompression and laminectomy admitted for comprehensive acute rehabilitation.    Plan:  - Continue therapies  - Patient afebrile and tachycardic overnight.  UA positive.  Patient started on ceftriaxone by Med.    - UCx, BCx, CXR pending.  - Will add order for covid and LE duplex  - DVT prophylaxis  - Skin- Turn q2h, check skin daily  - Continue current medications; patient medically stable.   - Patient is stable to continue current rehabilitation program.

## 2021-10-09 NOTE — PROVIDER CONTACT NOTE (OTHER) - ASSESSMENT
Pt is A&Ox4. Latest vitals are /69, HR 98, oral temp 98.8F, rectal temp 99.6F, SpO2 98%, RR 17. Pt c/o abdominal pain after breakfast, which was relieved after pt forced self to vomit. Pt then c/o chills and sweating. Pt was ordered a Ultrasound of [place] lower extremities, but patient refused to have test completed today and requests it be performed tomorrow.

## 2021-10-09 NOTE — PROVIDER CONTACT NOTE (OTHER) - SITUATION
/80, HR:105bpm, Temp:102.1F rectal. pt c/o chills
Pt remains tachycardic with temp 100.1F rectal
Pt has been febrile since last night (10/8). Pt currently feeling hot with chills and diaphoresis.

## 2021-10-09 NOTE — PROVIDER CONTACT NOTE (OTHER) - ACTION/TREATMENT ORDERED:
Dr. Huston made aware and assessed pt at bedside. tylenol to be administered as per order, no other orders given. UA collected and sent to lab with blood cx x2, lactate, CBC, CMP. CXR ordered.
Dr. Huston made aware, tylenol to be administered as per order, no other orders given. will continue to monitor
MD notified of patients status. Rectal temp repeated throughout shift. First dose of ceftriaxone administered this am. Ibuprofen given. MD aware that pt refused US today, wants it done tomorrow.

## 2021-10-09 NOTE — PROGRESS NOTE ADULT - ASSESSMENT
36M with PMH IVDU / methamphetamine use presented to [facility] on 9/15 with acute onset paraplegia after lifting heavy weights found to have SAH / SDH of the spinal cord with hematoma causing cord compression s/p T7-8 lami decompression (9/16) now with residual paraplegia. Hospital course complicated by hyponatremia secondary to SIADH. Admitted to [facility] acute inpatient rehab on 9/28 for initiation of multidisciplinary rehab program. ADL, gait, and functional impairments.     #Sepsis  #UTI  -Fever + Tachycardia, likely secondary to UTI  -UA positive for nitrites and bacteria which were not evident a few days ago  -Start Ceftriaxone x 3 days  -Urine cx  -Tylenol prn    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  -s/p evacuation and decompression of T7-8  -Continue comprehensive rehab program  -Follow up with outpatient neurosurgery    #Hyponatremia, likely SIADH  -resolved    #Anemia  -Stable    #hx of IV drug use  #Methamphetamine use  -Supportive care    #Neurogenic bladder  #Acute urinary retention  -Patient learning to self straight cath  -No benefit of using Flomax at this time    #HIV  -Continue Biktarvy    #Prophylactic Measure  -DVT ppx: Lovenox  -Bowel regimen

## 2021-10-09 NOTE — PROGRESS NOTE ADULT - SUBJECTIVE AND OBJECTIVE BOX
Patient is a 36y old  Male who presents with a chief complaint of Spinal Cord Compression (08 Oct 2021 09:57)      SUBJECTIVE / OVERNIGHT EVENTS:  Pt seen and examined at bedside. Yesterday and overnight with complaints of fever/chills. Endorses intermittent pain with self catheterization, possibly dysuria  Pt denies cp, palpitations, sob, abd pain, N/V.    ROS:  All other review of systems negative    Allergies    No Known Allergies    Intolerances        MEDICATIONS  (STANDING):  acetaminophen   Tablet .. 650 milliGRAM(s) Oral every 12 hours  bictegravir 50 mG/emtricitabine 200 mG/tenofovir alafenamide 25 mG (BIKTARVY) 1 Tablet(s) Oral daily  bisacodyl Suppository 10 milliGRAM(s) Rectal daily  cefTRIAXone   IVPB      enoxaparin Injectable 40 milliGRAM(s) SubCutaneous at bedtime  senna 2 Tablet(s) Oral at bedtime    MEDICATIONS  (PRN):  HYDROmorphone   Tablet 2 milliGRAM(s) Oral every 8 hours PRN Severe Pain (7 - 10)  methocarbamol 500 milliGRAM(s) Oral every 8 hours PRN Muscle Spasm  pregabalin 50 milliGRAM(s) Oral every 8 hours PRN Pain  traZODone 25 milliGRAM(s) Oral at bedtime PRN insomnia      Vital Signs Last 24 Hrs  T(C): 37.1 (09 Oct 2021 08:05), Max: 38.9 (08 Oct 2021 20:56)  T(F): 98.7 (09 Oct 2021 08:05), Max: 102.1 (08 Oct 2021 20:56)  HR: 101 (09 Oct 2021 08:05) (88 - 110)  BP: 107/63 (09 Oct 2021 08:05) (107/63 - 125/80)  BP(mean): --  RR: 15 (09 Oct 2021 08:05) (15 - 18)  SpO2: 100% (09 Oct 2021 08:05) (96% - 100%)  CAPILLARY BLOOD GLUCOSE        I&O's Summary    08 Oct 2021 07:01  -  09 Oct 2021 07:00  --------------------------------------------------------  IN: 0 mL / OUT: 1000 mL / NET: -1000 mL        PHYSICAL EXAM:  GENERAL: NAD, well-developed  CHEST/LUNG: Clear to auscultation bilaterally; No wheeze, nonlabored breathing  HEART: Regular rate and rhythm; No murmurs, rubs, or gallops  ABDOMEN: Soft, Nontender, Nondistended; Bowel sounds present  EXTREMITIES:  2+ Peripheral Pulses, No clubbing, cyanosis, or edema  NEUROLOGY: AAOx3, RLE weakness  PSYCH: calm, appropriate mood    LABS:                        12.4   9.18  )-----------( 191      ( 08 Oct 2021 21:45 )             35.5     10    139  |  103  |  16  ----------------------------<  114<H>  3.5   |  29  |  0.87    Ca    8.3<L>      08 Oct 2021 21:45    TPro  6.2  /  Alb  3.2<L>  /  TBili  0.4  /  DBili  x   /  AST  26  /  ALT  106<H>  /  AlkPhos  72  10-08          Urinalysis Basic - ( 08 Oct 2021 21:45 )    Color: Yellow / Appearance: Clear / S.010 / pH: x  Gluc: x / Ketone: Negative  / Bili: Negative / Urobili: Negative   Blood: x / Protein: Negative / Nitrite: Positive   Leuk Esterase: Negative / RBC: 5-10 /HPF / WBC 3-5 /HPF   Sq Epi: x / Non Sq Epi: Neg.-Few / Bacteria: Many /HPF        RADIOLOGY & ADDITIONAL TESTS:  Results Reviewed:   Imaging Personally Reviewed:  Electrocardiogram Personally Reviewed:    COORDINATION OF CARE:  Care Discussed with Consultants/Other Providers [Y/N]:  Prior or Outpatient Records Reviewed [Y/N]:

## 2021-10-10 LAB
ALBUMIN SERPL ELPH-MCNC: 2.8 G/DL — LOW (ref 3.3–5)
ALP SERPL-CCNC: 63 U/L — SIGNIFICANT CHANGE UP (ref 40–120)
ALT FLD-CCNC: 72 U/L — HIGH (ref 10–45)
ANION GAP SERPL CALC-SCNC: 9 MMOL/L — SIGNIFICANT CHANGE UP (ref 5–17)
AST SERPL-CCNC: 24 U/L — SIGNIFICANT CHANGE UP (ref 10–40)
BILIRUB SERPL-MCNC: 0.6 MG/DL — SIGNIFICANT CHANGE UP (ref 0.2–1.2)
BUN SERPL-MCNC: 5 MG/DL — LOW (ref 7–23)
CALCIUM SERPL-MCNC: 8.4 MG/DL — SIGNIFICANT CHANGE UP (ref 8.4–10.5)
CHLORIDE SERPL-SCNC: 108 MMOL/L — SIGNIFICANT CHANGE UP (ref 96–108)
CO2 SERPL-SCNC: 26 MMOL/L — SIGNIFICANT CHANGE UP (ref 22–31)
CREAT SERPL-MCNC: 0.87 MG/DL — SIGNIFICANT CHANGE UP (ref 0.5–1.3)
CRP SERPL-MCNC: 76 MG/L — HIGH
ERYTHROCYTE [SEDIMENTATION RATE] IN BLOOD: 55 MM/HR — HIGH (ref 0–15)
GLUCOSE SERPL-MCNC: 121 MG/DL — HIGH (ref 70–99)
HCT VFR BLD CALC: 35.3 % — LOW (ref 39–50)
HGB BLD-MCNC: 12 G/DL — LOW (ref 13–17)
MCHC RBC-ENTMCNC: 32.8 PG — SIGNIFICANT CHANGE UP (ref 27–34)
MCHC RBC-ENTMCNC: 34 GM/DL — SIGNIFICANT CHANGE UP (ref 32–36)
MCV RBC AUTO: 96.4 FL — SIGNIFICANT CHANGE UP (ref 80–100)
NRBC # BLD: 0 /100 WBCS — SIGNIFICANT CHANGE UP (ref 0–0)
PLATELET # BLD AUTO: 185 K/UL — SIGNIFICANT CHANGE UP (ref 150–400)
POTASSIUM SERPL-MCNC: 3.9 MMOL/L — SIGNIFICANT CHANGE UP (ref 3.5–5.3)
POTASSIUM SERPL-SCNC: 3.9 MMOL/L — SIGNIFICANT CHANGE UP (ref 3.5–5.3)
PROT SERPL-MCNC: 5.7 G/DL — LOW (ref 6–8.3)
RBC # BLD: 3.66 M/UL — LOW (ref 4.2–5.8)
RBC # FLD: 12.7 % — SIGNIFICANT CHANGE UP (ref 10.3–14.5)
SODIUM SERPL-SCNC: 143 MMOL/L — SIGNIFICANT CHANGE UP (ref 135–145)
WBC # BLD: 9.81 K/UL — SIGNIFICANT CHANGE UP (ref 3.8–10.5)
WBC # FLD AUTO: 9.81 K/UL — SIGNIFICANT CHANGE UP (ref 3.8–10.5)

## 2021-10-10 PROCEDURE — 93970 EXTREMITY STUDY: CPT | Mod: 26

## 2021-10-10 PROCEDURE — 99233 SBSQ HOSP IP/OBS HIGH 50: CPT

## 2021-10-10 RX ORDER — MEROPENEM 1 G/30ML
1000 INJECTION INTRAVENOUS EVERY 8 HOURS
Refills: 0 | Status: DISCONTINUED | OUTPATIENT
Start: 2021-10-10 | End: 2021-10-12

## 2021-10-10 RX ADMIN — Medication 650 MILLIGRAM(S): at 08:24

## 2021-10-10 RX ADMIN — SENNA PLUS 2 TABLET(S): 8.6 TABLET ORAL at 19:55

## 2021-10-10 RX ADMIN — MEROPENEM 100 MILLIGRAM(S): 1 INJECTION INTRAVENOUS at 19:56

## 2021-10-10 RX ADMIN — BICTEGRAVIR SODIUM, EMTRICITABINE, AND TENOFOVIR ALAFENAMIDE FUMARATE 1 TABLET(S): 30; 120; 15 TABLET ORAL at 11:54

## 2021-10-10 RX ADMIN — Medication 650 MILLIGRAM(S): at 20:54

## 2021-10-10 RX ADMIN — ENOXAPARIN SODIUM 40 MILLIGRAM(S): 100 INJECTION SUBCUTANEOUS at 19:56

## 2021-10-10 RX ADMIN — Medication 650 MILLIGRAM(S): at 09:24

## 2021-10-10 RX ADMIN — CEFTRIAXONE 100 MILLIGRAM(S): 500 INJECTION, POWDER, FOR SOLUTION INTRAMUSCULAR; INTRAVENOUS at 08:25

## 2021-10-10 RX ADMIN — Medication 650 MILLIGRAM(S): at 19:03

## 2021-10-10 NOTE — PROGRESS NOTE ADULT - SUBJECTIVE AND OBJECTIVE BOX
Cc: Gait dysfunction    HPI: Patient with a fever overnight.  Patient also with tachycardia but otherwise hemodynamically stable.  Labs stable, no leukocytosis.   ESR, CRP pending  CXR pending (preliminary view, unremarkable)  LE duplex neg  Covid neg  Patient with positive UA, UCx pending, BCx pending    Patient denies LUTS, no changes in bowel/bladder function, no new weakness, numbness, paresthesia.  No worsening back or neck pain.  Patient denies cough, shortness of breath, palpitations, chest pain, HA or LH.  Patient denies abdominal pain, nausea or vomiting.  Patient denies lower extremity swelling or pain.  Has been tolerating rehabilitation program.    acetaminophen   Tablet .. 650 milliGRAM(s) Oral every 12 hours  bictegravir 50 mG/emtricitabine 200 mG/tenofovir alafenamide 25 mG (BIKTARVY) 1 Tablet(s) Oral daily  bisacodyl Suppository 10 milliGRAM(s) Rectal daily  cefTRIAXone   IVPB      enoxaparin Injectable 40 milliGRAM(s) SubCutaneous at bedtime  HYDROmorphone   Tablet 2 milliGRAM(s) Oral every 8 hours PRN  ibuprofen  Tablet. 600 milliGRAM(s) Oral once  methocarbamol 500 milliGRAM(s) Oral every 8 hours PRN  pregabalin 50 milliGRAM(s) Oral every 8 hours PRN  senna 2 Tablet(s) Oral at bedtime  traZODone 25 milliGRAM(s) Oral at bedtime PRN      T(C): 38.4 (10-09-21 @ 10:56), Max: 38.9 (10-08-21 @ 20:56)  HR: 101 (10-09-21 @ 08:05) (88 - 110)  BP: 107/63 (10-09-21 @ 08:05) (107/63 - 125/80)  RR: 15 (10-09-21 @ 08:05) (15 - 18)  SpO2: 100% (10-09-21 @ 08:05) (96% - 100%)    In NAD  HEENT- EOMI  Heart- RRR, S1S2  Lungs- CTA bl, no wheeze, crackles or rales  Abd- + BS, NT, ND, soft  Back: surgical incision appears c/d/i, no new rashes, no new skin lesions, non-tender to palpation  Ext- Negative homen, minimal swelling, full PROM  Neuro- Exam unchanged from yesterday and from previously documented exams    Imp: Patient with diagnosis of SAH/SDH s/p T7-T8 decompression and laminectomy admitted for comprehensive acute rehabilitation.    Plan:  - Continue therapies  - Patient febrile and tachycardic overnight.  This morning patient is afebrile and hemodynamically stable.  No localizing signs or symptoms  - UA positive.  Patient started on ceftriaxone by Med, now day 2 of 3  - Patient with a history of IVDU and HIV and recent thoracic spine surgery.  ID consulted yesterday and awaiting further recs.      - UCx, BCx, CXR pending.  CRP/ESR pending.  - covid swab and LE duplex neg.  - DVT prophylaxis  - Skin- Turn q2h, check skin daily  - Continue current medications; patient medically stable.   - Patient is stable to continue current rehabilitation program.

## 2021-10-10 NOTE — PROGRESS NOTE ADULT - SUBJECTIVE AND OBJECTIVE BOX
Patient is a 36y old  Male who presents with a chief complaint of Spinal Cord Compression (09 Oct 2021 11:20)      SUBJECTIVE / OVERNIGHT EVENTS:  Pt seen and examined at bedside. Febrile overnight  Pt denies cp, palpitations, sob, abd pain, N/V.    ROS:  All other review of systems negative    Allergies    No Known Allergies    Intolerances        MEDICATIONS  (STANDING):  acetaminophen   Tablet .. 650 milliGRAM(s) Oral every 12 hours  bictegravir 50 mG/emtricitabine 200 mG/tenofovir alafenamide 25 mG (BIKTARVY) 1 Tablet(s) Oral daily  bisacodyl Suppository 10 milliGRAM(s) Rectal daily  cefTRIAXone   IVPB      cefTRIAXone   IVPB 1000 milliGRAM(s) IV Intermittent every 24 hours  enoxaparin Injectable 40 milliGRAM(s) SubCutaneous at bedtime  senna 2 Tablet(s) Oral at bedtime  sodium chloride 0.9%. 1000 milliLiter(s) (75 mL/Hr) IV Continuous <Continuous>    MEDICATIONS  (PRN):  HYDROmorphone   Tablet 2 milliGRAM(s) Oral every 8 hours PRN Severe Pain (7 - 10)  methocarbamol 500 milliGRAM(s) Oral every 8 hours PRN Muscle Spasm  pregabalin 50 milliGRAM(s) Oral every 8 hours PRN Pain  traZODone 25 milliGRAM(s) Oral at bedtime PRN insomnia      Vital Signs Last 24 Hrs  T(C): 37.6 (10 Oct 2021 06:15), Max: 38.5 (09 Oct 2021 20:39)  T(F): 99.6 (10 Oct 2021 06:15), Max: 101.3 (09 Oct 2021 20:39)  HR: 86 (10 Oct 2021 06:15) (86 - 101)  BP: 116/71 (10 Oct 2021 06:15) (112/73 - 117/69)  BP(mean): --  RR: 16 (10 Oct 2021 06:15) (16 - 17)  SpO2: 98% (10 Oct 2021 06:15) (98% - 100%)  CAPILLARY BLOOD GLUCOSE        I&O's Summary    09 Oct 2021 07:01  -  10 Oct 2021 07:00  --------------------------------------------------------  IN: 0 mL / OUT: 2500 mL / NET: -2500 mL        PHYSICAL EXAM:  GENERAL: NAD, well-developed  CHEST/LUNG: Clear to auscultation bilaterally; No wheeze, nonlabored breathing  HEART: Regular rate and rhythm; No murmurs, rubs, or gallops  ABDOMEN: Soft, Nontender, Nondistended; Bowel sounds present  EXTREMITIES:  2+ Peripheral Pulses, No clubbing, cyanosis, or edema  NEUROLOGY: AAOx3, RLE weakness  PSYCH: calm, appropriate mood    LABS:                        12.0   9.81  )-----------( 185      ( 10 Oct 2021 05:20 )             35.3     10-10    143  |  108  |  5<L>  ----------------------------<  121<H>  3.9   |  26  |  0.87    Ca    8.4      10 Oct 2021 05:20    TPro  5.7<L>  /  Alb  2.8<L>  /  TBili  0.6  /  DBili  x   /  AST  24  /  ALT  72<H>  /  AlkPhos  63  10-10          Urinalysis Basic - ( 08 Oct 2021 21:45 )    Color: Yellow / Appearance: Clear / S.010 / pH: x  Gluc: x / Ketone: Negative  / Bili: Negative / Urobili: Negative   Blood: x / Protein: Negative / Nitrite: Positive   Leuk Esterase: Negative / RBC: 5-10 /HPF / WBC 3-5 /HPF   Sq Epi: x / Non Sq Epi: Neg.-Few / Bacteria: Many /HPF        RADIOLOGY & ADDITIONAL TESTS:  Results Reviewed:   Imaging Personally Reviewed:  Electrocardiogram Personally Reviewed:    COORDINATION OF CARE:  Care Discussed with Consultants/Other Providers [Y/N]:  Prior or Outpatient Records Reviewed [Y/N]:

## 2021-10-10 NOTE — PROGRESS NOTE ADULT - ASSESSMENT
36M with PMH IVDU / methamphetamine use presented to [facility] on 9/15 with acute onset paraplegia after lifting heavy weights found to have SAH / SDH of the spinal cord with hematoma causing cord compression s/p T7-8 lami decompression (9/16) now with residual paraplegia. Hospital course complicated by hyponatremia secondary to SIADH. Admitted to [facility] acute inpatient rehab on 9/28 for initiation of multidisciplinary rehab program. ADL, gait, and functional impairments.     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

## 2021-10-10 NOTE — CONSULT NOTE ADULT - SUBJECTIVE AND OBJECTIVE BOX
HPI:   Patient is a 36y male, MSM, immigrant to [place] from [place], but [place] in [place], with PMH significant for HIV dx in 2017 on Biktarvy with CD4 of 700 & viral load of 100, was in his USOH until Sep 14th 2021, when he experienced acute onset of lower extremity weakness and paresthesias a few hours after lifting heavy weights in the gym.  He developed back pain and radiating pain to bilateral buttocks then progressed to paraplegia. He was taken to [facility] by an ambulance after a passerby witnessed his inability to get up from the curb.  He did not have any bowel or bladder incontinence on admission.      S/p T8-9 laminectomy - decompression for evacuation of subarachnoid clot on 9/15/21.  Post operative course notable for persistent paraplegia & now with urinary retention requiring ISC. Transferred to [facility] rehab on 21. He developed new onset of fever on 10/08/21. UA with only 3-5 WBCs and nit. Started on Ceftriaxone. However still with fevers - ID called.     REVIEW OF SYSTEMS:  All other review of systems negative as above. Unable to move his LE. Working with upper body.  Reports that he was not having any urinary sensation until about 2 days ago. He is now aware of both pressure when the urine fills up his bladder & it hurts to straight catheterize. He has also noted that urine is no longer clear & there are small pieces floating in it.     PAST MEDICAL & SURGICAL HISTORY:  Infection, HIV      Allergies  No Known Allergies  Intolerances      Antimicrobials Day #  :  bictegravir 50 mG/emtricitabine 200 mG/tenofovir alafenamide 25 mG (BIKTARVY) 1 Tablet(s) Oral daily  cefTRIAXone   IVPB      cefTRIAXone   IVPB 1000 milliGRAM(s) IV Intermittent every 24 hours    Other Medications:  acetaminophen   Tablet .. 650 milliGRAM(s) Oral every 12 hours  bisacodyl Suppository 10 milliGRAM(s) Rectal daily  enoxaparin Injectable 40 milliGRAM(s) SubCutaneous at bedtime  HYDROmorphone   Tablet 2 milliGRAM(s) Oral every 8 hours PRN  methocarbamol 500 milliGRAM(s) Oral every 8 hours PRN  pregabalin 50 milliGRAM(s) Oral every 8 hours PRN  senna 2 Tablet(s) Oral at bedtime  sodium chloride 0.9%. 1000 milliLiter(s) IV Continuous <Continuous>  traZODone 25 milliGRAM(s) Oral at bedtime PRN      FAMILY HISTORY:  All members are healthy    SOCIAL HISTORY:  Smoking: quit smoking    ETOH: no   Drug Use: infrequently has used methamphetamine & marijuana - last prior to the accident   Single     T(F): 100.2 (10-10-21 @ 16:17), Max: 101.3 (10-09-21 @ 20:39)  HR: 91 (10-10-21 @ 16:16)  BP: 130/80 (10-10-21 @ 16:16)  RR: 15 (10-10-21 @ 16:16)  SpO2: 100% (10-10-21 @ 16:16)  Wt(kg): --    PHYSICAL EXAM:  General: alert, no acute distress  Eyes:  anicteric, no conjunctival injection, no discharge  Neck: supple, without adenopathy  Lungs: clear to auscultation  Heart: regular rate and rhythm; no murmurs  Abdomen: soft, nondistended, nontender, bowel sounds+  Skin: no lesions  Extremities: no clubbing, cyanosis, or edema  Neurologic: alert, oriented, cannot move his legs    LAB RESULTS:                        12.0   9.81  )-----------( 185      ( 10 Oct 2021 05:20 )             35.3     10-10    143  |  108  |  5<L>  ----------------------------<  121<H>  3.9   |  26  |  0.87    Ca    8.4      10 Oct 2021 05:20    TPro  5.7<L>  /  Alb  2.8<L>  /  TBili  0.6  /  DBili  x   /  AST  24  /  ALT  72<H>  /  AlkPhos  63  10-10    LIVER FUNCTIONS - ( 10 Oct 2021 05:20 )  Alb: 2.8 g/dL / Pro: 5.7 g/dL / ALK PHOS: 63 U/L / ALT: 72 U/L / AST: 24 U/L / GGT: x           Urinalysis Basic - ( 08 Oct 2021 21:45 )    Color: Yellow / Appearance: Clear / S.010 / pH: x  Gluc: x / Ketone: Negative  / Bili: Negative / Urobili: Negative   Blood: x / Protein: Negative / Nitrite: Positive   Leuk Esterase: Negative / RBC: 5-10 /HPF / WBC 3-5 /HPF   Sq Epi: x / Non Sq Epi: Neg.-Few / Bacteria: Many /HPF        MICROBIOLOGY:  RECENT CULTURES:  10-08 @ 23:29 Catheterized Catheterized     >100,000 CFU/ml Gram Negative Rods      10-08 @ 22:00 .Blood Blood-Peripheral     No growth to date.      10-08 @ 21:45 .Blood Blood-Peripheral     No growth to date.      10-06 @ 18:45 Catheterized Catheterized     No growth      RADIOLOGY REVIEWED:

## 2021-10-10 NOTE — CONSULT NOTE ADULT - ASSESSMENT
36y male, MSM, immigrant to [place] from [place], but [place], with PMH significant for HIV dx in 2017 on Biktarvy with CD4 of 700 & viral load of 100, was in his USOH until Sep 14th 2021, when he experienced acute onset of lower extremity weakness a few hours after lifting heavy weights in the gym.  He developed back pain radiating pain to bilateral buttocks then progressed to paraplegia.   He was taken to [facility] by an ambulance.    S/p T8-9 laminectomy - decompression for evacuation of subarachnoid clot on 9/15/21.    Post operative course notable for persistent paraplegia & now with urinary retention requiring ISC.     Transferred to [facility] rehab on 9/28/21.   He developed new onset of fever on 10/08/21.   UA with only 3-5 WBCs and nit.  Started on Ceftriaxone.   Reports a new feeling of pressure when the urine fills up his bladder & that it hurts to straight catheterize. He has also noted that urine is no longer clear & there are small pieces floating in it    PLAN:  Despite such mild pyuria, given above new symptoms & cloudy urine & given the fact that he is dependent on ISC - suspect UTI responsible for fevers  Since still febrile despite 2 days of Ceftriaxone - will change antibiotics to meropenem pending further incubation of cx  Thank you - will follow   
36M with PMH IVDU / methamphetamine use presented to [facility] on 9/15 with acute onset paraplegia after lifting heavy weights found to have SAH / SDH of the spinal cord with hematoma causing cord compression s/p T7-8 lami decompression (9/16) now with residual paraplegia. Hospital course complicated by hyponatremia secondary to SIADH. Admitted to [facility] acute inpatient rehab on 9/28 for initiation of multidisciplinary rehab program. ADL, gait, and functional impairments.     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  -Secondary to spinal subdural hematoma s/p evacuation and decompression of T7-8  -Start comprehensive rehab program  -Pain control per primary - Dilaudid PRN for severe pain, Tylenol q 8 hours standing  -Robaxin PRN for muscle spasms  -Continue Lyrica  -Follow up with outpatient neurosurgery    #Hyponatremia, likely SIADH - resolved  -Continue Florinef, NaCl q 6 hours  -Continue fluid restriction    #Anemia  ?anemia of chronic disease v acute blood loss anemia post op  -No overt signs of bleeding  -H/H stable   -Follow up routine CBC    #IVDU   #Methamphetamine use  h/o substance abuse  -Supportive care  -Neuropsych consult    #Neurogenic bladder  #Acute urinary retention  -Bladder scan, straight cath per primary  -Encourage timed toileting     #HIV  -Continue Biktarvy    #Prophylactic Measure  DVT ppx: Lovenox  -Bowel regimen

## 2021-10-11 LAB
-  AMIKACIN: SIGNIFICANT CHANGE UP
-  AMOXICILLIN/CLAVULANIC ACID: SIGNIFICANT CHANGE UP
-  AMPICILLIN/SULBACTAM: SIGNIFICANT CHANGE UP
-  AMPICILLIN: SIGNIFICANT CHANGE UP
-  AZTREONAM: SIGNIFICANT CHANGE UP
-  CEFAZOLIN: SIGNIFICANT CHANGE UP
-  CEFEPIME: SIGNIFICANT CHANGE UP
-  CEFOXITIN: SIGNIFICANT CHANGE UP
-  CEFTRIAXONE: SIGNIFICANT CHANGE UP
-  CIPROFLOXACIN: SIGNIFICANT CHANGE UP
-  ERTAPENEM: SIGNIFICANT CHANGE UP
-  GENTAMICIN: SIGNIFICANT CHANGE UP
-  IMIPENEM: SIGNIFICANT CHANGE UP
-  LEVOFLOXACIN: SIGNIFICANT CHANGE UP
-  MEROPENEM: SIGNIFICANT CHANGE UP
-  NITROFURANTOIN: SIGNIFICANT CHANGE UP
-  PIPERACILLIN/TAZOBACTAM: SIGNIFICANT CHANGE UP
-  TIGECYCLINE: SIGNIFICANT CHANGE UP
-  TOBRAMYCIN: SIGNIFICANT CHANGE UP
-  TRIMETHOPRIM/SULFAMETHOXAZOLE: SIGNIFICANT CHANGE UP
ALBUMIN SERPL ELPH-MCNC: 2.9 G/DL — LOW (ref 3.3–5)
ALP SERPL-CCNC: 70 U/L — SIGNIFICANT CHANGE UP (ref 40–120)
ALT FLD-CCNC: 71 U/L — HIGH (ref 10–45)
ANION GAP SERPL CALC-SCNC: 7 MMOL/L — SIGNIFICANT CHANGE UP (ref 5–17)
AST SERPL-CCNC: 19 U/L — SIGNIFICANT CHANGE UP (ref 10–40)
BILIRUB SERPL-MCNC: 0.3 MG/DL — SIGNIFICANT CHANGE UP (ref 0.2–1.2)
BUN SERPL-MCNC: 7 MG/DL — SIGNIFICANT CHANGE UP (ref 7–23)
CALCIUM SERPL-MCNC: 8.6 MG/DL — SIGNIFICANT CHANGE UP (ref 8.4–10.5)
CHLORIDE SERPL-SCNC: 108 MMOL/L — SIGNIFICANT CHANGE UP (ref 96–108)
CO2 SERPL-SCNC: 27 MMOL/L — SIGNIFICANT CHANGE UP (ref 22–31)
CREAT SERPL-MCNC: 0.85 MG/DL — SIGNIFICANT CHANGE UP (ref 0.5–1.3)
CULTURE RESULTS: SIGNIFICANT CHANGE UP
GLUCOSE SERPL-MCNC: 94 MG/DL — SIGNIFICANT CHANGE UP (ref 70–99)
HCT VFR BLD CALC: 34.7 % — LOW (ref 39–50)
HGB BLD-MCNC: 11.7 G/DL — LOW (ref 13–17)
MCHC RBC-ENTMCNC: 31.7 PG — SIGNIFICANT CHANGE UP (ref 27–34)
MCHC RBC-ENTMCNC: 33.7 GM/DL — SIGNIFICANT CHANGE UP (ref 32–36)
MCV RBC AUTO: 94 FL — SIGNIFICANT CHANGE UP (ref 80–100)
METHOD TYPE: SIGNIFICANT CHANGE UP
NRBC # BLD: 0 /100 WBCS — SIGNIFICANT CHANGE UP (ref 0–0)
ORGANISM # SPEC MICROSCOPIC CNT: SIGNIFICANT CHANGE UP
ORGANISM # SPEC MICROSCOPIC CNT: SIGNIFICANT CHANGE UP
PLATELET # BLD AUTO: 187 K/UL — SIGNIFICANT CHANGE UP (ref 150–400)
POTASSIUM SERPL-MCNC: 4 MMOL/L — SIGNIFICANT CHANGE UP (ref 3.5–5.3)
POTASSIUM SERPL-SCNC: 4 MMOL/L — SIGNIFICANT CHANGE UP (ref 3.5–5.3)
PROT SERPL-MCNC: 6.3 G/DL — SIGNIFICANT CHANGE UP (ref 6–8.3)
RBC # BLD: 3.69 M/UL — LOW (ref 4.2–5.8)
RBC # FLD: 12.7 % — SIGNIFICANT CHANGE UP (ref 10.3–14.5)
SODIUM SERPL-SCNC: 142 MMOL/L — SIGNIFICANT CHANGE UP (ref 135–145)
SPECIMEN SOURCE: SIGNIFICANT CHANGE UP
WBC # BLD: 9.7 K/UL — SIGNIFICANT CHANGE UP (ref 3.8–10.5)
WBC # FLD AUTO: 9.7 K/UL — SIGNIFICANT CHANGE UP (ref 3.8–10.5)

## 2021-10-11 PROCEDURE — 99232 SBSQ HOSP IP/OBS MODERATE 35: CPT | Mod: GC

## 2021-10-11 RX ADMIN — BICTEGRAVIR SODIUM, EMTRICITABINE, AND TENOFOVIR ALAFENAMIDE FUMARATE 1 TABLET(S): 30; 120; 15 TABLET ORAL at 12:49

## 2021-10-11 RX ADMIN — MEROPENEM 100 MILLIGRAM(S): 1 INJECTION INTRAVENOUS at 06:02

## 2021-10-11 RX ADMIN — ENOXAPARIN SODIUM 40 MILLIGRAM(S): 100 INJECTION SUBCUTANEOUS at 22:18

## 2021-10-11 RX ADMIN — MEROPENEM 100 MILLIGRAM(S): 1 INJECTION INTRAVENOUS at 22:18

## 2021-10-11 RX ADMIN — MEROPENEM 100 MILLIGRAM(S): 1 INJECTION INTRAVENOUS at 14:02

## 2021-10-11 RX ADMIN — SENNA PLUS 2 TABLET(S): 8.6 TABLET ORAL at 22:18

## 2021-10-11 NOTE — PROGRESS NOTE ADULT - ASSESSMENT
36 year old male with hx of IVDU/methamphetamine use presented to [facility] on 9/15 with acute onset paraplegia after lifting heavy weights found to have SAH/ SDH of the spinal cord with hematoma causing cord compression s/p T7-8 lami decompression (9/16) now with residual paraplegia. Hospital course complicated by hyponatremia secondary to SIADH. Admitted to [facility] acute inpatient rehab on 9/28 for ADL, gait, and functional impairments.     T7 sensory incomplete Spinal Cord Compression   - secondary to spinal subdural hematoma s/p evacuation and decompression of T7-8  - Primary surgeon Dr. Randy D'Amico  - s/p decadron  - Comprehensive Multidisciplinary Rehab Program     3 hours a day, 5 days a week with PT/OT     P&O as needed  - leave incision open to air, OK to shower  - Per Neurosurgery: ok to start slow gradual, progressive translational movements in therapy as tolerated.     Hyponatremia, likely SIADH, resolved      Psych:  h/o substance abuse: IVDU, methamphetamine  Adjustment disorder  Insomnia  - Neuropsych evaluation and treat for adjustment, coping, and counseling  - will need to follow up with psych outpatient: counseling centers provided   - Trazodone as needed at bedtime    Pain Management:  - Seen by pain management at [facility] recommended:    Dilaudid PO 2mg q8h Severe - has not needed to take     Lyrica 50 q8h PRN     Tylenol weaned to 650 BID     Robaxin 500 q8h -- changed to PRN  - bowel regimen as below      GI/Bowel:  Neurogenic bowel with incontinence, decreased voluntary anal contraction  - Senna QHS  - d/c Miralax due to daytime incontinence and leaking  - cont AM suppository for full bowel evacuation -- would like to hold off for now   - encourage timed toileting, to be discussed with nursing     /Bladder:  Neurogenic bladder with Urinary retention  - bladder scans changed to q6hrs    pt with good hand function and motivated to learn to SC -- has been doing self catheterizing --SC changed to be done at 5:30am, 10:30am, 4:30pm and 10:30 pm     UTI- Meropenem 1000 mg q8h started on 10/10,   - follow up urine culture     Skin/Pressure Injury:  - Skin assessment on admission admission: no pressure injuries noted  - Monitor Incisions: spine incision open to air      Diet:   - Diet Consistency/Modifications: Reg + thins - fluid restriction discontinued    DVT ppx:  - Lovenox  - SCDs  - Last Doppler on 9/27 neg    Restrictions/Precautions:  - Precautions: Spine precautions (as above), falls      ---------------  Outpatient Follow-up:  Neurosurgery - Dr. Randy D'Amico  Psychological services/ counseling   PCP?    --------------    Team meeting 10/7:  OT: supervision with ADLs while in bed, min A from bed to drop arm commode  PT: independent mobility with WC   Tentative DC: 10/19

## 2021-10-11 NOTE — PROGRESS NOTE ADULT - SUBJECTIVE AND OBJECTIVE BOX
Patient is a 36y old  Male who presents with a chief complaint of Spinal Cord Compression (01 Oct 2021 08:33)    HPI:  Ms. ALISSA ARAUJO is a 36 year old male with history of IVDU and methamphetamine use presented to [facility] on 9/15/21 with symptoms of acute onset lower extremity weakness and parethesias a few hours after lifting heavy weights in the gym.  He developed back pain and radiating pain to bilateral buttocks then progressed to paraplegia. He was brought in by ambulance after a passerby witnessed her unable to rise from the curb.  He did not have any bowel or bladder incontinence on admission.      On presentation to the ED, MRI performed and found to have ill defined intradural - extramedullary lesion with enhancement at level T8 with mass effect on the spinal cord with lefward displacement and minimal associated cord edema.  He was admitted to neurosurgical survice and started on decadron. Spinal angiogram performed which was negative. He underwent T8-9 lami decompression on 9/15 with Dr. D-Amico with intraoperative findings consistent with subarachnoid clot now s/p evacuation.  Post operative course was uncomplicated. Tolerated procedure well. Pt was seen by pain management who adjusted pain regimen (tylenol, lyrica, Robaxin, Dilaudid). Labs were significant for hyponatremia likely secondary to SIADH given euvolemia on exam and good UOP. Pt required short course of hypertonic saline then weaned off. Sodium stable with fluid restriction and addition of Na tabs. Pt was seen by behavioral health and psychology for adjustment disorder in the setting of new paralysis. He was determined to be low risk for suicide and emotional support provided. Trazodone was started for insomnia.       ----------------------------------------------------------------------    SUBJECTIVE:  Patient seen and evaluated at bedside. Events of weekend noted. Developed fever. found to have a UTI, continued to have fever on Ceftriaxone and switched to Meropenem     ROS:  Denies: headache, lightheadedness, CP, SOB, abdominal pain, nausea, constipation. No dysuria today      ----------------------------------------------------------------------  PHYSICAL EXAM:    Vital Signs Last 24 Hrs  T(C): 37.6 (11 Oct 2021 06:12), Max: 37.9 (10 Oct 2021 16:17)  T(F): 99.6 (11 Oct 2021 06:12), Max: 100.2 (10 Oct 2021 16:17)  HR: 98 (11 Oct 2021 08:56) (88 - 102)  BP: 131/84 (11 Oct 2021 08:56) (118/84 - 131/84)  BP(mean): --  RR: 15 (11 Oct 2021 08:56) (15 - 16)  SpO2: 99% (11 Oct 2021 08:56) (98% - 100%)    PHYSICAL EXAM  General: NAD, comfortable  Cardio: RRR, S1/S2+  Resp: no respiratory difficulty or distress  Abdomen: soft, NTND  Neuro:  T7 incomplete paraplegia  2/5 on LLE, some toes wiggling on the RLE and some adduction as well   Extrem: no edema, no calf tenderness   Skin: thoracic incision open to air.  Mobile less than pea size bump to left wrist proximal to A-line site      ----------------------------------------------------------------------  RECENT LABS:    RECENT LABS/IMAGING                        12.8   6.97  )-----------( 229      ( 07 Oct 2021 05:45 )             38.4     10-07    144  |  107  |  14  ----------------------------<  93  4.3   |  28  |  0.88    Ca    8.5      07 Oct 2021 05:45    TPro  6.0  /  Alb  3.0<L>  /  TBili  0.2  /  DBili  x   /  AST  51<H>  /  ALT  126<H>  /  AlkPhos  68  10      Urinalysis Basic - ( 06 Oct 2021 18:45 )    Color: Yellow / Appearance: Clear / S.015 / pH: x  Gluc: x / Ketone: Negative  / Bili: Negative / Urobili: Negative   Blood: x / Protein: Negative / Nitrite: Negative   Leuk Esterase: Negative / RBC: x / WBC x   Sq Epi: x / Non Sq Epi: x / Bacteria: x    Urine Culture: >100 K Klebsiella, culture pending       ----------------------------------------------------------------------  RECENT IMAGING:    ----------------------------------------------------------------------  MEDICATIONS  (STANDING):  acetaminophen   Tablet .. 650 milliGRAM(s) Oral every 12 hours  bictegravir 50 mG/emtricitabine 200 mG/tenofovir alafenamide 25 mG (BIKTARVY) 1 Tablet(s) Oral daily  bisacodyl Suppository 10 milliGRAM(s) Rectal daily  enoxaparin Injectable 40 milliGRAM(s) SubCutaneous at bedtime  senna 2 Tablet(s) Oral at bedtime    MEDICATIONS  (PRN):  HYDROmorphone   Tablet 2 milliGRAM(s) Oral every 8 hours PRN Severe Pain (7 - 10)  methocarbamol 500 milliGRAM(s) Oral every 8 hours PRN Muscle Spasm  pregabalin 50 milliGRAM(s) Oral every 8 hours PRN Pain  traZODone 25 milliGRAM(s) Oral at bedtime PRN insomnia    ----------------------------------------------------------------------

## 2021-10-11 NOTE — PROGRESS NOTE ADULT - ASSESSMENT
36y male, MSM, immigrant to [place] from [place], but [place], with PMH significant for HIV dx in 2017 on Biktarvy with CD4 of 700 & viral load of 100, was in his USOH until Sep 14th 2021, when he experienced acute onset of lower extremity weakness a few hours after lifting heavy weights in the gym.  He developed back pain radiating pain to bilateral buttocks then progressed to paraplegia.   He was taken to [facility] by an ambulance.    S/p T8-9 laminectomy - decompression for evacuation of subarachnoid clot on 9/15/21.    Post operative course notable for persistent paraplegia & now with urinary retention requiring ISC.     Transferred to [facility] rehab on 9/28/21.   He developed new onset of fever on 10/08/21.   UA with only 3-5 WBCs and nit.  Started on Ceftriaxone.   Reports a new feeling of pressure when the urine fills up his bladder & that it hurts to straight catheterize. He has also noted that urine is no longer clear & there are small pieces floating in it  He was broadened from CTX to meropenem 10/10  His temps appear to be moderating.? If he has an ESBL klebsiella in urine  PLAN:  Continue meropenem  Will target antibiotics to klebsiella, awaiting sensitivities  ISC as needed  Biktarvy for HIV  monitor fevers

## 2021-10-11 NOTE — CHART NOTE - NSCHARTNOTEFT_GEN_A_CORE
Nutrition Follow Up Note  Hospital Course   (Per Electronic Medical Record)    Source:  Patient [X]  Medical Record [X]      Diet:   Regular Diet w/ Thin Liquids  Tolerates Diet Consistency Well  No Chewing/Swallowing Difficulties  No Recent Nausea, Vomiting, Diarrhea or Constipation (as Per Patient)  Consumes 100% of Meals (as Per Documentation) - States Good PO Intake/Appetite   Declines Nutrition Supplementation     Enteral/Parenteral Nutrition: Not Applicable    Current Weight: 141.7lb on 10/3  Obtain New Weight to Confirm  Obtain Weights Weekly     Pertinent Medications: MEDICATIONS  (STANDING):  acetaminophen   Tablet .. 650 milliGRAM(s) Oral every 12 hours  bictegravir 50 mG/emtricitabine 200 mG/tenofovir alafenamide 25 mG (BIKTARVY) 1 Tablet(s) Oral daily  bisacodyl Suppository 10 milliGRAM(s) Rectal daily  enoxaparin Injectable 40 milliGRAM(s) SubCutaneous at bedtime  meropenem  IVPB 1000 milliGRAM(s) IV Intermittent every 8 hours  senna 2 Tablet(s) Oral at bedtime  sodium chloride 0.9%. 1000 milliLiter(s) (75 mL/Hr) IV Continuous <Continuous>    MEDICATIONS  (PRN):  HYDROmorphone   Tablet 2 milliGRAM(s) Oral every 8 hours PRN Severe Pain (7 - 10)  methocarbamol 500 milliGRAM(s) Oral every 8 hours PRN Muscle Spasm  pregabalin 50 milliGRAM(s) Oral every 8 hours PRN Pain  traZODone 25 milliGRAM(s) Oral at bedtime PRN insomnia    Pertinent Labs:  10-11 Na142 mmol/L Glu 94 mg/dL K+ 4.0 mmol/L Cr  0.85 mg/dL BUN 7 mg/dL 10-11 Alb 2.9 g/dL<L> 09-29 PAB 31 mg/dL    Skin: No Pressure Ulcers  Multiple Surgical Incisions  (as Per Nursing Flow Sheet)     Edema: None Noted (as Per Documentation)     Last Bowel Movement: on 10/10    Estimated Needs:   [X] No Change Since Previous Assessment    Previous Nutrition Diagnosis:   Moderate Malnutrition     Nutrition Diagnosis is [X] Ongoing - Declines Nutrition Supplementation     New Nutrition Diagnosis: [X] Not Applicable    Interventions:   1. Recommend Continue Nutrition Plan of Care     Monitoring & Evaluation:   [X] Weights   [X] PO Intake   [X] Skin Integrity   [X] Follow Up (Per Protocol)  [X] Tolerance to Diet Prescription   [X] Other: Labs    Registered Dietitian/Nutritionist Remains Available.  Joe Chicas RDN    Pager #[phone]  Phone# [phone]

## 2021-10-11 NOTE — PROGRESS NOTE ADULT - SUBJECTIVE AND OBJECTIVE BOX
CC: f/u for  fever and klebsiella UTI    Patient reports: he is feeling better, he has voided spontaneously, he is also incontinent. . His fever has moderated    REVIEW OF SYSTEMS:  All other review of systems negative (Comprehensive ROS)    Antimicrobials Day #  :day 2  bictegravir 50 mG/emtricitabine 200 mG/tenofovir alafenamide 25 mG (BIKTARVY) 1 Tablet(s) Oral daily  meropenem  IVPB 1000 milliGRAM(s) IV Intermittent every 8 hours    Other Medications Reviewed    T(F): 98.7 (10-11-21 @ 08:56), Max: 100.2 (10-10-21 @ 19:54)  HR: 98 (10-11-21 @ 08:56)  BP: 131/84 (10-11-21 @ 08:56)  RR: 15 (10-11-21 @ 08:56)  SpO2: 99% (10-11-21 @ 08:56)  Wt(kg): --    PHYSICAL EXAM:  General: alert, no acute distress  Eyes:  anicteric, no conjunctival injection, no discharge  Oropharynx: no lesions or injection 	  Neck: supple, without adenopathy  Lungs: clear to auscultation  Heart: regular rate and rhythm; no murmur, rubs or gallops  Abdomen: soft, nondistended, nontender, without mass or organomegaly  Skin: no lesions  Extremities: no clubbing, cyanosis, or edema  Neurologic: alert, oriented, lower extremities are weak    LAB RESULTS:                        11.7   9.70  )-----------( 187      ( 11 Oct 2021 06:00 )             34.7     10-11    142  |  108  |  7   ----------------------------<  94  4.0   |  27  |  0.85    Ca    8.6      11 Oct 2021 06:00    TPro  6.3  /  Alb  2.9<L>  /  TBili  0.3  /  DBili  x   /  AST  19  /  ALT  71<H>  /  AlkPhos  70  10-11    LIVER FUNCTIONS - ( 11 Oct 2021 06:00 )  Alb: 2.9 g/dL / Pro: 6.3 g/dL / ALK PHOS: 70 U/L / ALT: 71 U/L / AST: 19 U/L / GGT: x             MICROBIOLOGY:  RECENT CULTURES:  10-08 @ 23:29 Catheterized Catheterized     >100,000 CFU/ml Klebsiella pneumoniae      10-08 @ 22:00 .Blood Blood-Peripheral     No growth to date.      10-08 @ 21:45 .Blood Blood-Peripheral     No growth to date.      10-06 @ 18:45 Catheterized Catheterized     No growth          RADIOLOGY REVIEWED:    < from: US Duplex Venous Lower Ext Complete, Bilateral (10.10.21 @ 09:55) >  IMPRESSION:  No evidence of deep venous thrombosis in either lower extremity.    < end of copied text >

## 2021-10-12 ENCOUNTER — TRANSCRIPTION ENCOUNTER (OUTPATIENT)
Age: 36
End: 2021-10-12

## 2021-10-12 PROCEDURE — 99232 SBSQ HOSP IP/OBS MODERATE 35: CPT

## 2021-10-12 PROCEDURE — 76770 US EXAM ABDO BACK WALL COMP: CPT | Mod: 26

## 2021-10-12 RX ORDER — CEFUROXIME AXETIL 250 MG
500 TABLET ORAL EVERY 12 HOURS
Refills: 0 | Status: DISCONTINUED | OUTPATIENT
Start: 2021-10-12 | End: 2021-10-26

## 2021-10-12 RX ADMIN — ENOXAPARIN SODIUM 40 MILLIGRAM(S): 100 INJECTION SUBCUTANEOUS at 21:03

## 2021-10-12 RX ADMIN — MEROPENEM 100 MILLIGRAM(S): 1 INJECTION INTRAVENOUS at 05:53

## 2021-10-12 RX ADMIN — Medication 500 MILLIGRAM(S): at 18:26

## 2021-10-12 RX ADMIN — SENNA PLUS 2 TABLET(S): 8.6 TABLET ORAL at 21:03

## 2021-10-12 RX ADMIN — BICTEGRAVIR SODIUM, EMTRICITABINE, AND TENOFOVIR ALAFENAMIDE FUMARATE 1 TABLET(S): 30; 120; 15 TABLET ORAL at 12:43

## 2021-10-12 NOTE — PROGRESS NOTE ADULT - ASSESSMENT
36M with PMH IVDU / methamphetamine use presented to [facility] on 9/15 with acute onset paraplegia after lifting heavy weights found to have SAH / SDH of the spinal cord with hematoma causing cord compression s/p T7-8 lami decompression (9/16) now with residual paraplegia. Hospital course complicated by hyponatremia secondary to SIADH. Admitted to [place] acute inpatient rehab on 9/28 for initiation of multidisciplinary rehab program. ADL, gait, and functional impairments.     #Sepsis  #Klebsiella UTI  -Sepsis resolved  -ID recommendations noted. Currently on Meropenem  -Urine cx, pansensitive Klebsiella. Discuss with ID for transitioning to PO, Keflex or Cipro etc  -Blood cx NGTD    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  -s/p evacuation and decompression of T7-8  -Continue comprehensive rehab program  -Follow up with outpatient neurosurgery    #Hyponatremia, likely SIADH  -resolved    #Anemia  -Stable    #hx of IV drug use  #Methamphetamine use  -Supportive care    #Neurogenic bladder  #Acute urinary retention  -Patient learning to self straight cath  -No benefit of using Flomax at this time    #HIV  -Continue Biktarvy    #Prophylactic Measure  -DVT ppx: Lovenox  -Bowel regimen

## 2021-10-12 NOTE — PROGRESS NOTE ADULT - SUBJECTIVE AND OBJECTIVE BOX
Patient is a 36y old  Male who presents with a chief complaint of Spinal Cord Compression (11 Oct 2021 16:39)      SUBJECTIVE / OVERNIGHT EVENTS:  Pt seen and examined at bedside. No acute events overnight.  Pt denies cp, palpitations, sob, abd pain, N/V, fever, chills.    ROS:  All other review of systems negative    Allergies    No Known Allergies    Intolerances        MEDICATIONS  (STANDING):  acetaminophen   Tablet .. 650 milliGRAM(s) Oral every 12 hours  bictegravir 50 mG/emtricitabine 200 mG/tenofovir alafenamide 25 mG (BIKTARVY) 1 Tablet(s) Oral daily  bisacodyl Suppository 10 milliGRAM(s) Rectal daily  enoxaparin Injectable 40 milliGRAM(s) SubCutaneous at bedtime  meropenem  IVPB 1000 milliGRAM(s) IV Intermittent every 8 hours  senna 2 Tablet(s) Oral at bedtime  sodium chloride 0.9%. 1000 milliLiter(s) (75 mL/Hr) IV Continuous <Continuous>    MEDICATIONS  (PRN):  HYDROmorphone   Tablet 2 milliGRAM(s) Oral every 8 hours PRN Severe Pain (7 - 10)  methocarbamol 500 milliGRAM(s) Oral every 8 hours PRN Muscle Spasm  pregabalin 50 milliGRAM(s) Oral every 8 hours PRN Pain  traZODone 25 milliGRAM(s) Oral at bedtime PRN insomnia      Vital Signs Last 24 Hrs  T(C): 36.8 (12 Oct 2021 09:03), Max: 37.1 (11 Oct 2021 20:15)  T(F): 98.2 (12 Oct 2021 09:03), Max: 98.7 (11 Oct 2021 20:15)  HR: 100 (12 Oct 2021 09:03) (76 - 100)  BP: 130/82 (12 Oct 2021 09:03) (106/70 - 130/82)  BP(mean): --  RR: 16 (12 Oct 2021 09:03) (16 - 17)  SpO2: 100% (12 Oct 2021 09:03) (97% - 100%)  CAPILLARY BLOOD GLUCOSE        I&O's Summary    11 Oct 2021 07:01  -  12 Oct 2021 07:00  --------------------------------------------------------  IN: 0 mL / OUT: 2100 mL / NET: -2100 mL        PHYSICAL EXAM:  GENERAL: NAD, well-developed  CHEST/LUNG: Clear to auscultation bilaterally; No wheeze, nonlabored breathing  HEART: Regular rate and rhythm; No murmurs, rubs, or gallops  ABDOMEN: Soft, Nontender, Nondistended; Bowel sounds present  EXTREMITIES:  2+ Peripheral Pulses, No clubbing, cyanosis, or edema  NEUROLOGY: AAOx3, RLE weakness  PSYCH: calm, appropriate mood    LABS:                        11.7   9.70  )-----------( 187      ( 11 Oct 2021 06:00 )             34.7     10-11    142  |  108  |  7   ----------------------------<  94  4.0   |  27  |  0.85    Ca    8.6      11 Oct 2021 06:00    TPro  6.3  /  Alb  2.9<L>  /  TBili  0.3  /  DBili  x   /  AST  19  /  ALT  71<H>  /  AlkPhos  70  10-11              RADIOLOGY & ADDITIONAL TESTS:  Results Reviewed:   Imaging Personally Reviewed:  Electrocardiogram Personally Reviewed:    COORDINATION OF CARE:  Care Discussed with Consultants/Other Providers [Y/N]:  Prior or Outpatient Records Reviewed [Y/N]:

## 2021-10-12 NOTE — PROGRESS NOTE ADULT - ASSESSMENT
36 year old male with hx of IVDU/methamphetamine use presented to [facility] on 9/15 with acute onset paraplegia after lifting heavy weights found to have SAH/ SDH of the spinal cord with hematoma causing cord compression s/p T7-8 lami decompression (9/16) now with residual paraplegia. Hospital course complicated by hyponatremia secondary to SIADH. Admitted to [place] acute inpatient rehab on 9/28 for ADL, gait, and functional impairments.     T7 sensory incomplete Spinal Cord Compression   - secondary to spinal subdural hematoma s/p evacuation and decompression of T7-8  - Primary surgeon Dr. Randy D'Amico  - s/p decadron  - Comprehensive Multidisciplinary Rehab Program     3 hours a day, 5 days a week with PT/OT     P&O as needed  - leave incision open to air, OK to shower  - Per Neurosurgery: ok to start slow gradual, progressive translational movements in therapy as tolerated.     Hyponatremia, likely SIADH, resolved    HIV  -Continue Biktarvy    Psych:  h/o substance abuse: IVDU, methamphetamine  Adjustment disorder  Insomnia  - Neuropsych evaluation and treat for adjustment, coping, and counseling  - will need to follow up with psych outpatient: counseling centers provided   - Trazodone as needed at bedtime    Pain Management:  - Seen by pain management at [facility] recommended:    Dilaudid PO 2mg q8h Severe     Lyrica 50 q8h PRN     Tylenol weaned to 650 BID     Robaxin 500 q8h -- changed to PRN  - bowel regimen as below      GI/Bowel:  Neurogenic bowel with incontinence, decreased voluntary anal contraction  - Senna QHS  - d/c Miralax due to daytime incontinence and leaking  - cont AM suppository for full bowel evacuation -- would like to hold off for now   - encourage timed toileting, to be discussed with nursing     /Bladder:  Neurogenic bladder with Urinary retention  - bladder scans changed to q6hrs    pt with good hand function and motivated to learn to SC -- has been doing self catheterizing --SC changed to be done at 5:30am, 10:30am, 4:30pm and 10:30 pm     UTI- Meropenem 1000 mg q8h started on 10/10,   - urine culture klebsiella peña sensitive   - ID to follow up and see if abx can be changed to PO   - US renal and bladder US ordered 10/12     Skin/Pressure Injury:  - Skin assessment on admission admission: no pressure injuries noted  - Monitor Incisions: spine incision open to air      Diet:   - Diet Consistency/Modifications: Reg + thins - fluid restriction discontinued    DVT ppx:  - Lovenox  - SCDs  - Last Doppler on 9/27 neg    Restrictions/Precautions:  - Precautions: Spine precautions (as above), falls      ---------------  Outpatient Follow-up:  Neurosurgery - Dr. Randy D'Amico  Psychological services/ counseling   PCP?    --------------    Team meeting 10/7:  OT: supervision with ADLs while in bed, min A from bed to drop arm commode  PT: independent mobility with WC   Tentative DC: 10/19   36 year old male with incomplete paraplegia.  Admitted to [place] acute inpatient rehab on 9/28 for ADL, gait, and functional impairments and neurogenic bowel and bladder. Currently being treated for Klebsiella UTI.     T7 AIS C paraplegia.  - secondary to spinal subdural hematoma s/p evacuation and decompression of T7-8  - Primary surgeon Dr. Randy D'Amico  - s/p decadron  - Comprehensive Multidisciplinary Rehab Program     3 hours a day, 5 days a week with PT/OT     P&O as needed, but premature now.   - leave incision open to air, OK to shower  - Per Neurosurgery: ok to start slow gradual, progressive translational movements in therapy as tolerated.   -Goal for inpatient rehab is independence at wc level.     Hyponatremia, likely SIADH, resolved    HIV  -Continue Biktarvy    Psych:  h/o substance abuse: IVDU, methamphetamine  Adjustment disorder  Insomnia  - Neuropsych evaluation and treat for adjustment, coping, and counseling  - will need to follow up with psych outpatient: counseling centers provided   - Trazodone as needed at bedtime  -Adjustment to date has been good; forward looking, no disruptive behaviors.       GI/Bowel:  Neurogenic bowel with incontinence, decreased voluntary anal contraction but improving.   - Senna QHS  - d/c Miralax due to daytime incontinence and leaking  -suppository prn.   - encourage timed toileting, after am food/coffee,  to be discussed with nursing     /Bladder:  Neurogenic bladder with Urinary retention  - bladder scans changed to q6hrs  -Performing self cath--  5:30am, 10:30am, 4:30pm and 10:30 pm, may be emerging control.     UTI- Meropenem 1000 mg q8h started on 10/10,   - urine culture klebsiella peña sensitive   - ID to follow up and see if abx can be changed to PO   - US renal and bladder US ordered 10/12    Skin/Pressure Injury:  - Skin assessment on admission admission: no pressure injuries noted  - Monitor Incisions: spine incision open to air    Diet:   - Diet Consistency/Modifications: Reg + thins - fluid restriction discontinued    DVT ppx:  - Lovenox for total 8 weeks.  - SCDs may be stopped.   - Last Doppler on 9/27 neg    Restrictions/Precautions:  - Precautions: Spine precautions (as above), falls      ---------------  Outpatient Follow-up:  Neurosurgery - Dr. Randy D'Amico  Psychological services/ counseling   PCP?    --------------    Team meeting 10/7:  OT: supervision with ADLs while in bed, min A from bed to drop arm commode  PT: independent mobility with WC   Tentative DC: 10/19   36 year old male with T7 MIRELLA C incomplete paraplegia.    # T7 MIRELLA C paraplegia.  - secondary to spinal subdural hematoma s/p evacuation and decompression of T7-8 by Dr. Randy D'Amico  - s/p decadron  - leave incision open to air, OK to shower  - Per Neurosurgery: ok to start slow gradual, progressive translational movements in therapy as tolerated  - Continue comprehensive Multidisciplinary Rehab Program 3 hours a day, 5 days a week with PT/OT    # Hyponatremia, likely SIADH  - resolved    # HIV  -Continue Biktarvy    # Psych:   - h/o substance abuse: IVDU, methamphetamine  - Adjustment disorder, Insomnia  - Trazodone as needed at bedtime  - Neuropsych support and counseling  - will need to follow up with psych outpatient: counseling centers provided     # Neurogenic bowel with incontinence  - decreased voluntary anal contraction but improving.   - Senna QHS, suppository prn.   - encourage timed toileting, after am food/coffee,  to be discussed with nursing     # Neurogenic bladder with Urinary retention  - bladder scans q6hrs  -Performing self cath--  5:30am, 10:30am, 4:30pm and 10:30 pm, may be emerging control.     # UTI  - urine culture klebsiella peña sensitive   -  Meropenem 1000 mg q8h started on 10/10,   - ID to follow up and see if abx can be changed to PO   - US renal and bladder US ordered 10/12    # Diet:   - Reg + thins - fluid restriction discontinued    # DVT ppx:  - Lovenox for total 8 weeks.  - SCDs may be stopped.   - Last Doppler on 9/27 neg    Restrictions/Precautions:  - Precautions: Spine precautions (as above), falls      ---------------  Outpatient Follow-up:  Neurosurgery - Dr. Randy D'Amico  Psychological services/ counseling   PCP?    --------------    Team meeting 10/7:  OT: supervision with ADLs while in bed, min A from bed to drop arm commode  PT: independent mobility with WC   Tentative DC: 10/19

## 2021-10-12 NOTE — PROGRESS NOTE ADULT - SUBJECTIVE AND OBJECTIVE BOX
Patient is a 36y old  Male who presents with a chief complaint of Spinal Cord Compression (01 Oct 2021 08:33)    HPI:  Ms. ALISSA ARAUJO is a 36 year old male with history of IVDU and methamphetamine use presented to [facility] on 9/15/21 with symptoms of acute onset lower extremity weakness and parethesias a few hours after lifting heavy weights in the gym.  He developed back pain and radiating pain to bilateral buttocks then progressed to paraplegia. He was brought in by ambulance after a passerby witnessed her unable to rise from the curb.  He did not have any bowel or bladder incontinence on admission.      On presentation to the ED, MRI performed and found to have ill defined intradural - extramedullary lesion with enhancement at level T8 with mass effect on the spinal cord with lefward displacement and minimal associated cord edema.  He was admitted to neurosurgical survice and started on decadron. Spinal angiogram performed which was negative. He underwent T8-9 lami decompression on 9/15 with Dr. D-Amico with intraoperative findings consistent with subarachnoid clot now s/p evacuation.  Post operative course was uncomplicated. Tolerated procedure well. Pt was seen by pain management who adjusted pain regimen (tylenol, lyrica, Robaxin, Dilaudid). Labs were significant for hyponatremia likely secondary to SIADH given euvolemia on exam and good UOP. Pt required short course of hypertonic saline then weaned off. Sodium stable with fluid restriction and addition of Na tabs. Pt was seen by behavioral health and psychology for adjustment disorder in the setting of new paralysis. He was determined to be low risk for suicide and emotional support provided. Trazodone was started for insomnia.       ----------------------------------------------------------------------    SUBJECTIVE:  Patient seen and evaluated at bedside. Patient stated that he feels a lot better compared to the weekend. No fevers overnight. Urine culture is back, pan sensitive klebsiella -- will touch base with ID about switch to PO abx, he is currently on meropenem. Patient endorsed to being able to urinate on his own at times, urinating 200-300 cc. This morning he urinated 350 cc on his own and then had to SC himself getting 450cc afterwards. No BM yet this morning.     ROS:  Denies: headache, lightheadedness, CP, SOB, abdominal pain, nausea, constipation. No dysuria       ----------------------------------------------------------------------  PHYSICAL EXAM:    Vital Signs Last 24 Hrs  T(C): 36.8 (12 Oct 2021 09:03), Max: 37.1 (11 Oct 2021 20:15)  T(F): 98.2 (12 Oct 2021 09:03), Max: 98.7 (11 Oct 2021 20:15)  HR: 100 (12 Oct 2021 09:03) (76 - 100)  BP: 130/82 (12 Oct 2021 09:03) (106/70 - 130/82)  BP(mean): --  RR: 16 (12 Oct 2021 09:03) (16 - 17)  SpO2: 100% (12 Oct 2021 09:03) (97% - 100%)    PHYSICAL EXAM  General: NAD, comfortable  Cardio: RRR, S1/S2+  Resp: no respiratory difficulty or distress  Abdomen: soft, NTND  Neuro:  T7 incomplete paraplegia  2/5 on LLE, some toes wiggling on the RLE and some adduction as well   Extrem: no edema, no calf tenderness   Skin: thoracic incision open to air.  Mobile less than pea size bump to left wrist proximal to A-line site      ----------------------------------------------------------------------  RECENT LABS/IMAGING                        11.7   9.70  )-----------( 187      ( 11 Oct 2021 06:00 )             34.7     10-11    142  |  108  |  7   ----------------------------<  94  4.0   |  27  |  0.85    Ca    8.6      11 Oct 2021 06:00    TPro  6.3  /  Alb  2.9<L>  /  TBili  0.3  /  DBili  x   /  AST  19  /  ALT  71<H>  /  AlkPhos  70  10-11      Culture - Urine (10.08.21 @ 23:29)    -  Amikacin: S <=16    -  Amoxicillin/Clavulanic Acid: S <=8/4    -  Ampicillin: R >16 These ampicillin results predict results for amoxicillin    -  Ampicillin/Sulbactam: S <=4/2 Enterobacter, Citrobacter, and Serratia may develop resistance during prolonged therapy (3-4 days)    -  Aztreonam: S <=4    -  Cefazolin: S <=2 (MIC_CL_COM_ENTERIC_CEFAZU) For uncomplicated UTI with K. pneumoniae, E. coli, or P. mirablis: RU <=16 is sensitive and RU >=32 is resistant. This also predicts results for oral agents cefaclor, cefdinir, cefpodoxime, cefprozil, cefuroxime axetil, cephalexin and locarbef for uncomplicated UTI. Note that some isolates may be susceptible to these agents while testing resistant to cefazolin.    -  Cefepime: S <=2    -  Cefoxitin: S <=8    -  Ceftriaxone: S <=1 Enterobacter, Citrobacter, and Serratia may develop resistance during prolonged therapy    -  Ciprofloxacin: S <=0.25    -  Ertapenem: S <=0.5    -  Gentamicin: S <=2    -  Imipenem: S <=1    -  Levofloxacin: S <=0.5    -  Meropenem: S <=1    -  Nitrofurantoin: I 64 Should not be used to treat pyelonephritis    -  Piperacillin/Tazobactam: S <=8    -  Tigecycline: S <=2    -  Tobramycin: S <=2    -  Trimethoprim/Sulfamethoxazole: S <=0.5/9.5    Specimen Source: Catheterized Catheterized    Culture Results:   >100,000 CFU/ml Klebsiella pneumoniae    Organism Identification: Klebsiella pneumoniae    Organism: Klebsiella pneumoniae    Method Type: RU        ----------------------------------------------------------------------  RECENT IMAGING:    ----------------------------------------------------------------------  MEDICATIONS  (STANDING):  acetaminophen   Tablet .. 650 milliGRAM(s) Oral every 12 hours  bictegravir 50 mG/emtricitabine 200 mG/tenofovir alafenamide 25 mG (BIKTARVY) 1 Tablet(s) Oral daily  bisacodyl Suppository 10 milliGRAM(s) Rectal daily  enoxaparin Injectable 40 milliGRAM(s) SubCutaneous at bedtime  meropenem  IVPB 1000 milliGRAM(s) IV Intermittent every 8 hours  senna 2 Tablet(s) Oral at bedtime  sodium chloride 0.9%. 1000 milliLiter(s) (75 mL/Hr) IV Continuous <Continuous>    MEDICATIONS  (PRN):  HYDROmorphone   Tablet 2 milliGRAM(s) Oral every 8 hours PRN Severe Pain (7 - 10)  methocarbamol 500 milliGRAM(s) Oral every 8 hours PRN Muscle Spasm  pregabalin 50 milliGRAM(s) Oral every 8 hours PRN Pain  traZODone 25 milliGRAM(s) Oral at bedtime PRN insomnia      ----------------------------------------------------------------------               Patient is a 36y old  Male who presents with a chief complaint of Spinal Cord Compression (01 Oct 2021 08:33)    HPI:  ALISSA ARAUJO is a 36 year old male who presented to [facility] on 9/15/21 with symptoms of acute onset lower extremity weakness and parethesias a few hours after lifting heavy weights in the gym.  He developed back pain and radiating pain to bilateral buttocks then progressed to paraplegia. He was brought in by ambulance after a passerby witnessed her unable to rise from the curb.  He did not have any bowel or bladder incontinence on admission.      On presentation to the ED, MRI performed and found to have ill defined intradural - extramedullary lesion with enhancement at level T8 with mass effect on the spinal cord with lefward displacement and minimal associated cord edema.  He was admitted to neurosurgical survice and started on decadron. Spinal angiogram performed which was negative. He underwent T8-9 lami decompression on 9/15 with Dr. D-Amico with intraoperative findings consistent with subarachnoid clot now s/p evacuation.  Post operative course was uncomplicated. Tolerated procedure well. Pt was seen by pain management who adjusted pain regimen (tylenol, lyrica, Robaxin, Dilaudid). Labs were significant for hyponatremia likely secondary to SIADH given euvolemia on exam and good UOP. Pt required short course of hypertonic saline then weaned off. Sodium stable with fluid restriction and addition of Na tabs. Pt was seen by behavioral health and psychology for adjustment disorder in the setting of new paralysis. He was determined to be low risk for suicide and emotional support provided. Trazodone was started for insomnia.       ----------------------------------------------------------------------    SUBJECTIVE:  Patient seen and evaluated at bedside. Patient stated that he feels a lot better compared to the weekend. No fevers overnight. Urine culture is back,  klebsiella -- will touch base with ID about switch to PO abx, he is currently on meropenem. Patient endorsed to being able to urinate on his own at times, urinating 200-300 cc. This morning he urinated 350 cc on his own and then had to SC himself getting 450cc afterwards. No BM yet this morning, though reports emerging bowel control. No severe leg spasms. Fully participatory in therapies.     ROS:  Denies: headache, lightheadedness, CP, SOB, abdominal pain, nausea, constipation. No dysuria       ----------------------------------------------------------------------  PHYSICAL EXAM:    Vital Signs Last 24 Hrs  T(C): 36.8 (12 Oct 2021 09:03), Max: 37.1 (11 Oct 2021 20:15)  T(F): 98.2 (12 Oct 2021 09:03), Max: 98.7 (11 Oct 2021 20:15)  HR: 100 (12 Oct 2021 09:03) (76 - 100)  BP: 130/82 (12 Oct 2021 09:03) (106/70 - 130/82)  BP(mean): --  RR: 16 (12 Oct 2021 09:03) (16 - 17)  SpO2: 100% (12 Oct 2021 09:03) (97% - 100%)    PHYSICAL EXAM  General: NAD, comfortable  Cardio: RRR, S1/S2+  Resp: no respiratory difficulty or distress  Abdomen: soft, NTND  Neuro:  T7 incomplete paraplegia  He has isolated movement throughout the left leg. He has right hip extension and trace other movements. Spasticity present but mild.   Extrem: no edema, no calf tenderness   Skin: thoracic incision open to air.  Mobile less than pea size bump to left wrist proximal to A-line site  No sacral pressure ulcers.       ----------------------------------------------------------------------  RECENT LABS/IMAGING                        11.7   9.70  )-----------( 187      ( 11 Oct 2021 06:00 )             34.7     10-11    142  |  108  |  7   ----------------------------<  94  4.0   |  27  |  0.85    Ca    8.6      11 Oct 2021 06:00    TPro  6.3  /  Alb  2.9<L>  /  TBili  0.3  /  DBili  x   /  AST  19  /  ALT  71<H>  /  AlkPhos  70  10-11      Culture - Urine (10.08.21 @ 23:29)    -  Amikacin: S <=16    -  Amoxicillin/Clavulanic Acid: S <=8/4    -  Ampicillin: R >16 These ampicillin results predict results for amoxicillin    -  Ampicillin/Sulbactam: S <=4/2 Enterobacter, Citrobacter, and Serratia may develop resistance during prolonged therapy (3-4 days)    -  Aztreonam: S <=4    -  Cefazolin: S <=2 (MIC_CL_COM_[email or web address]) For uncomplicated UTI with K. pneumoniae, E. coli, or P. mirablis: RU <=16 is sensitive and RU >=32 is resistant. This also predicts results for oral agents cefaclor, cefdinir, cefpodoxime, cefprozil, cefuroxime axetil, cephalexin and locarbef for uncomplicated UTI. Note that some isolates may be susceptible to these agents while testing resistant to cefazolin.    -  Cefepime: S <=2    -  Cefoxitin: S <=8    -  Ceftriaxone: S <=1 Enterobacter, Citrobacter, and Serratia may develop resistance during prolonged therapy    -  Ciprofloxacin: S <=0.25    -  Ertapenem: S <=0.5    -  Gentamicin: S <=2    -  Imipenem: S <=1    -  Levofloxacin: S <=0.5    -  Meropenem: S <=1    -  Nitrofurantoin: I 64 Should not be used to treat pyelonephritis    -  Piperacillin/Tazobactam: S <=8    -  Tigecycline: S <=2    -  Tobramycin: S <=2    -  Trimethoprim/Sulfamethoxazole: S <=0.5/9.5    Specimen Source: Catheterized Catheterized    Culture Results:   >100,000 CFU/ml Klebsiella pneumoniae    Organism Identification: Klebsiella pneumoniae    Organism: Klebsiella pneumoniae    Method Type: RU        ----------------------------------------------------------------------  RECENT IMAGING:    ----------------------------------------------------------------------  MEDICATIONS  (STANDING):  acetaminophen   Tablet .. 650 milliGRAM(s) Oral every 12 hours  bictegravir 50 mG/emtricitabine 200 mG/tenofovir alafenamide 25 mG (BIKTARVY) 1 Tablet(s) Oral daily  bisacodyl Suppository 10 milliGRAM(s) Rectal daily  enoxaparin Injectable 40 milliGRAM(s) SubCutaneous at bedtime  meropenem  IVPB 1000 milliGRAM(s) IV Intermittent every 8 hours  senna 2 Tablet(s) Oral at bedtime  sodium chloride 0.9%. 1000 milliLiter(s) (75 mL/Hr) IV Continuous <Continuous>    MEDICATIONS  (PRN):  HYDROmorphone   Tablet 2 milliGRAM(s) Oral every 8 hours PRN Severe Pain (7 - 10)  methocarbamol 500 milliGRAM(s) Oral every 8 hours PRN Muscle Spasm  pregabalin 50 milliGRAM(s) Oral every 8 hours PRN Pain  traZODone 25 milliGRAM(s) Oral at bedtime PRN insomnia      ----------------------------------------------------------------------               Patient is a 36y old  Male who presents with a chief complaint of Spinal Cord Compression (01 Oct 2021 08:33)    HPI:  ALISSA ARAUJO is a 36 year old male who presented to [facility] on 9/15/21 with symptoms of acute onset lower extremity weakness and parethesias a few hours after lifting heavy weights in the gym.  He developed back pain and radiating pain to bilateral buttocks then progressed to paraplegia. He was brought in by ambulance after a passerby witnessed her unable to rise from the curb.  He did not have any bowel or bladder incontinence on admission.      On presentation to the ED, MRI performed and found to have ill defined intradural - extramedullary lesion with enhancement at level T8 with mass effect on the spinal cord with lefward displacement and minimal associated cord edema.  He was admitted to neurosurgical survice and started on decadron. Spinal angiogram performed which was negative. He underwent T8-9 lami decompression on 9/15 with Dr. D-Amico with intraoperative findings consistent with subarachnoid clot now s/p evacuation.  Post operative course was uncomplicated. Tolerated procedure well. Pt was seen by pain management who adjusted pain regimen (tylenol, lyrica, Robaxin, Dilaudid). Labs were significant for hyponatremia likely secondary to SIADH given euvolemia on exam and good UOP. Pt required short course of hypertonic saline then weaned off. Sodium stable with fluid restriction and addition of Na tabs. Pt was seen by behavioral health and psychology for adjustment disorder in the setting of new paralysis. He was determined to be low risk for suicide and emotional support provided. Trazodone was started for insomnia.       ----------------------------------------------------------------------    SUBJECTIVE:  Patient seen and evaluated at bedside. Patient stated that he feels a lot better compared to the weekend. No fevers overnight. Urine culture is back,  klebsiella -- will touch base with ID about switch to PO abx, he is currently on meropenem. Patient endorsed to being able to urinate on his own at times, urinating 200-300 cc. This morning he urinated 350 cc on his own and then had to SC himself getting 450cc afterwards. No BM yet this morning, though reports emerging bowel control. No severe leg spasms. Fully participatory in therapies.     ROS:  Denies: headache, lightheadedness, CP, SOB, abdominal pain, nausea, constipation. No dysuria       ----------------------------------------------------------------------  PHYSICAL EXAM:    Vital Signs Last 24 Hrs  T(C): 36.8 (12 Oct 2021 09:03), Max: 37.1 (11 Oct 2021 20:15)  T(F): 98.2 (12 Oct 2021 09:03), Max: 98.7 (11 Oct 2021 20:15)  HR: 100 (12 Oct 2021 09:03) (76 - 100)  BP: 130/82 (12 Oct 2021 09:03) (106/70 - 130/82)  BP(mean): --  RR: 16 (12 Oct 2021 09:03) (16 - 17)  SpO2: 100% (12 Oct 2021 09:03) (97% - 100%)    PHYSICAL EXAM  General: NAD, comfortable  Cardio: RRR, S1/S2+  Resp: no respiratory difficulty or distress  Abdomen: soft, NTND  Neuro:  T7 incomplete paraplegia  He has isolated movement throughout the left leg. He has right hip extension and trace other movements. Spasticity present but mild.   Extrem: no edema, no calf tenderness   Skin: thoracic incision open to air.  Mobile less than pea size bump to left wrist proximal to A-line site  No sacral pressure ulcers.       ----------------------------------------------------------------------  RECENT LABS/IMAGING                        11.7   9.70  )-----------( 187      ( 11 Oct 2021 06:00 )             34.7     10-11    142  |  108  |  7   ----------------------------<  94  4.0   |  27  |  0.85    Ca    8.6      11 Oct 2021 06:00    TPro  6.3  /  Alb  2.9<L>  /  TBili  0.3  /  DBili  x   /  AST  19  /  ALT  71<H>  /  AlkPhos  70  10-11      Culture - Urine (10.08.21 @ 23:29)    -  Amikacin: S <=16    -  Amoxicillin/Clavulanic Acid: S <=8/4    -  Ampicillin: R >16 These ampicillin results predict results for amoxicillin    -  Ampicillin/Sulbactam: S <=4/2 Enterobacter, Citrobacter, and Serratia may develop resistance during prolonged therapy (3-4 days)    -  Aztreonam: S <=4    -  Cefazolin: S <=2 (MIC_CL_COM_ENTERIC_CEFAZU) For uncomplicated UTI with K. pneumoniae, E. coli, or P. mirablis: RU <=16 is sensitive and RU >=32 is resistant. This also predicts results for oral agents cefaclor, cefdinir, cefpodoxime, cefprozil, cefuroxime axetil, cephalexin and locarbef for uncomplicated UTI. Note that some isolates may be susceptible to these agents while testing resistant to cefazolin.    -  Cefepime: S <=2    -  Cefoxitin: S <=8    -  Ceftriaxone: S <=1 Enterobacter, Citrobacter, and Serratia may develop resistance during prolonged therapy    -  Ciprofloxacin: S <=0.25    -  Ertapenem: S <=0.5    -  Gentamicin: S <=2    -  Imipenem: S <=1    -  Levofloxacin: S <=0.5    -  Meropenem: S <=1    -  Nitrofurantoin: I 64 Should not be used to treat pyelonephritis    -  Piperacillin/Tazobactam: S <=8    -  Tigecycline: S <=2    -  Tobramycin: S <=2    -  Trimethoprim/Sulfamethoxazole: S <=0.5/9.5    Specimen Source: Catheterized Catheterized    Culture Results:   >100,000 CFU/ml Klebsiella pneumoniae    Organism Identification: Klebsiella pneumoniae    Organism: Klebsiella pneumoniae    Method Type: RU        ----------------------------------------------------------------------  RECENT IMAGING:    ----------------------------------------------------------------------  MEDICATIONS  (STANDING):  acetaminophen   Tablet .. 650 milliGRAM(s) Oral every 12 hours  bictegravir 50 mG/emtricitabine 200 mG/tenofovir alafenamide 25 mG (BIKTARVY) 1 Tablet(s) Oral daily  bisacodyl Suppository 10 milliGRAM(s) Rectal daily  enoxaparin Injectable 40 milliGRAM(s) SubCutaneous at bedtime  meropenem  IVPB 1000 milliGRAM(s) IV Intermittent every 8 hours  senna 2 Tablet(s) Oral at bedtime  sodium chloride 0.9%. 1000 milliLiter(s) (75 mL/Hr) IV Continuous <Continuous>    MEDICATIONS  (PRN):  HYDROmorphone   Tablet 2 milliGRAM(s) Oral every 8 hours PRN Severe Pain (7 - 10)  methocarbamol 500 milliGRAM(s) Oral every 8 hours PRN Muscle Spasm  pregabalin 50 milliGRAM(s) Oral every 8 hours PRN Pain  traZODone 25 milliGRAM(s) Oral at bedtime PRN insomnia      ----------------------------------------------------------------------

## 2021-10-13 RX ADMIN — ENOXAPARIN SODIUM 40 MILLIGRAM(S): 100 INJECTION SUBCUTANEOUS at 21:41

## 2021-10-13 RX ADMIN — SENNA PLUS 2 TABLET(S): 8.6 TABLET ORAL at 21:41

## 2021-10-13 RX ADMIN — Medication 500 MILLIGRAM(S): at 06:23

## 2021-10-13 RX ADMIN — BICTEGRAVIR SODIUM, EMTRICITABINE, AND TENOFOVIR ALAFENAMIDE FUMARATE 1 TABLET(S): 30; 120; 15 TABLET ORAL at 12:04

## 2021-10-13 RX ADMIN — Medication 500 MILLIGRAM(S): at 17:58

## 2021-10-13 NOTE — PROGRESS NOTE ADULT - SUBJECTIVE AND OBJECTIVE BOX
36 man with incomplete paraplegia following epidural hematoma while weight lifting.  He has been switched to oral antibiotic for UTI.  Renal and bladder sonogram is completed. His voiding with increasing control, albeit with some straining.   No sig pain.  No change clinical pattern.    Vital Signs Last 24 Hrs  T(C): 37 (10-13-21 @ 08:30)  T(F): 98.6 (10-13-21 @ 08:30), Max: 98.7 (10-12-21 @ 20:56)  HR: 100 (10-13-21 @ 08:30) (100 - 104)  BP: 130/85 (10-13-21 @ 08:30)  BP(mean): --  RR: 16 (10-13-21 @ 08:30) (16 - 16)  SpO2: 100% (10-13-21 @ 08:30) (98% - 100%)    Gen: benign, non toxic.  Abdomen is soft, bladder non palpable.   No sign DVT.  No back tenderness.  Spasticity more right than left leg, on right it is MAS 2.   Left leg with isolated movement throughout, right with no antigravity movement, many muscles remain 0/5.     Sono reveals no stones, no hydro. Able to void >50% bladder volume, PVR 190cc.

## 2021-10-13 NOTE — PROGRESS NOTE ADULT - SUBJECTIVE AND OBJECTIVE BOX
CC: f/u for  klebsiella uti, epididymitis and possible prostatitis  Patient reports his right testicle is sore    REVIEW OF SYSTEMS:  All other review of systems negative (Comprehensive ROS)    Antimicrobials Day #  :4/21  bictegravir 50 mG/emtricitabine 200 mG/tenofovir alafenamide 25 mG (BIKTARVY) 1 Tablet(s) Oral daily  cefuroxime   Tablet 500 milliGRAM(s) Oral every 12 hours    Other Medications Reviewed    T(F): 98.6 (10-13-21 @ 08:30), Max: 98.7 (10-12-21 @ 20:56)  HR: 100 (10-13-21 @ 08:30)  BP: 130/85 (10-13-21 @ 08:30)  RR: 16 (10-13-21 @ 08:30)  SpO2: 100% (10-13-21 @ 08:30)  Wt(kg): --    PHYSICAL EXAM:  General: alert, no acute distress  Eyes:  anicteric, no conjunctival injection, no discharge  Oropharynx: no lesions or injection 	  Neck: supple, without adenopathy  Lungs: clear to auscultation  Heart: regular rate and rhythm; no murmur, rubs or gallops  Abdomen: soft, nondistended, nontender, without mass or organomegaly  Skin: no lesions  Extremities: no clubbing, cyanosis, or edema  Neurologic: alert, oriented, moves all extremities  right epididymis mild firmness  t spine wound clean and healed  LAB RESULTS:              MICROBIOLOGY:  RECENT CULTURES:  10-08 @ 23:29 Catheterized Catheterized Klebsiella pneumoniae    >100,000 CFU/ml Klebsiella pneumoniae      10-08 @ 22:00 .Blood Blood-Peripheral     No growth to date.      10-08 @ 21:45 .Blood Blood-Peripheral     No growth to date.          RADIOLOGY REVIEWED:    < from: US Kidney and Bladder (10.12.21 @ 18:22) >  EXAM:  US KIDNEYS AND BLADDER      PROCEDURE DATE:  10/12/2021        INTERPRETATION:  CLINICAL INFORMATION: UTI    COMPARISON: None available.    TECHNIQUE: Sonography of the kidneys and bladder.    FINDINGS:    Right kidney: 10.9 cm. No renal mass, hydronephrosis or calculi.    Left kidney: 10.9 cm. No renal mass, hydronephrosis or calculi.    Urinary bladder: Prevoid urinary bladder volume 432 cc and post void urinary bladder volume 194 cc.    Prostate gland: 3.3 x 3.4 x 3.4 cm.    Aorta/iliac arteries: Visualized portions are unremarkable.    IMPRESSION:    No hydronephrosis or obstructing ureteral calculus.    Prevoid urinary bladder volume 432 cc and post void urinary bladder volume 194 cc.    --- End of Report ---              DARIA DENT MD; Attending Radiologist  This document has been electronically signed. Oct 12 2021  7:27PM    < end of copied text >            Assessment:  patient with hiv on biktary admitted to [facility] with spine hematoma after lifting weights, s/p surgical intervention, remains paraplegic with neurogenic bladder. Developed fever here at rehab, found to have klebsiella uti, despite appropriate antibiotics took a couple of days to defervesce. He has mild epididymitis on the right and could have a prostatitis too given pain with isc    Plan:  continue ceftin, favor total 3 weeks.

## 2021-10-13 NOTE — PROGRESS NOTE ADULT - ASSESSMENT
36 man with incomplete paraplegia following spontaneous epidural hematoma.  Rec:  1. Remove IV, on oral abx for UTI.  2. Mob, ADL, transfers. Inpatient goal remains independence at  level.  3. DVT chemoprophylaxis x 8 weeks.  4. Education provided on bowel and bladder status.  5. Discussed transition to outpt physiatry, therapy.

## 2021-10-14 LAB
ALBUMIN SERPL ELPH-MCNC: 2.9 G/DL — LOW (ref 3.3–5)
ALP SERPL-CCNC: 64 U/L — SIGNIFICANT CHANGE UP (ref 40–120)
ALT FLD-CCNC: 56 U/L — HIGH (ref 10–45)
ANION GAP SERPL CALC-SCNC: 9 MMOL/L — SIGNIFICANT CHANGE UP (ref 5–17)
AST SERPL-CCNC: 17 U/L — SIGNIFICANT CHANGE UP (ref 10–40)
BILIRUB SERPL-MCNC: 0.2 MG/DL — SIGNIFICANT CHANGE UP (ref 0.2–1.2)
BUN SERPL-MCNC: 13 MG/DL — SIGNIFICANT CHANGE UP (ref 7–23)
CALCIUM SERPL-MCNC: 8.9 MG/DL — SIGNIFICANT CHANGE UP (ref 8.4–10.5)
CHLORIDE SERPL-SCNC: 108 MMOL/L — SIGNIFICANT CHANGE UP (ref 96–108)
CO2 SERPL-SCNC: 28 MMOL/L — SIGNIFICANT CHANGE UP (ref 22–31)
CREAT SERPL-MCNC: 0.93 MG/DL — SIGNIFICANT CHANGE UP (ref 0.5–1.3)
CULTURE RESULTS: SIGNIFICANT CHANGE UP
CULTURE RESULTS: SIGNIFICANT CHANGE UP
GLUCOSE SERPL-MCNC: 102 MG/DL — HIGH (ref 70–99)
HCT VFR BLD CALC: 36.7 % — LOW (ref 39–50)
HGB BLD-MCNC: 12.3 G/DL — LOW (ref 13–17)
MCHC RBC-ENTMCNC: 31.5 PG — SIGNIFICANT CHANGE UP (ref 27–34)
MCHC RBC-ENTMCNC: 33.5 GM/DL — SIGNIFICANT CHANGE UP (ref 32–36)
MCV RBC AUTO: 93.9 FL — SIGNIFICANT CHANGE UP (ref 80–100)
NRBC # BLD: 0 /100 WBCS — SIGNIFICANT CHANGE UP (ref 0–0)
PLATELET # BLD AUTO: 257 K/UL — SIGNIFICANT CHANGE UP (ref 150–400)
POTASSIUM SERPL-MCNC: 3.9 MMOL/L — SIGNIFICANT CHANGE UP (ref 3.5–5.3)
POTASSIUM SERPL-SCNC: 3.9 MMOL/L — SIGNIFICANT CHANGE UP (ref 3.5–5.3)
PROT SERPL-MCNC: 6.2 G/DL — SIGNIFICANT CHANGE UP (ref 6–8.3)
RBC # BLD: 3.91 M/UL — LOW (ref 4.2–5.8)
RBC # FLD: 12.6 % — SIGNIFICANT CHANGE UP (ref 10.3–14.5)
SODIUM SERPL-SCNC: 145 MMOL/L — SIGNIFICANT CHANGE UP (ref 135–145)
SPECIMEN SOURCE: SIGNIFICANT CHANGE UP
SPECIMEN SOURCE: SIGNIFICANT CHANGE UP
WBC # BLD: 5.86 K/UL — SIGNIFICANT CHANGE UP (ref 3.8–10.5)
WBC # FLD AUTO: 5.86 K/UL — SIGNIFICANT CHANGE UP (ref 3.8–10.5)

## 2021-10-14 PROCEDURE — 99232 SBSQ HOSP IP/OBS MODERATE 35: CPT

## 2021-10-14 PROCEDURE — 99232 SBSQ HOSP IP/OBS MODERATE 35: CPT | Mod: GC

## 2021-10-14 RX ORDER — HYDROMORPHONE HYDROCHLORIDE 2 MG/ML
2 INJECTION INTRAMUSCULAR; INTRAVENOUS; SUBCUTANEOUS EVERY 8 HOURS
Refills: 0 | Status: DISCONTINUED | OUTPATIENT
Start: 2021-10-14 | End: 2021-10-20

## 2021-10-14 RX ADMIN — ENOXAPARIN SODIUM 40 MILLIGRAM(S): 100 INJECTION SUBCUTANEOUS at 20:07

## 2021-10-14 RX ADMIN — Medication 500 MILLIGRAM(S): at 18:16

## 2021-10-14 RX ADMIN — BICTEGRAVIR SODIUM, EMTRICITABINE, AND TENOFOVIR ALAFENAMIDE FUMARATE 1 TABLET(S): 30; 120; 15 TABLET ORAL at 11:58

## 2021-10-14 RX ADMIN — Medication 500 MILLIGRAM(S): at 06:04

## 2021-10-14 RX ADMIN — SENNA PLUS 2 TABLET(S): 8.6 TABLET ORAL at 20:07

## 2021-10-14 NOTE — PROGRESS NOTE ADULT - ASSESSMENT
36M with PMH IVDU / methamphetamine use presented to [facility] on 9/15 with acute onset paraplegia after lifting heavy weights found to have SAH / SDH of the spinal cord with hematoma causing cord compression s/p T7-8 lami decompression (9/16) now with residual paraplegia. Hospital course complicated by hyponatremia secondary to SIADH. Admitted to [place] acute inpatient rehab on 9/28 for initiation of multidisciplinary rehab program. ADL, gait, and functional impairments.     #Sepsis  #Klebsiella UTI  -Sepsis resolved  -ID recommendations noted. Ceftin x 3 weeks  -Blood cx NGTD    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  -s/p evacuation and decompression of T7-8  -Continue comprehensive rehab program  -Follow up with outpatient neurosurgery    #Hyponatremia, likely SIADH  -resolved    #Anemia  -Stable    #hx of IV drug use  #Methamphetamine use  -Supportive care    #Neurogenic bladder  #Acute urinary retention  -Improving. Pt not needing to ISC as frequently    #HIV  -Continue Biktarvy    #Prophylactic Measure  -DVT ppx: Lovenox  -Bowel regimen

## 2021-10-14 NOTE — CHART NOTE - NSCHARTNOTEFT_GEN_A_CORE
Nutrition Follow Up Note  Hospital Course   (Per Electronic Medical Record)    Source:  Patient [X]  Medical Record [X]      Diet: Diet, Regular (09-30-21 @ 11:13) [Active]    Pt is tolerating diet and consuming 100% of meals. No chewing/swallowing difficulties. No Recent Nausea, Vomiting, Diarrhea or Constipation (as Per Patient).    Enteral/Parenteral Nutrition: Not Applicable    Current Weight: 141 lb on 10/3  154 lb on 9/28  Obtain New Weight to Confirm   Obtain Weekly Weight     Pertinent Medications: MEDICATIONS  (STANDING):  acetaminophen   Tablet .. 650 milliGRAM(s) Oral every 12 hours  bictegravir 50 mG/emtricitabine 200 mG/tenofovir alafenamide 25 mG (BIKTARVY) 1 Tablet(s) Oral daily  bisacodyl Suppository 10 milliGRAM(s) Rectal daily  cefuroxime   Tablet 500 milliGRAM(s) Oral every 12 hours  enoxaparin Injectable 40 milliGRAM(s) SubCutaneous at bedtime  senna 2 Tablet(s) Oral at bedtime  sodium chloride 0.9%. 1000 milliLiter(s) (75 mL/Hr) IV Continuous <Continuous>    MEDICATIONS  (PRN):  HYDROmorphone   Tablet 2 milliGRAM(s) Oral every 8 hours PRN Severe Pain (7 - 10)  methocarbamol 500 milliGRAM(s) Oral every 8 hours PRN Muscle Spasm  pregabalin 50 milliGRAM(s) Oral every 8 hours PRN Pain  traZODone 25 milliGRAM(s) Oral at bedtime PRN insomnia    Pertinent Labs:  10-14 Na145 mmol/L Glu 102 mg/dL<H> K+ 3.9 mmol/L Cr  0.93 mg/dL BUN 13 mg/dL 10-14 Alb 2.9 g/dL<L> 09-29 PAB 31 mg/dL    Skin: Surgical incision- mid back    Edema: None Noted (as Per Documentation)     Last Bowel Movement: on 10/13    Estimated Needs:   [X] No Change Since Previous Assessment    Previous Nutrition Diagnosis:   Moderate Malnutrition    Nutrition Diagnosis is [X] Ongoing - Continue Plan Of Care; Declines Nutrition Supplementation     New Nutrition Diagnosis: [X] Not Applicable    Interventions:   1. Recommend Continue Nutrition Plan of Care     Monitoring & Evaluation:   [X] Weights   [X] PO Intake   [X] Skin Integrity   [X] Follow Up (Per Protocol)  [X] Tolerance to Diet Prescription   [X] Other: Labs    Registered Dietitian/Nutritionist Remains Available.  Joe Chicas RDN    Pager #[phone]  Phone# [phone]

## 2021-10-14 NOTE — PROGRESS NOTE ADULT - ASSESSMENT
36 year old male with incomplete paraplegia.  Admitted to [place] acute inpatient rehab on 9/28 for ADL, gait, and functional impairments and neurogenic bowel and bladder. Currently being treated for Klebsiella UTI.     T7 AIS C paraplegia.  - secondary to spinal subdural hematoma s/p evacuation and decompression of T7-8  - Primary surgeon Dr. Randy D'Amico  - s/p decadron  - Comprehensive Multidisciplinary Rehab Program     3 hours a day, 5 days a week with PT/OT     P&O as needed, but premature now.   - leave incision open to air, OK to shower  - Per Neurosurgery: ok to start slow gradual, progressive translational movements in therapy as tolerated.   -Goal for inpatient rehab is independence at wc level.     Hyponatremia, likely SIADH, resolved    HIV  -Continue Biktarvy    Psych:  h/o substance abuse: IVDU, methamphetamine  Adjustment disorder  Insomnia  - Neuropsych evaluation and treat for adjustment, coping, and counseling  - will need to follow up with psych outpatient: counseling centers provided   - Trazodone as needed at bedtime  -Adjustment to date has been good; forward looking, no disruptive behaviors.       GI/Bowel:  Neurogenic bowel with incontinence, decreased voluntary anal contraction but improving.   - Senna QHS  - d/c Miralax due to daytime incontinence and leaking  -suppository prn.   - encourage timed toileting, after am food/coffee,  to be discussed with nursing     /Bladder:  Neurogenic bladder with Urinary retention  - bladder scans changed to q6hrs  -Performing self cath--  5:30am, 10:30am, 4:30pm and 10:30 pm, may be emerging control.     UTI- Meropenem 1000 mg q8h started on 10/10,   - urine culture klebsiella peña sensitive   - US renal and bladder US  10/12 unremarkable   - switched to ceftin 500mg BID -- for total of 3 weeks (last day 10/31) -- as per ID     Skin/Pressure Injury:  - Skin assessment on admission admission: no pressure injuries noted  - Monitor Incisions: spine incision open to air    Diet:   - Diet Consistency/Modifications: Reg + thins - fluid restriction discontinued    DVT ppx:  - Lovenox for total 8 weeks.  - SCDs may be stopped.   - Last Doppler on 9/27 neg    Restrictions/Precautions:  - Precautions: Spine precautions (as above), falls      ---------------  Outpatient Follow-up:  Neurosurgery - Dr. Randy D'Amico  Psychological services/ counseling   PCP?    --------------    Team meeting 10/7:  OT: supervision with ADLs while in bed, min A from bed to drop arm commode  PT: independent mobility with WC   Tentative DC: 10/19

## 2021-10-14 NOTE — PROGRESS NOTE ADULT - SUBJECTIVE AND OBJECTIVE BOX
Patient is a 36y old  Male who presents with a chief complaint of Spinal Cord Compression (13 Oct 2021 18:35)      SUBJECTIVE / OVERNIGHT EVENTS:  Pt seen and examined at bedside. No acute events overnight.  Pt denies cp, palpitations, sob, abd pain, N/V, fever, chills.    ROS:  All other review of systems negative    Allergies    No Known Allergies    Intolerances        MEDICATIONS  (STANDING):  acetaminophen   Tablet .. 650 milliGRAM(s) Oral every 12 hours  bictegravir 50 mG/emtricitabine 200 mG/tenofovir alafenamide 25 mG (BIKTARVY) 1 Tablet(s) Oral daily  bisacodyl Suppository 10 milliGRAM(s) Rectal daily  cefuroxime   Tablet 500 milliGRAM(s) Oral every 12 hours  enoxaparin Injectable 40 milliGRAM(s) SubCutaneous at bedtime  senna 2 Tablet(s) Oral at bedtime  sodium chloride 0.9%. 1000 milliLiter(s) (75 mL/Hr) IV Continuous <Continuous>    MEDICATIONS  (PRN):  HYDROmorphone   Tablet 2 milliGRAM(s) Oral every 8 hours PRN Severe Pain (7 - 10)  methocarbamol 500 milliGRAM(s) Oral every 8 hours PRN Muscle Spasm  pregabalin 50 milliGRAM(s) Oral every 8 hours PRN Pain  traZODone 25 milliGRAM(s) Oral at bedtime PRN insomnia      Vital Signs Last 24 Hrs  T(C): 36.4 (14 Oct 2021 08:24), Max: 36.4 (13 Oct 2021 21:43)  T(F): 97.5 (14 Oct 2021 08:24), Max: 97.5 (13 Oct 2021 21:43)  HR: 87 (14 Oct 2021 08:24) (79 - 87)  BP: 108/75 (14 Oct 2021 08:24) (108/75 - 123/81)  BP(mean): --  RR: 16 (14 Oct 2021 08:24) (16 - 16)  SpO2: 98% (14 Oct 2021 08:24) (98% - 100%)  CAPILLARY BLOOD GLUCOSE        I&O's Summary      PHYSICAL EXAM:  GENERAL: NAD, well-developed  CHEST/LUNG: Clear to auscultation bilaterally; No wheeze, nonlabored breathing  HEART: Regular rate and rhythm; No murmurs, rubs, or gallops  ABDOMEN: Soft, Nontender, Nondistended; Bowel sounds present  EXTREMITIES:  2+ Peripheral Pulses, No clubbing, cyanosis, or edema  NEUROLOGY: AAOx3, RLE weakness  PSYCH: calm, appropriate mood    LABS:                        12.3   5.86  )-----------( 257      ( 14 Oct 2021 05:30 )             36.7     10-14    145  |  108  |  13  ----------------------------<  102<H>  3.9   |  28  |  0.93    Ca    8.9      14 Oct 2021 05:30    TPro  6.2  /  Alb  2.9<L>  /  TBili  0.2  /  DBili  x   /  AST  17  /  ALT  56<H>  /  AlkPhos  x   10-14              RADIOLOGY & ADDITIONAL TESTS:  Results Reviewed:   Imaging Personally Reviewed:  Electrocardiogram Personally Reviewed:    COORDINATION OF CARE:  Care Discussed with Consultants/Other Providers [Y/N]:  Prior or Outpatient Records Reviewed [Y/N]:

## 2021-10-14 NOTE — PROGRESS NOTE ADULT - SUBJECTIVE AND OBJECTIVE BOX
Patient is a 36y old  Male who presents with a chief complaint of Spinal Cord Compression (01 Oct 2021 08:33)    HPI:  ALISSA ARAUJO is a 36 year old male who presented to [facility] on 9/15/21 with symptoms of acute onset lower extremity weakness and parethesias a few hours after lifting heavy weights in the gym.  He developed back pain and radiating pain to bilateral buttocks then progressed to paraplegia. He was brought in by ambulance after a passerby witnessed her unable to rise from the curb.  He did not have any bowel or bladder incontinence on admission.      On presentation to the ED, MRI performed and found to have ill defined intradural - extramedullary lesion with enhancement at level T8 with mass effect on the spinal cord with lefward displacement and minimal associated cord edema.  He was admitted to neurosurgical survice and started on decadron. Spinal angiogram performed which was negative. He underwent T8-9 lami decompression on 9/15 with Dr. D-Amico with intraoperative findings consistent with subarachnoid clot now s/p evacuation.  Post operative course was uncomplicated. Tolerated procedure well. Pt was seen by pain management who adjusted pain regimen (tylenol, lyrica, Robaxin, Dilaudid). Labs were significant for hyponatremia likely secondary to SIADH given euvolemia on exam and good UOP. Pt required short course of hypertonic saline then weaned off. Sodium stable with fluid restriction and addition of Na tabs. Pt was seen by behavioral health and psychology for adjustment disorder in the setting of new paralysis. He was determined to be low risk for suicide and emotional support provided. Trazodone was started for insomnia.       ----------------------------------------------------------------------    SUBJECTIVE:  Patient seen and evaluated at bedside. Endorsing to doing well at the moment. Stated that he has not needed to SC himself for the past 24 hours. Has had episodes where he was able to urinate on his own and other times where he is unable to control his urination. Bladder scans have shown minimal residual urine. No BM yet. Denies any fevers or chills. Participating well in therapy.     ROS:  Denies: headache, lightheadedness, CP, SOB, abdominal pain, nausea, constipation. No dysuria       ----------------------------------------------------------------------    Vital Signs Last 24 Hrs  T(C): 36.4 (14 Oct 2021 08:24), Max: 36.4 (13 Oct 2021 21:43)  T(F): 97.5 (14 Oct 2021 08:24), Max: 97.5 (13 Oct 2021 21:43)  HR: 87 (14 Oct 2021 08:24) (79 - 87)  BP: 108/75 (14 Oct 2021 08:24) (108/75 - 123/81)  BP(mean): --  RR: 16 (14 Oct 2021 08:24) (16 - 16)  SpO2: 98% (14 Oct 2021 08:24) (98% - 100%)      PHYSICAL EXAM  General: NAD, comfortable  Cardio: RRR, S1/S2+  Resp: no respiratory difficulty or distress  Abdomen: soft, NTND  Neuro:  T7 incomplete paraplegia  He has isolated movement throughout the left leg. He has right hip extension and trace other movements. Spasticity present but mild.   Extrem: no edema, no calf tenderness   Skin: thoracic incision open to air.   No sacral pressure ulcers.         ----------------------------------------------------------------------  RECENT IMAGING:    RECENT LABS/IMAGING                        12.3   5.86  )-----------( 257      ( 14 Oct 2021 05:30 )             36.7     10-14    145  |  108  |  13  ----------------------------<  102<H>  3.9   |  28  |  0.93    Ca    8.9      14 Oct 2021 05:30    TPro  6.2  /  Alb  2.9<L>  /  TBili  0.2  /  DBili  x   /  AST  17  /  ALT  56<H>  /  AlkPhos  64  10-14          ----------------------------------------------------------------------  MEDICATIONS  (STANDING):  acetaminophen   Tablet .. 650 milliGRAM(s) Oral every 12 hours  bictegravir 50 mG/emtricitabine 200 mG/tenofovir alafenamide 25 mG (BIKTARVY) 1 Tablet(s) Oral daily  bisacodyl Suppository 10 milliGRAM(s) Rectal daily  cefuroxime   Tablet 500 milliGRAM(s) Oral every 12 hours  enoxaparin Injectable 40 milliGRAM(s) SubCutaneous at bedtime  senna 2 Tablet(s) Oral at bedtime  sodium chloride 0.9%. 1000 milliLiter(s) (75 mL/Hr) IV Continuous <Continuous>    MEDICATIONS  (PRN):  HYDROmorphone   Tablet 2 milliGRAM(s) Oral every 8 hours PRN Severe Pain (7 - 10)  methocarbamol 500 milliGRAM(s) Oral every 8 hours PRN Muscle Spasm  pregabalin 50 milliGRAM(s) Oral every 8 hours PRN Pain  traZODone 25 milliGRAM(s) Oral at bedtime PRN insomnia      ----------------------------------------------------------------------

## 2021-10-15 PROCEDURE — 99232 SBSQ HOSP IP/OBS MODERATE 35: CPT | Mod: GC

## 2021-10-15 RX ADMIN — Medication 500 MILLIGRAM(S): at 17:58

## 2021-10-15 RX ADMIN — SENNA PLUS 2 TABLET(S): 8.6 TABLET ORAL at 21:00

## 2021-10-15 RX ADMIN — ENOXAPARIN SODIUM 40 MILLIGRAM(S): 100 INJECTION SUBCUTANEOUS at 21:00

## 2021-10-15 RX ADMIN — Medication 500 MILLIGRAM(S): at 06:23

## 2021-10-15 RX ADMIN — BICTEGRAVIR SODIUM, EMTRICITABINE, AND TENOFOVIR ALAFENAMIDE FUMARATE 1 TABLET(S): 30; 120; 15 TABLET ORAL at 13:09

## 2021-10-15 NOTE — PROGRESS NOTE ADULT - SUBJECTIVE AND OBJECTIVE BOX
Patient is a 36y old  Male who presents with a chief complaint of Spinal Cord Compression (01 Oct 2021 08:33)    HPI:  ALISSA ARAUJO is a 36 year old male who presented to [facility] on 9/15/21 with symptoms of acute onset lower extremity weakness and parethesias a few hours after lifting heavy weights in the gym.  He developed back pain and radiating pain to bilateral buttocks then progressed to paraplegia. He was brought in by ambulance after a passerby witnessed her unable to rise from the curb.  He did not have any bowel or bladder incontinence on admission.      On presentation to the ED, MRI performed and found to have ill defined intradural - extramedullary lesion with enhancement at level T8 with mass effect on the spinal cord with lefward displacement and minimal associated cord edema.  [facility] and started on decadron. Spinal angiogram performed which was negative. He underwent T8-9 lami decompression on 9/15 with Dr. D-Amico with intraoperative findings consistent with subarachnoid clot now s/p evacuation.  Post operative course was uncomplicated. Tolerated procedure well. Pt was seen by pain management who adjusted pain regimen (tylenol, lyrica, Robaxin, Dilaudid). Labs were significant for hyponatremia likely secondary to SIADH given euvolemia on exam and good UOP. Pt required short course of hypertonic saline then weaned off. Sodium stable with fluid restriction and addition of Na tabs. Pt was seen by behavioral health and psychology for adjustment disorder in the setting of new paralysis. He was determined to be low risk for suicide and emotional support provided. Trazodone was started for insomnia.       ----------------------------------------------------------------------    SUBJECTIVE:  Patient seen and evaluated at bedside. He states that his bladder is little more controlled than before but still having accidents. Has not needed to SC himself for the past 48 hr. He was able to be transferred to commode yesterday and have BM. Doing well overall.     ROS:  Denies: headache, lightheadedness, CP, SOB, abdominal pain, nausea, constipation. No dysuria       ----------------------------------------------------------------------    Vital Signs Last 24 Hrs  T(C): 36.8 (15 Oct 2021 08:14), Max: 36.8 (14 Oct 2021 20:41)  T(F): 98.2 (15 Oct 2021 08:14), Max: 98.2 (14 Oct 2021 20:41)  HR: 98 (15 Oct 2021 08:14) (91 - 98)  BP: 119/71 (15 Oct 2021 08:14) (119/71 - 123/78)  BP(mean): --  RR: 16 (15 Oct 2021 08:14) (16 - 16)  SpO2: 97% (15 Oct 2021 08:14) (97% - 98%)    PHYSICAL EXAM  General: NAD, comfortable  Cardio: RRR, S1/S2+  Resp: no respiratory difficulty or distress  Abdomen: soft, NTND  Neuro:  T7 incomplete paraplegia  He has isolated movement throughout the left leg. He has right hip extension and trace other movements. Spasticity present but mild.   Extrem: no edema, no calf tenderness   Skin: thoracic incision open to air.   No sacral pressure ulcers.         ----------------------------------------------------------------------  RECENT IMAGING:        ----------------------------------------------------------------------  MEDICATIONS  (STANDING):  acetaminophen   Tablet .. 650 milliGRAM(s) Oral every 12 hours  bictegravir 50 mG/emtricitabine 200 mG/tenofovir alafenamide 25 mG (BIKTARVY) 1 Tablet(s) Oral daily  bisacodyl Suppository 10 milliGRAM(s) Rectal daily  cefuroxime   Tablet 500 milliGRAM(s) Oral every 12 hours  enoxaparin Injectable 40 milliGRAM(s) SubCutaneous at bedtime  senna 2 Tablet(s) Oral at bedtime  sodium chloride 0.9%. 1000 milliLiter(s) (75 mL/Hr) IV Continuous <Continuous>    MEDICATIONS  (PRN):  HYDROmorphone   Tablet 2 milliGRAM(s) Oral every 8 hours PRN Severe Pain (7 - 10)  methocarbamol 500 milliGRAM(s) Oral every 8 hours PRN Muscle Spasm  pregabalin 50 milliGRAM(s) Oral every 8 hours PRN Pain  traZODone 25 milliGRAM(s) Oral at bedtime PRN insomnia        ----------------------------------------------------------------------

## 2021-10-15 NOTE — PROGRESS NOTE ADULT - ASSESSMENT
36 year old male with incomplete paraplegia.  Admitted to [facility] acute inpatient rehab on 9/28 for ADL, gait, and functional impairments and neurogenic bowel and bladder. Currently being treated for Klebsiella UTI.     T7 AIS C paraplegia.  - secondary to spinal subdural hematoma s/p evacuation and decompression of T7-8  - Primary surgeon Dr. Randy D'Amico  - s/p decadron  - Comprehensive Multidisciplinary Rehab Program     3 hours a day, 5 days a week with PT/OT     P&O as needed, but premature now.   - leave incision open to air, OK to shower  - Per Neurosurgery: ok to start slow gradual, progressive translational movements in therapy as tolerated.   -Goal for inpatient rehab is independence at wc level.     Hyponatremia, likely SIADH, resolved    HIV  -Continue Biktarvy    Psych:  h/o substance abuse: IVDU, methamphetamine  Adjustment disorder  Insomnia  - Neuropsych evaluation and treat for adjustment, coping, and counseling  - will need to follow up with psych outpatient: counseling centers provided   - Trazodone as needed at bedtime  -Adjustment to date has been good; forward looking, no disruptive behaviors.       GI/Bowel:  Neurogenic bowel with incontinence, decreased voluntary anal contraction but improving.   - Senna QHS  - d/c Miralax due to daytime incontinence and leaking  -suppository prn.   - encourage timed toileting, after am food/coffee,  to be discussed with nursing     /Bladder:  Neurogenic bladder with Urinary retention  - bladder scans changed to q6hrs  -Performing self cath--  5:30am, 10:30am, 4:30pm and 10:30 pm, may be emerging control. -- has not needed to SC for the past 48 hr     UTI- Meropenem 1000 mg q8h started on 10/10,   - urine culture klebsiella peña sensitive   - US renal and bladder US  10/12 unremarkable   - switched to ceftin 500mg BID -- for total of 3 weeks (last day 10/31) -- as per ID     Skin/Pressure Injury:  - Skin assessment on admission admission: no pressure injuries noted  - Monitor Incisions: spine incision open to air    Diet:   - Diet Consistency/Modifications: Reg + thins - fluid restriction discontinued    DVT ppx:  - Lovenox for total 8 weeks.  - SCDs may be stopped.   - Last Doppler on 9/27 neg    Restrictions/Precautions:  - Precautions: Spine precautions (as above), falls      ---------------  Outpatient Follow-up:  Neurosurgery - Dr. Randy D'Amico  Psychological services/ counseling   PCP?    --------------    Team meeting 10/14  OT: supervision with ADLs while in bed, needs more assistance with toileting   PT: WC propulsion Mod I  Tentative DC: 10/26

## 2021-10-15 NOTE — PROGRESS NOTE ADULT - ASSESSMENT
36y male, MSM, immigrant to [place] from [place], but came [place] in [place], with PMH significant for HIV dx in 2017 on Biktarvy with CD4 of 700 & viral load of 100, was in his USOH until Sep 14th 2021, when he experienced acute onset of lower extremity weakness a few hours after lifting heavy weights in the gym.  He developed back pain radiating pain to bilateral buttocks then progressed to paraplegia.   He was taken to [facility] by an ambulance.    S/p T8-9 laminectomy - decompression for evacuation of subarachnoid clot on 9/15/21.    Post operative course notable for persistent paraplegia & now with urinary retention requiring ISC.     Transferred to [facility] rehab on 9/28/21.   He developed new onset of fever on 10/08/21.   UA with only 3-5 WBCs and nit.  Started on Ceftriaxone.   Reports a new feeling of pressure when the urine fills up his bladder & that it hurts to straight catheterize. He has also noted that urine is no longer clear & there are small pieces floating in it  He was broadened from CTX to meropenem 10/10  His temps have resolved.  Klebsiella in urine is pansensitive  He had scrotal pain a few days ago, is now voiding on his own  PLAN:  Continue ceftin-day 7/21  ISC as needed  Biktarvy for HIV  monitor for recurrent fever

## 2021-10-15 NOTE — PROGRESS NOTE ADULT - SUBJECTIVE AND OBJECTIVE BOX
CC: f/u for klebsiella  infection    Patient reports: he is voiding without discomfort, no dysuria, he c/o incontinence.    REVIEW OF SYSTEMS:  All other review of systems negative (Comprehensive ROS)    Antimicrobials Day #  :day 7  bictegravir 50 mG/emtricitabine 200 mG/tenofovir alafenamide 25 mG (BIKTARVY) 1 Tablet(s) Oral daily  cefuroxime   Tablet 500 milliGRAM(s) Oral every 12 hours    Other Medications Reviewed    T(F): 98.2 (10-15-21 @ 08:14), Max: 98.2 (10-14-21 @ 20:41)  HR: 98 (10-15-21 @ 08:14)  BP: 119/71 (10-15-21 @ 08:14)  RR: 16 (10-15-21 @ 08:14)  SpO2: 97% (10-15-21 @ 08:14)  Wt(kg): --    PHYSICAL EXAM:  General: alert, no acute distress  Eyes:  anicteric, no conjunctival injection, no discharge  Oropharynx: no lesions or injection 	  Neck: supple, without adenopathy  Lungs: clear to auscultation  Heart: regular rate and rhythm; no murmur, rubs or gallops  Abdomen: soft, nondistended, nontender, without mass or organomegaly  Skin: no lesions  Extremities: no clubbing, cyanosis, or edema  Neurologic: alert, oriented, moves all extremities    LAB RESULTS:                        12.3   5.86  )-----------( 257      ( 14 Oct 2021 05:30 )             36.7     10-14    145  |  108  |  13  ----------------------------<  102<H>  3.9   |  28  |  0.93    Ca    8.9      14 Oct 2021 05:30    TPro  6.2  /  Alb  2.9<L>  /  TBili  0.2  /  DBili  x   /  AST  17  /  ALT  56<H>  /  AlkPhos  64  10-14    LIVER FUNCTIONS - ( 14 Oct 2021 05:30 )  Alb: 2.9 g/dL / Pro: 6.2 g/dL / ALK PHOS: 64 U/L / ALT: 56 U/L / AST: 17 U/L / GGT: x             MICROBIOLOGY:  RECENT CULTURES:      RADIOLOGY REVIEWED:

## 2021-10-16 PROCEDURE — 99232 SBSQ HOSP IP/OBS MODERATE 35: CPT

## 2021-10-16 RX ADMIN — Medication 500 MILLIGRAM(S): at 18:00

## 2021-10-16 RX ADMIN — ENOXAPARIN SODIUM 40 MILLIGRAM(S): 100 INJECTION SUBCUTANEOUS at 21:00

## 2021-10-16 RX ADMIN — Medication 500 MILLIGRAM(S): at 06:25

## 2021-10-16 RX ADMIN — BICTEGRAVIR SODIUM, EMTRICITABINE, AND TENOFOVIR ALAFENAMIDE FUMARATE 1 TABLET(S): 30; 120; 15 TABLET ORAL at 12:29

## 2021-10-16 RX ADMIN — SENNA PLUS 2 TABLET(S): 8.6 TABLET ORAL at 21:00

## 2021-10-16 NOTE — PROGRESS NOTE ADULT - ASSESSMENT
36M with PMH IVDU / methamphetamine use presented to [facility] on 9/15 with acute onset paraplegia after lifting heavy weights found to have SAH / SDH of the spinal cord with hematoma causing cord compression s/p T7-8 lami decompression (9/16) now with residual paraplegia. Hospital course complicated by hyponatremia secondary to SIADH. Admitted to [place] acute inpatient rehab on 9/28 for initiation of multidisciplinary rehab program. ADL, gait, and functional impairments.     #Sepsis  #Klebsiella UTI  -Sepsis resolved  -ID recommendations noted. Ceftin Day 8/21  -Blood cx NGTD    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  -s/p evacuation and decompression of T7-8  -Continue comprehensive rehab program  -Follow up with outpatient neurosurgery    #Hyponatremia, likely SIADH  -resolved    #Anemia  -Stable    #hx of IV drug use  #Methamphetamine use  -Supportive care    #Neurogenic bladder  #Acute urinary retention  -Improving. Pt not needing to ISC    #HIV  -Continue Biktarvy    #Prophylactic Measure  -DVT ppx: Lovenox  -Bowel regimen

## 2021-10-16 NOTE — PROGRESS NOTE ADULT - SUBJECTIVE AND OBJECTIVE BOX
No overnight events.  patient denies pain, slept well    REVIEW OF SYSTEMS  Constitutional - No fever,  No fatigue  Neurological - No headaches, No loss of strength  Musculoskeletal - No joint pain, No joint swelling, No muscle pain    VITALS  T(C): 36.5 (10-16-21 @ 08:30), Max: 36.6 (10-15-21 @ 20:48)  HR: 79 (10-16-21 @ 08:30) (73 - 79)  BP: 100/70 (10-16-21 @ 08:30) (100/70 - 124/82)  RR: 16 (10-16-21 @ 08:30) (16 - 17)  SpO2: 100% (10-16-21 @ 08:30) (99% - 100%)  Wt(kg): --       MEDICATIONS   acetaminophen   Tablet .. 650 milliGRAM(s) every 12 hours  bictegravir 50 mG/emtricitabine 200 mG/tenofovir alafenamide 25 mG (BIKTARVY) 1 Tablet(s) daily  bisacodyl Suppository 10 milliGRAM(s) daily  cefuroxime   Tablet 500 milliGRAM(s) every 12 hours  enoxaparin Injectable 40 milliGRAM(s) at bedtime  HYDROmorphone   Tablet 2 milliGRAM(s) every 8 hours PRN  methocarbamol 500 milliGRAM(s) every 8 hours PRN  pregabalin 50 milliGRAM(s) every 8 hours PRN  senna 2 Tablet(s) at bedtime  sodium chloride 0.9%. 1000 milliLiter(s) <Continuous>  traZODone 25 milliGRAM(s) at bedtime PRN      RECENT LABS/IMAGING            PHYSICAL EXAM  General: NAD, comfortable, in bed   Resp: no respiratory difficulty or distress  Abdomen: soft, NTND  Neuro:  T7 incomplete paraplegia  Extrem: no edema, no calf tenderness   Skin: thoracic incision open to air.   No sacral pressure ulcers.         ASSESSMENT/PLAN  36y Male h/o HIV on Biktarvy, substance abuse, with functional deficits due to incomplete paraplegia secondary to spinal subdural hematoma  s/p evacuation, decompression T7-8  UTI on ceftin through 10/31   bowel: senna, suppository prn, timed toileting   bladder: bladder scans, incontinent   Continue current medical management  Pain - Tylenol PRN  DVT PPX - lovenox   Continue 3hrs a day of comprehensive rehab program.

## 2021-10-16 NOTE — PROGRESS NOTE ADULT - ASSESSMENT
36y male, MSM, immigrant to [place] from [place], but [place] in [place], with PMH significant for HIV dx in 2017 on Biktarvy with CD4 of 700 & viral load of 100, was in his USOH until Sep 14th 2021, when he experienced acute onset of lower extremity weakness a few hours after lifting heavy weights in the gym.  He developed back pain radiating pain to bilateral buttocks then progressed to paraplegia.   He was taken to [facility] by an ambulance.    S/p T8-9 laminectomy - decompression for evacuation of subarachnoid clot on 9/15/21.    Post operative course notable for persistent paraplegia & now with urinary retention requiring ISC.     Transferred to [facility] rehab on 9/28/21.   He developed new onset of fever on 10/08/21.   UA with only 3-5 WBCs and nit.  Started on Ceftriaxone.   Reports a new feeling of pressure when the urine fills up his bladder & that it hurts to straight catheterize. He has also noted that urine is no longer clear & there are small pieces floating in it  He was broadened from CTX to meropenem 10/10  His temps have resolved.  Klebsiella in urine is pansensitive  He had scrotal pain a few days ago, is now voiding on his own  PLAN:  Continue ceftin-day 8/21-extended course for Dr Aquino's concerns over prostatitis and orchitis  ISC as needed  Biktarvy for HIV  monitor for recurrent fever

## 2021-10-16 NOTE — PROGRESS NOTE ADULT - SUBJECTIVE AND OBJECTIVE BOX
CC: f/u for klebsiella  infection    Patient reports: he is voiding well, he does not have good control    REVIEW OF SYSTEMS:  All other review of systems negative (Comprehensive ROS)    Antimicrobials Day #  :day 8/21  bictegravir 50 mG/emtricitabine 200 mG/tenofovir alafenamide 25 mG (BIKTARVY) 1 Tablet(s) Oral daily  cefuroxime   Tablet 500 milliGRAM(s) Oral every 12 hours    Other Medications Reviewed    T(F): 97.7 (10-16-21 @ 08:30), Max: 97.8 (10-15-21 @ 20:48)  HR: 79 (10-16-21 @ 08:30)  BP: 100/70 (10-16-21 @ 08:30)  RR: 16 (10-16-21 @ 08:30)  SpO2: 100% (10-16-21 @ 08:30)  Wt(kg): --    PHYSICAL EXAM:  General: alert, no acute distress  Eyes:  anicteric, no conjunctival injection, no discharge  Oropharynx: no lesions or injection 	  Neck: supple, without adenopathy  Lungs: clear to auscultation  Heart: regular rate and rhythm; no murmur, rubs or gallops  Abdomen: soft, nondistended, nontender, without mass or organomegaly  Skin: no lesions  Extremities: no clubbing, cyanosis, or edema  Neurologic: alert, oriented, moves all extremities  Legs are weak  LAB RESULTS:              MICROBIOLOGY:  RECENT CULTURES:      RADIOLOGY REVIEWED:

## 2021-10-17 LAB — SARS-COV-2 RNA SPEC QL NAA+PROBE: SIGNIFICANT CHANGE UP

## 2021-10-17 PROCEDURE — 99232 SBSQ HOSP IP/OBS MODERATE 35: CPT

## 2021-10-17 RX ADMIN — BICTEGRAVIR SODIUM, EMTRICITABINE, AND TENOFOVIR ALAFENAMIDE FUMARATE 1 TABLET(S): 30; 120; 15 TABLET ORAL at 12:25

## 2021-10-17 RX ADMIN — Medication 500 MILLIGRAM(S): at 18:04

## 2021-10-17 RX ADMIN — Medication 500 MILLIGRAM(S): at 05:46

## 2021-10-17 RX ADMIN — ENOXAPARIN SODIUM 40 MILLIGRAM(S): 100 INJECTION SUBCUTANEOUS at 21:02

## 2021-10-17 RX ADMIN — SENNA PLUS 2 TABLET(S): 8.6 TABLET ORAL at 21:02

## 2021-10-17 NOTE — PROGRESS NOTE ADULT - SUBJECTIVE AND OBJECTIVE BOX
Patient is a 36y old  Male who presents with a chief complaint of Spinal Cord Compression (16 Oct 2021 11:43)      SUBJECTIVE / OVERNIGHT EVENTS:  Pt seen and examined at bedside. No acute events overnight.  Pt denies cp, palpitations, sob, abd pain, N/V, fever, chills.    ROS:  All other review of systems negative    Allergies    No Known Allergies    Intolerances        MEDICATIONS  (STANDING):  acetaminophen   Tablet .. 650 milliGRAM(s) Oral every 12 hours  bictegravir 50 mG/emtricitabine 200 mG/tenofovir alafenamide 25 mG (BIKTARVY) 1 Tablet(s) Oral daily  bisacodyl Suppository 10 milliGRAM(s) Rectal daily  cefuroxime   Tablet 500 milliGRAM(s) Oral every 12 hours  enoxaparin Injectable 40 milliGRAM(s) SubCutaneous at bedtime  senna 2 Tablet(s) Oral at bedtime  sodium chloride 0.9%. 1000 milliLiter(s) (75 mL/Hr) IV Continuous <Continuous>    MEDICATIONS  (PRN):  HYDROmorphone   Tablet 2 milliGRAM(s) Oral every 8 hours PRN Severe Pain (7 - 10)  methocarbamol 500 milliGRAM(s) Oral every 8 hours PRN Muscle Spasm  pregabalin 50 milliGRAM(s) Oral every 8 hours PRN Pain  traZODone 25 milliGRAM(s) Oral at bedtime PRN insomnia      Vital Signs Last 24 Hrs  T(C): 36.9 (16 Oct 2021 20:50), Max: 36.9 (16 Oct 2021 20:50)  T(F): 98.5 (16 Oct 2021 20:50), Max: 98.5 (16 Oct 2021 20:50)  HR: 78 (16 Oct 2021 20:50) (78 - 78)  BP: 111/68 (16 Oct 2021 20:50) (111/68 - 111/68)  BP(mean): --  RR: 17 (16 Oct 2021 20:50) (17 - 17)  SpO2: 98% (16 Oct 2021 20:50) (98% - 98%)  CAPILLARY BLOOD GLUCOSE        I&O's Summary      PHYSICAL EXAM:  GENERAL: NAD, well-developed  CHEST/LUNG: Clear to auscultation bilaterally; No wheeze, nonlabored breathing  HEART: Regular rate and rhythm; No murmurs, rubs, or gallops  ABDOMEN: Soft, Nontender, Nondistended; Bowel sounds present  EXTREMITIES:  2+ Peripheral Pulses, No clubbing, cyanosis, or edema  NEUROLOGY: AAOx3, RLE weakness  PSYCH: calm, appropriate mood      LABS:                    RADIOLOGY & ADDITIONAL TESTS:  Results Reviewed:   Imaging Personally Reviewed:  Electrocardiogram Personally Reviewed:    COORDINATION OF CARE:  Care Discussed with Consultants/Other Providers [Y/N]:  Prior or Outpatient Records Reviewed [Y/N]:

## 2021-10-17 NOTE — PROGRESS NOTE ADULT - SUBJECTIVE AND OBJECTIVE BOX
No overnight events.  no new complaints     REVIEW OF SYSTEMS  Constitutional - No fever,  No fatigue  Neurological - No headaches, No loss of strength  Musculoskeletal - No joint pain, No joint swelling, No muscle pain    VITALS  T(C): 36.9 (10-16-21 @ 20:50), Max: 36.9 (10-16-21 @ 20:50)  HR: 78 (10-16-21 @ 20:50) (78 - 78)  BP: 111/68 (10-16-21 @ 20:50) (111/68 - 111/68)  RR: 17 (10-16-21 @ 20:50) (17 - 17)  SpO2: 98% (10-16-21 @ 20:50) (98% - 98%)  Wt(kg): --       MEDICATIONS   acetaminophen   Tablet .. 650 milliGRAM(s) every 12 hours  bictegravir 50 mG/emtricitabine 200 mG/tenofovir alafenamide 25 mG (BIKTARVY) 1 Tablet(s) daily  bisacodyl Suppository 10 milliGRAM(s) daily  cefuroxime   Tablet 500 milliGRAM(s) every 12 hours  enoxaparin Injectable 40 milliGRAM(s) at bedtime  HYDROmorphone   Tablet 2 milliGRAM(s) every 8 hours PRN  methocarbamol 500 milliGRAM(s) every 8 hours PRN  pregabalin 50 milliGRAM(s) every 8 hours PRN  senna 2 Tablet(s) at bedtime  sodium chloride 0.9%. 1000 milliLiter(s) <Continuous>  traZODone 25 milliGRAM(s) at bedtime PRN      RECENT LABS/IMAGING          PHYSICAL EXAM  General: NAD, comfortable, in bed   Resp: no respiratory difficulty or distress  Abdomen: soft, NTND  Neuro:  T7 incomplete paraplegia  Extrem: no edema, no calf tenderness   Skin: thoracic incision open to air.   No sacral pressure ulcers.         ASSESSMENT/PLAN  36y Male h/o HIV on Biktarvy, substance abuse, with functional deficits due to incomplete paraplegia secondary to spinal subdural hematoma  s/p evacuation, decompression T7-8  UTI on ceftin through 10/31   bowel: senna, suppository prn, timed toileting   bladder: bladder scans, incontinent   Continue current medical management  Pain - Tylenol PRN  DVT PPX - lovenox   Continue 3hrs a day of comprehensive rehab program.

## 2021-10-17 NOTE — PROGRESS NOTE ADULT - ASSESSMENT
36M with PMH IVDU / methamphetamine use presented to [facility] on 9/15 with acute onset paraplegia after lifting heavy weights found to have SAH / SDH of the spinal cord with hematoma causing cord compression s/p T7-8 lami decompression (9/16) now with residual paraplegia. Hospital course complicated by hyponatremia secondary to SIADH. Admitted to [place] acute inpatient rehab on 9/28 for initiation of multidisciplinary rehab program. ADL, gait, and functional impairments.     #Sepsis  #Klebsiella UTI  -Sepsis resolved  -ID recommendations noted. Ceftin Day 9/21  -Blood cx NGTD    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  -s/p evacuation and decompression of T7-8  -Continue comprehensive rehab program  -Follow up with outpatient neurosurgery    #Hyponatremia, likely SIADH  -resolved    #Anemia  -Stable    #hx of IV drug use  #Methamphetamine use  -Supportive care    #Neurogenic bladder  #Acute urinary retention  -Improving. Pt not needing to ISC    #HIV  -Continue Biktarvy    #Prophylactic Measure  -DVT ppx: Lovenox  -Bowel regimen

## 2021-10-18 LAB
ALBUMIN SERPL ELPH-MCNC: 3 G/DL — LOW (ref 3.3–5)
ALP SERPL-CCNC: 75 U/L — SIGNIFICANT CHANGE UP (ref 40–120)
ALT FLD-CCNC: 77 U/L — HIGH (ref 10–45)
ANION GAP SERPL CALC-SCNC: 8 MMOL/L — SIGNIFICANT CHANGE UP (ref 5–17)
AST SERPL-CCNC: 37 U/L — SIGNIFICANT CHANGE UP (ref 10–40)
BILIRUB SERPL-MCNC: 0.1 MG/DL — LOW (ref 0.2–1.2)
BUN SERPL-MCNC: 16 MG/DL — SIGNIFICANT CHANGE UP (ref 7–23)
CALCIUM SERPL-MCNC: 8.7 MG/DL — SIGNIFICANT CHANGE UP (ref 8.4–10.5)
CHLORIDE SERPL-SCNC: 107 MMOL/L — SIGNIFICANT CHANGE UP (ref 96–108)
CO2 SERPL-SCNC: 30 MMOL/L — SIGNIFICANT CHANGE UP (ref 22–31)
CREAT SERPL-MCNC: 0.98 MG/DL — SIGNIFICANT CHANGE UP (ref 0.5–1.3)
GLUCOSE SERPL-MCNC: 96 MG/DL — SIGNIFICANT CHANGE UP (ref 70–99)
HCT VFR BLD CALC: 39.2 % — SIGNIFICANT CHANGE UP (ref 39–50)
HGB BLD-MCNC: 12.9 G/DL — LOW (ref 13–17)
MCHC RBC-ENTMCNC: 31.6 PG — SIGNIFICANT CHANGE UP (ref 27–34)
MCHC RBC-ENTMCNC: 32.9 GM/DL — SIGNIFICANT CHANGE UP (ref 32–36)
MCV RBC AUTO: 96.1 FL — SIGNIFICANT CHANGE UP (ref 80–100)
NRBC # BLD: 0 /100 WBCS — SIGNIFICANT CHANGE UP (ref 0–0)
PLATELET # BLD AUTO: 315 K/UL — SIGNIFICANT CHANGE UP (ref 150–400)
POTASSIUM SERPL-MCNC: 4.7 MMOL/L — SIGNIFICANT CHANGE UP (ref 3.5–5.3)
POTASSIUM SERPL-SCNC: 4.7 MMOL/L — SIGNIFICANT CHANGE UP (ref 3.5–5.3)
PROT SERPL-MCNC: 6.2 G/DL — SIGNIFICANT CHANGE UP (ref 6–8.3)
RBC # BLD: 4.08 M/UL — LOW (ref 4.2–5.8)
RBC # FLD: 12 % — SIGNIFICANT CHANGE UP (ref 10.3–14.5)
SODIUM SERPL-SCNC: 145 MMOL/L — SIGNIFICANT CHANGE UP (ref 135–145)
WBC # BLD: 6.66 K/UL — SIGNIFICANT CHANGE UP (ref 3.8–10.5)
WBC # FLD AUTO: 6.66 K/UL — SIGNIFICANT CHANGE UP (ref 3.8–10.5)

## 2021-10-18 PROCEDURE — 99232 SBSQ HOSP IP/OBS MODERATE 35: CPT | Mod: GC

## 2021-10-18 PROCEDURE — 99232 SBSQ HOSP IP/OBS MODERATE 35: CPT

## 2021-10-18 RX ADMIN — SENNA PLUS 2 TABLET(S): 8.6 TABLET ORAL at 22:33

## 2021-10-18 RX ADMIN — Medication 500 MILLIGRAM(S): at 05:09

## 2021-10-18 RX ADMIN — ENOXAPARIN SODIUM 40 MILLIGRAM(S): 100 INJECTION SUBCUTANEOUS at 22:33

## 2021-10-18 RX ADMIN — Medication 500 MILLIGRAM(S): at 17:31

## 2021-10-18 RX ADMIN — BICTEGRAVIR SODIUM, EMTRICITABINE, AND TENOFOVIR ALAFENAMIDE FUMARATE 1 TABLET(S): 30; 120; 15 TABLET ORAL at 12:40

## 2021-10-18 NOTE — PROGRESS NOTE ADULT - SUBJECTIVE AND OBJECTIVE BOX
Patient is a 36y old  Male who presents with a chief complaint of Spinal Cord Compression (17 Oct 2021 09:51)      SUBJECTIVE / OVERNIGHT EVENTS:  Pt seen and examined at bedside. No acute events overnight.  Pt denies cp, palpitations, sob, abd pain, N/V, fever, chills.    ROS:  All other review of systems negative    Allergies    No Known Allergies    Intolerances        MEDICATIONS  (STANDING):  acetaminophen   Tablet .. 650 milliGRAM(s) Oral every 12 hours  bictegravir 50 mG/emtricitabine 200 mG/tenofovir alafenamide 25 mG (BIKTARVY) 1 Tablet(s) Oral daily  bisacodyl Suppository 10 milliGRAM(s) Rectal daily  cefuroxime   Tablet 500 milliGRAM(s) Oral every 12 hours  enoxaparin Injectable 40 milliGRAM(s) SubCutaneous at bedtime  senna 2 Tablet(s) Oral at bedtime  sodium chloride 0.9%. 1000 milliLiter(s) (75 mL/Hr) IV Continuous <Continuous>    MEDICATIONS  (PRN):  HYDROmorphone   Tablet 2 milliGRAM(s) Oral every 8 hours PRN Severe Pain (7 - 10)  methocarbamol 500 milliGRAM(s) Oral every 8 hours PRN Muscle Spasm  pregabalin 50 milliGRAM(s) Oral every 8 hours PRN Pain  traZODone 25 milliGRAM(s) Oral at bedtime PRN insomnia      Vital Signs Last 24 Hrs  T(C): 36.8 (17 Oct 2021 19:49), Max: 36.8 (17 Oct 2021 12:32)  T(F): 98.2 (17 Oct 2021 19:49), Max: 98.2 (17 Oct 2021 12:32)  HR: 89 (17 Oct 2021 19:49) (83 - 89)  BP: 123/80 (17 Oct 2021 19:49) (121/78 - 123/80)  BP(mean): --  RR: 16 (17 Oct 2021 19:49) (16 - 16)  SpO2: 100% (17 Oct 2021 19:49) (98% - 100%)  CAPILLARY BLOOD GLUCOSE        I&O's Summary      PHYSICAL EXAM:  GENERAL: NAD, well-developed  CHEST/LUNG: Clear to auscultation bilaterally; No wheeze, nonlabored breathing  HEART: Regular rate and rhythm; No murmurs, rubs, or gallops  ABDOMEN: Soft, Nontender, Nondistended; Bowel sounds present  EXTREMITIES:  2+ Peripheral Pulses, No clubbing, cyanosis, or edema  NEUROLOGY: AAOx3, RLE weakness  PSYCH: calm, appropriate mood    LABS:                        12.9   6.66  )-----------( 315      ( 18 Oct 2021 05:09 )             39.2     10-18    145  |  107  |  16  ----------------------------<  96  4.7   |  30  |  0.98    Ca    8.7      18 Oct 2021 05:09    TPro  6.2  /  Alb  3.0<L>  /  TBili  0.1<L>  /  DBili  x   /  AST  37  /  ALT  77<H>  /  AlkPhos  75  10-18              RADIOLOGY & ADDITIONAL TESTS:  Results Reviewed:   Imaging Personally Reviewed:  Electrocardiogram Personally Reviewed:    COORDINATION OF CARE:  Care Discussed with Consultants/Other Providers [Y/N]:  Prior or Outpatient Records Reviewed [Y/N]:

## 2021-10-18 NOTE — PROGRESS NOTE ADULT - SUBJECTIVE AND OBJECTIVE BOX
Patient is a 36y old  Male who presents with a chief complaint of Spinal Cord Compression (01 Oct 2021 08:33)    HPI:  ALISSA ARAUJO is a 36 year old male who presented to [facility] on 9/15/21 with symptoms of acute onset lower extremity weakness and parethesias a few hours after lifting heavy weights in the gym.  He developed back pain and radiating pain to bilateral buttocks then progressed to paraplegia. He was brought in by ambulance after a passerby witnessed her unable to rise from the curb.  He did not have any bowel or bladder incontinence on admission.      On presentation to the ED, MRI performed and found to have ill defined intradural - extramedullary lesion with enhancement at level T8 with mass effect on the spinal cord with lefward displacement and minimal associated cord edema.  He was admitted to neurosurgical survice and started on decadron. Spinal angiogram performed which was negative. He underwent T8-9 lami decompression on 9/15 with Dr. D-Amico with intraoperative findings consistent with subarachnoid clot now s/p evacuation.  Post operative course was uncomplicated. Tolerated procedure well. Pt was seen by pain management who adjusted pain regimen (tylenol, lyrica, Robaxin, Dilaudid). Labs were significant for hyponatremia likely secondary to SIADH given euvolemia on exam and good UOP. Pt required short course of hypertonic saline then weaned off. Sodium stable with fluid restriction and addition of Na tabs. Pt was seen by behavioral health and psychology for adjustment disorder in the setting of new paralysis. He was determined to be low risk for suicide and emotional support provided. Trazodone was started for insomnia.       ----------------------------------------------------------------------    SUBJECTIVE:  Patient seen and evaluated at bedside. patient doing well overall. He stated that his was wetting his diaper over the weekend and did not need to SC himself. His resiual urine was 100-1200cc. Patient stated to being able to have BM and that he can sense when he has to go. He has been able to have BM while on the commode and has been going before dinner now (last BM 10/17). No acute medical issues noted overnight.      ROS:  Denies: headache, lightheadedness, CP, SOB, abdominal pain, nausea, constipation. No dysuria       ----------------------------------------------------------------------    Vital Signs Last 24 Hrs  T(C): 36.8 (17 Oct 2021 19:49), Max: 36.8 (17 Oct 2021 12:32)  T(F): 98.2 (17 Oct 2021 19:49), Max: 98.2 (17 Oct 2021 12:32)  HR: 89 (17 Oct 2021 19:49) (83 - 89)  BP: 123/80 (17 Oct 2021 19:49) (121/78 - 123/80)  BP(mean): --  RR: 16 (17 Oct 2021 19:49) (16 - 16)  SpO2: 100% (17 Oct 2021 19:49) (98% - 100%)    PHYSICAL EXAM  General: NAD, comfortable  Cardio: RRR, S1/S2+  Resp: no respiratory difficulty or distress  Abdomen: soft, NTND  Neuro:  T7 incomplete paraplegia  He has isolated movement throughout the left leg. He has right hip extension and right knee extension with gravity eliminated, trace movements for the rest. Spasticity present but mild.   Extrem: no edema, no calf tenderness   Skin: thoracic incision open to air.   No sacral pressure ulcers.       RECENT LABS/IMAGING                        12.9   6.66  )-----------( 315      ( 18 Oct 2021 05:09 )             39.2     10-18    145  |  107  |  16  ----------------------------<  96  4.7   |  30  |  0.98    Ca    8.7      18 Oct 2021 05:09    TPro  6.2  /  Alb  3.0<L>  /  TBili  0.1<L>  /  DBili  x   /  AST  37  /  ALT  77<H>  /  AlkPhos  75  10-18        ----------------------------------------------------------------------  RECENT IMAGING:        ----------------------------------------------------------------------  MEDICATIONS  (STANDING):  acetaminophen   Tablet .. 650 milliGRAM(s) Oral every 12 hours  bictegravir 50 mG/emtricitabine 200 mG/tenofovir alafenamide 25 mG (BIKTARVY) 1 Tablet(s) Oral daily  bisacodyl Suppository 10 milliGRAM(s) Rectal daily  cefuroxime   Tablet 500 milliGRAM(s) Oral every 12 hours  enoxaparin Injectable 40 milliGRAM(s) SubCutaneous at bedtime  senna 2 Tablet(s) Oral at bedtime  sodium chloride 0.9%. 1000 milliLiter(s) (75 mL/Hr) IV Continuous <Continuous>    MEDICATIONS  (PRN):  HYDROmorphone   Tablet 2 milliGRAM(s) Oral every 8 hours PRN Severe Pain (7 - 10)  methocarbamol 500 milliGRAM(s) Oral every 8 hours PRN Muscle Spasm  pregabalin 50 milliGRAM(s) Oral every 8 hours PRN Pain  traZODone 25 milliGRAM(s) Oral at bedtime PRN insomnia        ----------------------------------------------------------------------

## 2021-10-18 NOTE — PROGRESS NOTE ADULT - ASSESSMENT
36y male, MSM, immigrant to [place] from [place], [place], HIV dx in 2017 on Biktarvy with CD4 of 700 & viral load of 100, was in his USOH until Sep 14th 2021, when he experienced acute onset of lower extremity weakness a few hours after lifting heavy weights in the gym.  He developed back pain radiating pain to bilateral buttocks then progressed to paraplegia.   He was taken to [facility] by an ambulance.    S/p T8-9 laminectomy - decompression for evacuation of subarachnoid clot on 9/15/21.    Post operative course notable for persistent paraplegia & now with urinary retention requiring ISC.     Transferred to [facility] rehab on 9/28/21.   He developed new onset of fever on 10/08/21.   UA with only 3-5 WBCs and nit.  Started on Ceftriaxone.   Reports a new feeling of pressure when the urine fills up his bladder & that it hurts to straight catheterize. He has also noted that urine is no longer clear & there are small pieces floating in it  He was broadened from CTX to meropenem 10/10  His temps have resolved.  Klebsiella in urine is pansensitive  He had scrotal pain a few days ago, is now voiding on his own  PLAN:  Continue ceftin-day 10/21-extended course due to concern over prostatitis and orchitis  ISC as needed  Biktarvy for HIV

## 2021-10-18 NOTE — PROGRESS NOTE ADULT - ASSESSMENT
36 year old male with incomplete paraplegia.  Admitted to [place] acute inpatient rehab on 9/28 for ADL, gait, and functional impairments and neurogenic bowel and bladder. Currently being treated for Klebsiella UTI.     T7 AIS C paraplegia.  - secondary to spinal subdural hematoma s/p evacuation and decompression of T7-8  - Primary surgeon Dr. Randy D'Amico  - s/p decadron  - Comprehensive Multidisciplinary Rehab Program     3 hours a day, 5 days a week with PT/OT     P&O as needed, but premature now.   - leave incision open to air, OK to shower  - Per Neurosurgery: ok to start slow gradual, progressive translational movements in therapy as tolerated.   -Goal for inpatient rehab is independence at wc level.     Hyponatremia, likely SIADH, resolved    HIV  -Continue Biktarvy    Psych:  h/o substance abuse: IVDU, methamphetamine  Adjustment disorder  Insomnia  - Neuropsych evaluation and treat for adjustment, coping, and counseling  - will need to follow up with psych outpatient: counseling centers provided   - Trazodone as needed at bedtime  -Adjustment to date has been good; forward looking, no disruptive behaviors.       GI/Bowel:  Neurogenic bowel with incontinence, decreased voluntary anal contraction but improving.   - Senna QHS  - d/c Miralax due to daytime incontinence and leaking  -suppository prn.   - encourage timed toileting, after am food/coffee     /Bladder:  Neurogenic bladder with Urinary retention  - bladder scans changed to q6hrs  -Performing self cath--  5:30am, 10:30am, 4:30pm and 10:30 pm, may be emerging control. -- has not needed to SC-- has been having urinary incontinence   -Talked about starting Oxybutynin 2.5mg BID for possible bladder spasms causing his urinary incontinence but patient would like to hold off for now-- will continue to monitor      UTI- Meropenem 1000 mg q8h started on 10/10,   - urine culture klebsiella peña sensitive   - US renal and bladder US  10/12 unremarkable   - switched to ceftin 500mg BID -- for total of 3 weeks as per ID -- day 10/21     Skin/Pressure Injury:  - Skin assessment on admission admission: no pressure injuries noted  - Monitor Incisions: spine incision open to air    Diet:   - Diet Consistency/Modifications: Reg + thins - fluid restriction discontinued    DVT ppx:  - Lovenox for total 8 weeks.  - SCDs may be stopped.   - Last Doppler on 9/27 neg    Restrictions/Precautions:  - Precautions: Spine precautions (as above), falls      ---------------  Outpatient Follow-up:  Neurosurgery - Dr. Randy D'Amico  Psychological services/ counseling   PCP?    --------------    Team meeting 10/14  OT: supervision with ADLs while in bed, needs more assistance with toileting   PT: TRICIA propulsion Mod I  Tentative DC: 10/26

## 2021-10-18 NOTE — PROGRESS NOTE ADULT - ASSESSMENT
36M with PMH IVDU / methamphetamine use presented to [facility] on 9/15 with acute onset paraplegia after lifting heavy weights found to have SAH / SDH of the spinal cord with hematoma causing cord compression s/p T7-8 lami decompression (9/16) now with residual paraplegia. Hospital course complicated by hyponatremia secondary to SIADH. Admitted to [place] acute inpatient rehab on 9/28 for initiation of multidisciplinary rehab program. ADL, gait, and functional impairments.     #Sepsis  #Klebsiella UTI  -Sepsis resolved  -ID recommendations noted. Ceftin Day 10 out of 21 w/ end date 10/29  -Blood cx NGTD    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  -s/p evacuation and decompression of T7-8  -Continue comprehensive rehab program  -Follow up with outpatient neurosurgery    #Hyponatremia, likely SIADH  -resolved    #Anemia  -Stable    #hx of IV drug use  #Methamphetamine use  -Supportive care    #Neurogenic bladder  #Acute urinary retention  -Improving. Pt not needing to ISC    #HIV  -Continue Biktarvy    #Prophylactic Measure  -DVT ppx: Lovenox  -Bowel regimen

## 2021-10-18 NOTE — PROGRESS NOTE ADULT - SUBJECTIVE AND OBJECTIVE BOX
CC: f/u for    Patient reports    REVIEW OF SYSTEMS: no fevers, no chills, no nausea, no vomiting, no abd pain, no resp symptoms  All other review of systems negative (Comprehensive ROS)    Antimicrobials Day #    bictegravir 50 mG/emtricitabine 200 mG/tenofovir alafenamide 25 mG (BIKTARVY) 1 Tablet(s) Oral daily  cefuroxime   Tablet 500 milliGRAM(s) Oral every 12 hours    Other Medications Reviewed    T(F): 98.5 (10-18-21 @ 10:18), Max: 98.5 (10-18-21 @ 10:18)  HR: 99 (10-18-21 @ 10:18)  BP: 129/73 (10-18-21 @ 10:18)  RR: 16 (10-18-21 @ 10:18)  SpO2: 100% (10-18-21 @ 10:18)    PHYSICAL EXAM:  General: alert, no acute distress  Eyes:  anicteric, no conjunctival injection, no discharge  Oropharynx: no lesions or injection 	  Neck: supple, without adenopathy  Lungs: clear to auscultation  Heart: regular rate and rhythm; no murmur, rubs or gallops  Abdomen: soft, nondistended, nontender, without mass or organomegaly  Skin: no lesions  Extremities: no clubbing, cyanosis, or edema  Neurologic: alert, oriented, moves all extremities    LAB RESULTS:                        12.9   6.66  )-----------( 315      ( 18 Oct 2021 05:09 )             39.2     10-18    145  |  107  |  16  ----------------------------<  96  4.7   |  30  |  0.98    Ca    8.7      18 Oct 2021 05:09    TPro  6.2  /  Alb  3.0<L>  /  TBili  0.1<L>  /  DBili  x   /  AST  37  /  ALT  77<H>  /  AlkPhos  75  10-18    LIVER FUNCTIONS - ( 18 Oct 2021 05:09 )  Alb: 3.0 g/dL / Pro: 6.2 g/dL / ALK PHOS: 75 U/L / ALT: 77 U/L / AST: 37 U/L / GGT: x               MICROBIOLOGY REVIEWED:    RADIOLOGY REVIEWED:     CC: f/u for Kleb  infection    Patient reports no new c/o    REVIEW OF SYSTEMS: no fevers, no chills, no nausea, no vomiting, no abd pain, no resp symptoms  All other review of systems negative (Comprehensive ROS)    Antimicrobials Day # 10/21   bictegravir 50 mG/emtricitabine 200 mG/tenofovir alafenamide 25 mG (BIKTARVY) 1 Tablet(s) Oral daily  cefuroxime   Tablet 500 milliGRAM(s) Oral every 12 hours    Other Medications Reviewed    T(F): 98.5 (10-18-21 @ 10:18), Max: 98.5 (10-18-21 @ 10:18)  HR: 99 (10-18-21 @ 10:18)  BP: 129/73 (10-18-21 @ 10:18)  RR: 16 (10-18-21 @ 10:18)  SpO2: 100% (10-18-21 @ 10:18)    PHYSICAL EXAM:  General: alert, no acute distress  Eyes:  anicteric, no conjunctival injection, no discharge  Oropharynx: no lesions or injection 	  Neck: supple, without adenopathy  Lungs: clear to auscultation  Heart: regular rate and rhythm; no murmur, rubs or gallops  Abdomen: soft, nondistended, nontender, without mass or organomegaly  Skin: no lesions  Extremities: no clubbing, cyanosis, or edema  Neurologic: alert, oriented, LE weakness    LAB RESULTS:                        12.9   6.66  )-----------( 315      ( 18 Oct 2021 05:09 )             39.2     10-18    145  |  107  |  16  ----------------------------<  96  4.7   |  30  |  0.98    Ca    8.7      18 Oct 2021 05:09    TPro  6.2  /  Alb  3.0<L>  /  TBili  0.1<L>  /  DBili  x   /  AST  37  /  ALT  77<H>  /  AlkPhos  75  10-18    LIVER FUNCTIONS - ( 18 Oct 2021 05:09 )  Alb: 3.0 g/dL / Pro: 6.2 g/dL / ALK PHOS: 75 U/L / ALT: 77 U/L / AST: 37 U/L / GGT: x               MICROBIOLOGY REVIEWED:    RADIOLOGY REVIEWED:

## 2021-10-19 PROCEDURE — 99232 SBSQ HOSP IP/OBS MODERATE 35: CPT | Mod: GC

## 2021-10-19 RX ORDER — ACETAMINOPHEN 500 MG
650 TABLET ORAL EVERY 6 HOURS
Refills: 0 | Status: DISCONTINUED | OUTPATIENT
Start: 2021-10-19 | End: 2021-10-26

## 2021-10-19 RX ADMIN — BICTEGRAVIR SODIUM, EMTRICITABINE, AND TENOFOVIR ALAFENAMIDE FUMARATE 1 TABLET(S): 30; 120; 15 TABLET ORAL at 12:29

## 2021-10-19 RX ADMIN — Medication 500 MILLIGRAM(S): at 06:11

## 2021-10-19 RX ADMIN — ENOXAPARIN SODIUM 40 MILLIGRAM(S): 100 INJECTION SUBCUTANEOUS at 20:46

## 2021-10-19 RX ADMIN — Medication 500 MILLIGRAM(S): at 20:46

## 2021-10-19 RX ADMIN — SENNA PLUS 2 TABLET(S): 8.6 TABLET ORAL at 20:46

## 2021-10-19 NOTE — PROGRESS NOTE ADULT - ASSESSMENT
36 year old male with incomplete paraplegia.  Admitted to [facility] inpatient rehab on 9/28 for ADL, gait, and functional impairments and neurogenic bowel and bladder. Currently being treated for Klebsiella UTI.     T7 AIS C paraplegia.  - secondary to spinal subdural hematoma s/p evacuation and decompression of T7-8  - Primary surgeon Dr. Randy D'Amico  - s/p decadron  - Comprehensive Multidisciplinary Rehab Program     3 hours a day, 5 days a week with PT/OT     P&O as needed, but premature now.   - leave incision open to air, OK to shower  - Per Neurosurgery: ok to start slow gradual, progressive translational movements in therapy as tolerated.   -Goal for inpatient rehab is independence at wc level.     Hyponatremia, likely SIADH, resolved    HIV  -Continue Biktarvy    Psych:  h/o substance abuse: IVDU, methamphetamine  Adjustment disorder  Insomnia  - Neuropsych evaluation and treat for adjustment, coping, and counseling  - will need to follow up with psych outpatient: counseling centers provided   - Trazodone as needed at bedtime  -Adjustment to date has been good; forward looking, no disruptive behaviors.       GI/Bowel:  Neurogenic bowel with incontinence, decreased voluntary anal contraction but improving.   - Senna QHS  - d/c Miralax due to daytime incontinence and leaking  -suppository prn.   - encourage timed toileting, after am food/coffee     /Bladder:  Neurogenic bladder with Urinary retention  - bladder scans changed to q6hrs  -Performing self cath--  5:30am, 10:30am, 4:30pm and 10:30 pm, may be emerging control. -- has not needed to SC-- has been having urinary incontinence   -Talked about starting Oxybutynin 2.5mg BID for possible bladder spasms causing his urinary incontinence but patient would like to hold off for now-- will continue to monitor      UTI- Meropenem 1000 mg q8h started on 10/10,   - urine culture klebsiella peña sensitive   - US renal and bladder US  10/12 unremarkable   - switched to ceftin 500mg BID -- for total of 3 weeks as per ID -- day 11/21     Skin/Pressure Injury:  - Skin assessment on admission admission: no pressure injuries noted  - Monitor Incisions: spine incision open to air    Diet:   - Diet Consistency/Modifications: Reg + thins - fluid restriction discontinued    DVT ppx:  - Lovenox for total 8 weeks.  - SCDs may be stopped.   - Last Doppler on 9/27 neg    Restrictions/Precautions:  - Precautions: Spine precautions (as above), falls      ---------------  Outpatient Follow-up:  Neurosurgery - Dr. Randy D'Amico  Psychological services/ counseling   PCP?    --------------    Team meeting 10/14  OT: supervision with ADLs while in bed, needs more assistance with toileting   PT: TRICIA propulsion Mod I  Tentative DC: 10/26

## 2021-10-19 NOTE — PROGRESS NOTE ADULT - SUBJECTIVE AND OBJECTIVE BOX
Patient is a 36y old  Male who presents with a chief complaint of Spinal Cord Compression (01 Oct 2021 08:33)    HPI:  ALISSA ARAUJO is a 36 year old male who presented to [facility] on 9/15/21 with symptoms of acute onset lower extremity weakness and parethesias a few hours after lifting heavy weights in the gym.  He developed back pain and radiating pain to bilateral buttocks then progressed to paraplegia. He was brought in by ambulance after a passerby witnessed her unable to rise from the curb.  He did not have any bowel or bladder incontinence on admission.      On presentation to the ED, MRI performed and found to have ill defined intradural - extramedullary lesion with enhancement at level T8 with mass effect on the spinal cord with lefward displacement and minimal associated cord edema.  He was admitted to neurosurgical survice and started on decadron. Spinal angiogram performed which was negative. He underwent T8-9 lami decompression on 9/15 with Dr. D-Amico with intraoperative findings consistent with subarachnoid clot now s/p evacuation.  Post operative course was uncomplicated. Tolerated procedure well. Pt was seen by pain management who adjusted pain regimen (tylenol, lyrica, Robaxin, Dilaudid). Labs were significant for hyponatremia likely secondary to SIADH given euvolemia on exam and good UOP. Pt required short course of hypertonic saline then weaned off. Sodium stable with fluid restriction and addition of Na tabs. Pt was seen by behavioral health and psychology for adjustment disorder in the setting of new paralysis. He was determined to be low risk for suicide and emotional support provided. Trazodone was started for insomnia.       ----------------------------------------------------------------------    SUBJECTIVE:  Patient seen and evaluated at bedside. patient doing well overall. Still with episodes of urinary incontinence with multiple episodes of wetting the diaper. Has not needed to be SC. He states that he had a small BM yesterday while on the commode. He is able to have some sensation of fullness but cannot tell if it is because of gas or needing to have BM. No other complaints.     ROS:  Denies: headache, lightheadedness, CP, SOB, abdominal pain, nausea, constipation. No dysuria       ----------------------------------------------------------------------    Vital Signs Last 24 Hrs  T(C): 36.6 (19 Oct 2021 08:33), Max: 36.9 (18 Oct 2021 10:18)  T(F): 97.9 (19 Oct 2021 08:33), Max: 98.5 (18 Oct 2021 10:18)  HR: 99 (19 Oct 2021 08:33) (72 - 99)  BP: 110/79 (19 Oct 2021 08:33) (110/79 - 129/73)  BP(mean): --  RR: 14 (19 Oct 2021 08:33) (14 - 16)  SpO2: 100% (19 Oct 2021 08:33) (100% - 100%)    PHYSICAL EXAM  General: NAD, comfortable  Cardio: RRR, S1/S2+  Resp: no respiratory difficulty or distress  Abdomen: soft, NTND  Neuro:  T7 incomplete paraplegia  He has isolated movement throughout the left leg. He has right hip extension and right knee extension with gravity eliminated, trace movements for the rest. Spasticity present but mild.   Extrem: no edema, no calf tenderness   Skin: thoracic incision open to air.   No sacral pressure ulcers.       RECENT LABS/IMAGING        ----------------------------------------------------------------------  RECENT IMAGING:        ----------------------------------------------------------------------  MEDICATIONS  (STANDING):  acetaminophen   Tablet .. 650 milliGRAM(s) Oral every 12 hours  bictegravir 50 mG/emtricitabine 200 mG/tenofovir alafenamide 25 mG (BIKTARVY) 1 Tablet(s) Oral daily  bisacodyl Suppository 10 milliGRAM(s) Rectal daily  cefuroxime   Tablet 500 milliGRAM(s) Oral every 12 hours  enoxaparin Injectable 40 milliGRAM(s) SubCutaneous at bedtime  senna 2 Tablet(s) Oral at bedtime  sodium chloride 0.9%. 1000 milliLiter(s) (75 mL/Hr) IV Continuous <Continuous>    MEDICATIONS  (PRN):  HYDROmorphone   Tablet 2 milliGRAM(s) Oral every 8 hours PRN Severe Pain (7 - 10)  methocarbamol 500 milliGRAM(s) Oral every 8 hours PRN Muscle Spasm  pregabalin 50 milliGRAM(s) Oral every 8 hours PRN Pain  traZODone 25 milliGRAM(s) Oral at bedtime PRN insomnia        ----------------------------------------------------------------------

## 2021-10-20 PROCEDURE — 99232 SBSQ HOSP IP/OBS MODERATE 35: CPT

## 2021-10-20 RX ORDER — HYDROMORPHONE HYDROCHLORIDE 2 MG/ML
2 INJECTION INTRAMUSCULAR; INTRAVENOUS; SUBCUTANEOUS EVERY 8 HOURS
Refills: 0 | Status: DISCONTINUED | OUTPATIENT
Start: 2021-10-20 | End: 2021-10-26

## 2021-10-20 RX ADMIN — Medication 500 MILLIGRAM(S): at 17:09

## 2021-10-20 RX ADMIN — ENOXAPARIN SODIUM 40 MILLIGRAM(S): 100 INJECTION SUBCUTANEOUS at 21:00

## 2021-10-20 RX ADMIN — SENNA PLUS 2 TABLET(S): 8.6 TABLET ORAL at 21:00

## 2021-10-20 RX ADMIN — Medication 500 MILLIGRAM(S): at 06:14

## 2021-10-20 RX ADMIN — Medication 10 MILLIGRAM(S): at 06:14

## 2021-10-20 RX ADMIN — BICTEGRAVIR SODIUM, EMTRICITABINE, AND TENOFOVIR ALAFENAMIDE FUMARATE 1 TABLET(S): 30; 120; 15 TABLET ORAL at 11:41

## 2021-10-20 NOTE — PROGRESS NOTE ADULT - SUBJECTIVE AND OBJECTIVE BOX
CC: f/u for klebsiella UTI    Patient reports: he is voiding on his own, still has incontinence.    REVIEW OF SYSTEMS:  All other review of systems negative (Comprehensive ROS)    Antimicrobials Day #  :day 12/21  bictegravir 50 mG/emtricitabine 200 mG/tenofovir alafenamide 25 mG (BIKTARVY) 1 Tablet(s) Oral daily  cefuroxime   Tablet 500 milliGRAM(s) Oral every 12 hours    Other Medications Reviewed    T(F): 97.8 (10-19-21 @ 20:59), Max: 97.8 (10-19-21 @ 20:59)  HR: 80 (10-19-21 @ 20:59)  BP: 116/76 (10-19-21 @ 20:59)  RR: 15 (10-19-21 @ 20:59)  SpO2: 100% (10-19-21 @ 20:59)  Wt(kg): --    PHYSICAL EXAM:  General: alert, no acute distress  Eyes:  anicteric, no conjunctival injection, no discharge  Oropharynx: no lesions or injection 	  Neck: supple, without adenopathy  Lungs: clear to auscultation  Heart: regular rate and rhythm; no murmur, rubs or gallops  Abdomen: soft, nondistended, nontender, without mass or organomegaly  Skin: no lesions  Extremities: no clubbing, cyanosis, or edema  Neurologic: alert, oriented, moves all extremities    LAB RESULTS:              MICROBIOLOGY:  RECENT CULTURES:      RADIOLOGY REVIEWED:

## 2021-10-20 NOTE — PROGRESS NOTE ADULT - ASSESSMENT
36y male, MSM, immigrant to [place] from [place], in [place] since [place], HIV dx in 2017 on Biktarvy with CD4 of 700 & viral load of 100, was in his USOH until Sep 14th 2021, when he experienced acute onset of lower extremity weakness a few hours after lifting heavy weights in the gym.  He developed back pain radiating pain to bilateral buttocks then progressed to paraplegia.   He was taken to [facility] by an ambulance.    S/p T8-9 laminectomy - decompression for evacuation of subarachnoid clot on 9/15/21.    Post operative course notable for persistent paraplegia & now with urinary retention requiring ISC.     Transferred to [facility] rehab on 9/28/21.   He developed new onset of fever on 10/08/21.   UA with only 3-5 WBCs and nit.  Started on Ceftriaxone.   Reports a new feeling of pressure when the urine fills up his bladder & that it hurts to straight catheterize. He has also noted that urine is no longer clear & there are small pieces floating in it  He was broadened from CTX to meropenem 10/10  His temps have resolved.  Klebsiella in urine is pansensitive  He had scrotal pain a few days ago, is now voiding on his own  PLAN:  Continue ceftin-day 12/21-extended course due to concern over prostatitis and orchitis  ISC as needed  Biktarvy for HIV  No additional ID w/u planned, we will stop actively following, please call if ID issues arise

## 2021-10-20 NOTE — PROGRESS NOTE ADULT - ASSESSMENT
36M with PMH IVDU / methamphetamine use presented to [facility] on 9/15 with acute onset paraplegia after lifting heavy weights found to have SAH / SDH of the spinal cord with hematoma causing cord compression s/p T7-8 lami decompression (9/16) now with residual paraplegia. Hospital course complicated by hyponatremia secondary to SIADH. Admitted to [facility] acute inpatient rehab on 9/28 for initiation of multidisciplinary rehab program. ADL, gait, and functional impairments.     #Sepsis  #Klebsiella UTI  -Sepsis resolved  -ID recommendations noted. Ceftin Day 12/21 w/ end date 10/29  -Blood cx NGTD    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  -s/p evacuation and decompression of T7-8  -Continue comprehensive rehab program  -Follow up with outpatient neurosurgery    #Hyponatremia, likely SIADH  -resolved    #Anemia  -Stable    #hx of IV drug use  #Methamphetamine use  -Supportive care    #Neurogenic bladder  #Acute urinary retention  -Improving. Pt not needing to ISC    #HIV  -Continue Biktarvy    #Prophylactic Measure  -DVT ppx: Lovenox  -Bowel regimen

## 2021-10-20 NOTE — PROGRESS NOTE ADULT - SUBJECTIVE AND OBJECTIVE BOX
Patient is a 36y old  Male who presents with a chief complaint of Spinal Cord Compression (01 Oct 2021 08:33)    HPI:  ALISSA ARAUJO is a 36 year old male who presented to [facility] on 9/15/21 with symptoms of acute onset lower extremity weakness and parethesias a few hours after lifting heavy weights in the gym.  He developed back pain and radiating pain to bilateral buttocks then progressed to paraplegia. He was brought in by ambulance after a passerby witnessed her unable to rise from the curb.  He did not have any bowel or bladder incontinence on admission.      On presentation to the ED, MRI performed and found to have ill defined intradural - extramedullary lesion with enhancement at level T8 with mass effect on the spinal cord with lefward displacement and minimal associated cord edema.  He was admitted to neurosurgical survice and started on decadron. Spinal angiogram performed which was negative. He underwent T8-9 lami decompression on 9/15 with Dr. D-Amico with intraoperative findings consistent with subarachnoid clot now s/p evacuation.  Post operative course was uncomplicated. Tolerated procedure well. Pt was seen by pain management who adjusted pain regimen (tylenol, lyrica, Robaxin, Dilaudid). Labs were significant for hyponatremia likely secondary to SIADH given euvolemia on exam and good UOP. Pt required short course of hypertonic saline then weaned off. Sodium stable with fluid restriction and addition of Na tabs. Pt was seen by behavioral health and psychology for adjustment disorder in the setting of new paralysis. He was determined to be low risk for suicide and emotional support provided. Trazodone was started for insomnia.       ----------------------------------------------------------------------    SUBJECTIVE:  Patient seen and evaluated at bedside. patient doing well overall. Still with episodes of urinary incontinence but states that he has greater control.  Has not needed to be SC. He states that he had a small BM yesterday while on the commode.. No other complaints.     ROS:  Denies: headache, lightheadedness, CP, SOB, abdominal pain, nausea, constipation. No dysuria       ----------------------------------------------------------------------    Vital Signs Last 24 Hrs  T(C): 36.6 (19 Oct 2021 20:59), Max: 36.6 (19 Oct 2021 20:59)  T(F): 97.8 (19 Oct 2021 20:59), Max: 97.8 (19 Oct 2021 20:59)  HR: 80 (19 Oct 2021 20:59) (80 - 80)  BP: 116/76 (19 Oct 2021 20:59) (116/76 - 116/76)  BP(mean): --  RR: 15 (19 Oct 2021 20:59) (15 - 15)  SpO2: 100% (19 Oct 2021 20:59) (100% - 100%)    PHYSICAL EXAM  General: NAD, comfortable  Cardio: RRR, S1/S2+  Resp: no respiratory difficulty or distress  Abdomen: soft, NTND  Neuro:  T7 incomplete paraplegia  He has isolated movement throughout the left leg. He has right hip extension and right knee extension with gravity eliminated, trace movements for the rest. Spasticity present but mild.   Extrem: no edema, no calf tenderness   Skin: thoracic incision open to air.   No sacral pressure ulcers.       RECENT LABS/IMAGING        ----------------------------------------------------------------------  RECENT IMAGING:        ----------------------------------------------------------------------  MEDICATIONS  (STANDING):  acetaminophen   Tablet .. 650 milliGRAM(s) Oral every 12 hours  bictegravir 50 mG/emtricitabine 200 mG/tenofovir alafenamide 25 mG (BIKTARVY) 1 Tablet(s) Oral daily  bisacodyl Suppository 10 milliGRAM(s) Rectal daily  cefuroxime   Tablet 500 milliGRAM(s) Oral every 12 hours  enoxaparin Injectable 40 milliGRAM(s) SubCutaneous at bedtime  senna 2 Tablet(s) Oral at bedtime  sodium chloride 0.9%. 1000 milliLiter(s) (75 mL/Hr) IV Continuous <Continuous>    MEDICATIONS  (PRN):  HYDROmorphone   Tablet 2 milliGRAM(s) Oral every 8 hours PRN Severe Pain (7 - 10)  methocarbamol 500 milliGRAM(s) Oral every 8 hours PRN Muscle Spasm  pregabalin 50 milliGRAM(s) Oral every 8 hours PRN Pain  traZODone 25 milliGRAM(s) Oral at bedtime PRN insomnia        ----------------------------------------------------------------------

## 2021-10-20 NOTE — PROGRESS NOTE ADULT - SUBJECTIVE AND OBJECTIVE BOX
Patient is a 36y old  Male who presents with a chief complaint of Spinal Cord Compression (19 Oct 2021 09:56)      SUBJECTIVE / OVERNIGHT EVENTS:  Pt seen and examined at bedside. No acute events overnight.  Pt denies cp, palpitations, sob, abd pain, N/V, fever, chills.    ROS:  All other review of systems negative    Allergies    No Known Allergies    Intolerances        MEDICATIONS  (STANDING):  bictegravir 50 mG/emtricitabine 200 mG/tenofovir alafenamide 25 mG (BIKTARVY) 1 Tablet(s) Oral daily  bisacodyl Suppository 10 milliGRAM(s) Rectal daily  cefuroxime   Tablet 500 milliGRAM(s) Oral every 12 hours  enoxaparin Injectable 40 milliGRAM(s) SubCutaneous at bedtime  senna 2 Tablet(s) Oral at bedtime  sodium chloride 0.9%. 1000 milliLiter(s) (75 mL/Hr) IV Continuous <Continuous>    MEDICATIONS  (PRN):  acetaminophen   Tablet .. 650 milliGRAM(s) Oral every 6 hours PRN Temp greater or equal to 38C (100.4F), Mild Pain (1 - 3)  HYDROmorphone   Tablet 2 milliGRAM(s) Oral every 8 hours PRN Severe Pain (7 - 10)  methocarbamol 500 milliGRAM(s) Oral every 8 hours PRN Muscle Spasm  pregabalin 50 milliGRAM(s) Oral every 8 hours PRN Pain  traZODone 25 milliGRAM(s) Oral at bedtime PRN insomnia      Vital Signs Last 24 Hrs  T(C): 36.6 (19 Oct 2021 20:59), Max: 36.6 (19 Oct 2021 08:33)  T(F): 97.8 (19 Oct 2021 20:59), Max: 97.9 (19 Oct 2021 08:33)  HR: 80 (19 Oct 2021 20:59) (80 - 99)  BP: 116/76 (19 Oct 2021 20:59) (110/79 - 116/76)  BP(mean): --  RR: 15 (19 Oct 2021 20:59) (14 - 15)  SpO2: 100% (19 Oct 2021 20:59) (100% - 100%)  CAPILLARY BLOOD GLUCOSE        I&O's Summary    19 Oct 2021 07:01  -  20 Oct 2021 07:00  --------------------------------------------------------  IN: 0 mL / OUT: 450 mL / NET: -450 mL        PHYSICAL EXAM:  GENERAL: NAD, well-developed  CHEST/LUNG: Clear to auscultation bilaterally; No wheeze, nonlabored breathing  HEART: Regular rate and rhythm; No murmurs, rubs, or gallops  ABDOMEN: Soft, Nontender, Nondistended; Bowel sounds present  EXTREMITIES:  2+ Peripheral Pulses, No clubbing, cyanosis, or edema  NEUROLOGY: AAOx3, RLE weakness  PSYCH: calm, appropriate mood    LABS:                    RADIOLOGY & ADDITIONAL TESTS:  Results Reviewed:   Imaging Personally Reviewed:  Electrocardiogram Personally Reviewed:    COORDINATION OF CARE:  Care Discussed with Consultants/Other Providers [Y/N]:  Prior or Outpatient Records Reviewed [Y/N]:

## 2021-10-20 NOTE — PROGRESS NOTE ADULT - ASSESSMENT
36 year old male with incomplete paraplegia.  Admitted to [facility] acute inpatient rehab on 9/28 for ADL, gait, and functional impairments and neurogenic bowel and bladder. Currently being treated for Klebsiella UTI.     T7 AIS C paraplegia.  - secondary to spinal subdural hematoma s/p evacuation and decompression of T7-8  - Primary surgeon Dr. Randy D'Amico  - s/p decadron  - Comprehensive Multidisciplinary Rehab Program     3 hours a day, 5 days a week with PT/OT     P&O as needed, but premature now.   - leave incision open to air, OK to shower  - Per Neurosurgery: ok to start slow gradual, progressive translational movements in therapy as tolerated.   -Goal for inpatient rehab is independence at wc level.       HIV  -Continue Biktarvy    Psych:  h/o substance abuse: IVDU, methamphetamine  Adjustment disorder  Insomnia  - - Trazodone as needed at bedtime  -Adjustment to date has been good      GI/Bowel:  Neurogenic bowel with incontinence, decreased voluntary anal contraction but improving.   - Senna QHS  -suppository prn.   - encourage timed toileting, after am food/coffee     /Bladder:  Neurogenic bladder with Urinary retention  - bladder scans changed to q6hrs  -has not been needing to cath, but has incontinence, perhaps a little more control   -Talked about starting Oxybutynin 2.5mg BID for possible bladder spasms causing his urinary incontinence but patient would like to hold off for now-- will continue to monitor      UTI- Meropenem 1000 mg q8h started on 10/10,   - urine culture klebsiella peña sensitive   - US renal and bladder US  10/12 unremarkable   - switched to ceftin 500mg BID -- for total of 3 weeks as per ID -    Skin/Pressure Injury:  - Skin assessment on admission admission: no pressure injuries noted  - Monitor Incisions: spine incision open to air    Diet:   - Diet Consistency/Modifications: Reg + thins - fluid restriction discontinued    DVT ppx:  - Lovenox for total 8 weeks.  - SCDs may be stopped.   - Last Doppler on 9/27 neg    Restrictions/Precautions:  - Precautions: Spine precautions (as above), falls      ---------------  Outpatient Follow-up:  Neurosurgery - Dr. Randy D'Amico  Psychological services/ counseling   PCP?    --------------    Team meeting 10/14  OT: supervision with ADLs while in bed, needs more assistance with toileting   PT: TRICIA propulsion Mod I  Tentative DC: 10/26

## 2021-10-21 LAB
ALBUMIN SERPL ELPH-MCNC: 3.3 G/DL — SIGNIFICANT CHANGE UP (ref 3.3–5)
ALP SERPL-CCNC: 77 U/L — SIGNIFICANT CHANGE UP (ref 40–120)
ALT FLD-CCNC: 110 U/L — HIGH (ref 10–45)
ANION GAP SERPL CALC-SCNC: 5 MMOL/L — SIGNIFICANT CHANGE UP (ref 5–17)
AST SERPL-CCNC: 42 U/L — HIGH (ref 10–40)
BILIRUB SERPL-MCNC: 0.2 MG/DL — SIGNIFICANT CHANGE UP (ref 0.2–1.2)
BUN SERPL-MCNC: 16 MG/DL — SIGNIFICANT CHANGE UP (ref 7–23)
CALCIUM SERPL-MCNC: 8.8 MG/DL — SIGNIFICANT CHANGE UP (ref 8.4–10.5)
CHLORIDE SERPL-SCNC: 107 MMOL/L — SIGNIFICANT CHANGE UP (ref 96–108)
CO2 SERPL-SCNC: 31 MMOL/L — SIGNIFICANT CHANGE UP (ref 22–31)
CREAT SERPL-MCNC: 1.06 MG/DL — SIGNIFICANT CHANGE UP (ref 0.5–1.3)
GLUCOSE SERPL-MCNC: 100 MG/DL — HIGH (ref 70–99)
HCT VFR BLD CALC: 39.6 % — SIGNIFICANT CHANGE UP (ref 39–50)
HGB BLD-MCNC: 13.4 G/DL — SIGNIFICANT CHANGE UP (ref 13–17)
MCHC RBC-ENTMCNC: 31.7 PG — SIGNIFICANT CHANGE UP (ref 27–34)
MCHC RBC-ENTMCNC: 33.8 GM/DL — SIGNIFICANT CHANGE UP (ref 32–36)
MCV RBC AUTO: 93.6 FL — SIGNIFICANT CHANGE UP (ref 80–100)
NRBC # BLD: 0 /100 WBCS — SIGNIFICANT CHANGE UP (ref 0–0)
PLATELET # BLD AUTO: 273 K/UL — SIGNIFICANT CHANGE UP (ref 150–400)
POTASSIUM SERPL-MCNC: 4.4 MMOL/L — SIGNIFICANT CHANGE UP (ref 3.5–5.3)
POTASSIUM SERPL-SCNC: 4.4 MMOL/L — SIGNIFICANT CHANGE UP (ref 3.5–5.3)
PROT SERPL-MCNC: 6.5 G/DL — SIGNIFICANT CHANGE UP (ref 6–8.3)
RBC # BLD: 4.23 M/UL — SIGNIFICANT CHANGE UP (ref 4.2–5.8)
RBC # FLD: 12.5 % — SIGNIFICANT CHANGE UP (ref 10.3–14.5)
SODIUM SERPL-SCNC: 143 MMOL/L — SIGNIFICANT CHANGE UP (ref 135–145)
WBC # BLD: 7.04 K/UL — SIGNIFICANT CHANGE UP (ref 3.8–10.5)
WBC # FLD AUTO: 7.04 K/UL — SIGNIFICANT CHANGE UP (ref 3.8–10.5)

## 2021-10-21 PROCEDURE — 99232 SBSQ HOSP IP/OBS MODERATE 35: CPT

## 2021-10-21 RX ORDER — OXYBUTYNIN CHLORIDE 5 MG
2.5 TABLET ORAL DAILY
Refills: 0 | Status: DISCONTINUED | OUTPATIENT
Start: 2021-10-21 | End: 2021-10-21

## 2021-10-21 RX ADMIN — BICTEGRAVIR SODIUM, EMTRICITABINE, AND TENOFOVIR ALAFENAMIDE FUMARATE 1 TABLET(S): 30; 120; 15 TABLET ORAL at 12:15

## 2021-10-21 RX ADMIN — Medication 500 MILLIGRAM(S): at 06:48

## 2021-10-21 RX ADMIN — Medication 500 MILLIGRAM(S): at 17:07

## 2021-10-21 RX ADMIN — ENOXAPARIN SODIUM 40 MILLIGRAM(S): 100 INJECTION SUBCUTANEOUS at 22:25

## 2021-10-21 RX ADMIN — SENNA PLUS 2 TABLET(S): 8.6 TABLET ORAL at 22:25

## 2021-10-21 NOTE — CHART NOTE - NSCHARTNOTEFT_GEN_A_CORE
NUTRITION FOLLOW UP    SOURCE: Patient [X) Medical Record (X)    DIET: Regular.   Pt is tolerating diet well. Consumes 100% of meals. Pt previously declined Nutrition Supplement. Pt reports some bowel irregularity with the last BM being 10/19. No chewing or swallowing difficulties noted.     PATIENT REPORT [X] constipation      PO INTAKE:  [X] 100%     Enteral Nutrition : Not Applicable    CURRENT WEIGHT: 170 lbs  - (10/16)  154 lbs - (9/28)  Obtain new weight to confirm change.     PERTINENT MEDS:   Pertinent Medications: MEDICATIONS  (STANDING):  bictegravir 50 mG/emtricitabine 200 mG/tenofovir alafenamide 25 mG (BIKTARVY) 1 Tablet(s) Oral daily  bisacodyl Suppository 10 milliGRAM(s) Rectal daily  cefuroxime   Tablet 500 milliGRAM(s) Oral every 12 hours  enoxaparin Injectable 40 milliGRAM(s) SubCutaneous at bedtime  senna 2 Tablet(s) Oral at bedtime  sodium chloride 0.9%. 1000 milliLiter(s) (75 mL/Hr) IV Continuous <Continuous>    MEDICATIONS  (PRN):  acetaminophen   Tablet .. 650 milliGRAM(s) Oral every 6 hours PRN Temp greater or equal to 38C (100.4F), Mild Pain (1 - 3)  HYDROmorphone   Tablet 2 milliGRAM(s) Oral every 8 hours PRN Severe Pain (7 - 10)  methocarbamol 500 milliGRAM(s) Oral every 8 hours PRN Muscle Spasm  pregabalin 50 milliGRAM(s) Oral every 8 hours PRN Pain  traZODone 25 milliGRAM(s) Oral at bedtime PRN insomnia      PERTINENT LABS:  10-21 Na143 mmol/L Glu 100 mg/dL<H> K+ 4.4 mmol/L Cr  1.06 mg/dL BUN 16 mg/dL 10-21 Alb 3.3 g/dL 09-29 PAB 31 mg/dL      SKIN: Surgical Incision - mid back.     EDEMA: None noted.     LAST BM: on 10/19    ESTIMATED NEEDS:   [X] no change since previous assessment    PREVIOUS NUTRITION DIAGNOSIS:   Moderate Malnutrition    NUTRITION DIAGNOSIS is :  (X)  Ongoing      NEW NUTRITION DIAGNOSIS: N/A    NUTRITION RECOMMENDATIONS: Continue current Nutrition Care Plan.    MONITORING AND EVALUATION:   1. Tolerance to diet prescription   2. PO intake  3. Weights  4. Labs  5. Follow Up per protocol     Nithya Kwon  Dietetic Intern

## 2021-10-21 NOTE — PROGRESS NOTE ADULT - SUBJECTIVE AND OBJECTIVE BOX
Patient is a 36y old  Male who presents with a chief complaint of Spinal Cord Compression (01 Oct 2021 08:33)    HPI:  ALISSA ARAUJO is a 36 year old male who presented to [facility] on 9/15/21 with symptoms of acute onset lower extremity weakness and parethesias a few hours after lifting heavy weights in the gym.  He developed back pain and radiating pain to bilateral buttocks then progressed to paraplegia. He was brought in by ambulance after a passerby witnessed her unable to rise from the curb.  He did not have any bowel or bladder incontinence on admission.      On presentation to the ED, MRI performed and found to have ill defined intradural - extramedullary lesion with enhancement at level T8 with mass effect on the spinal cord with lefward displacement and minimal associated cord edema.  He was admitted to neurosurgical survice and started on decadron. Spinal angiogram performed which was negative. He underwent T8-9 lami decompression on 9/15 with Dr. D-Amico with intraoperative findings consistent with subarachnoid clot now s/p evacuation.  Post operative course was uncomplicated. Tolerated procedure well. Pt was seen by pain management who adjusted pain regimen (tylenol, lyrica, Robaxin, Dilaudid). Labs were significant for hyponatremia likely secondary to SIADH given euvolemia on exam and good UOP. Pt required short course of hypertonic saline then weaned off. Sodium stable with fluid restriction and addition of Na tabs. Pt was seen by behavioral health and psychology for adjustment disorder in the setting of new paralysis. He was determined to be low risk for suicide and emotional support provided. Trazodone was started for insomnia.       ----------------------------------------------------------------------    SUBJECTIVE:  Patient seen and evaluated at bedside. Patient states that his urinary incontinence is getting in the way of his therapies. Stated that he did not have BM yet, will take suppository later if he still levy sno have. No new issues.     ROS:  Denies: headache, lightheadedness, CP, SOB, abdominal pain, nausea, constipation. No dysuria       ----------------------------------------------------------------------  Vital Signs Last 24 Hrs  T(C): 36.6 (21 Oct 2021 08:32), Max: 36.8 (20 Oct 2021 10:22)  T(F): 97.9 (21 Oct 2021 08:32), Max: 98.2 (20 Oct 2021 10:22)  HR: 85 (21 Oct 2021 08:32) (73 - 89)  BP: 125/82 (21 Oct 2021 08:32) (107/75 - 125/82)  BP(mean): --  RR: 14 (21 Oct 2021 08:32) (14 - 16)  SpO2: 100% (21 Oct 2021 08:32) (96% - 100%)    PHYSICAL EXAM  General: NAD, comfortable  Cardio: RRR, S1/S2+  Resp: no respiratory difficulty or distress  Abdomen: soft, NTND  Neuro:  T7 incomplete paraplegia  He has isolated movement throughout the left leg. He has right hip extension and some right knee extension with gravity eliminated, trace movements for the rest. Spasticity present but mild.   Extrem: no edema, no calf tenderness   Skin: thoracic incision open to air.   No sacral pressure ulcers.       RECENT LABS/IMAGING                          13.4   7.04  )-----------( 273      ( 21 Oct 2021 06:00 )             39.6     10-21    143  |  107  |  16  ----------------------------<  100<H>  4.4   |  31  |  1.06    Ca    8.8      21 Oct 2021 06:00    TPro  6.5  /  Alb  3.3  /  TBili  0.2  /  DBili  x   /  AST  42<H>  /  ALT  110<H>  /  AlkPhos  77  10-21        ----------------------------------------------------------------------  RECENT IMAGING:        ----------------------------------------------------------------------  MEDICATIONS  (STANDING):  bictegravir 50 mG/emtricitabine 200 mG/tenofovir alafenamide 25 mG (BIKTARVY) 1 Tablet(s) Oral daily  bisacodyl Suppository 10 milliGRAM(s) Rectal daily  cefuroxime   Tablet 500 milliGRAM(s) Oral every 12 hours  enoxaparin Injectable 40 milliGRAM(s) SubCutaneous at bedtime  oxybutynin 2.5 milliGRAM(s) Oral daily  senna 2 Tablet(s) Oral at bedtime  sodium chloride 0.9%. 1000 milliLiter(s) (75 mL/Hr) IV Continuous <Continuous>    MEDICATIONS  (PRN):  acetaminophen   Tablet .. 650 milliGRAM(s) Oral every 6 hours PRN Temp greater or equal to 38C (100.4F), Mild Pain (1 - 3)  HYDROmorphone   Tablet 2 milliGRAM(s) Oral every 8 hours PRN Severe Pain (7 - 10)  methocarbamol 500 milliGRAM(s) Oral every 8 hours PRN Muscle Spasm  pregabalin 50 milliGRAM(s) Oral every 8 hours PRN Pain  traZODone 25 milliGRAM(s) Oral at bedtime PRN insomnia        ----------------------------------------------------------------------               Patient is a 36y old  Male who presents with a chief complaint of Spinal Cord Compression (01 Oct 2021 08:33)after spontaneous epidural hemorrhage during weight lifting.     HPI:  ALISSA ARAUJO is a 36 year old male who presented to [facility] on 9/15/21 with symptoms of acute onset lower extremity weakness and parethesias a few hours after lifting heavy weights in the gym.  He developed back pain and radiating pain to bilateral buttocks then progressed to paraplegia. He was brought in by ambulance after a passerby witnessed her unable to rise from the curb.  He did not have any bowel or bladder incontinence on admission.      On presentation to the ED, MRI performed and found to have ill defined intradural - extramedullary lesion with enhancement at level T8 with mass effect on the spinal cord with lefward displacement and minimal associated cord edema.  He was admitted to neurosurgical survice and started on decadron. Spinal angiogram performed which was negative. He underwent T8-9 lami decompression on 9/15 with Dr. D-Amico with intraoperative findings consistent with subarachnoid clot now s/p evacuation.  Post operative course was uncomplicated. Tolerated procedure well. Pt was seen by pain management who adjusted pain regimen (tylenol, lyrica, Robaxin, Dilaudid). Labs were significant for hyponatremia likely secondary to SIADH given euvolemia on exam and good UOP. Pt required short course of hypertonic saline then weaned off. Sodium stable with fluid restriction and addition of Na tabs. Pt was seen by behavioral health and psychology for adjustment disorder in the setting of new paralysis. He was determined to be low risk for suicide and emotional support provided. Trazodone was started for insomnia.       ----------------------------------------------------------------------    SUBJECTIVE:  Patient seen and evaluated at bedside. Patient states that his urinary incontinence is getting in the way of his therapies. Stated that he did not have BM yet, will take suppository later if he still doesnt have. No new issues.     ROS:  Denies: headache, lightheadedness, CP, SOB, abdominal pain, nausea, constipation. No dysuria       ----------------------------------------------------------------------  Vital Signs Last 24 Hrs  T(C): 36.6 (21 Oct 2021 08:32), Max: 36.8 (20 Oct 2021 10:22)  T(F): 97.9 (21 Oct 2021 08:32), Max: 98.2 (20 Oct 2021 10:22)  HR: 85 (21 Oct 2021 08:32) (73 - 89)  BP: 125/82 (21 Oct 2021 08:32) (107/75 - 125/82)  BP(mean): --  RR: 14 (21 Oct 2021 08:32) (14 - 16)  SpO2: 100% (21 Oct 2021 08:32) (96% - 100%)    PHYSICAL EXAM  General: NAD, comfortable  Cardio: RRR, S1/S2+  Resp: no respiratory difficulty or distress  Abdomen: soft, NTND  Neuro:  T7 incomplete paraplegia  He has isolated movement throughout the left leg. He has right hip extension and some right knee extension with gravity eliminated, trace movements for the rest. Spasticity present but mild. Overall he continues to improve in his motor recovery.   Extrem: no edema, no calf tenderness   Skin: thoracic incision open to air.   No pressure ulcers.       RECENT LABS/IMAGING                          13.4   7.04  )-----------( 273      ( 21 Oct 2021 06:00 )             39.6     10-21    143  |  107  |  16  ----------------------------<  100<H>  4.4   |  31  |  1.06    Ca    8.8      21 Oct 2021 06:00    TPro  6.5  /  Alb  3.3  /  TBili  0.2  /  DBili  x   /  AST  42<H>  /  ALT  110<H>  /  AlkPhos  77  10-21        ----------------------------------------------------------------------  RECENT IMAGING:        ----------------------------------------------------------------------  MEDICATIONS  (STANDING):  bictegravir 50 mG/emtricitabine 200 mG/tenofovir alafenamide 25 mG (BIKTARVY) 1 Tablet(s) Oral daily  bisacodyl Suppository 10 milliGRAM(s) Rectal daily  cefuroxime   Tablet 500 milliGRAM(s) Oral every 12 hours  enoxaparin Injectable 40 milliGRAM(s) SubCutaneous at bedtime  oxybutynin 2.5 milliGRAM(s) Oral daily  senna 2 Tablet(s) Oral at bedtime  sodium chloride 0.9%. 1000 milliLiter(s) (75 mL/Hr) IV Continuous <Continuous>    MEDICATIONS  (PRN):  acetaminophen   Tablet .. 650 milliGRAM(s) Oral every 6 hours PRN Temp greater or equal to 38C (100.4F), Mild Pain (1 - 3)  HYDROmorphone   Tablet 2 milliGRAM(s) Oral every 8 hours PRN Severe Pain (7 - 10)  methocarbamol 500 milliGRAM(s) Oral every 8 hours PRN Muscle Spasm  pregabalin 50 milliGRAM(s) Oral every 8 hours PRN Pain  traZODone 25 milliGRAM(s) Oral at bedtime PRN insomnia        ----------------------------------------------------------------------

## 2021-10-21 NOTE — PROGRESS NOTE ADULT - ASSESSMENT
36 year old male with incomplete paraplegia.  Admitted to [place] acute inpatient rehab on 9/28 for ADL, gait, and functional impairments and neurogenic bowel and bladder. Currently being treated for Klebsiella UTI.     T7 AIS C paraplegia.  - secondary to spinal subdural hematoma s/p evacuation and decompression of T7-8  - Primary surgeon Dr. Randy D'Amico  - s/p decadron  - Comprehensive Multidisciplinary Rehab Program     3 hours a day, 5 days a week with PT/OT     P&O as needed, but premature now.   - leave incision open to air, OK to shower  - Per Neurosurgery: ok to start slow gradual, progressive translational movements in therapy as tolerated.   -Goal for inpatient rehab is independence at wc level.       HIV  -Continue Biktarvy    Psych:  h/o substance abuse: IVDU, methamphetamine  Adjustment disorder  Insomnia  - - Trazodone as needed at bedtime  -Adjustment to date has been good      GI/Bowel:  Neurogenic bowel with incontinence, decreased voluntary anal contraction but improving.   - Senna QHS  -suppository prn.   - encourage timed toileting, after am food/coffee     /Bladder:  Neurogenic bladder with Urinary retention  - bladder scans changed to q6hrs  -has not been needing to cath, but has incontinence, perhaps a little more control   -Talked about starting Oxybutynin 2.5mg BID for possible bladder spasms causing his urinary incontinence but patient would like to hold off for now-- he wants to start oxybutynin daily for now-- will start oxybutynin 2.5 daily 10/21     UTI- Meropenem 1000 mg q8h started on 10/10,   - urine culture klebsiella peña sensitive   - US renal and bladder US  10/12 unremarkable   - switched to ceftin 500mg BID -- for total of 3 weeks as per ID -day 13/21     Skin/Pressure Injury:  - Skin assessment on admission admission: no pressure injuries noted  - Monitor Incisions: spine incision open to air    Diet:   - Diet Consistency/Modifications: Reg + thins - fluid restriction discontinued    DVT ppx:  - Lovenox for total 8 weeks.  - SCDs may be stopped.   - Last Doppler on 9/27 neg    Restrictions/Precautions:  - Precautions: Spine precautions (as above), falls      ---------------  Outpatient Follow-up:  Neurosurgery - Dr. Star D'Amico  Psychological services/ counseling   PCP?    --------------    Team meeting 10/14  OT: supervision with ADLs while in bed, needs more assistance with toileting   PT: TRICIA propulsion Mod I  Tentative DC: 10/26  36 year old male with incomplete paraplegia.  Admitted to [place] acute inpatient rehab on 9/28 for ADL, gait, and functional impairments and neurogenic bowel and bladder. Currently being treated for Klebsiella UTI.     T7 AIS C paraplegia.  - secondary to spinal subdural hematoma s/p evacuation and decompression of T7-8  - Primary surgeon Dr. Randy D'Amico  - s/p decadron  - Comprehensive Multidisciplinary Rehab Program     3 hours a day, 5 days a week with PT/OT     P&O as needed, but premature now.   - leave incision open to air, OK to shower  - Per Neurosurgery: ok to start slow gradual, progressive translational movements in therapy as tolerated.   -Goal for inpatient rehab is independence at wc level.       HIV  -Continue Biktarvy    Transaminitis   -AST/ALT 42/110  10/21   -Asymptomatic  -Continue to monitor -- if still elevated will get ABD US     Psych:  h/o substance abuse: IVDU, methamphetamine  Adjustment disorder  Insomnia  - - Trazodone as needed at bedtime  -Adjustment to date has been good      GI/Bowel:  Neurogenic bowel with incontinence, decreased voluntary anal contraction but improving.   - Senna QHS  -suppository prn.   - encourage timed toileting, after am food/coffee     /Bladder:  Neurogenic bladder with Urinary retention  - bladder scans changed to q6hrs  -has not been needing to cath, but has incontinence, perhaps a little more control   -Talked about starting Oxybutynin 2.5mg BID for possible bladder spasms causing his urinary incontinence but patient would like to hold off for now-- he wants to start oxybutynin daily for now-- will start oxybutynin 2.5 daily 10/21     UTI- Meropenem 1000 mg q8h started on 10/10,   - urine culture klebsiella peña sensitive   - US renal and bladder US  10/12 unremarkable   - switched to ceftin 500mg BID -- for total of 3 weeks as per ID -day 13/21     Skin/Pressure Injury:  - Skin assessment on admission admission: no pressure injuries noted  - Monitor Incisions: spine incision open to air    Diet:   - Diet Consistency/Modifications: Reg + thins - fluid restriction discontinued    DVT ppx:  - Lovenox for total 8 weeks.  - SCDs may be stopped.   - Last Doppler on 9/27 neg    Restrictions/Precautions:  - Precautions: Spine precautions (as above), falls      ---------------  Outpatient Follow-up:  Neurosurgery - Dr. Randy D'Amico  Psychological services/ counseling   PCP?    --------------    Team meeting 10/14  OT: supervision with ADLs while in bed, needs more assistance with toileting   PT: WC propulsion Mod I  Tentative DC: 10/26  36 year old male with incomplete paraplegia.  Admitted to [place] acute inpatient rehab on 9/28 for ADL, gait, and functional impairments and neurogenic bowel and bladder. Currently being treated for Klebsiella UTI.     T7 AIS C paraplegia.  - secondary to spinal subdural hematoma s/p evacuation and decompression of T7-8  - Neurosurgeon Dr. Randy D'Amico  - Comprehensive Multidisciplinary Rehab Program     3 hours a day, 5 days a week with PT/OT     P&O as needed, but premature now.   - leave incision open to air, OK to shower  - Per Neurosurgery: ok to start slow gradual, progressive translational movements in therapy as tolerated.   -Goal for inpatient rehab is independence at wc level.       HIV  -Continue Biktarvy    Transaminitis   -AST/ALT 42/110  10/21   -Asymptomatic  -Continue to monitor -- if still elevated will get ABD US   -On the rise recently, though has been similarly elevated prior.   -Stop all non essential medsz.     Psych:  h/o substance abuse: IVDU, methamphetamine  Adjustment disorder  Insomnia  - - Trazodone as needed at bedtime  -Adjustment to date has been good, optimistic and proactive.       GI/Bowel:  Neurogenic bowel with incontinence, decreased voluntary anal contraction but improving.   - Senna QHS  -suppository prn.   - encourage timed toileting, after am food/coffee     /Bladder:  Neurogenic bladder with evolution.   - Only should do post void measurements at this time unless unable to void for > 6 hours or feeling of retention.   -has not been needing to cath, but has incontinence, perhaps a little more control   -Talked about starting Oxybutynin 2.5mg BID for possible bladder spasms causing his urinary incontinence but patient would like to hold off for now-- he wants to start oxybutynin daily for now-- will start oxybutynin 2.5 daily though will try bladder tapping first prior to therapies. Told that if really wants to ensure bladder empty prior to activities, can self cath.     UTI- Meropenem 1000 mg q8h started on 10/10,   - urine culture klebsiella peña sensitive   - US renal and bladder US  10/12 unremarkable   - switched to ceftin 500mg BID -- for total of 3 weeks as per ID -day 13/21     Skin/Pressure Injury:  - Skin assessment on admission admission: no pressure injuries noted  - Monitor Incisions: spine incision open to air    Diet:   - Diet Consistency/Modifications: Reg + thins - fluid restriction discontinued    DVT ppx:  - Lovenox for total 8 weeks, or upon discharge.   - SCDs may be stopped.   - Last Doppler on 9/27 neg    Restrictions/Precautions:  - Precautions: Spine precautions (as above), falls      ---------------  Outpatient Follow-up:  Neurosurgery - Dr. Randy D'Amico  Psychological services/ counseling   PCP?    --------------    Team meeting 10/14  OT: supervision with ADLs while in bed, needs more assistance with toileting   PT: WC propulsion Mod I  Tentative DC: 10/26

## 2021-10-22 PROCEDURE — 99232 SBSQ HOSP IP/OBS MODERATE 35: CPT | Mod: GC

## 2021-10-22 PROCEDURE — 99232 SBSQ HOSP IP/OBS MODERATE 35: CPT

## 2021-10-22 RX ORDER — OXYBUTYNIN CHLORIDE 5 MG
2.5 TABLET ORAL DAILY
Refills: 0 | Status: DISCONTINUED | OUTPATIENT
Start: 2021-10-22 | End: 2021-10-23

## 2021-10-22 RX ADMIN — ENOXAPARIN SODIUM 40 MILLIGRAM(S): 100 INJECTION SUBCUTANEOUS at 22:44

## 2021-10-22 RX ADMIN — Medication 500 MILLIGRAM(S): at 22:44

## 2021-10-22 RX ADMIN — Medication 500 MILLIGRAM(S): at 06:25

## 2021-10-22 RX ADMIN — BICTEGRAVIR SODIUM, EMTRICITABINE, AND TENOFOVIR ALAFENAMIDE FUMARATE 1 TABLET(S): 30; 120; 15 TABLET ORAL at 14:09

## 2021-10-22 RX ADMIN — Medication 2.5 MILLIGRAM(S): at 14:09

## 2021-10-22 RX ADMIN — SENNA PLUS 2 TABLET(S): 8.6 TABLET ORAL at 22:44

## 2021-10-22 NOTE — PROGRESS NOTE ADULT - ASSESSMENT
36M with PMH IVDU / methamphetamine use presented to [facility] on 9/15 with acute onset paraplegia after lifting heavy weights found to have SAH / SDH of the spinal cord with hematoma causing cord compression s/p T7-8 lami decompression (9/16) now with residual paraplegia. Hospital course complicated by hyponatremia secondary to SIADH. Admitted to [place] acute inpatient rehab on 9/28 for initiation of multidisciplinary rehab program. ADL, gait, and functional impairments.     #Sepsis  #Klebsiella UTI  -Sepsis resolved  -ID recommendations noted. Ceftin Day 14/21 w/ end date 10/29  -Blood cx NGTD    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  -s/p evacuation and decompression of T7-8  -Continue comprehensive rehab program  -Follow up with outpatient neurosurgery    #Hyponatremia, likely SIADH  -resolved    #Anemia  -Stable    #hx of IV drug use  #Methamphetamine use  -Supportive care    #Neurogenic bladder  #Acute urinary retention; resolved  #Mild urinary incontinence  -Oxybutynin  was started but discontinued?    #HIV  -Continue Biktarvy    #Prophylactic Measure  -DVT ppx: Lovenox  -Bowel regimen

## 2021-10-22 NOTE — PROGRESS NOTE ADULT - ASSESSMENT
36 year old male with incomplete paraplegia.  Admitted to [place] acute inpatient rehab on 9/28 for ADL, gait, and functional impairments and neurogenic bowel and bladder. Currently being treated for Klebsiella UTI.     T7 AIS C paraplegia.  - secondary to spinal subdural hematoma s/p evacuation and decompression of T7-8  - Neurosurgeon Dr. Randy D'Amico  - Comprehensive Multidisciplinary Rehab Program     3 hours a day, 5 days a week with PT/OT     P&O as needed, but premature now.   - leave incision open to air, OK to shower  - Per Neurosurgery: ok to start slow gradual, progressive translational movements in therapy as tolerated.   -Goal for inpatient rehab is independence at wc level.       HIV  -Continue Biktarvy    Transaminitis   -AST/ALT 42/110  10/21   -Asymptomatic  -Continue to monitor -- if still elevated will get ABD US   -On the rise recently, though has been similarly elevated prior.   -Stop all non essential medsz.     Psych:  h/o substance abuse: IVDU, methamphetamine  Adjustment disorder  Insomnia  - - Trazodone as needed at bedtime  -Adjustment to date has been good, optimistic and proactive.       GI/Bowel:  Neurogenic bowel with incontinence, decreased voluntary anal contraction but improving.   - Senna QHS  -suppository prn.   - encourage timed toileting, after am food/coffee     /Bladder:  Neurogenic bladder with evolution.   - Only should do post void measurements at this time unless unable to void for > 6 hours or feeling of retention.   -has not been needing to cath, but has incontinence, perhaps a little more control   -Talked about starting Oxybutynin 2.5mg BID for possible bladder spasms causing his urinary incontinence but patient would like to hold off for now-- he wants to start oxybutynin daily for now-- will start oxybutynin 2.5 daily though will try bladder tapping first prior to therapies. Told that if really wants to ensure bladder empty prior to activities, can self cath.   Oxybutynin 2.5mg started on 10/22     UTI- Meropenem 1000 mg q8h started on 10/10,   - urine culture klebsiella peña sensitive   - US renal and bladder US  10/12 unremarkable   - switched to ceftin 500mg BID -- for total of 3 weeks as per ID -day 14/21     Skin/Pressure Injury:  - Skin assessment on admission admission: no pressure injuries noted  - Monitor Incisions: spine incision open to air    Diet:   - Diet Consistency/Modifications: Reg + thins - fluid restriction discontinued    DVT ppx:  - Lovenox for total 8 weeks, or upon discharge.   - SCDs may be stopped.   - Last Doppler on 9/27 neg    Restrictions/Precautions:  - Precautions: Spine precautions (as above), falls      ---------------  Outpatient Follow-up:  Neurosurgery - Dr. Randy D'Amico  Psychological services/ counseling   PCP?    --------------    Team meeting 10/14  OT: supervision with ADLs while in bed, needs more assistance with toileting   PT: WC propulsion Mod I  Tentative DC: 10/26

## 2021-10-22 NOTE — PROGRESS NOTE ADULT - SUBJECTIVE AND OBJECTIVE BOX
Patient is a 36y old  Male who presents with a chief complaint of Spinal Cord Compression (01 Oct 2021 08:33)after spontaneous epidural hemorrhage during weight lifting.     HPI:  ALISSA ARAUJO is a 36 year old male who presented to [facility] on 9/15/21 with symptoms of acute onset lower extremity weakness and parethesias a few hours after lifting heavy weights in the gym.  He developed back pain and radiating pain to bilateral buttocks then progressed to paraplegia. He was brought in by ambulance after a passerby witnessed her unable to rise from the curb.  He did not have any bowel or bladder incontinence on admission.      On presentation to the ED, MRI performed and found to have ill defined intradural - extramedullary lesion with enhancement at level T8 with mass effect on the spinal cord with lefward displacement and minimal associated cord edema.  He was admitted to neurosurgical survice and started on decadron. Spinal angiogram performed which was negative. He underwent T8-9 lami decompression on 9/15 with Dr. D-Amico with intraoperative findings consistent with subarachnoid clot now s/p evacuation.  Post operative course was uncomplicated. Tolerated procedure well. Pt was seen by pain management who adjusted pain regimen (tylenol, lyrica, Robaxin, Dilaudid). Labs were significant for hyponatremia likely secondary to SIADH given euvolemia on exam and good UOP. Pt required short course of hypertonic saline then weaned off. Sodium stable with fluid restriction and addition of Na tabs. Pt was seen by behavioral health and psychology for adjustment disorder in the setting of new paralysis. He was determined to be low risk for suicide and emotional support provided. Trazodone was started for insomnia.       ----------------------------------------------------------------------    SUBJECTIVE:  Patient seen and evaluated at bedside. Patient stated that tapping on his bladder helped him to urinate more and helped with his incontinence but is still having accidents. He was able to have BM when transferred to [facility] yesterday night and did not have to use a suppository. No acute issues endorsed.     ROS:  Denies: headache, lightheadedness, CP, SOB, abdominal pain, nausea, constipation. No dysuria       ----------------------------------------------------------------------  Vital Signs Last 24 Hrs  T(C): 36.8 (21 Oct 2021 20:43), Max: 36.8 (21 Oct 2021 20:43)  T(F): 98.2 (21 Oct 2021 20:43), Max: 98.2 (21 Oct 2021 20:43)  HR: 90 (21 Oct 2021 20:43) (90 - 90)  BP: 105/71 (21 Oct 2021 20:43) (105/71 - 105/71)  BP(mean): --  RR: 16 (21 Oct 2021 20:43) (16 - 16)  SpO2: 97% (21 Oct 2021 20:43) (97% - 97%)    PHYSICAL EXAM  General: NAD, comfortable  Cardio: RRR, S1/S2+  Resp: no respiratory difficulty or distress  Abdomen: soft, NTND  Neuro:  T7 incomplete paraplegia  He has isolated movement throughout the left leg. He has right hip extension and some right knee extension with gravity eliminated, trace movements for the rest. Spasticity present but mild. Overall he continues to improve in his motor recovery.   Extrem: no edema, no calf tenderness   Skin: thoracic incision open to air.   No pressure ulcers.       RECENT LABS/IMAGING      ----------------------------------------------------------------------  RECENT IMAGING:        ----------------------------------------------------------------------  MEDICATIONS  (STANDING):  bictegravir 50 mG/emtricitabine 200 mG/tenofovir alafenamide 25 mG (BIKTARVY) 1 Tablet(s) Oral daily  cefuroxime   Tablet 500 milliGRAM(s) Oral every 12 hours  enoxaparin Injectable 40 milliGRAM(s) SubCutaneous at bedtime  oxybutynin 2.5 milliGRAM(s) Oral daily  senna 2 Tablet(s) Oral at bedtime  sodium chloride 0.9%. 1000 milliLiter(s) (75 mL/Hr) IV Continuous <Continuous>    MEDICATIONS  (PRN):  acetaminophen   Tablet .. 650 milliGRAM(s) Oral every 6 hours PRN Temp greater or equal to 38C (100.4F), Mild Pain (1 - 3)  bisacodyl Suppository 10 milliGRAM(s) Rectal at bedtime PRN Constipation  HYDROmorphone   Tablet 2 milliGRAM(s) Oral every 8 hours PRN Severe Pain (7 - 10)  methocarbamol 500 milliGRAM(s) Oral every 8 hours PRN Muscle Spasm  pregabalin 50 milliGRAM(s) Oral every 8 hours PRN Pain  traZODone 25 milliGRAM(s) Oral at bedtime PRN insomnia        ----------------------------------------------------------------------

## 2021-10-22 NOTE — PROGRESS NOTE ADULT - SUBJECTIVE AND OBJECTIVE BOX
Patient is a 36y old  Male who presents with a chief complaint of Incomplete paraplegia. (21 Oct 2021 10:06)      SUBJECTIVE / OVERNIGHT EVENTS:  Pt seen and examined at bedside. No acute events overnight.  Pt denies cp, palpitations, sob, abd pain, N/V, fever, chills.    ROS:  All other review of systems negative    Allergies    No Known Allergies    Intolerances        MEDICATIONS  (STANDING):  bictegravir 50 mG/emtricitabine 200 mG/tenofovir alafenamide 25 mG (BIKTARVY) 1 Tablet(s) Oral daily  bisacodyl Suppository 10 milliGRAM(s) Rectal once  bisacodyl Suppository 10 milliGRAM(s) Rectal daily  cefuroxime   Tablet 500 milliGRAM(s) Oral every 12 hours  enoxaparin Injectable 40 milliGRAM(s) SubCutaneous at bedtime  senna 2 Tablet(s) Oral at bedtime  sodium chloride 0.9%. 1000 milliLiter(s) (75 mL/Hr) IV Continuous <Continuous>    MEDICATIONS  (PRN):  acetaminophen   Tablet .. 650 milliGRAM(s) Oral every 6 hours PRN Temp greater or equal to 38C (100.4F), Mild Pain (1 - 3)  HYDROmorphone   Tablet 2 milliGRAM(s) Oral every 8 hours PRN Severe Pain (7 - 10)  methocarbamol 500 milliGRAM(s) Oral every 8 hours PRN Muscle Spasm  pregabalin 50 milliGRAM(s) Oral every 8 hours PRN Pain  traZODone 25 milliGRAM(s) Oral at bedtime PRN insomnia      Vital Signs Last 24 Hrs  T(C): 36.8 (21 Oct 2021 20:43), Max: 36.8 (21 Oct 2021 20:43)  T(F): 98.2 (21 Oct 2021 20:43), Max: 98.2 (21 Oct 2021 20:43)  HR: 90 (21 Oct 2021 20:43) (90 - 90)  BP: 105/71 (21 Oct 2021 20:43) (105/71 - 105/71)  BP(mean): --  RR: 16 (21 Oct 2021 20:43) (16 - 16)  SpO2: 97% (21 Oct 2021 20:43) (97% - 97%)  CAPILLARY BLOOD GLUCOSE        I&O's Summary      PHYSICAL EXAM:  GENERAL: NAD, well-developed  CHEST/LUNG: Clear to auscultation bilaterally; No wheeze, nonlabored breathing  HEART: Regular rate and rhythm; No murmurs, rubs, or gallops  ABDOMEN: Soft, Nontender, Nondistended; Bowel sounds present  EXTREMITIES:  2+ Peripheral Pulses, No clubbing, cyanosis, or edema  NEUROLOGY: AAOx3, RLE weakness  PSYCH: calm, appropriate mood    LABS:                        13.4   7.04  )-----------( 273      ( 21 Oct 2021 06:00 )             39.6     10-21    143  |  107  |  16  ----------------------------<  100<H>  4.4   |  31  |  1.06    Ca    8.8      21 Oct 2021 06:00    TPro  6.5  /  Alb  3.3  /  TBili  0.2  /  DBili  x   /  AST  42<H>  /  ALT  110<H>  /  AlkPhos  77  10-21              RADIOLOGY & ADDITIONAL TESTS:  Results Reviewed:   Imaging Personally Reviewed:  Electrocardiogram Personally Reviewed:    COORDINATION OF CARE:  Care Discussed with Consultants/Other Providers [Y/N]:  Prior or Outpatient Records Reviewed [Y/N]:

## 2021-10-23 LAB — SARS-COV-2 RNA SPEC QL NAA+PROBE: SIGNIFICANT CHANGE UP

## 2021-10-23 PROCEDURE — 99232 SBSQ HOSP IP/OBS MODERATE 35: CPT

## 2021-10-23 RX ORDER — OXYBUTYNIN CHLORIDE 5 MG
2.5 TABLET ORAL ONCE
Refills: 0 | Status: COMPLETED | OUTPATIENT
Start: 2021-10-23 | End: 2021-10-23

## 2021-10-23 RX ORDER — OXYBUTYNIN CHLORIDE 5 MG
2.5 TABLET ORAL
Refills: 0 | Status: DISCONTINUED | OUTPATIENT
Start: 2021-10-24 | End: 2021-10-26

## 2021-10-23 RX ADMIN — ENOXAPARIN SODIUM 40 MILLIGRAM(S): 100 INJECTION SUBCUTANEOUS at 22:09

## 2021-10-23 RX ADMIN — Medication 10 MILLIGRAM(S): at 18:44

## 2021-10-23 RX ADMIN — Medication 500 MILLIGRAM(S): at 09:00

## 2021-10-23 RX ADMIN — Medication 2.5 MILLIGRAM(S): at 08:59

## 2021-10-23 RX ADMIN — SENNA PLUS 2 TABLET(S): 8.6 TABLET ORAL at 22:10

## 2021-10-23 RX ADMIN — BICTEGRAVIR SODIUM, EMTRICITABINE, AND TENOFOVIR ALAFENAMIDE FUMARATE 1 TABLET(S): 30; 120; 15 TABLET ORAL at 14:04

## 2021-10-23 RX ADMIN — Medication 500 MILLIGRAM(S): at 17:33

## 2021-10-23 NOTE — PROGRESS NOTE ADULT - SUBJECTIVE AND OBJECTIVE BOX
HPI:  Ms. ALISSA ARAUJO is a 36 year old male with history of IVDU and methamphetamine use presented to [facility] on 9/15/21 with symptoms of acute onset lower extremity weakness and parethesias a few hours after lifting heavy weights in the gym.  He developed back pain and radiating pain to bilateral buttocks then progressed to paraplegia. He was brought in by ambulance after a passerby witnessed unable to rise from the curb.  He did not have any bowel or bladder incontinence on admission.      On presentation to the ED, MRI performed and found to have ill defined intradural - extramedullary lesion with enhancement at level T8 with mass effect on the spinal cord with lefward displacement and minimal associated cord edema.  He was admitted to neurosurgical survice and started on decadron. Spinal angiogram performed which was negative. He underwent T8-9 lami decompression on 9/15 with Dr. D-Amico with intraoperative findings consistent with subarachnoid clot now s/p evacuation.  Post operative course was uncomplicated. Tolerated procedure well. Pt was seen by pain management who adjusted pain regimen (tylenol, lyrica, Robaxin, Dilaudid). Labs were significant for hyponatremia likely secondary to SIADH given euvolemia on exam and good UOP. Pt required short course of hypertonic saline then weaned off. Sodium stable with fluid restriction and addition of Na tabs. Pt was seen by behavioral health and psychology for adjustment disorder in the setting of new paralysis. He was determined to be low risk for suicide and emotional support provided. Trazodone was started for insomnia.     Patient was evaluated by PM&R and therapy for functional deficits and gait/ ADL impairments and recommended acute rehabilitation. Patient was medically optimized for discharge to  to [facility] on 9/28 (28 Sep 2021 13:56)      Subjective  No new complaints.         PAST MEDICAL & SURGICAL HISTORY:  Infection, HIV        MedsMEDICATIONS  (STANDING):  bictegravir 50 mG/emtricitabine 200 mG/tenofovir alafenamide 25 mG (BIKTARVY) 1 Tablet(s) Oral daily  cefuroxime   Tablet 500 milliGRAM(s) Oral every 12 hours  enoxaparin Injectable 40 milliGRAM(s) SubCutaneous at bedtime  senna 2 Tablet(s) Oral at bedtime  sodium chloride 0.9%. 1000 milliLiter(s) (75 mL/Hr) IV Continuous <Continuous>    MEDICATIONS  (PRN):  acetaminophen   Tablet .. 650 milliGRAM(s) Oral every 6 hours PRN Temp greater or equal to 38C (100.4F), Mild Pain (1 - 3)  bisacodyl Suppository 10 milliGRAM(s) Rectal at bedtime PRN Constipation  HYDROmorphone   Tablet 2 milliGRAM(s) Oral every 8 hours PRN Severe Pain (7 - 10)  methocarbamol 500 milliGRAM(s) Oral every 8 hours PRN Muscle Spasm  pregabalin 50 milliGRAM(s) Oral every 8 hours PRN Pain  traZODone 25 milliGRAM(s) Oral at bedtime PRN insomnia      Vital Signs Last 24 Hrs  T(C): 36.2 (23 Oct 2021 08:06), Max: 36.7 (22 Oct 2021 22:43)  T(F): 97.2 (23 Oct 2021 08:06), Max: 98 (22 Oct 2021 22:43)  HR: 73 (23 Oct 2021 08:06) (73 - 82)  BP: 118/81 (23 Oct 2021 08:06) (118/81 - 122/82)  BP(mean): --  RR: 14 (23 Oct 2021 08:06) (14 - 16)  SpO2: 97% (23 Oct 2021 08:06) (97% - 100%)  I&O's Summary      PHYSICAL EXAM:  GENERAL: NAD  NECK: Supple  NERVOUS SYSTEM:  awake and alert  HEART: S1s2 NL , RRR  CHEST/LUNG: Clear to percussion bilaterally  ABDOMEN: Soft, Nontender, Nondistended; Bowel sounds present  EXTREMITIES:  No edema      LABS:              RVP:          Tox:           CAPILLARY BLOOD GLUCOSE          Imaging Personally Reviewed:  [ ] YES  [ ] NO        Care Discussed with Consultants/Other Providers [ x] YES  [ ] NO

## 2021-10-23 NOTE — PROGRESS NOTE ADULT - SUBJECTIVE AND OBJECTIVE BOX
Pt. seen and examined at bedside.  No overnight events.  Voiding.  Ambulating w/ 2 person assist.      REVIEW OF SYSTEMS  Constitutional - No fever,  No fatigue  Neurological - No headaches, ++ loss of strength  Musculoskeletal - No joint pain, No joint swelling, No muscle pain    VITALS  T(C): 36.2 (10-23-21 @ 08:06), Max: 36.7 (10-22-21 @ 22:43)  HR: 73 (10-23-21 @ 08:06) (73 - 82)  BP: 118/81 (10-23-21 @ 08:06) (118/81 - 122/82)  RR: 14 (10-23-21 @ 08:06) (14 - 16)  SpO2: 97% (10-23-21 @ 08:06) (97% - 100%)  Wt(kg): --       MEDICATIONS   acetaminophen   Tablet .. 650 milliGRAM(s) every 6 hours PRN  bictegravir 50 mG/emtricitabine 200 mG/tenofovir alafenamide 25 mG (BIKTARVY) 1 Tablet(s) daily  bisacodyl Suppository 10 milliGRAM(s) at bedtime PRN  cefuroxime   Tablet 500 milliGRAM(s) every 12 hours  enoxaparin Injectable 40 milliGRAM(s) at bedtime  HYDROmorphone   Tablet 2 milliGRAM(s) every 8 hours PRN  methocarbamol 500 milliGRAM(s) every 8 hours PRN  pregabalin 50 milliGRAM(s) every 8 hours PRN  senna 2 Tablet(s) at bedtime  sodium chloride 0.9%. 1000 milliLiter(s) <Continuous>  traZODone 25 milliGRAM(s) at bedtime PRN      RECENT LABS/IMAGING                        ---------  PHYSICAL EXAM  Constitutional - NAD, Comfortable  Pulm - Breathing comfortably, No wheezing  Abd - Soft, NTND  SPINE - INCISION C/D/I  Extremities - No edema, No calf tenderness  Neurologic Exam -                    Cognitive - Awake, Alert     Communication - Fluent     Motor - LE PARAPLEGIA     Sensory - Intact to LT  Psychiatric - Mood WNL, Affect WNL    ASSESSMENT/PLAN  36y Male with functional deficits s/p T7-8 lami/decompression 2' hematoma -> cord compression.  Continue current medical management  Pain - Tylenol PRN; dilaudid; methocarbamol; pregabalin  DVT PPX - enoxaparin Injectable 40 milliGRAM(s) at bedtime  Active issues - urinary incontinence; HIV infection  Continue 3hrs a day of comprehensive rehab program.

## 2021-10-23 NOTE — PROGRESS NOTE ADULT - ASSESSMENT
T8 Spinal Cord Compression sec to SAH / SDH, s/p evacuation and decompression   PT/OT per rehab    UTI  Ceftin, total of 21 days per ID    Hyponatremia  Resolved    Anemia  Resolved    HIV  Biktarvy    DVT ppx  Lovenox

## 2021-10-24 PROCEDURE — 99232 SBSQ HOSP IP/OBS MODERATE 35: CPT

## 2021-10-24 RX ADMIN — Medication 500 MILLIGRAM(S): at 18:42

## 2021-10-24 RX ADMIN — SENNA PLUS 2 TABLET(S): 8.6 TABLET ORAL at 22:07

## 2021-10-24 RX ADMIN — ENOXAPARIN SODIUM 40 MILLIGRAM(S): 100 INJECTION SUBCUTANEOUS at 22:07

## 2021-10-24 RX ADMIN — Medication 500 MILLIGRAM(S): at 06:17

## 2021-10-24 RX ADMIN — Medication 2.5 MILLIGRAM(S): at 08:04

## 2021-10-24 RX ADMIN — BICTEGRAVIR SODIUM, EMTRICITABINE, AND TENOFOVIR ALAFENAMIDE FUMARATE 1 TABLET(S): 30; 120; 15 TABLET ORAL at 12:00

## 2021-10-24 NOTE — PROGRESS NOTE ADULT - SUBJECTIVE AND OBJECTIVE BOX
Pt. seen and examined at bedside.  No overnight events.      REVIEW OF SYSTEMS  Constitutional - No fever,  No fatigue  Neurological - No headaches, ++ loss of strength  Musculoskeletal - No joint pain, No joint swelling, No muscle pain    VITALS  T(C): 36.6 (10-24-21 @ 08:27), Max: 36.7 (10-23-21 @ 21:40)  HR: 61 (10-24-21 @ 08:27) (61 - 72)  BP: 123/84 (10-24-21 @ 08:27) (110/71 - 123/84)  RR: 16 (10-24-21 @ 08:27) (15 - 16)  SpO2: 100% (10-24-21 @ 08:27) (99% - 100%)  Wt(kg): --       MEDICATIONS   acetaminophen   Tablet .. 650 milliGRAM(s) every 6 hours PRN  bictegravir 50 mG/emtricitabine 200 mG/tenofovir alafenamide 25 mG (BIKTARVY) 1 Tablet(s) daily  bisacodyl Suppository 10 milliGRAM(s) at bedtime PRN  cefuroxime   Tablet 500 milliGRAM(s) every 12 hours  enoxaparin Injectable 40 milliGRAM(s) at bedtime  HYDROmorphone   Tablet 2 milliGRAM(s) every 8 hours PRN  methocarbamol 500 milliGRAM(s) every 8 hours PRN  oxybutynin 2.5 milliGRAM(s) <User Schedule>  pregabalin 50 milliGRAM(s) every 8 hours PRN  senna 2 Tablet(s) at bedtime  traZODone 25 milliGRAM(s) at bedtime PRN      RECENT LABS/IMAGING                        ---------  PHYSICAL EXAM  Constitutional - NAD, Comfortable  Pulm - Breathing comfortably, No wheezing  Abd - Soft, NTND  SPINE INCISION C/D/I  Extremities - No edema, No calf tenderness  Neurologic Exam -                    Cognitive - Awake, Alert     Communication - Fluent     Motor - LE WEAK     Sensory - Intact to LT  Psychiatric - Mood WNL, Affect WNL    ASSESSMENT/PLAN  36y Male with functional deficits 2' SC INFARCTION S/P T7-8 LAMINECTOMY.  Continue current medical management  Pain - Tylenol PRN; PREGABALIN; METHOCARBAMOL; DILAUDID  DVT PPX - enoxaparin Injectable 40 milliGRAM(s) at bedtime  Active issues - NONE  Continue 3hrs a day of comprehensive rehab program.

## 2021-10-25 LAB
ALBUMIN SERPL ELPH-MCNC: 3.3 G/DL — SIGNIFICANT CHANGE UP (ref 3.3–5)
ALP SERPL-CCNC: 65 U/L — SIGNIFICANT CHANGE UP (ref 40–120)
ALT FLD-CCNC: 78 U/L — HIGH (ref 10–45)
ANION GAP SERPL CALC-SCNC: 6 MMOL/L — SIGNIFICANT CHANGE UP (ref 5–17)
AST SERPL-CCNC: 33 U/L — SIGNIFICANT CHANGE UP (ref 10–40)
BILIRUB SERPL-MCNC: 0.2 MG/DL — SIGNIFICANT CHANGE UP (ref 0.2–1.2)
BUN SERPL-MCNC: 14 MG/DL — SIGNIFICANT CHANGE UP (ref 7–23)
CALCIUM SERPL-MCNC: 9.2 MG/DL — SIGNIFICANT CHANGE UP (ref 8.4–10.5)
CHLORIDE SERPL-SCNC: 108 MMOL/L — SIGNIFICANT CHANGE UP (ref 96–108)
CO2 SERPL-SCNC: 32 MMOL/L — HIGH (ref 22–31)
CREAT SERPL-MCNC: 0.99 MG/DL — SIGNIFICANT CHANGE UP (ref 0.5–1.3)
GLUCOSE SERPL-MCNC: 96 MG/DL — SIGNIFICANT CHANGE UP (ref 70–99)
HCT VFR BLD CALC: 40.5 % — SIGNIFICANT CHANGE UP (ref 39–50)
HGB BLD-MCNC: 13.6 G/DL — SIGNIFICANT CHANGE UP (ref 13–17)
MCHC RBC-ENTMCNC: 32.2 PG — SIGNIFICANT CHANGE UP (ref 27–34)
MCHC RBC-ENTMCNC: 33.6 GM/DL — SIGNIFICANT CHANGE UP (ref 32–36)
MCV RBC AUTO: 95.7 FL — SIGNIFICANT CHANGE UP (ref 80–100)
NRBC # BLD: 0 /100 WBCS — SIGNIFICANT CHANGE UP (ref 0–0)
PLATELET # BLD AUTO: 231 K/UL — SIGNIFICANT CHANGE UP (ref 150–400)
POTASSIUM SERPL-MCNC: 4.7 MMOL/L — SIGNIFICANT CHANGE UP (ref 3.5–5.3)
POTASSIUM SERPL-SCNC: 4.7 MMOL/L — SIGNIFICANT CHANGE UP (ref 3.5–5.3)
PROT SERPL-MCNC: 6.4 G/DL — SIGNIFICANT CHANGE UP (ref 6–8.3)
RBC # BLD: 4.23 M/UL — SIGNIFICANT CHANGE UP (ref 4.2–5.8)
RBC # FLD: 12.4 % — SIGNIFICANT CHANGE UP (ref 10.3–14.5)
SODIUM SERPL-SCNC: 146 MMOL/L — HIGH (ref 135–145)
WBC # BLD: 6.94 K/UL — SIGNIFICANT CHANGE UP (ref 3.8–10.5)
WBC # FLD AUTO: 6.94 K/UL — SIGNIFICANT CHANGE UP (ref 3.8–10.5)

## 2021-10-25 PROCEDURE — 99232 SBSQ HOSP IP/OBS MODERATE 35: CPT | Mod: GC

## 2021-10-25 RX ORDER — ACETAMINOPHEN 500 MG
2 TABLET ORAL
Qty: 0 | Refills: 0 | DISCHARGE
Start: 2021-10-25

## 2021-10-25 RX ORDER — BICTEGRAVIR SODIUM, EMTRICITABINE, AND TENOFOVIR ALAFENAMIDE FUMARATE 30; 120; 15 MG/1; MG/1; MG/1
1 TABLET ORAL
Qty: 30 | Refills: 0
Start: 2021-10-25 | End: 2021-11-23

## 2021-10-25 RX ORDER — BICTEGRAVIR SODIUM, EMTRICITABINE, AND TENOFOVIR ALAFENAMIDE FUMARATE 30; 120; 15 MG/1; MG/1; MG/1
1 TABLET ORAL
Qty: 0 | Refills: 0 | DISCHARGE

## 2021-10-25 RX ORDER — TRAZODONE HCL 50 MG
0.5 TABLET ORAL
Qty: 15 | Refills: 0
Start: 2021-10-25 | End: 2021-11-23

## 2021-10-25 RX ORDER — CEFUROXIME AXETIL 250 MG
1 TABLET ORAL
Qty: 8 | Refills: 0
Start: 2021-10-25 | End: 2021-10-28

## 2021-10-25 RX ORDER — OXYBUTYNIN CHLORIDE 5 MG
0.5 TABLET ORAL
Qty: 15 | Refills: 0
Start: 2021-10-25 | End: 2021-11-23

## 2021-10-25 RX ORDER — METHOCARBAMOL 500 MG/1
1 TABLET, FILM COATED ORAL
Qty: 90 | Refills: 0
Start: 2021-10-25 | End: 2021-11-23

## 2021-10-25 RX ORDER — SENNA PLUS 8.6 MG/1
2 TABLET ORAL
Qty: 0 | Refills: 0 | DISCHARGE
Start: 2021-10-25

## 2021-10-25 RX ADMIN — ENOXAPARIN SODIUM 40 MILLIGRAM(S): 100 INJECTION SUBCUTANEOUS at 22:03

## 2021-10-25 RX ADMIN — Medication 500 MILLIGRAM(S): at 17:56

## 2021-10-25 RX ADMIN — Medication 500 MILLIGRAM(S): at 06:21

## 2021-10-25 RX ADMIN — Medication 2.5 MILLIGRAM(S): at 08:33

## 2021-10-25 RX ADMIN — SENNA PLUS 2 TABLET(S): 8.6 TABLET ORAL at 22:03

## 2021-10-25 RX ADMIN — BICTEGRAVIR SODIUM, EMTRICITABINE, AND TENOFOVIR ALAFENAMIDE FUMARATE 1 TABLET(S): 30; 120; 15 TABLET ORAL at 11:56

## 2021-10-25 NOTE — PROGRESS NOTE ADULT - ATTENDING SUPERVISION STATEMENT
Resident
ACP
Resident

## 2021-10-25 NOTE — PROGRESS NOTE ADULT - SUBJECTIVE AND OBJECTIVE BOX
Patient is a 36y old  Male who presents with a chief complaint of Spinal Cord Compression (01 Oct 2021 08:33)after spontaneous epidural hemorrhage during weight lifting.     HPI:  ALISSA ARAUJO is a 36 year old male who presented to [facility] on 9/15/21 with symptoms of acute onset lower extremity weakness and parethesias a few hours after lifting heavy weights in the gym.  He developed back pain and radiating pain to bilateral buttocks then progressed to paraplegia. He was brought in by ambulance after a passerby witnessed her unable to rise from the curb.  He did not have any bowel or bladder incontinence on admission.      On presentation to the ED, MRI performed and found to have ill defined intradural - extramedullary lesion with enhancement at level T8 with mass effect on the spinal cord with lefward displacement and minimal associated cord edema.  He was admitted to neurosurgical survice and started on decadron. Spinal angiogram performed which was negative. He underwent T8-9 lami decompression on 9/15 with Dr. D-Amico with intraoperative findings consistent with subarachnoid clot now s/p evacuation.  Post operative course was uncomplicated. Tolerated procedure well. Pt was seen by pain management who adjusted pain regimen (tylenol, lyrica, Robaxin, Dilaudid). Labs were significant for hyponatremia likely secondary to SIADH given euvolemia on exam and good UOP. Pt required short course of hypertonic saline then weaned off. Sodium stable with fluid restriction and addition of Na tabs. Pt was seen by behavioral health and psychology for adjustment disorder in the setting of new paralysis. He was determined to be low risk for suicide and emotional support provided. Trazodone was started for insomnia.       ----------------------------------------------------------------------    SUBJECTIVE:  Patient seen and evaluated at bedside. Tolerating therapy - tried using crutches.  Bladder control while on oxybutynin 2.5mg noted with less accidents/leakage but seems harder to initiate flow.  PVR has been <200, no SC needed.  RLE with spasm, tone allows him to stand better.      ROS:  Denies: headache, lightheadedness, CP, SOB, abdominal pain, nausea, constipation. No dysuria       ----------------------------------------------------------------------  Vital Signs Last 24 Hrs  T(C): 36.8 (25 Oct 2021 08:37), Max: 36.8 (25 Oct 2021 08:37)  T(F): 98.3 (25 Oct 2021 08:37), Max: 98.3 (25 Oct 2021 08:37)  HR: 89 (25 Oct 2021 08:37) (71 - 89)  BP: 141/79 (25 Oct 2021 08:37) (122/86 - 141/79)  BP(mean): --  RR: 16 (25 Oct 2021 08:37) (16 - 16)  SpO2: 100% (25 Oct 2021 08:37) (100% - 100%)    PHYSICAL EXAM  General: NAD, comfortable  Cardio: RRR, S1/S2+  Resp: no respiratory difficulty or distress  Abdomen: soft, NTND  Neuro:  T7 incomplete paraplegia  He has isolated movement throughout the left leg. He has right hip extension and some right knee extension with gravity eliminated, trace movements for the rest. Spasticity present but mild. Overall he continues to improve in his motor recovery.   Extrem: no edema, no calf tenderness   Skin: thoracic incision open to air.   No pressure ulcers.       RECENT LABS/IMAGING  LAB                        13.6   6.94  )-----------( 231      ( 25 Oct 2021 06:00 )             40.5     10-25    146<H>  |  108  |  14  ----------------------------<  96  4.7   |  32<H>  |  0.99    Ca    9.2      25 Oct 2021 06:00    TPro  6.4  /  Alb  3.3  /  TBili  0.2  /  DBili  x   /  AST  33  /  ALT  78<H>  /  AlkPhos  65  10-25    LIVER FUNCTIONS - ( 25 Oct 2021 06:00 )  Alb: 3.3 g/dL / Pro: 6.4 g/dL / ALK PHOS: 65 U/L / ALT: 78 U/L / AST: 33 U/L / GGT: x           ----------------------------------------------------------------------  RECENT IMAGING:        ----------------------------------------------------------------------  MEDICATIONS  (STANDING):  bictegravir 50 mG/emtricitabine 200 mG/tenofovir alafenamide 25 mG (BIKTARVY) 1 Tablet(s) Oral daily  cefuroxime   Tablet 500 milliGRAM(s) Oral every 12 hours  enoxaparin Injectable 40 milliGRAM(s) SubCutaneous at bedtime  oxybutynin 2.5 milliGRAM(s) Oral <User Schedule>  senna 2 Tablet(s) Oral at bedtime    MEDICATIONS  (PRN):  acetaminophen   Tablet .. 650 milliGRAM(s) Oral every 6 hours PRN Temp greater or equal to 38C (100.4F), Mild Pain (1 - 3)  bisacodyl Suppository 10 milliGRAM(s) Rectal at bedtime PRN Constipation  HYDROmorphone   Tablet 2 milliGRAM(s) Oral every 8 hours PRN Severe Pain (7 - 10)  methocarbamol 500 milliGRAM(s) Oral every 8 hours PRN Muscle Spasm  pregabalin 50 milliGRAM(s) Oral every 8 hours PRN Pain  traZODone 25 milliGRAM(s) Oral at bedtime PRN insomnia    ----------------------------------------------------------------------

## 2021-10-25 NOTE — PROGRESS NOTE ADULT - ATTENDING COMMENTS
Doing, well, tolerating rehab
Doing well. incontinence may be getting better with oxybutynin. No retention.   dc planning
Patient doing well, continue current program
Patient doing well, continue rehab
Patient doing well. Not needing to straight cath over last 24 h but does not have control- will observe  continue PO antibiotics
Pt with some bleeding noted when cathing- observe for now.   His strength is getting better.
doing well, continue rehab
Patient doing well, continue rehab program
Patient doing well. has not used straight cath. reports incontinence   still working out bm, reluctant to use suppository
Patient is doing well, continue rehab program
Pt doing well, started ditropan today
discussed with pt his bowel program, will resume using a suppository tomorrow
Patient was seen and examined. Findings and plan as noted by Dr. Rodriguez, modified where appropriate.   In addition to above, began discussion of sexuality. He is able to get transient reflex erections at this time during self cleaning. Not sure if able ot get psychogenic. Has not tried to masturbate. Counseling provided using PLISSIT model. Several options reviewed.
Chart reviewed, seen and examined with Dr. Rodriguez. I have modified note to reflect my findings.  Has improved with IV abx for presumed UTI. Surprising that UA at time of febrile with min pyuria. Order renal and bladder sono to eval for stones, hydro. Hope to switch to oral abx. Cath as needed for PVR or cath vols >400cc.   Emerging bowel control. Monitor.  Full program.  On track for wc independence at time of discharge.  May be a challenge to arrange outpt follow up/therapies with insurance constraints.

## 2021-10-25 NOTE — PROGRESS NOTE ADULT - ASSESSMENT
36 year old male with incomplete paraplegia.  Admitted to [facility] acute inpatient rehab on 9/28 for ADL, gait, and functional impairments and neurogenic bowel and bladder. Currently being treated for Klebsiella UTI.     T7 AIS C paraplegia.  - secondary to spinal subdural hematoma s/p evacuation and decompression of T7-8  - Neurosurgeon Dr. Randy D'Amico  - Comprehensive Multidisciplinary Rehab Program     3 hours a day, 5 days a week with PT/OT     P&O as needed, but premature now.   - leave incision open to air, OK to shower  - Per Neurosurgery: ok to start slow gradual, progressive translational movements in therapy as tolerated.   -Goal for inpatient rehab is independence at wc level.     HIV  -Continue Biktarvy    Transaminitis   -AST/ALT 42/110  10/21   -Asymptomatic  -Continue to monitor -- if still elevated will get ABD US   -On the rise recently, though has been similarly elevated prior.   -Stop all non essential medsz.     Psych:  h/o substance abuse: IVDU, methamphetamine  Adjustment disorder  Insomnia  - - Trazodone as needed at bedtime  -Adjustment to date has been good, optimistic and proactive.       GI/Bowel:  Neurogenic bowel with incontinence, decreased voluntary anal contraction but improving.   - Senna QHS  -suppository prn.   - encourage timed toileting, after am food/coffee     /Bladder:  Neurogenic bladder with evolution.   - Only should do post void measurements at this time unless unable to void for > 6 hours or feeling of retention.   -has not been needing to cath, but has incontinence, perhaps a little more control   -Talked about starting Oxybutynin 2.5mg BID for possible bladder spasms causing his urinary incontinence but patient would like to hold off for now-- he wants to start oxybutynin daily for now-- will start oxybutynin 2.5 daily though will try bladder tapping first prior to therapies. Told that if really wants to ensure bladder empty prior to activities, can self cath.   Oxybutynin 2.5mg started on 10/22     UTI- Meropenem 1000 mg q8h started on 10/10,   - urine culture klebsiella peña sensitive   - US renal and bladder US  10/12 unremarkable   - switched to ceftin 500mg BID -- for total of 3 weeks as per ID -day 17/21     Skin/Pressure Injury:  - Skin assessment on admission admission: no pressure injuries noted  - Monitor Incisions: spine incision open to air    Diet:   - Diet Consistency/Modifications: Reg + thins - fluid restriction discontinued    DVT ppx:  - Lovenox for total 8 weeks, or upon discharge.   - SCDs may be stopped.   - Last Doppler on 9/27 neg    Restrictions/Precautions:  - Precautions: Spine precautions (as above), falls      ---------------  Outpatient Follow-up:  Neurosurgery - Dr. Randy D'Amico  Psychological services/ counseling   PCP?    --------------    Team meeting 10/14  OT: supervision with ADLs while in bed, needs more assistance with toileting   PT: WC propulsion Mod I  Tentative DC: 10/26

## 2021-10-26 ENCOUNTER — TRANSCRIPTION ENCOUNTER (OUTPATIENT)
Age: 36
End: 2021-10-26

## 2021-10-26 VITALS
SYSTOLIC BLOOD PRESSURE: 122 MMHG | DIASTOLIC BLOOD PRESSURE: 68 MMHG | TEMPERATURE: 98 F | HEART RATE: 89 BPM | RESPIRATION RATE: 16 BRPM | OXYGEN SATURATION: 98 %

## 2021-10-26 PROCEDURE — 87086 URINE CULTURE/COLONY COUNT: CPT

## 2021-10-26 PROCEDURE — 97116 GAIT TRAINING THERAPY: CPT

## 2021-10-26 PROCEDURE — 86769 SARS-COV-2 COVID-19 ANTIBODY: CPT

## 2021-10-26 PROCEDURE — 97530 THERAPEUTIC ACTIVITIES: CPT

## 2021-10-26 PROCEDURE — 76770 US EXAM ABDO BACK WALL COMP: CPT

## 2021-10-26 PROCEDURE — 97112 NEUROMUSCULAR REEDUCATION: CPT

## 2021-10-26 PROCEDURE — U0005: CPT

## 2021-10-26 PROCEDURE — 87186 SC STD MICRODIL/AGAR DIL: CPT

## 2021-10-26 PROCEDURE — 87040 BLOOD CULTURE FOR BACTERIA: CPT

## 2021-10-26 PROCEDURE — 85652 RBC SED RATE AUTOMATED: CPT

## 2021-10-26 PROCEDURE — 36415 COLL VENOUS BLD VENIPUNCTURE: CPT

## 2021-10-26 PROCEDURE — 97167 OT EVAL HIGH COMPLEX 60 MIN: CPT

## 2021-10-26 PROCEDURE — 97163 PT EVAL HIGH COMPLEX 45 MIN: CPT

## 2021-10-26 PROCEDURE — 80053 COMPREHEN METABOLIC PANEL: CPT

## 2021-10-26 PROCEDURE — 97542 WHEELCHAIR MNGMENT TRAINING: CPT

## 2021-10-26 PROCEDURE — U0003: CPT

## 2021-10-26 PROCEDURE — 81001 URINALYSIS AUTO W/SCOPE: CPT

## 2021-10-26 PROCEDURE — 87077 CULTURE AEROBIC IDENTIFY: CPT

## 2021-10-26 PROCEDURE — 85027 COMPLETE CBC AUTOMATED: CPT

## 2021-10-26 PROCEDURE — 83605 ASSAY OF LACTIC ACID: CPT

## 2021-10-26 PROCEDURE — 80048 BASIC METABOLIC PNL TOTAL CA: CPT

## 2021-10-26 PROCEDURE — 97535 SELF CARE MNGMENT TRAINING: CPT

## 2021-10-26 PROCEDURE — 85025 COMPLETE CBC W/AUTO DIFF WBC: CPT

## 2021-10-26 PROCEDURE — 97110 THERAPEUTIC EXERCISES: CPT

## 2021-10-26 PROCEDURE — 99232 SBSQ HOSP IP/OBS MODERATE 35: CPT

## 2021-10-26 PROCEDURE — 84134 ASSAY OF PREALBUMIN: CPT

## 2021-10-26 PROCEDURE — 71045 X-RAY EXAM CHEST 1 VIEW: CPT

## 2021-10-26 PROCEDURE — 87635 SARS-COV-2 COVID-19 AMP PRB: CPT

## 2021-10-26 PROCEDURE — 93970 EXTREMITY STUDY: CPT

## 2021-10-26 PROCEDURE — 86140 C-REACTIVE PROTEIN: CPT

## 2021-10-26 RX ADMIN — Medication 500 MILLIGRAM(S): at 06:28

## 2021-10-26 RX ADMIN — BICTEGRAVIR SODIUM, EMTRICITABINE, AND TENOFOVIR ALAFENAMIDE FUMARATE 1 TABLET(S): 30; 120; 15 TABLET ORAL at 11:02

## 2021-10-26 RX ADMIN — Medication 2.5 MILLIGRAM(S): at 08:04

## 2021-10-26 NOTE — PROGRESS NOTE ADULT - ASSESSMENT
36 year old male with incomplete paraplegia.  Admitted to [facility] acute inpatient rehab on 9/28 for ADL, gait, and functional impairments and neurogenic bowel and bladder. Currently being treated for Klebsiella UTI.     T7 AIS C paraplegia.  - secondary to spinal subdural hematoma s/p evacuation and decompression of T7-8  - Neurosurgeon Dr. Randy D'Amico  - Comprehensive Multidisciplinary Rehab Program     3 hours a day, 5 days a week with PT/OT     P&O as needed, but premature now.   - leave incision open to air, OK to shower  - Per Neurosurgery: ok to start slow gradual, progressive translational movements in therapy as tolerated.   -Goal for inpatient rehab is independence at wc level.     HIV  -Continue Biktarvy    Transaminitis   -AST/ALT 42/110  10/21   -Asymptomatic  -Continue to monitor   -On the rise recently, though has been similarly elevated prior.   -Stop all non essential medsz.     Psych:  h/o substance abuse: IVDU, methamphetamine  Adjustment disorder  Insomnia  - - Trazodone as needed at bedtime  -Adjustment to date has been good, optimistic and proactive.       GI/Bowel:  Neurogenic bowel with incontinence, decreased voluntary anal contraction but improving.   - Senna QHS  -suppository prn.   - encourage timed toileting, after am food/coffee     /Bladder:  Neurogenic bladder with evolution.   - Only should do post void measurements at this time unless unable to void for > 6 hours or feeling of retention.   -has not been needing to cath, but has incontinence, perhaps a little more control   -Talked about starting Oxybutynin 2.5mg BID for possible bladder spasms causing his urinary incontinence but patient would like to hold off for now-- he wants to start oxybutynin daily for now-- will start oxybutynin 2.5 daily though will try bladder tapping first prior to therapies. Told that if really wants to ensure bladder empty prior to activities, can self cath.   Oxybutynin 2.5mg started on 10/22     UTI- Meropenem 1000 mg q8h started on 10/10,   - urine culture klebsiella peña sensitive   - US renal and bladder US  10/12 unremarkable   - switched to ceftin 500mg BID -- for total of 3 weeks as per ID -day 18/21     Skin/Pressure Injury:  - Skin assessment on admission admission: no pressure injuries noted  - Monitor Incisions: spine incision open to air    Diet:   - Diet Consistency/Modifications: Reg + thins - fluid restriction discontinued    DVT ppx:  - Lovenox for total 8 weeks, or upon discharge.   - SCDs may be stopped.   - Last Doppler on 9/27 neg    Restrictions/Precautions:  - Precautions: Spine precautions (as above), falls      ---------------  Outpatient Follow-up:  Neurosurgery - Dr. Randy D'Amico  Psychological services/ counseling   PCP?    --------------    Team meeting 10/14  OT: supervision with ADLs while in bed, needs more assistance with toileting   PT: WC propulsion Mod I  Tentative DC: 10/26

## 2021-10-26 NOTE — DISCHARGE NOTE NURSING/CASE MANAGEMENT/SOCIAL WORK - PATIENT PORTAL LINK FT
You can access the FollowMyHealth Patient Portal offered by [facility] by registering at the following website: [email or web address]. By joining [email or web address]’s FollowMyHealth portal, you will also be able to view your health information using other applications (apps) compatible with our system.

## 2021-10-26 NOTE — PROGRESS NOTE ADULT - SUBJECTIVE AND OBJECTIVE BOX
Patient is a 36y old  Male who presents with a chief complaint of Spinal Cord Compression (25 Oct 2021 09:59)      Patient seen and examined at bedside.  No overnight events  No complaints this morning. Excited today is his discharge date    ALLERGIES:  No Known Allergies    MEDICATIONS  (STANDING):  bictegravir 50 mG/emtricitabine 200 mG/tenofovir alafenamide 25 mG (BIKTARVY) 1 Tablet(s) Oral daily  cefuroxime   Tablet 500 milliGRAM(s) Oral every 12 hours  enoxaparin Injectable 40 milliGRAM(s) SubCutaneous at bedtime  oxybutynin 2.5 milliGRAM(s) Oral <User Schedule>  senna 2 Tablet(s) Oral at bedtime    MEDICATIONS  (PRN):  acetaminophen   Tablet .. 650 milliGRAM(s) Oral every 6 hours PRN Temp greater or equal to 38C (100.4F), Mild Pain (1 - 3)  bisacodyl Suppository 10 milliGRAM(s) Rectal at bedtime PRN Constipation  HYDROmorphone   Tablet 2 milliGRAM(s) Oral every 8 hours PRN Severe Pain (7 - 10)  methocarbamol 500 milliGRAM(s) Oral every 8 hours PRN Muscle Spasm  pregabalin 50 milliGRAM(s) Oral every 8 hours PRN Pain  traZODone 25 milliGRAM(s) Oral at bedtime PRN insomnia    Vital Signs Last 24 Hrs  T(F): 98.5 (25 Oct 2021 18:58), Max: 98.5 (25 Oct 2021 18:58)  HR: 88 (25 Oct 2021 18:58) (88 - 89)  BP: 117/81 (25 Oct 2021 18:58) (117/81 - 141/79)  RR: 16 (25 Oct 2021 18:58) (16 - 16)  SpO2: 100% (25 Oct 2021 18:58) (100% - 100%)  I&O's Summary    PHYSICAL EXAM:  GENERAL: NAD  HENT:  Atraumatic, Normocephalic; No tonsillar erythema, exudates, or enlargement; Moist mucous membranes;   EYES: EOMI, PERRLA, conjunctiva and sclera clear, no lid-lag  NECK: Supple, No JVD, Normal thyroid  CHEST/LUNG: Clear to percussion bilaterally; No rales, rhonchi, wheezing, or rubs; normal respiratory effort, no intercostal retractions  HEART: Regular rate and rhythm; No murmurs, rubs, or gallops; No pitting edema  ABDOMEN: Soft, Nontender, Nondistended; Bowel sounds present; No HSM  MUSCULOSKELETAL/EXTREMITIES:  2+ Peripheral Pulses, No clubbing or digital cyanosis  PSYCH: Appropriate affect, Alert & Awake    LABS:                        13.6   6.94  )-----------( 231      ( 25 Oct 2021 06:00 )             40.5       10-25    146  |  108  |  14  ----------------------------<  96  4.7   |  32  |  0.99    Ca    9.2      25 Oct 2021 06:00    TPro  6.4  /  Alb  3.3  /  TBili  0.2  /  DBili  x   /  AST  33  /  ALT  78  /  AlkPhos  65  10-25     eGFR if Non African American: 97 mL/min/1.73M2 (10-25-21 @ 06:00)  eGFR if : 113 mL/min/1.73M2 (10-25-21 @ 06:00)    COVID-19 PCR: NotDetec (10-23-21 @ 05:20)  COVID-19 PCR: NotDetec (10-17-21 @ 06:45)  COVID-19 PCR: NotDetec (10-09-21 @ 11:39)  COVID-19 PCR: NotDetec (10-05-21 @ 07:00)  COVID-19 PCR: NotDetec (09-27-21 @ 18:49)      Care Discussed with Rehab Attending and Other Providers

## 2021-10-26 NOTE — PROGRESS NOTE ADULT - SUBJECTIVE AND OBJECTIVE BOX
Patient is a 36y old  Male who presents with a chief complaint of Spinal Cord Compression (01 Oct 2021 08:33)after spontaneous epidural hemorrhage during weight lifting.     HPI:  ALISSA ARAUJO is a 36 year old male who presented to [facility] on 9/15/21 with symptoms of acute onset lower extremity weakness and parethesias a few hours after lifting heavy weights in the gym.  He developed back pain and radiating pain to bilateral buttocks then progressed to paraplegia. He was brought in by ambulance after a passerby witnessed her unable to rise from the curb.  He did not have any bowel or bladder incontinence on admission.      On presentation to the ED, MRI performed and found to have ill defined intradural - extramedullary lesion with enhancement at level T8 with mass effect on the spinal cord with lefward displacement and minimal associated cord edema.  He was admitted to neurosurgical survice and started on decadron. Spinal angiogram performed which was negative. He underwent T8-9 lami decompression on 9/15 with Dr. D-Amico with intraoperative findings consistent with subarachnoid clot now s/p evacuation.  Post operative course was uncomplicated. Tolerated procedure well. Pt was seen by pain management who adjusted pain regimen (tylenol, lyrica, Robaxin, Dilaudid). Labs were significant for hyponatremia likely secondary to SIADH given euvolemia on exam and good UOP. Pt required short course of hypertonic saline then weaned off. Sodium stable with fluid restriction and addition of Na tabs. Pt was seen by behavioral health and psychology for adjustment disorder in the setting of new paralysis. He was determined to be low risk for suicide and emotional support provided. Trazodone was started for insomnia.       ----------------------------------------------------------------------    SUBJECTIVE:  Patient seen and evaluated at bedside. Planned for DC today and is ready. No acute medical issues noted overnight. We went over signs of needing to straight cath himself if needed and outpatient providers upon discharge.      ROS:  Denies: headache, lightheadedness, CP, SOB, abdominal pain, nausea, constipation. No dysuria       ----------------------------------------------------------------------  Vital Signs Last 24 Hrs  T(C): 36.8 (26 Oct 2021 08:08), Max: 36.9 (25 Oct 2021 18:58)  T(F): 98.2 (26 Oct 2021 08:08), Max: 98.5 (25 Oct 2021 18:58)  HR: 86 (26 Oct 2021 08:08) (86 - 88)  BP: 128/84 (26 Oct 2021 08:08) (117/81 - 128/84)  BP(mean): --  RR: 16 (26 Oct 2021 08:08) (16 - 16)  SpO2: 98% (26 Oct 2021 08:08) (98% - 100%)    PHYSICAL EXAM  General: NAD, comfortable  Cardio: RRR, S1/S2+  Resp: no respiratory difficulty or distress  Abdomen: soft, NTND  Neuro:  T7 incomplete paraplegia  He has isolated movement throughout the left leg. He has right hip extension and some right knee extension with gravity eliminated, trace movements for the rest. Spasticity present but mild. Overall he continues to improve in his motor recovery.   Extrem: no edema, no calf tenderness   Skin: thoracic incision open to air.   No pressure ulcers.       RECENT LABS/IMAGING    ----------------------------------------------------------------------  RECENT IMAGING:        ----------------------------------------------------------------------  MEDICATIONS  (STANDING):  bictegravir 50 mG/emtricitabine 200 mG/tenofovir alafenamide 25 mG (BIKTARVY) 1 Tablet(s) Oral daily  cefuroxime   Tablet 500 milliGRAM(s) Oral every 12 hours  enoxaparin Injectable 40 milliGRAM(s) SubCutaneous at bedtime  oxybutynin 2.5 milliGRAM(s) Oral <User Schedule>  senna 2 Tablet(s) Oral at bedtime    MEDICATIONS  (PRN):  acetaminophen   Tablet .. 650 milliGRAM(s) Oral every 6 hours PRN Temp greater or equal to 38C (100.4F), Mild Pain (1 - 3)  bisacodyl Suppository 10 milliGRAM(s) Rectal at bedtime PRN Constipation  HYDROmorphone   Tablet 2 milliGRAM(s) Oral every 8 hours PRN Severe Pain (7 - 10)  methocarbamol 500 milliGRAM(s) Oral every 8 hours PRN Muscle Spasm  pregabalin 50 milliGRAM(s) Oral every 8 hours PRN Pain  traZODone 25 milliGRAM(s) Oral at bedtime PRN insomnia      ----------------------------------------------------------------------

## 2021-10-26 NOTE — PROGRESS NOTE ADULT - ASSESSMENT
36M with PMH IVDU / methamphetamine use presented to [facility] on 9/15 with acute onset paraplegia after lifting heavy weights found to have SAH / SDH of the spinal cord with hematoma causing cord compression s/p T7-8 lami decompression (9/16) now with residual paraplegia. Hospital course complicated by hyponatremia secondary to SIADH. Admitted to [facility] acute inpatient rehab on 9/28 for initiation of multidisciplinary rehab program. ADL, gait, and functional impairments.     #Sepsis - resolved  #Klebsiella UTI  -ID recommendations noted. Ceftin until 10/29  -Blood cx NGTD    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  -s/p evacuation and decompression of T7-8  -Continue comprehensive rehab program  -Follow up with outpatient neurosurgery    #Hyponatremia, likely SIADH  -resolved    #Hypernatremia  - encourage oral intake  - outpatient follow up for repeat BMP    #Anemia  -Stable    #hx of IV drug use  #Methamphetamine use  -Supportive care    #Neurogenic bladder  #Acute urinary retention; resolved  #Mild urinary incontinence  -Oxybutynin  was started but discontinued?    #HIV  -Continue Biktarvy    #Prophylactic Measure  -DVT ppx: Lovenox    Medically stable for discharge

## 2021-10-26 NOTE — PROGRESS NOTE ADULT - NUTRITIONAL ASSESSMENT
This patient has been assessed with a concern for Malnutrition and has been determined to have a diagnosis/diagnoses of Moderate protein-calorie malnutrition.    This patient is being managed with:   Diet Regular-  Entered: Sep 30 2021 11:12AM    
This patient has been assessed with a concern for Malnutrition and has been determined to have a diagnosis/diagnoses of Moderate protein-calorie malnutrition.    This patient is being managed with:   Diet Regular-  Entered: Sep 30 2021 11:12AM      This patient has been assessed with a concern for Malnutrition and has been determined to have a diagnosis/diagnoses of Moderate protein-calorie malnutrition.    This patient is being managed with:   Diet Regular-  Entered: Sep 30 2021 11:12AM    
This patient has been assessed with a concern for Malnutrition and has been determined to have a diagnosis/diagnoses of Moderate protein-calorie malnutrition.    This patient is being managed with:   Diet Regular-  Entered: Sep 30 2021 11:12AM    

## 2021-10-26 NOTE — SBIRT NOTE ADULT - NSSBIRTDRGPOSREINDET_GEN_A_CORE
Patient had episodic methamphetamine use over 1.5 years ago. Occasional marijuana and social alcohol use. Spoke with patient at length regarding his drug history. Patient states he is open to the possibility of substance treatment, but his incomplete paraplegia and recovery is his priority. Patient verbalizes he has no intent of using methamphetamines.

## 2021-10-26 NOTE — DISCHARGE NOTE NURSING/CASE MANAGEMENT/SOCIAL WORK - NSDCFUADDAPPT_GEN_ALL_CORE_FT
Appt. with Melvin Maddox, DPT 10/27/21  [phone]    Appt. with new PCP Dr. Danelle Grady   11/3/21   [phone]

## 2021-10-26 NOTE — PROGRESS NOTE ADULT - PROVIDER SPECIALTY LIST ADULT
Rehab Medicine
Rehab Medicine
Hospitalist
Infectious Disease
Infectious Disease
Rehab Medicine
Hospitalist
Infectious Disease
Rehab Medicine
Hospitalist
Infectious Disease
Rehab Medicine
Hospitalist
Hospitalist
Rehab Medicine

## 2021-10-28 RX ORDER — BICTEGRAVIR SODIUM, EMTRICITABINE, AND TENOFOVIR ALAFENAMIDE FUMARATE 30; 120; 15 MG/1; MG/1; MG/1
TABLET ORAL
Refills: 0 | Status: ACTIVE | COMMUNITY

## 2021-10-28 RX ORDER — ACETAMINOPHEN 500 MG/1
TABLET ORAL
Refills: 0 | Status: ACTIVE | COMMUNITY

## 2021-11-02 DIAGNOSIS — S06.5X9A TRAUMATIC SUBDURAL HEMORRHAGE WITH LOSS OF CONSCIOUSNESS OF UNSPECIFIED DURATION, INITIAL ENCOUNTER: ICD-10-CM

## 2021-11-03 ENCOUNTER — APPOINTMENT (OUTPATIENT)
Dept: NEUROSURGERY | Facility: CLINIC | Age: 36
End: 2021-11-03
Payer: SELF-PAY

## 2021-11-03 VITALS
RESPIRATION RATE: 16 BRPM | SYSTOLIC BLOOD PRESSURE: 119 MMHG | BODY MASS INDEX: 24.44 KG/M2 | WEIGHT: 165 LBS | DIASTOLIC BLOOD PRESSURE: 78 MMHG | HEART RATE: 80 BPM | HEIGHT: 69 IN | TEMPERATURE: 97.8 F | OXYGEN SATURATION: 99 %

## 2021-11-03 DIAGNOSIS — Z87.891 PERSONAL HISTORY OF NICOTINE DEPENDENCE: ICD-10-CM

## 2021-11-03 PROCEDURE — 99024 POSTOP FOLLOW-UP VISIT: CPT

## 2021-11-03 RX ORDER — NORMAL SALT TABLETS 1 G/G
TABLET ORAL
Refills: 0 | Status: DISCONTINUED | COMMUNITY
End: 2021-11-03

## 2021-11-03 NOTE — DATA REVIEWED
[de-identified] : EXAM: MR SPINE THORACIC WAW IC\par \par PROCEDURE DATE: 09/16/2021\par \par \par \par INTERPRETATION: Flaccid paralysis of the leg.\par \par Technique: MRI of the thoracic spine was performed utilizing sagittal T1, axial and sagittal T2-weighted and axial gradient echo images as well as sagittal STIR images. Following the administration of 7.5 cc of Gadavist, sagittal and axial fat-saturated T1-weighted images were obtained.\par \par Comparison is made to a prior MRI of the thoracic spine performed on 5/15/2021.\par \par The patient is now status post bilateral laminectomies at the T8 level. There is a small epidural fluid collection of fluid at the laminectomy site measuring approximately 4 cm cranial caudal x 1.4 cm wide x 1.3 cm AP and causing effacement of the posterior spinal cord from the T7/T8 through the superior endplate of T9 levels. There has been evacuation of the previously seen intradural extra medullary right-sided hemorrhage at the T8 level.\par \par The previously seen focus of T2 signal hyperintensity within the spinal cord has progressed and now there is abnormal T2 signal hyperintensity ("snake eyes") within the central spinal cord from the inferior endplate of T7 through the mid T9 vertebral body highly suspicious for spinal cord infarction. Again noted is diffuse subarachnoid hemorrhage within the thoracic thecal sac\par \par Findings: There is normal curvature to the thoracic spine. The vertebral bodies are of normal height and configuration. The intervertebral discs spaces are within normal limits. Evaluation of the spinal cord demonstrates no evidence of abnormal signal changes.\par \par Evaluation of the individual levels demonstrates no evidence of a focal disc herniation or spinal cord compression.\par \par Impression: Status post bilateral T8 laminectomies with a small postoperative surgical fluid collection at the laminectomy site causing mild mass effect on the spinal cord. Status post evacuation of the previously seen clot within the thecal sac at the T8 level.\par \par Abnormal T2 signal seen within the central spinal cord from the T7/T8 through the T9 level consistent with spinal cord infarction.\par \par --- End of Report ---\par \par \par \par \par EXAM: MR SPINE LUMBAR WAW IC\par \par PROCEDURE DATE: 09/16/2021\par \par \par \par INTERPRETATION: Postoperative decompression.\par \par MRI OF THE LUMBAR SPINE WITH CONTRAST.\par MRI of the lumbar spine was performed utilizing axial and sagittal T1 and T2-weighted images. Sagittal STIR images were also acquired. Following the administration of 7.5 cc of Gadavist, fat-saturated sagittal and axial T1-weighted images were obtained.\par \par Findings: There are no prior studies available for comparison.\par \par There has been interval appearance of small pockets of fluid fluid levels seen within the thecal sac at the T12/L1 level (image #14 series 17 at the L4-L5 levels consistent with subdural hemorrhage. Again noted is diffuse subarachnoid hemorrhage causing moderate spinal canal stenosis\par \par There is straightening of the lumbar lordosis. There is a hemangioma within the L4 vertebral body. The vertebral bodies are normal height and configuration. There is mild loss of disc height and T2 signal at the L4/L5 disc space consistent with desiccation. The remaining intervertebral disc spaces are within normal limits. The conus terminates at the T12/L1 level and demonstrates no evidence of abnormal signal changes.\par \par Evaluation of the individual levels demonstrates at the L4/L5 level there is a minimal diffuse disc bulge with a left foraminal and far lateral annular tear contacting the ventral thecal sac. There is mild to moderate foraminal narrowing, left greater than right. The bilateral L4 foraminal exiting nerve roots appear within normal limits. There is mild to moderate facet hypertrophy.\par \par The remaining levels demonstrate no evidence of focal disc herniation . The bilateral neuroforamen are patent.\par \par Impression: Interval appearance of pockets of lumbar subdural mixed hemorrhage. Diffuse subarachnoid hemorrhage. Interval increase in spinal canal stenosis to moderate to severe most prominent at the L5-S1 level.\par \par --- End of Report ---

## 2021-11-03 NOTE — ASSESSMENT
[FreeTextEntry1] : 37 y/o r-handed male with PMHx HIV (CD4 700s, VLUD on bikarvy), hx of IVDU and methamphetamine use and recent [facility] admission 9/15/21 s/p spinal angiogram (neg) and T8-T9 lami decompression for epidural hematoma without Motor signals intraoperatively and post-op hemiplegia. Significant functional recovery with rehab. Able to stand with assistance. Right leg with increased tone and persistent weakness. \par \par Dr. D' Amico reviewed all available images with patient. \par \par PLAN: \par -Okay to start lifting weights, no functional limitations.  \par -Continue home PT/rehabilitation\par -RTC 3 months; plan to obtain 6 month MRI\par \par Patient verbalizes understanding of today’s discussion and next steps in treatment plan. \par \par \par A total of 15 minutes was spent reviewing the labs, imaging and physical examination of the patient. We discussed the diagnosis, and the plan. The patient's questions were answered. The patient demonstrated an excellent understanding of the plan. \par \par

## 2021-11-03 NOTE — PHYSICAL EXAM
[General Appearance - Alert] : alert [General Appearance - In No Acute Distress] : in no acute distress [Oriented To Time, Place, And Person] : oriented to person, place, and time [Affect] : the affect was normal [Clean] : clean [Dry] : dry [Healing Well] : healing well [No Drainage] : without drainage [Normal Skin] : normal [Person] : oriented to person [Place] : oriented to place [Time] : oriented to time [Cranial Nerves Optic (II)] : visual acuity intact bilaterally,  pupils equal round and reactive to light [Cranial Nerves Oculomotor (III)] : extraocular motion intact [Cranial Nerves Trigeminal (V)] : facial sensation intact symmetrically [Cranial Nerves Facial (VII)] : face symmetrical [Cranial Nerves Vestibulocochlear (VIII)] : hearing was intact bilaterally [Cranial Nerves Glossopharyngeal (IX)] : tongue and palate midline [Cranial Nerves Accessory (XI - Cranial And Spinal)] : head turning and shoulder shrug symmetric [Cranial Nerves Hypoglossal (XII)] : there was no tongue deviation with protrusion [3] : S1 ankle flexors 3/5  [4] : S1 ankle flexors 4/5 [Sensation Tactile Decrease] : light touch was intact [Sclera] : the sclera and conjunctiva were normal [PERRL With Normal Accommodation] : pupils were equal in size, round, reactive to light, with normal accommodation [Outer Ear] : the ears and nose were normal in appearance [Neck Appearance] : the appearance of the neck was normal [] : no respiratory distress [Heart Sounds] : normal S1 and S2 [Skin Color & Pigmentation] : normal skin color and pigmentation [2] : L2 Iliopsoas 2/5 [5] : L2 Iliopsoas 5/5

## 2021-11-03 NOTE — REVIEW OF SYSTEMS
[Leg Weakness] : leg weakness [Tingling] : tingling [As Noted in HPI] : as noted in HPI [Fever] : no fever [Chills] : no chills [Emotional Problems] : no emotional problems [Arm Weakness] : no arm weakness [Hand Weakness] : no hand weakness [Seizures] : no convulsions [Dizziness] : no dizziness [Chest Pain] : no chest pain [Palpitations] : no palpitations [Shortness Of Breath] : no shortness of breath [Wheezing] : no wheezing [Cough] : no cough

## 2021-11-03 NOTE — REASON FOR VISIT
[de-identified] : /p T8-T9 lami decompression, intraoperative findings of subarachnoid clot s/p evac on 9/15 and negative spinal angio 9/21 [de-identified] : Returns TODAY for initial post op visit: \par Patient reports that he has returned home from rehab. Notes his last sodium prior to leaving rehab was 146 on 10/25\par He states doing well; denies headaches, seizure activity, visual changes, or new/worsening focal neuro deficits. Denies signs of any postop wound infection which could include but not limited to redness, swelling, purulent drainage. Denies chest pain, shortness of breath, unilateral leg edema. \par He is slowly introducing preop activities, endorses that he is now able to stand with assistance and ambulate with walker with assistance. Endorses LLE strength and mobility continue to improve; RLE strength/ mobility weaker than left, but also continuing to improve. Tingling right leg worse than left. \par He is continuing with PT at home. \par Endorses occasional bladder incontinence that has been improving over the past 2 weeks, no bowel incontinence. No longer straight cathing. \par

## 2021-11-03 NOTE — HISTORY OF PRESENT ILLNESS
[FreeTextEntry1] : parts of HPI obtained from [facility] notes: \par \par 37 y/o r-handed male with PMHx HIV (CD4 700s, VLUD on bikarvy), hx of IVDU and methamphetamine use and recent [facility] admission 9/15/21 s/p spinal angiogram (neg) and T8-T9 lami decompression. \par \par 9/15/21 patient was at the gym lifting heavy weights (ie dead lifts) and later that day developed pain in his thoracolumbar region, along with paraesthesias down both his legs and buttock that progressed to complete plegia of bilateral lower extremities with loss of sensation from T8 dermatomes and below. MRI showed intradual/extramedullar lesion with mass effect on spinal cod displacing it to the left. Now s/p T8-T9 lami decompression, intraoperative findings of subarachnoid clot s/p evac on 9/15 and negative spinal angio 9/21. \par \par Hospital course: \par 9/15: s/p spinal angiogram (neg) and T8-T9 lami decompression, intraoperative \par findings of subarachnoid clot s/p evacuation POD # 0 (9/15) dilaudid x 1 for \par pain. diet advanced. \par 9/16: POD# 1: s/p spinal angiogram (neg) and T8-T9 lami decompression, \par intraoperative findings of subarachnoid clot s/p evacuation POD #1 (9/15) \par dilaudid x 1 for pain. diet advanced. \par 9/17: POD#2 s/p spinal angio, POD#2 s/p T8-9 lami decompression. LE dopplers \par negative. MRI T/L spine read shows T7/T8 spinal cord edema consistent with \par infarction, L5-S1 increase in spinal canal stenosis w/ mixed SDH and diffuse \par SAH. Psych consulted for anxiety/depression. Pend SDU. Bile bag removed today \par 9/18: POD 3. T8-9 lami/decompression. Psych eval suggests adjustment d/o and \par outpatient follow up \par 9/19: POD4. MARCO overnight. Pending spinal rehab placement. \par 9/20: POD5 MARCO overnight \par 9/21: POD6, pending angio today. MARCO overnight, neuro stable. Angio results are \par negative. \par 9/22: POD7 angio and T8-9 lami decompression, POD1 negative angio. MRACO \par overnight, neuro stable. Pending dispo \par 9/23: POD8 angio and T8-9 lami decompression, POD2 negative angio. MARCO \par overnight, neuro stable. Pending dispo. \par 9/24: POD9 s/p angio and T8-9 lami decompression, POD3 negative angio. MARCO \par overnight, neuro stable. Pending multidisciplinary discussion regarding dispo \par today at 11am. \par 9/25: POD10 s/p spinal angio and T8-9 lami decompression, POD4 s/p neg spinal \par angio. MARCO overnight. Neuro exam stable. Pending acute rehab \par 9/26: POD11 s/p spinal angio and T8-9 lami decompression, POD5 s/p neg spinal \par angio. Pt reports posterior neck pain and frontal headaches, worse when laying \par flat. States it may be his usual tension headaches. Advised to use warm packs \par as needed. Neuro exam stable. \par 9/27: POD 12/POD6 spinal angio. Upgraded to SDU for higher rates of hypertonics \par overnight. Exam stable. f/u urine lytes \par 9/28 Patient dc to [facility]\par \par Exam on day of discharge: \par Neuro: no aphasia, speech clear, no dysmetria, no pronator drift \par strength 5/5 b/l UE, some LLE toe movements otherwise 0/5 b/l LE, b/l LE \par flaccid, absent reflexes, no clonus, babinski neg b/l \par RLE sensation (especially along L3/L4 dermatome) to light touch diminished \par compared to LLE \par \par Hospital course c/b Hyponatremia (discharge on Florinef, Salt tabs, Fluid \par restriction) Sodium on 9/28/21 129; instructed to check BMP at rehab and monitor sodium; continue 1.5 L fluid restriction at rehab\par ______\par \par \par

## 2021-11-14 ENCOUNTER — INPATIENT (INPATIENT)
Facility: HOSPITAL | Age: 36
LOS: 2 days | Discharge: HOME | DRG: 872 | End: 2021-11-17
Attending: HOSPITALIST | Admitting: HOSPITALIST
Payer: MEDICAID

## 2021-11-14 VITALS
DIASTOLIC BLOOD PRESSURE: 90 MMHG | RESPIRATION RATE: 18 BRPM | TEMPERATURE: 99 F | WEIGHT: 169.98 LBS | HEIGHT: 68 IN | HEART RATE: 100 BPM | SYSTOLIC BLOOD PRESSURE: 154 MMHG | OXYGEN SATURATION: 99 %

## 2021-11-14 DIAGNOSIS — Z98.890 OTHER SPECIFIED POSTPROCEDURAL STATES: Chronic | ICD-10-CM

## 2021-11-14 LAB
ALBUMIN SERPL ELPH-MCNC: 4.5 G/DL — SIGNIFICANT CHANGE UP (ref 3.3–5)
ALP SERPL-CCNC: 76 U/L — SIGNIFICANT CHANGE UP (ref 40–120)
ALT FLD-CCNC: SIGNIFICANT CHANGE UP U/L (ref 10–45)
ANION GAP SERPL CALC-SCNC: 12 MMOL/L — SIGNIFICANT CHANGE UP (ref 5–17)
ANION GAP SERPL CALC-SCNC: 12 MMOL/L — SIGNIFICANT CHANGE UP (ref 5–17)
APPEARANCE UR: CLEAR — SIGNIFICANT CHANGE UP
AST SERPL-CCNC: SIGNIFICANT CHANGE UP U/L (ref 10–40)
BACTERIA # UR AUTO: PRESENT /HPF
BASOPHILS # BLD AUTO: 0.02 K/UL — SIGNIFICANT CHANGE UP (ref 0–0.2)
BASOPHILS NFR BLD AUTO: 0.1 % — SIGNIFICANT CHANGE UP (ref 0–2)
BILIRUB SERPL-MCNC: 1 MG/DL — SIGNIFICANT CHANGE UP (ref 0.2–1.2)
BILIRUB UR-MCNC: NEGATIVE — SIGNIFICANT CHANGE UP
BUN SERPL-MCNC: 6 MG/DL — LOW (ref 7–23)
BUN SERPL-MCNC: 7 MG/DL — SIGNIFICANT CHANGE UP (ref 7–23)
CALCIUM SERPL-MCNC: 8.7 MG/DL — SIGNIFICANT CHANGE UP (ref 8.4–10.5)
CALCIUM SERPL-MCNC: 9.6 MG/DL — SIGNIFICANT CHANGE UP (ref 8.4–10.5)
CHLORIDE SERPL-SCNC: 100 MMOL/L — SIGNIFICANT CHANGE UP (ref 96–108)
CHLORIDE SERPL-SCNC: 98 MMOL/L — SIGNIFICANT CHANGE UP (ref 96–108)
CO2 SERPL-SCNC: 21 MMOL/L — LOW (ref 22–31)
CO2 SERPL-SCNC: 26 MMOL/L — SIGNIFICANT CHANGE UP (ref 22–31)
COLOR SPEC: YELLOW — SIGNIFICANT CHANGE UP
CREAT SERPL-MCNC: 0.82 MG/DL — SIGNIFICANT CHANGE UP (ref 0.5–1.3)
CREAT SERPL-MCNC: 0.89 MG/DL — SIGNIFICANT CHANGE UP (ref 0.5–1.3)
DIFF PNL FLD: ABNORMAL
EOSINOPHIL # BLD AUTO: 0 K/UL — SIGNIFICANT CHANGE UP (ref 0–0.5)
EOSINOPHIL NFR BLD AUTO: 0 % — SIGNIFICANT CHANGE UP (ref 0–6)
EPI CELLS # UR: SIGNIFICANT CHANGE UP /HPF (ref 0–5)
GLUCOSE SERPL-MCNC: 119 MG/DL — HIGH (ref 70–99)
GLUCOSE SERPL-MCNC: 145 MG/DL — HIGH (ref 70–99)
GLUCOSE UR QL: NEGATIVE — SIGNIFICANT CHANGE UP
HCT VFR BLD CALC: 39.3 % — SIGNIFICANT CHANGE UP (ref 39–50)
HGB BLD-MCNC: 13.4 G/DL — SIGNIFICANT CHANGE UP (ref 13–17)
IMM GRANULOCYTES NFR BLD AUTO: 0.5 % — SIGNIFICANT CHANGE UP (ref 0–1.5)
KETONES UR-MCNC: NEGATIVE — SIGNIFICANT CHANGE UP
LACTATE SERPL-SCNC: 1 MMOL/L — SIGNIFICANT CHANGE UP (ref 0.5–2)
LEUKOCYTE ESTERASE UR-ACNC: ABNORMAL
LYMPHOCYTES # BLD AUTO: 15.4 % — SIGNIFICANT CHANGE UP (ref 13–44)
LYMPHOCYTES # BLD AUTO: 3.56 K/UL — HIGH (ref 1–3.3)
MCHC RBC-ENTMCNC: 31.5 PG — SIGNIFICANT CHANGE UP (ref 27–34)
MCHC RBC-ENTMCNC: 34.1 GM/DL — SIGNIFICANT CHANGE UP (ref 32–36)
MCV RBC AUTO: 92.5 FL — SIGNIFICANT CHANGE UP (ref 80–100)
MONOCYTES # BLD AUTO: 0.3 K/UL — SIGNIFICANT CHANGE UP (ref 0–0.9)
MONOCYTES NFR BLD AUTO: 1.3 % — LOW (ref 2–14)
NEUTROPHILS # BLD AUTO: 19.2 K/UL — HIGH (ref 1.8–7.4)
NEUTROPHILS NFR BLD AUTO: 82.7 % — HIGH (ref 43–77)
NITRITE UR-MCNC: NEGATIVE — SIGNIFICANT CHANGE UP
NRBC # BLD: 0 /100 WBCS — SIGNIFICANT CHANGE UP (ref 0–0)
PH UR: 6 — SIGNIFICANT CHANGE UP (ref 5–8)
PLATELET # BLD AUTO: 193 K/UL — SIGNIFICANT CHANGE UP (ref 150–400)
POTASSIUM SERPL-MCNC: 3.2 MMOL/L — LOW (ref 3.5–5.3)
POTASSIUM SERPL-MCNC: SIGNIFICANT CHANGE UP MMOL/L (ref 3.5–5.3)
POTASSIUM SERPL-SCNC: 3.2 MMOL/L — LOW (ref 3.5–5.3)
POTASSIUM SERPL-SCNC: SIGNIFICANT CHANGE UP MMOL/L (ref 3.5–5.3)
PROT SERPL-MCNC: 8.1 G/DL — SIGNIFICANT CHANGE UP (ref 6–8.3)
PROT UR-MCNC: NEGATIVE MG/DL — SIGNIFICANT CHANGE UP
RBC # BLD: 4.25 M/UL — SIGNIFICANT CHANGE UP (ref 4.2–5.8)
RBC # FLD: 12.1 % — SIGNIFICANT CHANGE UP (ref 10.3–14.5)
RBC CASTS # UR COMP ASSIST: < 5 /HPF — SIGNIFICANT CHANGE UP
SARS-COV-2 RNA SPEC QL NAA+PROBE: NEGATIVE — SIGNIFICANT CHANGE UP
SODIUM SERPL-SCNC: 133 MMOL/L — LOW (ref 135–145)
SODIUM SERPL-SCNC: 136 MMOL/L — SIGNIFICANT CHANGE UP (ref 135–145)
SP GR SPEC: <=1.005 — SIGNIFICANT CHANGE UP (ref 1–1.03)
UROBILINOGEN FLD QL: 0.2 E.U./DL — SIGNIFICANT CHANGE UP
WBC # BLD: 23.19 K/UL — HIGH (ref 3.8–10.5)
WBC # FLD AUTO: 23.19 K/UL — HIGH (ref 3.8–10.5)
WBC UR QL: ABNORMAL /HPF

## 2021-11-14 PROCEDURE — 99285 EMERGENCY DEPT VISIT HI MDM: CPT

## 2021-11-14 PROCEDURE — 99223 1ST HOSP IP/OBS HIGH 75: CPT | Mod: GC

## 2021-11-14 RX ORDER — SODIUM CHLORIDE 9 MG/ML
1000 INJECTION INTRAMUSCULAR; INTRAVENOUS; SUBCUTANEOUS ONCE
Refills: 0 | Status: COMPLETED | OUTPATIENT
Start: 2021-11-14 | End: 2021-11-14

## 2021-11-14 RX ORDER — CEFTRIAXONE 500 MG/1
1000 INJECTION, POWDER, FOR SOLUTION INTRAMUSCULAR; INTRAVENOUS ONCE
Refills: 0 | Status: COMPLETED | OUTPATIENT
Start: 2021-11-14 | End: 2021-11-14

## 2021-11-14 RX ADMIN — SODIUM CHLORIDE 1000 MILLILITER(S): 9 INJECTION INTRAMUSCULAR; INTRAVENOUS; SUBCUTANEOUS at 21:26

## 2021-11-14 RX ADMIN — CEFTRIAXONE 100 MILLIGRAM(S): 500 INJECTION, POWDER, FOR SOLUTION INTRAMUSCULAR; INTRAVENOUS at 21:26

## 2021-11-14 NOTE — H&P ADULT - PROBLEM SELECTOR PLAN 3
#Spinal cord injury  - recent spinal cord injury s/p laminectomy decompression 9/15/21   - hosp. course c/b UTI #Spinal cord injury  - recent spinal cord injury s/p laminectomy decompression 9/15/21   - hosp. course c/b UTI  Spinal cord injury likely contributing to repeat UTI.    Plan:  - bladder scans q6 and straight cath for urinary retention > 300 cc  - PT #Spinal cord injury  - recent spinal cord injury s/p laminectomy decompression 9/15/21   - hosp. course c/b UTI  Spinal cord injury likely contributing to repeat UTI.    Plan:  - bladder scans q6 and straight cath for urinary retention > 300 cc  - PT  - notify Dr. D'Amico in AM of pt's admission #Spinal cord injury  - recent spinal cord injury s/p laminectomy decompression 9/15/21   - hosp. course c/b UTI  Spinal cord injury likely contributing to repeat UTI.    Plan:  - bladder scans q6 and straight cath for urinary retention > 300 cc  - PT  - notify neurosurgery Dr. D'Amico in AM of pt's admission

## 2021-11-14 NOTE — ED PROVIDER NOTE - ATTENDING CONTRIBUTION TO CARE
dysuria with low grade fevers x several days. well appearing but recently paraplegic without sensation lower body. borderline tachy, taking tylenol. concern for sepsis from uti. labs/cultures obtained. wbc elevated. ivf/ ceftriaxone given. admit for further management.

## 2021-11-14 NOTE — ED PROVIDER NOTE - CLINICAL SUMMARY MEDICAL DECISION MAKING FREE TEXT BOX
Pt has low grade temp 99.1F oral on arrival. Took tylenol prior to arrival. , otherwise hemodynamically stable. He has no abdominal tenderness or CVA tenderness on exam, but reports decreased sensation below T8. WBC 23. Lactate wnl. No acute metabolic abnormalities. Blood cultures sent and pending. UA concerning for infection; urine culture pending. Pt given 1L NS and ceftriaxone 1g IV while in ED. Will admit to Medicine for further management due to multiple comorbidities.

## 2021-11-14 NOTE — H&P ADULT - PROBLEM SELECTOR PLAN 5
#Prophylactic Measure  F: s/p 1L NS   E: prn   N: normal diet   DVT ppx: lovenox   GI ppx: none  GI motility: senna   dispo: [facility]

## 2021-11-14 NOTE — H&P ADULT - PROBLEM SELECTOR PLAN 1
Has not been signed by PCP since 2017. Please advise. Thank you!     #Sepsis  - SIRS 2/4 + for tachycardia, leukocytosis to WBC 23.19 (neutrophil predominant), and subjective fevers at home; UA suggestive of UTI  - lactate WNL  - s/p 1L NS and ceftriaxone 1g IV in ED  Plan:  - plan per UTI below  - f/u blood cultures #Sepsis  - SIRS 2/4 + for tachycardia, leukocytosis to WBC 23.19 (neutrophil predominant), and subjective fevers at home; UA suggestive of UTI; no respiratory symptoms, no abd pain/diarrhea, and surgical scar well healed, all pointing towards UTI as source of sepsis  - lactate WNL  - s/p 1L NS and ceftriaxone 1g IV in ED    Plan:  - f/u blood cultures  - plan per UTI below #Sepsis  - SIRS 2/4 + for tachycardia, leukocytosis to WBC 23.19 (neutrophil predominant), and subjective fevers at home; UA suggestive of UTI; no respiratory symptoms, no abd pain/diarrhea, and surgical scar well healed, all pointing towards UTI as source of sepsis  - lactate WNL  - s/p 1L NS and ceftriaxone 1g IV in ED    Plan:  - f/u blood cultures  - f/u CXR to r/o lung infx i/s/o sepsis   - plan per UTI below  - EKG for subjective palpitations w/ fevers

## 2021-11-14 NOTE — ED PROVIDER NOTE - NSICDXPASTMEDICALHX_GEN_ALL_CORE_FT
PAST MEDICAL HISTORY:  Infection, HIV     IVDU (intravenous drug user)     Paraplegia following spinal cord injury

## 2021-11-14 NOTE — H&P ADULT - PROBLEM SELECTOR PLAN 4
#HIV  - f/u full t cell count in AM  - cont. home biktarvy #HIV  - f/u full T cell count in AM  - cont. home biktarvy

## 2021-11-14 NOTE — H&P ADULT - ASSESSMENT
36M PMH HIV and spinal cord injury, presents with fevers and dysuria x1 week, admitted for UTI 36M PMH HIV (unknown T cell count or VL) and spinal cord injury, presents with fevers and dysuria x1 week, admitted for sepsis 2/2 UTI

## 2021-11-14 NOTE — H&P ADULT - NSHPPHYSICALEXAM_GEN_ALL_CORE
VITAL SIGNS:  T(C): 37.3 (11-15-21 @ 00:26), Max: 37.5 (11-14-21 @ 23:39)  T(F): 99.1 (11-15-21 @ 00:26), Max: 99.5 (11-14-21 @ 23:39)  HR: 91 (11-15-21 @ 00:26) (91 - 100)  BP: 116/75 (11-15-21 @ 00:26) (107/67 - 154/90)  BP(mean): --  RR: 17 (11-15-21 @ 00:26) (17 - 18)  SpO2: 95% (11-15-21 @ 00:26) (95% - 100%)  Wt(kg): --    PHYSICAL EXAM:    Constitutional: resting comfortably in bed; NAD  HEENT: NC/AT, EOMI, anicteric sclera, no nasal discharge; uvula midline, no oropharyngeal erythema or exudates, MMM   Respiratory: CTA B/L; no W/R/R, no retractions  Cardiac: +S1/S2; RRR; no M/R/G appreciated  Gastrointestinal: abdomen soft, NT/ND, no rebound tenderness or guarding  Back: no CVAT B/L  Extremities: legs WWP, no peripheral edema  Vascular: 2+ distal pedal pulses B/L  Dermatologic: skin warm, dry and intact; no rashes or wound; (+) surgical scar over thoracic region of back well healed with mild erythema no drainage, fluctuance, or tenderness  Neurologic: AAOx3; CNII-XII grossly intact; strength 5/5 in L leg and arms, 4/5 in R leg, sensation intact in all 4 extremities; no focal deficits noted  Psychiatric: affect and characteristics of appearance, verbalizations, behaviors are appropriate VITAL SIGNS:  T(C): 37.3 (11-15-21 @ 00:26), Max: 37.5 (11-14-21 @ 23:39)  T(F): 99.1 (11-15-21 @ 00:26), Max: 99.5 (11-14-21 @ 23:39)  HR: 91 (11-15-21 @ 00:26) (91 - 100)  BP: 116/75 (11-15-21 @ 00:26) (107/67 - 154/90)  BP(mean): --  RR: 17 (11-15-21 @ 00:26) (17 - 18)  SpO2: 95% (11-15-21 @ 00:26) (95% - 100%)  Wt(kg): --    PHYSICAL EXAM:    Constitutional: resting comfortably in bed; NAD  HEENT: NC/AT, EOMI, anicteric sclera, no nasal discharge; uvula midline, no oropharyngeal erythema or exudates, MMM   Respiratory: CTA B/L; no W/R/R, no retractions  Cardiac: +S1/S2; RRR; no M/R/G appreciated  Gastrointestinal: abdomen soft, NT/ND, no rebound tenderness or guarding  Back: no CVAT B/L  Extremities: legs WWP, no peripheral edema  Vascular: 2+ distal pedal pulses B/L  Dermatologic: skin warm, dry and intact; no rashes or wound; (+) surgical scar over thoracic region of back well healed with mild erythema no drainage, fluctuance, or tenderness  Neurologic: AAOx3; CNII-XII grossly intact; strength 5/5 in L leg and arms, 4/5 in R leg, sensation intact in all 4 extremities; no focal deficits noted; no spinal tenderness  Psychiatric: affect and characteristics of appearance, verbalizations, behaviors are appropriate

## 2021-11-14 NOTE — ED PROVIDER NOTE - NS ED ROS FT
Constitutional: No fever. No chills.  Eyes: No redness. No discharge. No vision change.   ENT: No sore throat. No ear pain.  Cardiovascular: No chest pain. No leg swelling.  Respiratory: No cough. No shortness of breath.  GI: No abdominal pain. No vomiting. No diarrhea.   : +dysuria. +urinary frequency. +dark urine. No flank pain.  MSK: No joint pain. No back pain.   Skin: No rash. No abrasions.   Neuro: No numbness. No weakness.   Psych: No known mental health issues.

## 2021-11-14 NOTE — H&P ADULT - PROBLEM SELECTOR PLAN 2
#UTI  - recent hosp. course c/b pansensitive Klebsiella UTI w/ neg. blood cultures, was treated with cefuroxime until 10/29 for total 21 day course recommended by ID (extended course due to concern over prostatitis and orchitis).   - was self catheterizing for urinary retention, now no longer requiring straight caths, able to void independently but often requiring self bladder massages to stimulate urination.   - difficulty urinating / oliguria x1 week w/ increasing dysuria, sometimes feeling pain even before he urinates, as well as weaker stream and a few episodes of dark urine, increased hesitancy, minimal UOP, cloudy urine  - fevers, chills x3 days   - tylenol for symptomatic relief  - UA w/ trace blood, mod leuk est., many WBCs, (+) bacteria.    Plan:  - cont. w/ ceftriaxone 2 g IV q24  - f/u urine culture and narrow Abx as able #UTI  - recent hosp. course c/b pansensitive Klebsiella UTI w/ neg. blood cultures, was treated with cefuroxime until 10/29 for total 21 day course recommended by ID (extended course due to concern over prostatitis and orchitis).   - was self catheterizing for urinary retention, now no longer requiring straight caths, able to void independently but often requiring self bladder massages to stimulate urination.   - difficulty urinating / oliguria x1 week w/ increasing dysuria, sometimes feeling pain even before he urinates, as well as weaker stream and a few episodes of dark urine, increased hesitancy, minimal UOP, cloudy urine  - fevers, chills x3 days   - tylenol for symptomatic relief  - UA w/ trace blood, mod leuk est., many WBCs, (+) bacteria.    Plan:  - cont. w/ ceftriaxone 2 g IV q24 (high leukocytosis, spinal injury, male UTI, increased risk of pyelo/heme seeding)  - f/u urine culture and narrow Abx as able, can cont. w/ ceftriaxone 1g q24 if negative blood cultures

## 2021-11-14 NOTE — ED PROVIDER NOTE - PHYSICAL EXAMINATION
VITAL SIGNS: I have reviewed nursing notes and confirm.  CONSTITUTIONAL: Well-developed; well-nourished; in no acute distress.   SKIN:  warm and dry, no acute rash.   HEAD:  normocephalic, atraumatic.  EYES: PERRL, EOM intact; conjunctiva and sclera clear.  ENT: No nasal discharge; airway clear.   NECK: Supple; non tender.  CARD: S1, S2 normal; no murmurs, gallops, or rubs. Regular rate and rhythm.   RESP:  Clear to auscultation b/l, no wheezes, rales or rhonchi.  ABD: Normal bowel sounds; soft; non-distended; non-tender; no guarding/ rebound. No CVA tenderness; however, pt reports minimal sensation with abdominal palpation or percussion of flanks.   EXT: Normal ROM. No clubbing, cyanosis or edema. 2+ pulses to b/l ue/le.  NEURO: Alert, oriented, grossly unremarkable  PSYCH: Cooperative, mood and affect appropriate.

## 2021-11-14 NOTE — H&P ADULT - NSHPLABSRESULTS_GEN_ALL_CORE
13.4   23.19 )-----------( 193      ( 14 Nov 2021 21:21 )             39.3       11-14    133<L>  |  100  |  7   ----------------------------<  145<H>  3.2<L>   |  21<L>  |  0.82    Ca    8.7      14 Nov 2021 23:15    TPro  8.1  /  Alb  4.5  /  TBili  1.0  /  DBili  x   /  AST  see note  /  ALT  see note  /  AlkPhos  76  11-14              Urinalysis Basic - ( 14 Nov 2021 20:47 )    Color: Yellow / Appearance: Clear / SG: <=1.005 / pH: x  Gluc: x / Ketone: NEGATIVE  / Bili: Negative / Urobili: 0.2 E.U./dL   Blood: x / Protein: NEGATIVE mg/dL / Nitrite: NEGATIVE   Leuk Esterase: Moderate / RBC: < 5 /HPF / WBC Many /HPF   Sq Epi: x / Non Sq Epi: 0-5 /HPF / Bacteria: Present /HPF            Lactate Trend  11-14 @ 22:05 Lactate:1.0             CAPILLARY BLOOD GLUCOSE

## 2021-11-14 NOTE — ED PROVIDER NOTE - OBJECTIVE STATEMENT
35yo male with pmhx of HIV on HAART (unknown viral load), IVDU and methamphetamine use, admission in Sept 2021 for lower extremity weakness and parethesias found to have intradural - extramedullary lesion with enhancement at level T8 with mass effect on the spinal cord with leftward displacement and minimal associated cord edema now s/p T8-9 lami decompression on 9/15 with Dr. D-Amico with intraoperative findings consistent with subarachnoid clot now s/p evacuation who presents with 1wk of urinary hesitancy, dysuria, dark urine, low grade fever, vomiting. Pt reports decreased sensation below T8. He states he has partial function of his BLE. He was discharged from rehab on 10/28. He reports he was previously straight cathing but is now able to void spontaneously. He reports that over the past week he noted urinary hesitancy, dysuria and dark urine and over the past few days he has had low grade fever with an episode of NBNB vomiting last night. He denies chest pain, shortness of breath, abdominal pain, flank pain, diarrhea, constipation. 35yo male with pmhx of HIV on HAART (unknown viral load), IVDU and methamphetamine use, admission in Sept 2021 for lower extremity weakness and parethesias found to have intradural - extramedullary lesion with enhancement at level T8 with mass effect on the spinal cord with leftward displacement and minimal associated cord edema now s/p T8-9 lami decompression on 9/15 with Dr. D-Amico with intraoperative findings consistent with subarachnoid clot now s/p evacuation who presents with 1wk of urinary hesitancy, dysuria, dark urine, fever and rigors, vomiting. Pt reports decreased sensation below T8. He states he has partial function of his BLE. He was discharged from rehab on 10/28. He reports he was previously straight cathing but is now able to void spontaneously. He reports that over the past week he noted urinary hesitancy, dysuria and dark urine and over the past few days he has had low grade fever with an episode of NBNB vomiting last night. He denies chest pain, shortness of breath, abdominal pain, flank pain, diarrhea, constipation. Of note pt took tylenol pta.

## 2021-11-14 NOTE — H&P ADULT - HISTORY OF PRESENT ILLNESS
HPI:     Mr. Pena is a 36 yr old man with a PMH of HIV and spinal cord injury who presents with fevers and dysuria x1 week.    Pt suffered a recent spinal cord injury 9/15/21 causing sudden onset lower extremity weakness/paresthesias after lifting weights, leading to paraplegia with a prolonged hospitalization 9/28-10/26. MRI showed an intradural - extramedullary lesion with enhancement at level T8 with mass effect on the spinal cord with leftward displacement and minimal associated cord edema.  He was admitted to neurosurgery and underwent T8-9 lami decompression on 9/15/21 c/b subarachnoid clot s/p evacuation. Post operative course was uncomplicated. During the hospitalization he had a pansensitive Klebsiella UTI w/ neg. blood cultures, was treated with cefuroxime until 10/29 for total 21 day course recommended by ID (extended course due to concern over prostatitis and orchitis). He learned to self catheterize for urinary retention. Also had urinary incontinence. He was discharged to [facility], and has since been completing his recovering at home, no longer requiring straight caths, able to void independently but often requiring self bladder massages to stimulate urination. For the past week, he has had difficulty urinating / oliguria. He has had increasing dysuria, sometimes feeling pain even before he urinates, as well as weaker stream and a few episodes of dark urine. Also having increased hesitancy, with minimal urine output.  On Friday he developed fevers, and by afternoon fevers worsened, felt cold. He had no runny nose and COVID test at home was negative. He took tylenol for symptomatic relief. On day of admission he noticed his urine had a cloudy appearance.    In the ED:  Initial vital signs: T: 99.1 F, HR: 100, BP: 154/90, RR: 18, SpO2: 99% on RA  ED course:   Labs: WBC 23.19 (neutrophil predominant), Hgb 13.4, Plt 193. Lactate WNL. K, AST, ALT hemolyzed. UA w/ trace blood, mod leuk est., many WBCs, (+) bacteria.  Imaging:  Medications: ceftriaxone 1g IV x1, NS 1L x1.  Consults: none  HPI:     Mr. Pena is a 36 yr old man with a PMH of HIV and spinal cord injury who presents with fevers and dysuria x1 week.    Pt suffered a recent spinal cord injury 9/15/21 causing sudden onset lower extremity weakness/paresthesias after lifting weights, leading to paraplegia with a prolonged hospitalization 9/28-10/26. MRI showed an intradural - extramedullary lesion with enhancement at level T8 with mass effect on the spinal cord with leftward displacement and minimal associated cord edema.  He was admitted to neurosurgery and underwent T8-9 lami decompression on 9/15/21 c/b subarachnoid clot s/p evacuation. Post operative course was uncomplicated. During the hospitalization he had a pansensitive Klebsiella UTI w/ neg. blood cultures, was treated with cefuroxime until 10/29 for total 21 day course recommended by ID (extended course due to concern over prostatitis and orchitis). He learned to self catheterize for urinary retention. Also had urinary incontinence. He was discharged to [facility], and has since been completing his recovery at home, with improving strength in legs, no longer requiring straight caths, able to void independently but often requiring self bladder massages to stimulate urination. For the past week, he has had difficulty urinating / oliguria. He has had increasing dysuria, sometimes feeling pain even before he urinates, as well as weaker stream and a few episodes of dark urine. Also having increased hesitancy, with minimal urine output.  On Friday he developed fevers, and by afternoon fevers worsened, felt cold. He had no runny nose and COVID test at home was negative. He took tylenol for symptomatic relief. On day of admission he noticed his urine had a cloudy appearance.    In the ED:  Initial vital signs: T: 99.1 F, HR: 100, BP: 154/90, RR: 18, SpO2: 99% on RA  ED course:   Labs: WBC 23.19 (neutrophil predominant), Hgb 13.4, Plt 193. Lactate WNL. K, AST, ALT hemolyzed. UA w/ trace blood, mod leuk est., many WBCs, (+) bacteria.  Imaging:  Medications: ceftriaxone 1g IV x1, NS 1L x1.  Consults: none  HPI:     Mr. Pena is a 36 yr old man with a PMH of HIV (unknown T cell count or VL) and spinal cord injury who presents with fevers and dysuria x1 week.    Pt suffered a recent spinal cord injury 9/15/21 causing sudden onset lower extremity weakness/paresthesias after lifting weights, leading to paraplegia with a prolonged hospitalization 9/28-10/26. MRI showed an intradural - extramedullary lesion with enhancement at level T8 with mass effect on the spinal cord with leftward displacement and minimal associated cord edema.  He was admitted to neurosurgery and underwent T8-9 lami decompression on 9/15/21 c/b subarachnoid clot s/p evacuation. Post operative course was uncomplicated. During the hospitalization he had a pansensitive Klebsiella UTI w/ neg. blood cultures, was treated with cefuroxime until 10/29 for total 21 day course recommended by ID (extended course due to concern over prostatitis and orchitis). He learned to self catheterize for urinary retention. Also had urinary incontinence. He was discharged to [facility], and has since been completing his recovery at home, with improving strength in legs, no longer requiring straight caths, able to void independently but often requiring self bladder massages to stimulate urination. For the past week, he has had difficulty urinating / oliguria. He has had increasing dysuria, sometimes feeling pain even before he urinates, as well as weaker stream and a few episodes of dark urine. Also having increased hesitancy, with minimal urine output.  On Friday he developed fevers, and by afternoon fevers worsened, felt cold. He had no runny nose and COVID test at home was negative. He took tylenol for symptomatic relief. On day of admission he noticed his urine had a cloudy appearance.    In the ED:  Initial vital signs: T: 99.1 F, HR: 100, BP: 154/90, RR: 18, SpO2: 99% on RA  ED course:   Labs: WBC 23.19 (neutrophil predominant), Hgb 13.4, Plt 193. Lactate WNL. K, AST, ALT hemolyzed. UA w/ trace blood, mod leuk est., many WBCs, (+) bacteria.  Imaging:  Medications: ceftriaxone 1g IV x1, NS 1L x1.  Consults: none  HPI:     Mr. Pena is a 36 yr old man with a PMH of HIV (unknown T cell count or VL) and spinal cord injury who presents with fevers and dysuria x1 week.    Pt suffered a recent spinal cord injury 9/15/21 causing sudden onset lower extremity weakness/paresthesias after lifting weights, leading to paraplegia with a prolonged hospitalization 9/28-10/26. MRI showed an intradural - extramedullary lesion with enhancement at level T8 with mass effect on the spinal cord with leftward displacement and minimal associated cord edema.  He was admitted to neurosurgery and underwent T8-9 lami decompression on 9/15/21 c/b subarachnoid clot s/p evacuation. Post operative course was uncomplicated. During the hospitalization he had a pansensitive Klebsiella UTI w/ neg. blood cultures, was treated with cefuroxime until 10/29 for total 21 day course recommended by ID (extended course due to concern over prostatitis and orchitis). He learned to self catheterize for urinary retention. Also had urinary incontinence. He was discharged to [facility], and has since been completing his recovery at home, with improving strength in legs, no longer requiring straight caths, able to void independently but often requiring self bladder massages to stimulate urination. For the past week, he has had difficulty urinating / oliguria. He has had increasing dysuria, sometimes feeling pain even before he urinates, as well as weaker stream and a few episodes of dark urine. Also having increased hesitancy, with minimal urine output. On Friday he developed fevers, and by afternoon fevers worsened, felt cold. He had no runny nose and COVID test at home was negative. He took tylenol for symptomatic relief. On day of admission he noticed his urine had a cloudy appearance.    In the ED:  Initial vital signs: T: 99.1 F, HR: 100, BP: 154/90, RR: 18, SpO2: 99% on RA  ED course:   Labs: WBC 23.19 (neutrophil predominant), Hgb 13.4, Plt 193. Lactate WNL. K, AST, ALT hemolyzed. UA w/ trace blood, mod leuk est., many WBCs, (+) bacteria.  Imaging:  Medications: ceftriaxone 1g IV x1, NS 1L x1.  Consults: none

## 2021-11-14 NOTE — H&P ADULT - ATTENDING COMMENTS
#Sepsis: 2/2 UTI, +UA and urinary symptoms, no CVA tenderness. hx of urinary retenstion from neurogenic bladder, f/up bladder scan, urine culture, blood culture. No spinal tenderness on exam, no new neurologic symptoms- overall improved. Surgical site intact. c/w Ceft for now #Sepsis: 2/2 UTI, +UA and urinary symptoms, no CVA tenderness. hx of urinary retenstion from neurogenic bladder, f/up bladder scan, urine culture, blood culture. No spinal tenderness on exam, no new neurologic symptoms- overall improved. Surgical site intact. c/w Ceft 2g for now

## 2021-11-14 NOTE — ED ADULT NURSE NOTE - OBJECTIVE STATEMENT
Pt presents to ED c/o urinary symptoms x 3 days. reporting dysuria, cloudy urine and fevers/chills. hx of spinal cord injury, presents to ED via wheelchair. A&Ox4, speaking in clear full sentences, NAD.

## 2021-11-14 NOTE — ED ADULT NURSE NOTE - NSIMPLEMENTINTERV_GEN_ALL_ED
Implemented All Fall Risk Interventions:  [place] to call system. Call bell, personal items and telephone within reach. Instruct patient to call for assistance. Room bathroom lighting operational. Non-slip footwear when patient is off stretcher. Physically safe environment: no spills, clutter or unnecessary equipment. Stretcher in lowest position, wheels locked, appropriate side rails in place. Provide visual cue, wrist band, yellow gown, etc. Monitor gait and stability. Monitor for mental status changes and reorient to person, place, and time. Review medications for side effects contributing to fall risk. Reinforce activity limits and safety measures with patient and family.

## 2021-11-14 NOTE — H&P ADULT - NSHPREVIEWOFSYSTEMS_GEN_ALL_CORE
General: (+) fevers, chills, sweats, fatigue, decreased PO x3 days  HEENT: no ear or nasal pain  CV: (+) palpitations; no chest pain  Pulm: no SOB or cough  GI: (+) vomiting a few times after drinking cold water; (+) constipation x~1 month; no abdominal pain, nausea, or diarrhea; no blood in stools  : (+) dysuria, hesitancy, weaker stream per HPI; (+) darker urine a few times recently  Neuro: (+) numbness, tingling, spasms, headaches  Heme: no bleeding or bruising

## 2021-11-15 DIAGNOSIS — G82.20 PARAPLEGIA, UNSPECIFIED: ICD-10-CM

## 2021-11-15 DIAGNOSIS — A41.9 SEPSIS, UNSPECIFIED ORGANISM: ICD-10-CM

## 2021-11-15 DIAGNOSIS — B20 HUMAN IMMUNODEFICIENCY VIRUS [HIV] DISEASE: ICD-10-CM

## 2021-11-15 DIAGNOSIS — N39.0 URINARY TRACT INFECTION, SITE NOT SPECIFIED: ICD-10-CM

## 2021-11-15 DIAGNOSIS — D64.9 ANEMIA, UNSPECIFIED: ICD-10-CM

## 2021-11-15 DIAGNOSIS — Z29.9 ENCOUNTER FOR PROPHYLACTIC MEASURES, UNSPECIFIED: ICD-10-CM

## 2021-11-15 LAB
-  K. PNEUMONIAE GROUP: SIGNIFICANT CHANGE UP
-  KPC RESISTANCE GENE: SIGNIFICANT CHANGE UP
4/8 RATIO: 0.58 RATIO — LOW (ref 0.9–3.6)
ABS CD8: 465 /UL — SIGNIFICANT CHANGE UP (ref 142–740)
ALBUMIN SERPL ELPH-MCNC: 3.3 G/DL — SIGNIFICANT CHANGE UP (ref 3.3–5)
ALBUMIN SERPL ELPH-MCNC: 3.3 G/DL — SIGNIFICANT CHANGE UP (ref 3.3–5)
ALP SERPL-CCNC: 56 U/L — SIGNIFICANT CHANGE UP (ref 40–120)
ALP SERPL-CCNC: 67 U/L — SIGNIFICANT CHANGE UP (ref 40–120)
ALT FLD-CCNC: 11 U/L — SIGNIFICANT CHANGE UP (ref 10–45)
ALT FLD-CCNC: 11 U/L — SIGNIFICANT CHANGE UP (ref 10–45)
ANION GAP SERPL CALC-SCNC: 10 MMOL/L — SIGNIFICANT CHANGE UP (ref 5–17)
ANION GAP SERPL CALC-SCNC: 13 MMOL/L — SIGNIFICANT CHANGE UP (ref 5–17)
AST SERPL-CCNC: 11 U/L — SIGNIFICANT CHANGE UP (ref 10–40)
AST SERPL-CCNC: 12 U/L — SIGNIFICANT CHANGE UP (ref 10–40)
BASOPHILS # BLD AUTO: 0.03 K/UL — SIGNIFICANT CHANGE UP (ref 0–0.2)
BASOPHILS NFR BLD AUTO: 0.2 % — SIGNIFICANT CHANGE UP (ref 0–2)
BILIRUB DIRECT SERPL-MCNC: 0.3 MG/DL — HIGH (ref 0–0.2)
BILIRUB INDIRECT FLD-MCNC: 0.5 MG/DL — SIGNIFICANT CHANGE UP (ref 0.2–1)
BILIRUB SERPL-MCNC: 0.7 MG/DL — SIGNIFICANT CHANGE UP (ref 0.2–1.2)
BILIRUB SERPL-MCNC: 0.8 MG/DL — SIGNIFICANT CHANGE UP (ref 0.2–1.2)
BLD GP AB SCN SERPL QL: NEGATIVE — SIGNIFICANT CHANGE UP
BUN SERPL-MCNC: 5 MG/DL — LOW (ref 7–23)
BUN SERPL-MCNC: 6 MG/DL — LOW (ref 7–23)
CALCIUM SERPL-MCNC: 8.1 MG/DL — LOW (ref 8.4–10.5)
CALCIUM SERPL-MCNC: 8.2 MG/DL — LOW (ref 8.4–10.5)
CD16+CD56+ CELLS NFR BLD: 8 % — SIGNIFICANT CHANGE UP (ref 5–23)
CD16+CD56+ CELLS NFR SPEC: 78 /UL — SIGNIFICANT CHANGE UP (ref 71–410)
CD19 BLASTS SPEC-ACNC: 10 % — SIGNIFICANT CHANGE UP (ref 6–24)
CD19 BLASTS SPEC-ACNC: 104 /UL — SIGNIFICANT CHANGE UP (ref 84–469)
CD3 BLASTS SPEC-ACNC: 781 /UL — SIGNIFICANT CHANGE UP (ref 672–1870)
CD3 BLASTS SPEC-ACNC: 80 % — SIGNIFICANT CHANGE UP (ref 59–83)
CD4 %: 28 % — LOW (ref 30–62)
CD8 %: 48 % — HIGH (ref 12–36)
CHLORIDE SERPL-SCNC: 101 MMOL/L — SIGNIFICANT CHANGE UP (ref 96–108)
CHLORIDE SERPL-SCNC: 104 MMOL/L — SIGNIFICANT CHANGE UP (ref 96–108)
CO2 SERPL-SCNC: 19 MMOL/L — LOW (ref 22–31)
CO2 SERPL-SCNC: 21 MMOL/L — LOW (ref 22–31)
COVID-19 NUCLEOCAPSID GAM AB INTERP: NEGATIVE — SIGNIFICANT CHANGE UP
COVID-19 NUCLEOCAPSID TOTAL GAM ANTIBODY RESULT: 0.08 INDEX — SIGNIFICANT CHANGE UP
COVID-19 SPIKE DOMAIN AB INTERP: POSITIVE
COVID-19 SPIKE DOMAIN ANTIBODY RESULT: >250 U/ML — HIGH
CREAT SERPL-MCNC: 0.77 MG/DL — SIGNIFICANT CHANGE UP (ref 0.5–1.3)
CREAT SERPL-MCNC: 0.8 MG/DL — SIGNIFICANT CHANGE UP (ref 0.5–1.3)
EOSINOPHIL # BLD AUTO: 0 K/UL — SIGNIFICANT CHANGE UP (ref 0–0.5)
EOSINOPHIL NFR BLD AUTO: 0 % — SIGNIFICANT CHANGE UP (ref 0–6)
GLUCOSE SERPL-MCNC: 124 MG/DL — HIGH (ref 70–99)
GLUCOSE SERPL-MCNC: 127 MG/DL — HIGH (ref 70–99)
GRAM STN FLD: SIGNIFICANT CHANGE UP
HAPTOGLOB SERPL-MCNC: 351 MG/DL — HIGH (ref 34–200)
HCT VFR BLD CALC: 31.3 % — LOW (ref 39–50)
HCT VFR BLD CALC: 32.7 % — LOW (ref 39–50)
HGB BLD-MCNC: 10.3 G/DL — LOW (ref 13–17)
HGB BLD-MCNC: 11.3 G/DL — LOW (ref 13–17)
IMM GRANULOCYTES NFR BLD AUTO: 0.6 % — SIGNIFICANT CHANGE UP (ref 0–1.5)
LACTATE SERPL-SCNC: 1.1 MMOL/L — SIGNIFICANT CHANGE UP (ref 0.5–2)
LDH SERPL L TO P-CCNC: 211 U/L — SIGNIFICANT CHANGE UP (ref 50–242)
LYMPHOCYTES # BLD AUTO: 1.3 K/UL — SIGNIFICANT CHANGE UP (ref 1–3.3)
LYMPHOCYTES # BLD AUTO: 7.4 % — LOW (ref 13–44)
MAGNESIUM SERPL-MCNC: 1.5 MG/DL — LOW (ref 1.6–2.6)
MAGNESIUM SERPL-MCNC: 1.7 MG/DL — SIGNIFICANT CHANGE UP (ref 1.6–2.6)
MAGNESIUM SERPL-MCNC: 2.2 MG/DL — SIGNIFICANT CHANGE UP (ref 1.6–2.6)
MCHC RBC-ENTMCNC: 30.2 PG — SIGNIFICANT CHANGE UP (ref 27–34)
MCHC RBC-ENTMCNC: 31.4 PG — SIGNIFICANT CHANGE UP (ref 27–34)
MCHC RBC-ENTMCNC: 32.9 GM/DL — SIGNIFICANT CHANGE UP (ref 32–36)
MCHC RBC-ENTMCNC: 34.6 GM/DL — SIGNIFICANT CHANGE UP (ref 32–36)
MCV RBC AUTO: 90.8 FL — SIGNIFICANT CHANGE UP (ref 80–100)
MCV RBC AUTO: 91.8 FL — SIGNIFICANT CHANGE UP (ref 80–100)
METHOD TYPE: SIGNIFICANT CHANGE UP
MONOCYTES # BLD AUTO: 1.25 K/UL — HIGH (ref 0–0.9)
MONOCYTES NFR BLD AUTO: 7.1 % — SIGNIFICANT CHANGE UP (ref 2–14)
NEUTROPHILS # BLD AUTO: 14.95 K/UL — HIGH (ref 1.8–7.4)
NEUTROPHILS NFR BLD AUTO: 84.7 % — HIGH (ref 43–77)
NRBC # BLD: 0 /100 WBCS — SIGNIFICANT CHANGE UP (ref 0–0)
NRBC # BLD: 0 /100 WBCS — SIGNIFICANT CHANGE UP (ref 0–0)
PHOSPHATE SERPL-MCNC: 1.1 MG/DL — LOW (ref 2.5–4.5)
PHOSPHATE SERPL-MCNC: 1.8 MG/DL — LOW (ref 2.5–4.5)
PHOSPHATE SERPL-MCNC: 1.8 MG/DL — LOW (ref 2.5–4.5)
PLATELET # BLD AUTO: 154 K/UL — SIGNIFICANT CHANGE UP (ref 150–400)
PLATELET # BLD AUTO: 175 K/UL — SIGNIFICANT CHANGE UP (ref 150–400)
POTASSIUM SERPL-MCNC: 3.5 MMOL/L — SIGNIFICANT CHANGE UP (ref 3.5–5.3)
POTASSIUM SERPL-MCNC: 3.9 MMOL/L — SIGNIFICANT CHANGE UP (ref 3.5–5.3)
POTASSIUM SERPL-SCNC: 3.5 MMOL/L — SIGNIFICANT CHANGE UP (ref 3.5–5.3)
POTASSIUM SERPL-SCNC: 3.9 MMOL/L — SIGNIFICANT CHANGE UP (ref 3.5–5.3)
PROT SERPL-MCNC: 6 G/DL — SIGNIFICANT CHANGE UP (ref 6–8.3)
PROT SERPL-MCNC: 6.7 G/DL — SIGNIFICANT CHANGE UP (ref 6–8.3)
RBC # BLD: 3.41 M/UL — LOW (ref 4.2–5.8)
RBC # BLD: 3.6 M/UL — LOW (ref 4.2–5.8)
RBC # FLD: 12.2 % — SIGNIFICANT CHANGE UP (ref 10.3–14.5)
RBC # FLD: 12.3 % — SIGNIFICANT CHANGE UP (ref 10.3–14.5)
RH IG SCN BLD-IMP: POSITIVE — SIGNIFICANT CHANGE UP
SARS-COV-2 IGG+IGM SERPL QL IA: 0.08 INDEX — SIGNIFICANT CHANGE UP
SARS-COV-2 IGG+IGM SERPL QL IA: >250 U/ML — HIGH
SARS-COV-2 IGG+IGM SERPL QL IA: NEGATIVE — SIGNIFICANT CHANGE UP
SARS-COV-2 IGG+IGM SERPL QL IA: POSITIVE
SODIUM SERPL-SCNC: 133 MMOL/L — LOW (ref 135–145)
SODIUM SERPL-SCNC: 135 MMOL/L — SIGNIFICANT CHANGE UP (ref 135–145)
T-CELL CD4 SUBSET PNL BLD: 271 /UL — LOW (ref 489–1457)
WBC # BLD: 15.19 K/UL — HIGH (ref 3.8–10.5)
WBC # BLD: 17.63 K/UL — HIGH (ref 3.8–10.5)
WBC # FLD AUTO: 15.19 K/UL — HIGH (ref 3.8–10.5)
WBC # FLD AUTO: 17.63 K/UL — HIGH (ref 3.8–10.5)

## 2021-11-15 PROCEDURE — 99252 IP/OBS CONSLTJ NEW/EST SF 35: CPT

## 2021-11-15 PROCEDURE — 71045 X-RAY EXAM CHEST 1 VIEW: CPT | Mod: 26

## 2021-11-15 PROCEDURE — 99233 SBSQ HOSP IP/OBS HIGH 50: CPT | Mod: GC

## 2021-11-15 RX ORDER — INFLUENZA VIRUS VACCINE 15; 15; 15; 15 UG/.5ML; UG/.5ML; UG/.5ML; UG/.5ML
0.5 SUSPENSION INTRAMUSCULAR ONCE
Refills: 0 | Status: DISCONTINUED | OUTPATIENT
Start: 2021-11-15 | End: 2021-11-17

## 2021-11-15 RX ORDER — MAGNESIUM SULFATE 500 MG/ML
4 VIAL (ML) INJECTION ONCE
Refills: 0 | Status: COMPLETED | OUTPATIENT
Start: 2021-11-15 | End: 2021-11-15

## 2021-11-15 RX ORDER — ACETAMINOPHEN 500 MG
650 TABLET ORAL EVERY 6 HOURS
Refills: 0 | Status: DISCONTINUED | OUTPATIENT
Start: 2021-11-15 | End: 2021-11-17

## 2021-11-15 RX ORDER — POTASSIUM PHOSPHATE, MONOBASIC POTASSIUM PHOSPHATE, DIBASIC 236; 224 MG/ML; MG/ML
30 INJECTION, SOLUTION INTRAVENOUS ONCE
Refills: 0 | Status: COMPLETED | OUTPATIENT
Start: 2021-11-15 | End: 2021-11-15

## 2021-11-15 RX ORDER — ACETAMINOPHEN 500 MG
325 TABLET ORAL ONCE
Refills: 0 | Status: COMPLETED | OUTPATIENT
Start: 2021-11-15 | End: 2021-11-15

## 2021-11-15 RX ORDER — ACETAMINOPHEN 500 MG
650 TABLET ORAL EVERY 6 HOURS
Refills: 0 | Status: DISCONTINUED | OUTPATIENT
Start: 2021-11-15 | End: 2021-11-15

## 2021-11-15 RX ORDER — POTASSIUM PHOSPHATE, MONOBASIC POTASSIUM PHOSPHATE, DIBASIC 236; 224 MG/ML; MG/ML
30 INJECTION, SOLUTION INTRAVENOUS ONCE
Refills: 0 | Status: DISCONTINUED | OUTPATIENT
Start: 2021-11-15 | End: 2021-11-15

## 2021-11-15 RX ORDER — SODIUM CHLORIDE 9 MG/ML
1100 INJECTION INTRAMUSCULAR; INTRAVENOUS; SUBCUTANEOUS ONCE
Refills: 0 | Status: COMPLETED | OUTPATIENT
Start: 2021-11-15 | End: 2021-11-15

## 2021-11-15 RX ORDER — BICTEGRAVIR SODIUM, EMTRICITABINE, AND TENOFOVIR ALAFENAMIDE FUMARATE 30; 120; 15 MG/1; MG/1; MG/1
1 TABLET ORAL EVERY 24 HOURS
Refills: 0 | Status: DISCONTINUED | OUTPATIENT
Start: 2021-11-15 | End: 2021-11-17

## 2021-11-15 RX ORDER — CEFTRIAXONE 500 MG/1
1000 INJECTION, POWDER, FOR SOLUTION INTRAMUSCULAR; INTRAVENOUS ONCE
Refills: 0 | Status: COMPLETED | OUTPATIENT
Start: 2021-11-15 | End: 2021-11-15

## 2021-11-15 RX ORDER — SODIUM CHLORIDE 9 MG/ML
1000 INJECTION INTRAMUSCULAR; INTRAVENOUS; SUBCUTANEOUS ONCE
Refills: 0 | Status: DISCONTINUED | OUTPATIENT
Start: 2021-11-15 | End: 2021-11-15

## 2021-11-15 RX ORDER — BICTEGRAVIR SODIUM, EMTRICITABINE, AND TENOFOVIR ALAFENAMIDE FUMARATE 30; 120; 15 MG/1; MG/1; MG/1
1 TABLET ORAL ONCE
Refills: 0 | Status: DISCONTINUED | OUTPATIENT
Start: 2021-11-15 | End: 2021-11-15

## 2021-11-15 RX ORDER — ACETAMINOPHEN 500 MG
1000 TABLET ORAL ONCE
Refills: 0 | Status: COMPLETED | OUTPATIENT
Start: 2021-11-15 | End: 2021-11-15

## 2021-11-15 RX ORDER — CEFTRIAXONE 500 MG/1
2000 INJECTION, POWDER, FOR SOLUTION INTRAMUSCULAR; INTRAVENOUS EVERY 24 HOURS
Refills: 0 | Status: DISCONTINUED | OUTPATIENT
Start: 2021-11-15 | End: 2021-11-17

## 2021-11-15 RX ORDER — ENOXAPARIN SODIUM 100 MG/ML
40 INJECTION SUBCUTANEOUS EVERY 24 HOURS
Refills: 0 | Status: DISCONTINUED | OUTPATIENT
Start: 2021-11-15 | End: 2021-11-17

## 2021-11-15 RX ORDER — SENNA PLUS 8.6 MG/1
2 TABLET ORAL AT BEDTIME
Refills: 0 | Status: DISCONTINUED | OUTPATIENT
Start: 2021-11-15 | End: 2021-11-17

## 2021-11-15 RX ORDER — POTASSIUM CHLORIDE 20 MEQ
40 PACKET (EA) ORAL ONCE
Refills: 0 | Status: COMPLETED | OUTPATIENT
Start: 2021-11-15 | End: 2021-11-15

## 2021-11-15 RX ORDER — ACETAMINOPHEN 500 MG
650 TABLET ORAL ONCE
Refills: 0 | Status: DISCONTINUED | OUTPATIENT
Start: 2021-11-15 | End: 2021-11-15

## 2021-11-15 RX ADMIN — Medication 85 MILLIMOLE(S): at 15:48

## 2021-11-15 RX ADMIN — Medication 1000 MILLIGRAM(S): at 15:00

## 2021-11-15 RX ADMIN — Medication 325 MILLIGRAM(S): at 22:34

## 2021-11-15 RX ADMIN — ENOXAPARIN SODIUM 40 MILLIGRAM(S): 100 INJECTION SUBCUTANEOUS at 07:18

## 2021-11-15 RX ADMIN — SENNA PLUS 2 TABLET(S): 8.6 TABLET ORAL at 20:28

## 2021-11-15 RX ADMIN — CEFTRIAXONE 100 MILLIGRAM(S): 500 INJECTION, POWDER, FOR SOLUTION INTRAMUSCULAR; INTRAVENOUS at 02:02

## 2021-11-15 RX ADMIN — BICTEGRAVIR SODIUM, EMTRICITABINE, AND TENOFOVIR ALAFENAMIDE FUMARATE 1 TABLET(S): 30; 120; 15 TABLET ORAL at 19:35

## 2021-11-15 RX ADMIN — SODIUM CHLORIDE 1100 MILLILITER(S): 9 INJECTION INTRAMUSCULAR; INTRAVENOUS; SUBCUTANEOUS at 06:15

## 2021-11-15 RX ADMIN — Medication 650 MILLIGRAM(S): at 12:20

## 2021-11-15 RX ADMIN — Medication 650 MILLIGRAM(S): at 09:24

## 2021-11-15 RX ADMIN — POTASSIUM PHOSPHATE, MONOBASIC POTASSIUM PHOSPHATE, DIBASIC 83.33 MILLIMOLE(S): 236; 224 INJECTION, SOLUTION INTRAVENOUS at 03:54

## 2021-11-15 RX ADMIN — Medication 100 GRAM(S): at 09:24

## 2021-11-15 RX ADMIN — CEFTRIAXONE 100 MILLIGRAM(S): 500 INJECTION, POWDER, FOR SOLUTION INTRAMUSCULAR; INTRAVENOUS at 22:11

## 2021-11-15 RX ADMIN — Medication 10 MILLIGRAM(S): at 18:33

## 2021-11-15 RX ADMIN — Medication 650 MILLIGRAM(S): at 21:28

## 2021-11-15 RX ADMIN — Medication 650 MILLIGRAM(S): at 20:28

## 2021-11-15 RX ADMIN — Medication 650 MILLIGRAM(S): at 05:01

## 2021-11-15 RX ADMIN — Medication 400 MILLIGRAM(S): at 14:14

## 2021-11-15 RX ADMIN — Medication 325 MILLIGRAM(S): at 23:30

## 2021-11-15 RX ADMIN — Medication 40 MILLIEQUIVALENT(S): at 01:19

## 2021-11-15 NOTE — CONSULT NOTE ADULT - SUBJECTIVE AND OBJECTIVE BOX
NEUROSURGERY CONSULT NOTE  HISTORY OF PRESENT ILLNESS:  36 yr old M with a PMH of HIV (unknown T cell count or VL) and spinal cord injury who presents with fevers and dysuria x1 week.    Pt suffered a recent spinal cord injury 9/15/21 causing sudden onset lower extremity weakness/paresthesias after lifting weights, leading to paraplegia with a prolonged hospitalization 9/28-10/26. MRI showed an intradural - extramedullary lesion with enhancement at level T8 with mass effect on the spinal cord with leftward displacement and minimal associated cord edema.  He was admitted to neurosurgery and underwent T8-9 lami decompression on 9/15/21 c/b subarachnoid clot s/p evacuation. Post operative course was uncomplicated. During the hospitalization he had a pansensitive Klebsiella UTI w/ neg. blood cultures, was treated with cefuroxime until 10/29 for total 21 day course recommended by ID (extended course due to concern over prostatitis and orchitis). He learned to self catheterize for urinary retention. Also had urinary incontinence. He was discharged to [facility], and has since been completing his recovery at home, with improving strength in legs, no longer requiring straight caths, able to void independently but often requiring self bladder massages to stimulate urination. For the past week, he has had difficulty urinating / oliguria. He has had increasing dysuria, sometimes feeling pain even before he urinates, as well as weaker stream and a few episodes of dark urine. Also having increased hesitancy, with minimal urine output. On Friday he developed fevers, and by afternoon fevers worsened, felt cold. He had no runny nose and COVID test at home was negative. He took tylenol for symptomatic relief. On day of admission he noticed his urine had a cloudy appearance.      PAST MEDICAL & SURGICAL HISTORY:  Infection, HIV    IVDU (intravenous drug user)    Paraplegia following spinal cord injury    S/P laminectomy  Neurosurgeon Dr. GUERRERO&#x27;Amico. 9/15/21.      REVIEW OF SYSTEMS      General:	no recent illnesses, no recent wt gain/loss    Skin/Breast:  intact  	  Ophthalmologic:  negative  	  ENMT:	negative    Respiratory and Thorax: no coughing, wheezing, recent URI  	  Cardiovascular: no chest pain, VALDERRAMA    Gastrointestinal:	soft, non tender    Genitourinary: no frequency, dysuria    Musculoskeletal:	negative    Neurological:	see HPI    Psychiatric:	negative    Hematology/Lymphatics:	negative    Endocrine:  	negative    Allergic/Immunologic:  Negative      MEDICATIONS:  Antibiotics:  bictegravir 50 mG/emtricitabine 200 mG/tenofovir alafenamide 25 mG (BIKTARVY) 1 Tablet(s) Oral every 24 hours  cefTRIAXone   IVPB 2000 milliGRAM(s) IV Intermittent every 24 hours    Neuro:  acetaminophen     Tablet .. 650 milliGRAM(s) Oral every 6 hours    Anticoagulation:  enoxaparin Injectable 40 milliGRAM(s) SubCutaneous every 24 hours    OTHER:  bisacodyl Suppository 10 milliGRAM(s) Rectal at bedtime PRN  influenza   Vaccine 0.5 milliLiter(s) IntraMuscular once  senna 2 Tablet(s) Oral at bedtime    IVF:  magnesium sulfate  IVPB 4 Gram(s) IV Intermittent once  sodium phosphate IVPB 30 milliMole(s) IV Intermittent once      Vital Signs Last 24 Hrs  T(C): 37 (15 Nov 2021 08:39), Max: 40.3 (15 Nov 2021 06:17)  T(F): 98.6 (15 Nov 2021 08:39), Max: 104.5 (15 Nov 2021 06:17)  HR: 98 (15 Nov 2021 08:39) (91 - 108)  BP: 122/81 (15 Nov 2021 08:39) (103/73 - 154/90)  BP(mean): --  RR: 17 (15 Nov 2021 08:39) (17 - 18)  SpO2: 98% (15 Nov 2021 08:39) (95% - 100%)    PHYSICAL EXAM:  Constitutional: NAD, well groomed  Respiratory: breathing non-labored, symmetrical chest wall movement  Cardiovascuar: RRR, no murmurs  Gastrointestinal: abdomen soft, non tender  Neurological:  AAOX3. Face symmetric, Verbal function intact, speech clear  Cranial Nerves: II-XII intact  Motor: 5/5 power in b/l upper extremities and lower extremities  Sensation: intact to light touch in all extremities  Pronator Drift: ***  Dysmetria: ***  Extremities: distal pulses 2+ x4  Wound/incision:    LABS:                        10.3   15.19 )-----------( 154      ( 15 Nov 2021 07:24 )             31.3     11-15    135  |  104  |  6<L>  ----------------------------<  127<H>  3.5   |  21<L>  |  0.80    Ca    8.1<L>      15 Nov 2021 07:24  Phos  1.1     11-15  Mg     1.5     11-15    TPro  6.0  /  Alb  3.3  /  TBili  0.8  /  DBili  0.3<H>  /  AST  12  /  ALT  11  /  AlkPhos  56  11-15      Urinalysis Basic - ( 14 Nov 2021 20:47 )    Color: Yellow / Appearance: Clear / SG: <=1.005 / pH: x  Gluc: x / Ketone: NEGATIVE  / Bili: Negative / Urobili: 0.2 E.U./dL   Blood: x / Protein: NEGATIVE mg/dL / Nitrite: NEGATIVE   Leuk Esterase: Moderate / RBC: < 5 /HPF / WBC Many /HPF   Sq Epi: x / Non Sq Epi: 0-5 /HPF / Bacteria: Present /HPF      CULTURES:      RADIOLOGY & ADDITIONAL STUDIES: x    Assessment/Plan: 36M PMH HIV and spinal cord injury, presents with fevers and dysuria x1 week, admitted for sepsis 2/2 UTI    Neurosurgery was consulted for any additional imaging, per Dr D'amico, no additional imaging needed on this admission unless there is an acute change in neuro status or neuro exam.  Rest of care per primary team  Patient should follow up with Dr D'amico at his regularly scheduled appointment outpatient    Please call [phone] with any questions or changes in neuro status   NEUROSURGERY CONSULT NOTE  HISTORY OF PRESENT ILLNESS:  36 yr old M with a PMH of HIV (unknown T cell count or VL) and spinal cord injury who presents with fevers and dysuria x1 week.    Pt suffered a recent spinal cord injury 9/15/21 causing sudden onset lower extremity weakness/paresthesias after lifting weights, leading to paraplegia with a prolonged hospitalization 9/28-10/26. MRI showed an intradural - extramedullary lesion with enhancement at level T8 with mass effect on the spinal cord with leftward displacement and minimal associated cord edema.  He was admitted to neurosurgery and underwent T8-9 lami decompression on 9/15/21 c/b subarachnoid clot s/p evacuation. Post operative course was uncomplicated. During the hospitalization he had a pansensitive Klebsiella UTI w/ neg. blood cultures, was treated with cefuroxime until 10/29 for total 21 day course recommended by ID (extended course due to concern over prostatitis and orchitis). He learned to self catheterize for urinary retention. Also had urinary incontinence. He was discharged to [facility], and has since been completing his recovery at home, with improving strength in legs, no longer requiring straight caths, able to void independently but often requiring self bladder massages to stimulate urination. For the past week, he has had difficulty urinating / oliguria. He has had increasing dysuria, sometimes feeling pain even before he urinates, as well as weaker stream and a few episodes of dark urine. Also having increased hesitancy, with minimal urine output. On Friday he developed fevers, and by afternoon fevers worsened, felt cold. He had no runny nose and COVID test at home was negative. He took tylenol for symptomatic relief. On day of admission he noticed his urine had a cloudy appearance.      PAST MEDICAL & SURGICAL HISTORY:  Infection, HIV    IVDU (intravenous drug user)    Paraplegia following spinal cord injury    S/P laminectomy  Neurosurgeon Dr. GUERRERO&#x27;Amico. 9/15/21.      REVIEW OF SYSTEMS  General:	no recent illnesses, no recent wt gain/loss    Skin/Breast:  intact  	  Ophthalmologic:  negative  	  ENMT:	negative    Respiratory and Thorax: no coughing, wheezing, recent URI  	  Cardiovascular: no chest pain, VALDERRAMA    Gastrointestinal:	soft, non tender    Genitourinary: no frequency, dysuria    Musculoskeletal:	negative    Neurological:	see HPI    Psychiatric:	negative    Hematology/Lymphatics:	negative    Endocrine:  	negative    Allergic/Immunologic:  Negative      MEDICATIONS:  Antibiotics:  bictegravir 50 mG/emtricitabine 200 mG/tenofovir alafenamide 25 mG (BIKTARVY) 1 Tablet(s) Oral every 24 hours  cefTRIAXone   IVPB 2000 milliGRAM(s) IV Intermittent every 24 hours    Neuro:  acetaminophen     Tablet .. 650 milliGRAM(s) Oral every 6 hours    Anticoagulation:  enoxaparin Injectable 40 milliGRAM(s) SubCutaneous every 24 hours    OTHER:  bisacodyl Suppository 10 milliGRAM(s) Rectal at bedtime PRN  influenza   Vaccine 0.5 milliLiter(s) IntraMuscular once  senna 2 Tablet(s) Oral at bedtime    IVF:  magnesium sulfate  IVPB 4 Gram(s) IV Intermittent once  sodium phosphate IVPB 30 milliMole(s) IV Intermittent once      Vital Signs Last 24 Hrs  T(C): 37 (15 Nov 2021 08:39), Max: 40.3 (15 Nov 2021 06:17)  T(F): 98.6 (15 Nov 2021 08:39), Max: 104.5 (15 Nov 2021 06:17)  HR: 98 (15 Nov 2021 08:39) (91 - 108)  BP: 122/81 (15 Nov 2021 08:39) (103/73 - 154/90)  BP(mean): --  RR: 17 (15 Nov 2021 08:39) (17 - 18)  SpO2: 98% (15 Nov 2021 08:39) (95% - 100%)    PHYSICAL EXAM:  Constitutional: NAD, well groomed  Respiratory: breathing non-labored, symmetrical chest wall movement  Cardiovascuar: RRR, no murmurs  Gastrointestinal: abdomen soft, non tender  Neurological:  AAOX3. Face symmetric, Verbal function intact, speech clear  Cranial Nerves: II-XII intact  Motor: 4+/5 in L leg, 3-4-/5 R leg, sensation intact, 5/5 b/l upper extremities  Sensation: intact to light touch in all extremities  Pronator Drift: none  Dysmetria: none  Extremities: distal pulses 2+ x4  Wound/incision: thoracic/lumbar incision C/D/I     LABS:                        10.3   15.19 )-----------( 154      ( 15 Nov 2021 07:24 )             31.3     11-15    135  |  104  |  6<L>  ----------------------------<  127<H>  3.5   |  21<L>  |  0.80    Ca    8.1<L>      15 Nov 2021 07:24  Phos  1.1     11-15  Mg     1.5     11-15    TPro  6.0  /  Alb  3.3  /  TBili  0.8  /  DBili  0.3<H>  /  AST  12  /  ALT  11  /  AlkPhos  56  11-15      Urinalysis Basic - ( 14 Nov 2021 20:47 )    Color: Yellow / Appearance: Clear / SG: <=1.005 / pH: x  Gluc: x / Ketone: NEGATIVE  / Bili: Negative / Urobili: 0.2 E.U./dL   Blood: x / Protein: NEGATIVE mg/dL / Nitrite: NEGATIVE   Leuk Esterase: Moderate / RBC: < 5 /HPF / WBC Many /HPF   Sq Epi: x / Non Sq Epi: 0-5 /HPF / Bacteria: Present /HPF      CULTURES:      RADIOLOGY & ADDITIONAL STUDIES: x    Assessment/Plan: 36M PMH HIV and spinal cord injury, presents with fevers and dysuria x1 week, admitted for sepsis 2/2 UTI    Neurosurgery was consulted for any additional imaging, per Dr D'amico, no additional imaging needed on this admission unless there is an acute change in neuro status or neuro exam.  Rest of care per primary team  Patient should follow up with Dr D'amico at his regularly scheduled appointment outpatient    Please call [phone] with any questions or changes in neuro status

## 2021-11-15 NOTE — PHYSICAL THERAPY INITIAL EVALUATION ADULT - ADDITIONAL COMMENTS
Pt lives with sibling in elevator access apartment building with no stairs to enter. fairly independent with ADLs prior to this admission, able to ambulate short distances with RW and community with WC. was receiving home PT twice per week.

## 2021-11-15 NOTE — PROGRESS NOTE ADULT - ASSESSMENT
36M PMH HIV (unknown T cell count or VL) and spinal cord injury, presents with fevers and dysuria x1 week, admitted for sepsis 2/2 UTI

## 2021-11-15 NOTE — PROGRESS NOTE ADULT - PROBLEM SELECTOR PLAN 1
- SIRS 3/4 + for fever, tachycardia, leukocytosis to WBC 23.19 (neutrophil predominant),; UA suggestive of UTI; no respiratory symptoms, no abd pain/diarrhea, and surgical scar well healed with no spinal tenderness or CVA tenderness, all pointing towards UTI as source of sepsis  - lactate WNL  - s/p 1L NS and ceftriaxone 1g IV in ED  - EKG sinus tachycardia    Plan:  - f/u blood cultures  - f/u CXR to r/o lung infx i/s/o sepsis   - plan per UTI below - SIRS 3/4 + for fever, tachycardia, leukocytosis to WBC 23.19 (neutrophil predominant),; UA suggestive of UTI; no respiratory symptoms, no abd pain/diarrhea, and surgical scar well healed with no spinal tenderness or CVA tenderness, all pointing towards UTI as source of sepsis  - lactate WNL  - s/p 1L NS and ceftriaxone 1g IV in ED  - EKG sinus tachycardia    Plan:  - f/u blood cultures  - CXR appears clear and no complaints of lung pathology  - plan per UTI below

## 2021-11-15 NOTE — PROGRESS NOTE ADULT - PROBLEM SELECTOR PLAN 2
#UTI  - recent hosp. course c/b pansensitive Klebsiella UTI w/ neg. blood cultures, was treated with cefuroxime until 10/29 for total 21 day course recommended by ID (extended course due to concern over prostatitis and orchitis).   - was self catheterizing for urinary retention, now no longer requiring straight caths, able to void independently but often requiring self bladder massages to stimulate urination.   - difficulty urinating / oliguria x1 week w/ increasing dysuria, sometimes feeling pain even before he urinates, as well as weaker stream and a few episodes of dark urine, increased hesitancy, minimal UOP, cloudy urine  - fevers, chills x3 days   - tylenol for symptomatic relief  - UA w/ trace blood, mod leuk est., many WBCs, (+) bacteria.    Plan:  - cont. w/ ceftriaxone 2 g IV q24 (high leukocytosis, spinal injury, male UTI, increased risk of pyelo/heme seeding)  - f/u urine culture and narrow Abx as able, can cont. w/ ceftriaxone 1g q24 if negative blood cultures #UTI  - recent hosp. course c/b pansensitive Klebsiella UTI w/ neg. blood cultures, was treated with cefuroxime until 10/29 for total 21 day course recommended by ID (extended course due to concern over prostatitis and orchitis).   - was self catheterizing for urinary retention, now no longer requiring straight caths, able to void independently but often requiring self bladder massages to stimulate urination.   - difficulty urinating / oliguria x1 week w/ increasing dysuria, sometimes feeling pain even before he urinates, as well as weaker stream and a few episodes of dark urine, increased hesitancy, minimal UOP, cloudy urine  - fevers, chills x3 days   - tylenol for symptomatic relief  - UA w/ trace blood, mod leuk est., many WBCs, (+) bacteria.    Plan:  - cont. w/ ceftriaxone 2 g IV q24 for likely 7-10 day course(high leukocytosis, spinal injury, male UTI, increased risk of pyelo/heme seeding)  - f/u urine culture and narrow Abx as able  - f/u bcx

## 2021-11-15 NOTE — PHYSICAL THERAPY INITIAL EVALUATION ADULT - IMPAIRMENTS FOUND, PT EVAL
aerobic capacity/endurance/fine motor/gait, locomotion, and balance/muscle strength/neuromotor development and sensory integration/posture/tone

## 2021-11-15 NOTE — PROGRESS NOTE ADULT - SUBJECTIVE AND OBJECTIVE BOX
CC: Patient is a 36y old  Male who presents with a chief complaint of     INTERVAL EVENTS: MARCO    SUBJECTIVE / INTERVAL HPI: Patient seen and examined at bedside.     ROS: negative unless otherwise stated above.    VITAL SIGNS:  Vital Signs Last 24 Hrs  T(C): 40.3 (15 Nov 2021 06:17), Max: 40.3 (15 Nov 2021 06:17)  T(F): 104.5 (15 Nov 2021 06:17), Max: 104.5 (15 Nov 2021 06:17)  HR: 108 (15 Nov 2021 05:13) (91 - 108)  BP: 103/73 (15 Nov 2021 05:13) (103/73 - 154/90)  BP(mean): --  RR: 18 (15 Nov 2021 05:13) (17 - 18)  SpO2: 100% (15 Nov 2021 05:13) (95% - 100%)      11-14-21 @ 07:01  -  11-15-21 @ 07:00  --------------------------------------------------------  IN: 0 mL / OUT: 200 mL / NET: -200 mL        PHYSICAL EXAM:    General: NAD  HEENT: MMM  Neck: supple  Cardiovascular: +S1/S2; RRR  Respiratory: CTA B/L; no W/R/R  Gastrointestinal: soft, NT/ND  Extremities: WWP; no edema, clubbing or cyanosis  Vascular: 2+ radial, DP/PT pulses B/L  Neurological: AAOx3; no focal deficits    MEDICATIONS:  MEDICATIONS  (STANDING):  bictegravir 50 mG/emtricitabine 200 mG/tenofovir alafenamide 25 mG (BIKTARVY) 1 Tablet(s) Oral every 24 hours  cefTRIAXone   IVPB 2000 milliGRAM(s) IV Intermittent every 24 hours  enoxaparin Injectable 40 milliGRAM(s) SubCutaneous every 24 hours  influenza   Vaccine 0.5 milliLiter(s) IntraMuscular once  senna 2 Tablet(s) Oral at bedtime    MEDICATIONS  (PRN):  acetaminophen     Tablet .. 650 milliGRAM(s) Oral every 6 hours PRN Temp greater or equal to 38C (100.4F), Mild Pain (1 - 3)  bisacodyl Suppository 10 milliGRAM(s) Rectal at bedtime PRN Constipation      ALLERGIES:  Allergies    No Known Allergies    Intolerances        LABS:                        13.4   23.19 )-----------( 193      ( 14 Nov 2021 21:21 )             39.3     11-14    133<L>  |  100  |  7   ----------------------------<  145<H>  3.2<L>   |  21<L>  |  0.82    Ca    8.7      14 Nov 2021 23:15  Phos  1.8     11-14  Mg     1.7     11-14    TPro  8.1  /  Alb  4.5  /  TBili  1.0  /  DBili  x   /  AST  see note  /  ALT  see note  /  AlkPhos  76  11-14      Urinalysis Basic - ( 14 Nov 2021 20:47 )    Color: Yellow / Appearance: Clear / SG: <=1.005 / pH: x  Gluc: x / Ketone: NEGATIVE  / Bili: Negative / Urobili: 0.2 E.U./dL   Blood: x / Protein: NEGATIVE mg/dL / Nitrite: NEGATIVE   Leuk Esterase: Moderate / RBC: < 5 /HPF / WBC Many /HPF   Sq Epi: x / Non Sq Epi: 0-5 /HPF / Bacteria: Present /HPF      CAPILLARY BLOOD GLUCOSE          RADIOLOGY & ADDITIONAL TESTS: Reviewed. CC: Patient is a 36y old  Male who presents with a chief complaint of     INTERVAL EVENTS: Fevers overnight.    SUBJECTIVE / INTERVAL HPI: Patient seen and examined at bedside. Pt says dysuria is improving. Still complains of fevers and rigors.    ROS: negative unless otherwise stated above.    VITAL SIGNS:  Vital Signs Last 24 Hrs  T(C): 40.3 (15 Nov 2021 06:17), Max: 40.3 (15 Nov 2021 06:17)  T(F): 104.5 (15 Nov 2021 06:17), Max: 104.5 (15 Nov 2021 06:17)  HR: 108 (15 Nov 2021 05:13) (91 - 108)  BP: 103/73 (15 Nov 2021 05:13) (103/73 - 154/90)  BP(mean): --  RR: 18 (15 Nov 2021 05:13) (17 - 18)  SpO2: 100% (15 Nov 2021 05:13) (95% - 100%)      11-14-21 @ 07:01  -  11-15-21 @ 07:00  --------------------------------------------------------  IN: 0 mL / OUT: 200 mL / NET: -200 mL        PHYSICAL EXAM:    General: NAD  HEENT: MMM  Neck: supple  Cardiovascular: +S1/S2; RRR  Respiratory: CTA B/L; no W/R/R  BACK: No CVA tenderness, tenderness over incision on back  Gastrointestinal: soft, NT/ND  Extremities: WWP; no edema, clubbing or cyanosis  Vascular: 2+ radial, DP/PT pulses B/L  Neurological: AAOx3; decreased strength 4/5 in R leg and 5/5 in L leg    MEDICATIONS:  MEDICATIONS  (STANDING):  bictegravir 50 mG/emtricitabine 200 mG/tenofovir alafenamide 25 mG (BIKTARVY) 1 Tablet(s) Oral every 24 hours  cefTRIAXone   IVPB 2000 milliGRAM(s) IV Intermittent every 24 hours  enoxaparin Injectable 40 milliGRAM(s) SubCutaneous every 24 hours  influenza   Vaccine 0.5 milliLiter(s) IntraMuscular once  senna 2 Tablet(s) Oral at bedtime    MEDICATIONS  (PRN):  acetaminophen     Tablet .. 650 milliGRAM(s) Oral every 6 hours PRN Temp greater or equal to 38C (100.4F), Mild Pain (1 - 3)  bisacodyl Suppository 10 milliGRAM(s) Rectal at bedtime PRN Constipation      ALLERGIES:  Allergies    No Known Allergies    Intolerances        LABS:                        13.4   23.19 )-----------( 193      ( 14 Nov 2021 21:21 )             39.3     11-14    133<L>  |  100  |  7   ----------------------------<  145<H>  3.2<L>   |  21<L>  |  0.82    Ca    8.7      14 Nov 2021 23:15  Phos  1.8     11-14  Mg     1.7     11-14    TPro  8.1  /  Alb  4.5  /  TBili  1.0  /  DBili  x   /  AST  see note  /  ALT  see note  /  AlkPhos  76  11-14      Urinalysis Basic - ( 14 Nov 2021 20:47 )    Color: Yellow / Appearance: Clear / SG: <=1.005 / pH: x  Gluc: x / Ketone: NEGATIVE  / Bili: Negative / Urobili: 0.2 E.U./dL   Blood: x / Protein: NEGATIVE mg/dL / Nitrite: NEGATIVE   Leuk Esterase: Moderate / RBC: < 5 /HPF / WBC Many /HPF   Sq Epi: x / Non Sq Epi: 0-5 /HPF / Bacteria: Present /HPF      CAPILLARY BLOOD GLUCOSE          RADIOLOGY & ADDITIONAL TESTS: Reviewed.

## 2021-11-15 NOTE — PROGRESS NOTE ADULT - SUBJECTIVE AND OBJECTIVE BOX
Patient was seen and examined by me at bedside. I agree with resident's note, subjective, objective physical exam, assessment and plan with following modifications/additions.    Greater than 35 minutes spent on total encounter; more than 50% of the visit was spent counseling and/or coordinating care by the attending physician.    85M PMH HIV no OI hx, CAD, GERD, hypothyroidism, spinal cord injury s/p T8-9 lami decompression on 9/15/21 c/b subarachnoid clot s/p evacuation , hx of UTI presents with sepsis 2/2 UTI   #Sepsis 2/2 UTI, no hydro per exam and HD stable, no MDR hx but recent abx- c/w ceftriaxone 2g, f/u bcx, urine cx, low threshold to escalate to zosyn, bladder scan to ensure no retention   #HIV hx- c/w biktarvy, CD4 counts  #S/p spinal surg, site looks c/d/i, no residual hardware- monitor surg site, no imaging per NSGY, improving weakness in b/l LE (now 4/5) since prior visit  #Hypothyroidism and CAD in admit note- no prior meds noted in prior visit, f/u if on meds     #DVT px- lovenox- safe to be on even if prior bleed hx per notes  #Dispo- home PT per PT    Jesse Chahal MD [phone]

## 2021-11-15 NOTE — PHYSICAL THERAPY INITIAL EVALUATION ADULT - PLANNED THERAPY INTERVENTIONS, PT EVAL
balance training/gait training/motor coordination training/neuromuscular re-education/postural re-education/strengthening/transfer training

## 2021-11-15 NOTE — PROGRESS NOTE ADULT - PROBLEM SELECTOR PLAN 5
#Prophylactic Measure  F: s/p 1L NS   E: prn   N: normal diet   DVT ppx: lovenox   GI ppx: none  GI motility: senna   dispo: [facility] #HIV  - f/u full T cell count   - cont. home biktarvy

## 2021-11-15 NOTE — PROGRESS NOTE ADULT - PROBLEM SELECTOR PLAN 3
#Spinal cord injury  - recent spinal cord injury s/p laminectomy decompression 9/15/21   - hosp. course c/b UTI  Spinal cord injury likely contributing to repeat UTI.    Plan:  - bladder scans q6 and straight cath for urinary retention > 300 cc  - PT consult  - notify neurosurgery Dr. D'Amico in AM of pt's admission Hgb drop from 13-> 10 after fluids. Could be dilutional but will continue to monitor    PLAN:  - f/u 2pm labs

## 2021-11-15 NOTE — PROGRESS NOTE ADULT - PROBLEM SELECTOR PLAN 4
#HIV  - f/u full T cell count in AM and viral load   - cont. home biktarvy #Spinal cord injury  - recent spinal cord injury s/p laminectomy decompression 9/15/21   - hosp. course c/b UTI  Spinal cord injury likely contributing to repeat UTI.    Plan:  - bladder scans q6 and straight cath for urinary retention > 300 cc  - PT consult  - notify neurosurgery Dr. D'Amico in AM of pt's admission - recs - nothing

## 2021-11-16 ENCOUNTER — TRANSCRIPTION ENCOUNTER (OUTPATIENT)
Age: 36
End: 2021-11-16

## 2021-11-16 LAB
-  AMPICILLIN/SULBACTAM: SIGNIFICANT CHANGE UP
-  AMPICILLIN: SIGNIFICANT CHANGE UP
-  CEFAZOLIN: SIGNIFICANT CHANGE UP
-  CEFTRIAXONE: SIGNIFICANT CHANGE UP
-  CIPROFLOXACIN: SIGNIFICANT CHANGE UP
-  ERTAPENEM: SIGNIFICANT CHANGE UP
-  GENTAMICIN: SIGNIFICANT CHANGE UP
-  NITROFURANTOIN: SIGNIFICANT CHANGE UP
-  PIPERACILLIN/TAZOBACTAM: SIGNIFICANT CHANGE UP
-  TOBRAMYCIN: SIGNIFICANT CHANGE UP
-  TRIMETHOPRIM/SULFAMETHOXAZOLE: SIGNIFICANT CHANGE UP
ALBUMIN SERPL ELPH-MCNC: 3.4 G/DL — SIGNIFICANT CHANGE UP (ref 3.3–5)
ALP SERPL-CCNC: 66 U/L — SIGNIFICANT CHANGE UP (ref 40–120)
ALT FLD-CCNC: 11 U/L — SIGNIFICANT CHANGE UP (ref 10–45)
ANION GAP SERPL CALC-SCNC: 9 MMOL/L — SIGNIFICANT CHANGE UP (ref 5–17)
AST SERPL-CCNC: 12 U/L — SIGNIFICANT CHANGE UP (ref 10–40)
BILIRUB SERPL-MCNC: 0.5 MG/DL — SIGNIFICANT CHANGE UP (ref 0.2–1.2)
BUN SERPL-MCNC: 6 MG/DL — LOW (ref 7–23)
CALCIUM SERPL-MCNC: 8.6 MG/DL — SIGNIFICANT CHANGE UP (ref 8.4–10.5)
CHLORIDE SERPL-SCNC: 102 MMOL/L — SIGNIFICANT CHANGE UP (ref 96–108)
CO2 SERPL-SCNC: 24 MMOL/L — SIGNIFICANT CHANGE UP (ref 22–31)
CREAT SERPL-MCNC: 0.8 MG/DL — SIGNIFICANT CHANGE UP (ref 0.5–1.3)
CULTURE RESULTS: SIGNIFICANT CHANGE UP
GLUCOSE SERPL-MCNC: 121 MG/DL — HIGH (ref 70–99)
MAGNESIUM SERPL-MCNC: 2 MG/DL — SIGNIFICANT CHANGE UP (ref 1.6–2.6)
METHOD TYPE: SIGNIFICANT CHANGE UP
ORGANISM # SPEC MICROSCOPIC CNT: SIGNIFICANT CHANGE UP
ORGANISM # SPEC MICROSCOPIC CNT: SIGNIFICANT CHANGE UP
PHOSPHATE SERPL-MCNC: 2.5 MG/DL — SIGNIFICANT CHANGE UP (ref 2.5–4.5)
POTASSIUM SERPL-MCNC: 3.4 MMOL/L — LOW (ref 3.5–5.3)
POTASSIUM SERPL-SCNC: 3.4 MMOL/L — LOW (ref 3.5–5.3)
PROT SERPL-MCNC: 6.2 G/DL — SIGNIFICANT CHANGE UP (ref 6–8.3)
SODIUM SERPL-SCNC: 135 MMOL/L — SIGNIFICANT CHANGE UP (ref 135–145)
SPECIMEN SOURCE: SIGNIFICANT CHANGE UP

## 2021-11-16 PROCEDURE — 99233 SBSQ HOSP IP/OBS HIGH 50: CPT | Mod: GC

## 2021-11-16 PROCEDURE — 74177 CT ABD & PELVIS W/CONTRAST: CPT | Mod: 26

## 2021-11-16 RX ORDER — ACETAMINOPHEN 500 MG
1000 TABLET ORAL ONCE
Refills: 0 | Status: COMPLETED | OUTPATIENT
Start: 2021-11-16 | End: 2021-11-16

## 2021-11-16 RX ORDER — POTASSIUM CHLORIDE 20 MEQ
40 PACKET (EA) ORAL ONCE
Refills: 0 | Status: COMPLETED | OUTPATIENT
Start: 2021-11-16 | End: 2021-11-16

## 2021-11-16 RX ADMIN — Medication 40 MILLIEQUIVALENT(S): at 11:21

## 2021-11-16 RX ADMIN — Medication 1000 MILLIGRAM(S): at 09:55

## 2021-11-16 RX ADMIN — SENNA PLUS 2 TABLET(S): 8.6 TABLET ORAL at 20:37

## 2021-11-16 RX ADMIN — Medication 650 MILLIGRAM(S): at 20:37

## 2021-11-16 RX ADMIN — BICTEGRAVIR SODIUM, EMTRICITABINE, AND TENOFOVIR ALAFENAMIDE FUMARATE 1 TABLET(S): 30; 120; 15 TABLET ORAL at 19:20

## 2021-11-16 RX ADMIN — Medication 400 MILLIGRAM(S): at 09:39

## 2021-11-16 RX ADMIN — ENOXAPARIN SODIUM 40 MILLIGRAM(S): 100 INJECTION SUBCUTANEOUS at 07:20

## 2021-11-16 RX ADMIN — Medication 650 MILLIGRAM(S): at 02:03

## 2021-11-16 RX ADMIN — Medication 650 MILLIGRAM(S): at 21:37

## 2021-11-16 RX ADMIN — Medication 650 MILLIGRAM(S): at 03:03

## 2021-11-16 RX ADMIN — Medication 1 ENEMA: at 18:10

## 2021-11-16 RX ADMIN — CEFTRIAXONE 100 MILLIGRAM(S): 500 INJECTION, POWDER, FOR SOLUTION INTRAMUSCULAR; INTRAVENOUS at 21:25

## 2021-11-16 NOTE — DISCHARGE NOTE PROVIDER - NSDCCPCAREPLAN_GEN_ALL_CORE_FT
PRINCIPAL DISCHARGE DIAGNOSIS  Diagnosis: Sepsis due to urinary tract infection  Assessment and Plan of Treatment: You came in with paniful urination, fevers. You were found to have increased white blood cell count and increased heart rate. This means that you had a severe infection. It was also found that you had bacteria in your blood which was the same bacteria in your urine. You were given IV antibiotics to treat your infection while you were in here and then you were given ____ antibiotics upon discharge.      SECONDARY DISCHARGE DIAGNOSES  Diagnosis: HIV infection  Assessment and Plan of Treatment: You have a history of HIV. You take biktarvy at home. You should continue taking biktarvy upon discharge. You should also followup with your HIV doctor to recheck your T cell count and your viral load upon discharge.     PRINCIPAL DISCHARGE DIAGNOSIS  Diagnosis: Sepsis due to urinary tract infection  Assessment and Plan of Treatment: You came in with paniful urination, fevers. You were found to have increased white blood cell count and increased heart rate. This means that you had a severe infection. It was also found that you had bacteria in your blood which was the same bacteria in your urine. You were given IV antibiotics to treat your infection while you were in here and then you were given oral antibiotics upon discharge.  Instructions:  You will take Bactrim DS 1 tab every 12 hours for 14 days   After 14 days, you will see Urology for evaluation of prostatitis. If you have clinical signs of prostatitis at that time, then the Urologist should prolong your antibiotic course.  Please seek medical attention if you develop worsening flank pain or fevers.      SECONDARY DISCHARGE DIAGNOSES  Diagnosis: HIV infection  Assessment and Plan of Treatment: You have a history of HIV. You take biktarvy at home. You should continue taking biktarvy upon discharge.   Once your insurance goes through this week, please see Dr. Garcia within 2 weeks of discharge to establish care. Dr. Garcia specializes in HIV and can also be your Primary Care Provider.

## 2021-11-16 NOTE — PROGRESS NOTE ADULT - PROBLEM SELECTOR PLAN 6
F: s/p 1L NS   E: prn   N: normal diet   DVT ppx: lovenox   GI ppx: none  GI motility: senna   dispo: [facility]
#Prophylactic Measure  F: s/p 1L NS   E: prn   N: normal diet   DVT ppx: lovenox   GI ppx: none  GI motility: senna   dispo: [facility]

## 2021-11-16 NOTE — DISCHARGE NOTE PROVIDER - NSDCCPTREATMENT_GEN_ALL_CORE_FT
PRINCIPAL PROCEDURE  Procedure: CT abdomen  Findings and Treatment: PROCEDURE:  CT of the Abdomen and Pelvis was performed with intravenous contrast.  Intravenous contrast: 90 ml Omnipaque 350. 10 ml discarded.  Oral contrast: positive contrast was administered.  Sagittal and coronal reformats were performed.  FINDINGS:  LOWER CHEST: Within normallimits.  LIVER: Within normal limits.  BILE DUCTS: Normal caliber.  GALLBLADDER: Within normal limits.  SPLEEN: Within normal limits.  PANCREAS: Within normal limits.  ADRENALS: Within normal limits.  KIDNEYS/URETERS: 5 cm rounded focus of left intrarenal phlegmon with cystic foci and perinephric inflammation. No hydronephrosis. No drainable abscess.  BLADDER: Within normal limits.  REPRODUCTIVE ORGANS: Prostate within normal limits.  BOWEL: No bowel obstruction. Appendix is normal.  PERITONEUM: No ascites.  VESSELS: Within normal limits.  RETROPERITONEUM/LYMPH NODES: No lymphadenopathy.  ABDOMINAL WALL: Within normal limits.  BONES: Within normal limits.  IMPRESSION:  Severe focal pyelonephritis left kidney.< from: CT Abdomen and Pelvis w/ IV Cont (11.16.21 @ 16:34)

## 2021-11-16 NOTE — PROGRESS NOTE ADULT - PROBLEM SELECTOR PLAN 5
- T cell count 270s  - viral load pending  - cont. home biktarvy - T cell count 271  - viral load pending, as is genotyping   - cont. home biktarvy  - consider ID consult if f/u labs show continued low T cell count in face of resolving UTI/sepsis

## 2021-11-16 NOTE — PROGRESS NOTE ADULT - SUBJECTIVE AND OBJECTIVE BOX
CC: Patient is a 36y old  Male who presents with a chief complaint of sepsis 2/2 UTI (15 Nov 2021 07:23)      INTERVAL EVENTS: MARCO    SUBJECTIVE / INTERVAL HPI: Patient seen and examined at bedside.     ROS: negative unless otherwise stated above.    VITAL SIGNS:  Vital Signs Last 24 Hrs  T(C): 37.1 (16 Nov 2021 06:11), Max: 39.4 (15 Nov 2021 21:28)  T(F): 98.7 (16 Nov 2021 06:11), Max: 102.9 (15 Nov 2021 21:28)  HR: 86 (16 Nov 2021 06:11) (86 - 113)  BP: 110/70 (16 Nov 2021 06:11) (110/70 - 152/81)  BP(mean): --  RR: 17 (16 Nov 2021 06:11) (17 - 18)  SpO2: 99% (16 Nov 2021 06:11) (96% - 100%)      11-14-21 @ 07:01  -  11-15-21 @ 07:00  --------------------------------------------------------  IN: 0 mL / OUT: 200 mL / NET: -200 mL    11-15-21 @ 07:01  -  11-16-21 @ 06:56  --------------------------------------------------------  IN: 450 mL / OUT: 2300 mL / NET: -1850 mL        PHYSICAL EXAM:    General: NAD  HEENT: MMM  Neck: supple  Cardiovascular: +S1/S2; RRR  Respiratory: CTA B/L; no W/R/R  Gastrointestinal: soft, NT/ND  Extremities: WWP; no edema, clubbing or cyanosis  Vascular: 2+ radial, DP/PT pulses B/L  Neurological: AAOx3; no focal deficits    MEDICATIONS:  MEDICATIONS  (STANDING):  acetaminophen     Tablet .. 650 milliGRAM(s) Oral every 6 hours  bictegravir 50 mG/emtricitabine 200 mG/tenofovir alafenamide 25 mG (BIKTARVY) 1 Tablet(s) Oral every 24 hours  cefTRIAXone   IVPB 2000 milliGRAM(s) IV Intermittent every 24 hours  enoxaparin Injectable 40 milliGRAM(s) SubCutaneous every 24 hours  influenza   Vaccine 0.5 milliLiter(s) IntraMuscular once  senna 2 Tablet(s) Oral at bedtime    MEDICATIONS  (PRN):  bisacodyl Suppository 10 milliGRAM(s) Rectal daily PRN Constipation      ALLERGIES:  Allergies    No Known Allergies    Intolerances        LABS:                        11.3   17.63 )-----------( 175      ( 15 Nov 2021 15:03 )             32.7     11-15    133<L>  |  101  |  5<L>  ----------------------------<  124<H>  3.9   |  19<L>  |  0.77    Ca    8.2<L>      15 Nov 2021 15:03  Phos  1.8     11-15  Mg     2.2     11-15    TPro  6.7  /  Alb  3.3  /  TBili  0.7  /  DBili  x   /  AST  11  /  ALT  11  /  AlkPhos  67  11-15      Urinalysis Basic - ( 14 Nov 2021 20:47 )    Color: Yellow / Appearance: Clear / SG: <=1.005 / pH: x  Gluc: x / Ketone: NEGATIVE  / Bili: Negative / Urobili: 0.2 E.U./dL   Blood: x / Protein: NEGATIVE mg/dL / Nitrite: NEGATIVE   Leuk Esterase: Moderate / RBC: < 5 /HPF / WBC Many /HPF   Sq Epi: x / Non Sq Epi: 0-5 /HPF / Bacteria: Present /HPF      CAPILLARY BLOOD GLUCOSE          RADIOLOGY & ADDITIONAL TESTS: Reviewed. CC: Patient is a 36y old  Male who presents with a chief complaint of sepsis 2/2 UTI (15 Nov 2021 07:23)      INTERVAL EVENTS: Continues fevers overnight, resolved with administration IV Tylenol.      SUBJECTIVE / INTERVAL HPI: Patient seen and examined at bedside. Pt reports continued improvement of dysuria, denying pain or burning of urination, but admitting to continued urinary urgency. Patient complains of fevers, chills and sweating overnight, but reports his rigors have improved. The patient indicates his muscle spasms has persisted. He denies any instances of vomiting.     ROS: negative unless otherwise stated above.    VITAL SIGNS:  Vital Signs Last 24 Hrs  T(C): 37.1 (16 Nov 2021 06:11), Max: 39.4 (15 Nov 2021 21:28)  T(F): 98.7 (16 Nov 2021 06:11), Max: 102.9 (15 Nov 2021 21:28)  HR: 86 (16 Nov 2021 06:11) (86 - 113)  BP: 110/70 (16 Nov 2021 06:11) (110/70 - 152/81)  BP(mean): --  RR: 17 (16 Nov 2021 06:11) (17 - 18)  SpO2: 99% (16 Nov 2021 06:11) (96% - 100%)      11-14-21 @ 07:01  -  11-15-21 @ 07:00  --------------------------------------------------------  IN: 0 mL / OUT: 200 mL / NET: -200 mL    11-15-21 @ 07:01  -  11-16-21 @ 06:56  --------------------------------------------------------  IN: 450 mL / OUT: 2300 mL / NET: -1850 mL        PHYSICAL EXAM:    General: NAD  HEENT: MMM  Neck: supple  Cardiovascular: +S1/S2; RRR  Respiratory: CTA B/L; no W/R/R  Back: Incision site CDI, with tenderness over incision site. No CVA tenderness bilaterally.   Gastrointestinal: soft, NT/ND  Extremities: WWP; no edema, clubbing or cyanosis  Vascular: 2+ radial, DP/PT pulses B/L  Neurological: AAOx3; strength 4/5 in right leg and 5/5 in left leg.     MEDICATIONS:  MEDICATIONS  (STANDING):  acetaminophen     Tablet .. 650 milliGRAM(s) Oral every 6 hours  bictegravir 50 mG/emtricitabine 200 mG/tenofovir alafenamide 25 mG (BIKTARVY) 1 Tablet(s) Oral every 24 hours  cefTRIAXone   IVPB 2000 milliGRAM(s) IV Intermittent every 24 hours  enoxaparin Injectable 40 milliGRAM(s) SubCutaneous every 24 hours  influenza   Vaccine 0.5 milliLiter(s) IntraMuscular once  senna 2 Tablet(s) Oral at bedtime    MEDICATIONS  (PRN):  bisacodyl Suppository 10 milliGRAM(s) Rectal daily PRN Constipation      ALLERGIES:  Allergies    No Known Allergies    No known intolerances       LABS:                        11.3   17.63 )-----------( 175      ( 15 Nov 2021 15:03 )             32.7     11-15    133<L>  |  101  |  5<L>  ----------------------------<  124<H>  3.9   |  19<L>  |  0.77    Ca    8.2<L>      15 Nov 2021 15:03  Phos  1.8     11-15  Mg     2.2     11-15    TPro  6.7  /  Alb  3.3  /  TBili  0.7  /  DBili  x   /  AST  11  /  ALT  11  /  AlkPhos  67  11-15      Urinalysis Basic - ( 14 Nov 2021 20:47 )    Color: Yellow / Appearance: Clear / SG: <=1.005 / pH: x  Gluc: x / Ketone: NEGATIVE  / Bili: Negative / Urobili: 0.2 E.U./dL   Blood: x / Protein: NEGATIVE mg/dL / Nitrite: NEGATIVE   Leuk Esterase: Moderate / RBC: < 5 /HPF / WBC Many /HPF   Sq Epi: x / Non Sq Epi: 0-5 /HPF / Bacteria: Present /HPF      RADIOLOGY & ADDITIONAL TESTS: Reviewed. CC: Patient is a 36y old  Male who presents with a chief complaint of sepsis 2/2 UTI (15 Nov 2021 07:23)    HOSPITAL COURSE:.  36M PMHx HIV (current Tcell 271, VL pending) and spinal cord injury (w/ residual LE weakness worse Right than Left), presents with fevers and dysuria x1 week, admitted for sepsis 2/2 UTI klebsiella penumoniae c/b klebseilla pneumoniae bacteremia. Currently still spiking fevers while on CTX. However defeverses nicely with tylenol. Repeat bcx pending and sensitivities to follow. Not retaining urine on bladder scans. Lactate wnl. Will require long course of abx on discharge for prostatitis vs orchitis along with bacteremia.       INTERVAL EVENTS: Continues fevers overnight, resolved with administration IV Tylenol.      SUBJECTIVE / INTERVAL HPI: Patient seen and examined at bedside. Pt reports continued improvement of dysuria, denying pain or burning of urination, but admitting to continued urinary urgency. Patient complains of fevers, chills and sweating overnight, but reports his rigors have improved. The patient indicates his muscle spasms has persisted. He denies any instances of vomiting.     ROS: negative unless otherwise stated above.    VITAL SIGNS:  Vital Signs Last 24 Hrs  T(C): 37.1 (16 Nov 2021 06:11), Max: 39.4 (15 Nov 2021 21:28)  T(F): 98.7 (16 Nov 2021 06:11), Max: 102.9 (15 Nov 2021 21:28)  HR: 86 (16 Nov 2021 06:11) (86 - 113)  BP: 110/70 (16 Nov 2021 06:11) (110/70 - 152/81)  BP(mean): --  RR: 17 (16 Nov 2021 06:11) (17 - 18)  SpO2: 99% (16 Nov 2021 06:11) (96% - 100%)      11-14-21 @ 07:01  -  11-15-21 @ 07:00  --------------------------------------------------------  IN: 0 mL / OUT: 200 mL / NET: -200 mL    11-15-21 @ 07:01  -  11-16-21 @ 06:56  --------------------------------------------------------  IN: 450 mL / OUT: 2300 mL / NET: -1850 mL        PHYSICAL EXAM:    General: NAD  HEENT: MMM  Neck: supple  Cardiovascular: +S1/S2; RRR  Respiratory: CTA B/L; no W/R/R  Back: Incision site CDI, with tenderness over incision site. No CVA tenderness bilaterally.   Gastrointestinal: soft, NT/ND  Extremities: WWP; no edema, clubbing or cyanosis  Vascular: 2+ radial, DP/PT pulses B/L  Neurological: AAOx3; strength 4/5 in right leg and 5/5 in left leg.     MEDICATIONS:  MEDICATIONS  (STANDING):  acetaminophen     Tablet .. 650 milliGRAM(s) Oral every 6 hours  bictegravir 50 mG/emtricitabine 200 mG/tenofovir alafenamide 25 mG (BIKTARVY) 1 Tablet(s) Oral every 24 hours  cefTRIAXone   IVPB 2000 milliGRAM(s) IV Intermittent every 24 hours  enoxaparin Injectable 40 milliGRAM(s) SubCutaneous every 24 hours  influenza   Vaccine 0.5 milliLiter(s) IntraMuscular once  senna 2 Tablet(s) Oral at bedtime    MEDICATIONS  (PRN):  bisacodyl Suppository 10 milliGRAM(s) Rectal daily PRN Constipation      ALLERGIES:  Allergies    No Known Allergies    No known intolerances       LABS:                        11.3   17.63 )-----------( 175      ( 15 Nov 2021 15:03 )             32.7     11-15    133<L>  |  101  |  5<L>  ----------------------------<  124<H>  3.9   |  19<L>  |  0.77    Ca    8.2<L>      15 Nov 2021 15:03  Phos  1.8     11-15  Mg     2.2     11-15    TPro  6.7  /  Alb  3.3  /  TBili  0.7  /  DBili  x   /  AST  11  /  ALT  11  /  AlkPhos  67  11-15      Urinalysis Basic - ( 14 Nov 2021 20:47 )    Color: Yellow / Appearance: Clear / SG: <=1.005 / pH: x  Gluc: x / Ketone: NEGATIVE  / Bili: Negative / Urobili: 0.2 E.U./dL   Blood: x / Protein: NEGATIVE mg/dL / Nitrite: NEGATIVE   Leuk Esterase: Moderate / RBC: < 5 /HPF / WBC Many /HPF   Sq Epi: x / Non Sq Epi: 0-5 /HPF / Bacteria: Present /HPF      RADIOLOGY & ADDITIONAL TESTS: Reviewed.

## 2021-11-16 NOTE — DISCHARGE NOTE PROVIDER - NSFOLLOWUPCLINICS_GEN_ALL_ED_FT
[facility] Urology Clinic  Urology  [address] [place] [address]  Phone: [phone]  Fax:   Follow Up Time: 2 weeks

## 2021-11-16 NOTE — PROGRESS NOTE ADULT - PROBLEM SELECTOR PLAN 4
- recent spinal cord injury s/p laminectomy decompression 9/15/21   - hosp. course c/b UTI during previous hospitalization  - has not requried straight cath at home and not retaining on bladder scans    Plan:  - bladder scans q6 and straight cath for urinary retention > 300 cc  - PT consult  - notify neurosurgery Dr. D'Amico in AM of pt's admission - recs - no changes in management - outpatient f/u - recent spinal cord injury s/p laminectomy decompression 9/15/21   - hosp. course c/b UTI during previous hospitalization  - has not required straight cath at home and not retaining on bladder scans    Plan:  - bladder scans q6 and straight cath for urinary retention > 300 cc  - PT consult  - notify neurosurgery Dr. D'Amico in AM of pt's admission - recs - no changes in management - outpatient f/u - recent spinal cord injury s/p laminectomy decompression 9/15/21   - hosp. course c/b UTI during previous hospitalization  - has not required straight cath at home and not retaining on bladder scans    Plan:  - d/danilea bladder scans as pt is not retaining on bladder scans  - PT consult  - notify neurosurgery Dr. D'Amico in AM of pt's admission - recs - no changes in management - outpatient f/u

## 2021-11-16 NOTE — DISCHARGE NOTE PROVIDER - CARE PROVIDER_API CALL
DAmico, Randy S (MD)  Neurosurgery  [address]  [place], [place] [address]  Phone: [phone]  Fax: [phone]  Established Patient  Follow Up Time: 1 month   DAmico, Randy S (MD)  Neurosurgery  [address], [address] [place] [address]  Phone: [phone]  Fax: [phone]  Established Patient  Follow Up Time: 1 month    Yari Garcia)  Internal Medicine  [address] [address]  Phone: [phone]  Fax: [phone]  Follow Up Time: 2 weeks

## 2021-11-16 NOTE — PROGRESS NOTE ADULT - PROBLEM SELECTOR PLAN 2
- recent hosp. course c/b pansensitive Klebsiella UTI w/ neg. blood cultures, was treated with cefuroxime until 10/29 for total 21 day course recommended by ID (extended course due to concern over prostatitis and orchitis).   - fevers, chills x3 days   - tylenol for symptomatic relief  - UA w/ trace blood, mod leuk est., many WBCs, (+) bacteria.    Plan:  - cont. w/ ceftriaxone 2 g IV q24 for likely 7-10 day course(high leukocytosis, spinal injury, male UTI, increased risk of pyelo/heme seeding)  - f/u urine culture and narrow Abx as able  - f/u bcx - 1st set growing klebseilla and 2nd set NGTD at 12 hrs - recent hosp. course c/b pansensitive Klebsiella UTI w/ neg. blood cultures, was treated with cefuroxime until 10/29 for total 21 day course recommended by ID (extended course due to concern over prostatitis and orchitis).   - fevers, chills x3 days   - tylenol for symptomatic relief  - UA w/ trace blood, mod leuk est., many WBCs, (+) bacteria.    Plan:  - cont. w/ ceftriaxone 2 g IV q24 for likely 7-10 day course(high leukocytosis, spinal injury, male UTI, increased risk of pyelo/heme seeding)  - f/u urine culture and narrow Abx as able  - f/u bcx - 1st set growing klebseilla and 2nd set NGTD at 12 hrs  - Adjust Abx to Bactrim/Cipro 11/17 for bacteremia coverage - recent hosp. course c/b pansensitive Klebsiella UTI w/ neg. blood cultures, was treated with cefuroxime until 10/29 for total 21 day course recommended by ID (extended course due to concern over prostatitis and orchitis).   - fevers, chills x3 days   - tylenol for symptomatic relief  - UA w/ trace blood, mod leuk est., many WBCs, (+) bacteria.    Plan:  - cont. w/ ceftriaxone 2 g IV q24 for likely 7-10 day course(high leukocytosis, spinal injury, male UTI, increased risk of pyelo/heme seeding)  - f/u urine culture and narrow Abx as able  - f/u bcx - 1st set growing klebseilla and 2nd set NGTD at 12 hrs  - Adjust Abx to PO Bactrim/Cipro upon discharge for bacteremia vs prostatitis coverage

## 2021-11-16 NOTE — DISCHARGE NOTE PROVIDER - HOSPITAL COURSE
#Discharge: do not delete    Patient is a 36M PMHx HIV (current Tcell 271, VL pending) and spinal cord injury (w/ residual LE weakness worse Right than Left),   Presented with fevers and dysuria x1 week, found to have sepsis 2/2 UTI klebsiella penumoniae c/b klebseilla pneumoniae bacteremia.  Problem List/Main Diagnoses (system-based):   1. Sepsis 2/2 klebsiella UTI and bacteremia - pt came in w/ dysuria and high fevers, tachycardia and inc WBC. Started on 2g CTX. BCX and UCX grew klebsiella pneumoniae. Same bug as prior UTI w/ prostatitis 1 month ago. Fevers and white count downtrended. CT abd/pelv showed _______________. Discharge on PO TMP/SMX.  2. Spinal cord injury hx - seen by physical therapy  3. HIV - T cell count 271. May be decreased in setting of acute bacterial infection. Viral load ________. Will f/u outpt with HIV provider  Inpatient treatment course: as above  New medications: TMP/SMX abx  Labs to be followed outpatient: CBC for hgb  Exam to be followed outpatient:    #Discharge: do not delete    Patient is a 36M PMHx HIV (current Tcell 271, VL pending) and spinal cord injury (w/ residual LE weakness Right worse than Left), presented with fevers and dysuria x1 week, found to have sepsis 2/2 UTI klebsiella pneumoniae c/b klebsiella pneumoniae bacteremia.    Hospital course (by problem):     #Sepsis 2/2 klebsiella UTI and bacteremia  - pt initially presenting w/ complaints of dysuria and fever, tachycardia and elevated WBC.   - Started on 2g CTX daily (11/14-11/16)  - BCx and UCx grew klebsiella pneumoniae. Same organism as prior UTI w/ prostatitis 1 month ago.   - Fevers and white count downtrended on abx therapy.   - CT abd/pelv showed severe focal pyelonephritis of the left kidney.   - Will discharge on PO TMP/SMX x 14 additional days (11/17-11/30)    #Spinal cord injury hx   - seen by physical therapy    #HIV   - T cell count 271. May be decreased in setting of acute bacterial infection. Viral load ________. Will f/u outpt with HIV provider    Patient was discharged to: Home     New medications: TMP/SMX abx  Changes to old medications: none  Medications that were stopped: none     Items to be followed outpatient: CBC for hgb    Physical exam at the time of discharge:  General: NAD  HEENT: MMM  Neck: supple  Cardiovascular: +S1/S2; RRR  Respiratory: CTA B/L; no W/R/R  Back: Well healed previous incision site at upper thoracic spine CDI, with mild tenderness to palpation. Midline bony tenderness. Mild left sided CVA tenderness to percussion.   Gastrointestinal: soft, NT/ND  Extremities: WWP; no edema, clubbing or cyanosis  Vascular: 2+ radial, DP/PT pulses B/L  Neurological: AAOx3; strength 4/5 in right leg and 5/5 in left leg #Discharge: do not delete    Patient is a 36M PMHx HIV (current Tcell 271, VL pending) and spinal cord injury (w/ residual LE weakness Right worse than Left), presented with fevers and dysuria x1 week, found to have sepsis 2/2 UTI klebsiella pneumoniae c/b klebsiella pneumoniae bacteremia.    Hospital course (by problem):   #Sepsis 2/2 klebsiella UTI and bacteremia  - pt initially presenting w/ complaints of dysuria and fever, tachycardia and elevated WBC.   - Started on 2g CTX daily (11/14-11/16)  - BCx and UCx grew klebsiella pneumoniae. Same organism as prior UTI w/ prostatitis 1 month ago.   - Fevers and white count downtrended on abx therapy.   - CT abd/pelv showed severe focal pyelonephritis of the left kidney.   - Will discharge on PO TMP/SMX (per sensitivities) x 14 additional days (11/17-11/30)  - Follow up appointment in 2 weeks with Urology to examine for prostatitis. Would need to prolong Bactrim course at that time if pt has clinical findings of prostatitis.     #Spinal cord injury hx   - Seen by physical therapy, continue home PT     #HIV   - T cell count 271. May be decreased in setting of acute bacterial infection. Viral load pending. Will f/u outpt with HIV provider    Patient was discharged to: Home     New medications: TMP/SMX abx  Changes to old medications: none  Medications that were stopped: none     Items to be followed outpatient: CBC for hgb    Physical exam at the time of discharge:  General: NAD  HEENT: MMM  Neck: supple  Cardiovascular: +S1/S2; RRR  Respiratory: CTA B/L; no W/R/R  Back: Well healed previous incision site at upper thoracic spine CDI, with mild tenderness to palpation. Midline bony tenderness. Mild left sided CVA tenderness to percussion.   Gastrointestinal: soft, NT/ND  Extremities: WWP; no edema, clubbing or cyanosis  Vascular: 2+ radial, DP/PT pulses B/L  Neurological: AAOx3; strength 4/5 in right leg and 5/5 in left leg

## 2021-11-16 NOTE — PROGRESS NOTE ADULT - ASSESSMENT
36M PMH HIV (unknown T cell count or VL) and spinal cord injury, presents with fevers and dysuria x1 week, admitted for sepsis 2/2 UTI. Currently still spiking fevers while on CTX. 36M PMHx HIV (unknown T cell count or VL) and spinal cord injury, presents with fevers and dysuria x1 week, admitted for sepsis 2/2 UTI. Currently still spiking fevers while on CTX. 36M PMHx HIV (unknown T cell count or VL) and spinal cord injury, presents with fevers and dysuria x1 week, admitted for sepsis 2/2 UTI c/b klebsiella bacteremia. Currently still spiking fevers while on CTX.

## 2021-11-16 NOTE — PROGRESS NOTE ADULT - PROBLEM SELECTOR PLAN 1
- SIRS 3/4 + for fever, tachycardia, leukocytosis to WBC 23.19 (neutrophil predominant),; UA suggestive of UTI; all pointing towards UTI as source of sepsis  - repeat lactate WNL  - s/p 1L NS and ceftriaxone 1g IV in ED  - EKG sinus tachycardia    Plan:  - f/u blood cultures - Klebsiella pneumonia growing - KPC neg  - repeat Bcx - NGTD at 12 hrs    - plan per UTI below - SIRS 3/4 + for fever, tachycardia, leukocytosis to WBC 23.19 in ED (neutrophil predominant, current downtrend to 11.70 as of 11/16); UA suggestive of UTI; all pointing towards UTI as source of sepsis  - repeat lactate WNL (11/15)  - s/p 1L NS and ceftriaxone 1g IV in ED, 1g IV RMF   - EKG sinus tachycardia    Plan:  - f/u blood cultures - Klebsiella pneumonia growing - KPC neg  - repeat Bcx - NGTD at 12 hrs  - plan per UTI below

## 2021-11-16 NOTE — DISCHARGE NOTE PROVIDER - NSDCMRMEDTOKEN_GEN_ALL_CORE_FT
acetaminophen 325 mg oral tablet: 2 tab(s) orally every 6 hours, As needed, Temp greater or equal to 38C (100.4F), Mild Pain (1 - 3)  Biktarvy oral tablet: 1 tab(s) orally once a day  bisacodyl 10 mg rectal suppository: 1 suppository(ies) rectal once a day (at bedtime), As needed, Constipation  PT 2-3X a week for 6 weeks  :    acetaminophen 325 mg oral tablet: 2 tab(s) orally every 6 hours, As needed, Temp greater or equal to 38C (100.4F), Mild Pain (1 - 3)  Biktarvy oral tablet: 1 tab(s) orally every 24 hours  Biktarvy oral tablet: 1 tab(s) orally once a day  bisacodyl 10 mg rectal suppository: 1 suppository(ies) rectal once a day (at bedtime), As needed, Constipation  PT 2-3X a week for 6 weeks  :    acetaminophen 325 mg oral tablet: 2 tab(s) orally every 6 hours, As needed, Temp greater or equal to 38C (100.4F), Mild Pain (1 - 3)  Bactrim  mg-160 mg oral tablet: 1 tab(s) orally 2 times a day   Biktarvy oral tablet: 1 tab(s) orally once a day  bisacodyl 10 mg rectal suppository: 1 suppository(ies) rectal once a day (at bedtime), As needed, Constipation  PT 2-3X a week for 6 weeks  :

## 2021-11-16 NOTE — DISCHARGE NOTE PROVIDER - NSDCFUADDAPPT_GEN_ALL_CORE_FT
Please follow up with Urology 2 weeks after discharge.     Once your insurance goes through this week, please see Dr. Garcia within 2 weeks of discharge to establish care. Dr. Garcia specializes in HIV and can also be your Primary Care Provider.     Please follow up with your Neurosurgeon per routine.

## 2021-11-16 NOTE — PROGRESS NOTE ADULT - SUBJECTIVE AND OBJECTIVE BOX
Patient was seen and examined by me at bedside. I agree with resident's note, subjective, objective physical exam, assessment and plan with following modifications/additions.    Greater than 35 minutes spent on total encounter; more than 50% of the visit was spent counseling and/or coordinating care by the attending physician.    85M PMH HIV no OI hx, IVDU hx, spinal cord injury s/p T8-9 lami decompression on 9/15/21 c/b subarachnoid clot s/p evacuation , hx of UTI presents with sepsis 2/2 kleb UTI and bacteremia   #Sepsis 2/2 UTI, no hydro per exam and HD stable, kleb in urine cx and blood, no urinary retentoin- c/w ceftriaxone 2g, f/u repeat bcx, will review with ID pharmacy ideal po agent   #HIV hx- c/w biktarvy, f/u VL, CD4 200's affected by active infection, f/u VL, g/c per patient request   #S/p spinal surg, site looks c/d/i, no residual hardware- monitor surg site, no imaging per NSGY, improving weakness in b/l LE (now 4/5) since prior visit    #DVT px- lovenox- safe to be on even if prior bleed hx per notes  #Dispo- home PT per PT    Jesse Chahal MD [phone]

## 2021-11-17 ENCOUNTER — TRANSCRIPTION ENCOUNTER (OUTPATIENT)
Age: 36
End: 2021-11-17

## 2021-11-17 VITALS
HEART RATE: 89 BPM | RESPIRATION RATE: 17 BRPM | TEMPERATURE: 99 F | DIASTOLIC BLOOD PRESSURE: 89 MMHG | OXYGEN SATURATION: 100 % | SYSTOLIC BLOOD PRESSURE: 132 MMHG

## 2021-11-17 LAB
-  AMPICILLIN/SULBACTAM: SIGNIFICANT CHANGE UP
-  AMPICILLIN: SIGNIFICANT CHANGE UP
-  CEFAZOLIN: SIGNIFICANT CHANGE UP
-  CEFTRIAXONE: SIGNIFICANT CHANGE UP
-  CIPROFLOXACIN: SIGNIFICANT CHANGE UP
-  ERTAPENEM: SIGNIFICANT CHANGE UP
-  GENTAMICIN: SIGNIFICANT CHANGE UP
-  PIPERACILLIN/TAZOBACTAM: SIGNIFICANT CHANGE UP
-  TOBRAMYCIN: SIGNIFICANT CHANGE UP
-  TRIMETHOPRIM/SULFAMETHOXAZOLE: SIGNIFICANT CHANGE UP
FERRITIN SERPL-MCNC: 323 NG/ML — SIGNIFICANT CHANGE UP (ref 30–400)
HIV-1 VIRAL LOAD RESULT: SIGNIFICANT CHANGE UP
HIV1 RNA # SERPL NAA+PROBE: SIGNIFICANT CHANGE UP COPIES/ML
HIV1 RNA SER-IMP: SIGNIFICANT CHANGE UP
HIV1 RNA SERPL NAA+PROBE-ACNC: SIGNIFICANT CHANGE UP
HIV1 RNA SERPL NAA+PROBE-LOG#: SIGNIFICANT CHANGE UP LG COP/ML
IRON SATN MFR SERPL: 22 % — SIGNIFICANT CHANGE UP (ref 16–55)
IRON SATN MFR SERPL: 33 UG/DL — LOW (ref 45–165)
METHOD TYPE: SIGNIFICANT CHANGE UP
TIBC SERPL-MCNC: 153 UG/DL — LOW (ref 220–430)
UIBC SERPL-MCNC: 120 UG/DL — SIGNIFICANT CHANGE UP (ref 110–370)

## 2021-11-17 PROCEDURE — 97116 GAIT TRAINING THERAPY: CPT

## 2021-11-17 PROCEDURE — 71045 X-RAY EXAM CHEST 1 VIEW: CPT

## 2021-11-17 PROCEDURE — 82728 ASSAY OF FERRITIN: CPT

## 2021-11-17 PROCEDURE — 86360 T CELL ABSOLUTE COUNT/RATIO: CPT

## 2021-11-17 PROCEDURE — 36415 COLL VENOUS BLD VENIPUNCTURE: CPT

## 2021-11-17 PROCEDURE — 86850 RBC ANTIBODY SCREEN: CPT

## 2021-11-17 PROCEDURE — 83615 LACTATE (LD) (LDH) ENZYME: CPT

## 2021-11-17 PROCEDURE — 87591 N.GONORRHOEAE DNA AMP PROB: CPT

## 2021-11-17 PROCEDURE — 86357 NK CELLS TOTAL COUNT: CPT

## 2021-11-17 PROCEDURE — 96374 THER/PROPH/DIAG INJ IV PUSH: CPT

## 2021-11-17 PROCEDURE — 86900 BLOOD TYPING SEROLOGIC ABO: CPT

## 2021-11-17 PROCEDURE — 97162 PT EVAL MOD COMPLEX 30 MIN: CPT

## 2021-11-17 PROCEDURE — 83550 IRON BINDING TEST: CPT

## 2021-11-17 PROCEDURE — 86769 SARS-COV-2 COVID-19 ANTIBODY: CPT

## 2021-11-17 PROCEDURE — 83540 ASSAY OF IRON: CPT

## 2021-11-17 PROCEDURE — 83605 ASSAY OF LACTIC ACID: CPT

## 2021-11-17 PROCEDURE — 81001 URINALYSIS AUTO W/SCOPE: CPT

## 2021-11-17 PROCEDURE — 83010 ASSAY OF HAPTOGLOBIN QUANT: CPT

## 2021-11-17 PROCEDURE — 80048 BASIC METABOLIC PNL TOTAL CA: CPT

## 2021-11-17 PROCEDURE — 74177 CT ABD & PELVIS W/CONTRAST: CPT

## 2021-11-17 PROCEDURE — 87040 BLOOD CULTURE FOR BACTERIA: CPT

## 2021-11-17 PROCEDURE — 87635 SARS-COV-2 COVID-19 AMP PRB: CPT

## 2021-11-17 PROCEDURE — 99285 EMERGENCY DEPT VISIT HI MDM: CPT | Mod: 25

## 2021-11-17 PROCEDURE — 83735 ASSAY OF MAGNESIUM: CPT

## 2021-11-17 PROCEDURE — 87491 CHLMYD TRACH DNA AMP PROBE: CPT

## 2021-11-17 PROCEDURE — 84100 ASSAY OF PHOSPHORUS: CPT

## 2021-11-17 PROCEDURE — 80076 HEPATIC FUNCTION PANEL: CPT

## 2021-11-17 PROCEDURE — 87186 SC STD MICRODIL/AGAR DIL: CPT

## 2021-11-17 PROCEDURE — 87086 URINE CULTURE/COLONY COUNT: CPT

## 2021-11-17 PROCEDURE — 87536 HIV-1 QUANT&REVRSE TRNSCRPJ: CPT

## 2021-11-17 PROCEDURE — 85025 COMPLETE CBC W/AUTO DIFF WBC: CPT

## 2021-11-17 PROCEDURE — 80053 COMPREHEN METABOLIC PANEL: CPT

## 2021-11-17 PROCEDURE — 86359 T CELLS TOTAL COUNT: CPT

## 2021-11-17 PROCEDURE — 86901 BLOOD TYPING SEROLOGIC RH(D): CPT

## 2021-11-17 PROCEDURE — 86355 B CELLS TOTAL COUNT: CPT

## 2021-11-17 PROCEDURE — 85027 COMPLETE CBC AUTOMATED: CPT

## 2021-11-17 PROCEDURE — 87150 DNA/RNA AMPLIFIED PROBE: CPT

## 2021-11-17 RX ORDER — AZTREONAM 2 G
1 VIAL (EA) INJECTION
Qty: 28 | Refills: 0
Start: 2021-11-17 | End: 2021-11-30

## 2021-11-17 RX ORDER — BICTEGRAVIR SODIUM, EMTRICITABINE, AND TENOFOVIR ALAFENAMIDE FUMARATE 30; 120; 15 MG/1; MG/1; MG/1
1 TABLET ORAL
Qty: 0 | Refills: 0 | DISCHARGE
Start: 2021-11-17

## 2021-11-17 RX ADMIN — Medication 650 MILLIGRAM(S): at 02:58

## 2021-11-17 RX ADMIN — ENOXAPARIN SODIUM 40 MILLIGRAM(S): 100 INJECTION SUBCUTANEOUS at 06:52

## 2021-11-17 RX ADMIN — Medication 650 MILLIGRAM(S): at 04:00

## 2021-11-17 NOTE — DISCHARGE NOTE NURSING/CASE MANAGEMENT/SOCIAL WORK - PATIENT PORTAL LINK FT
You can access the FollowMyHealth Patient Portal offered by [facility] by registering at the following website: [email or web address]. By joining [facility]’s FollowMyHealth portal, you will also be able to view your health information using other applications (apps) compatible with our system.

## 2021-11-18 LAB
C TRACH RRNA SPEC QL NAA+PROBE: SIGNIFICANT CHANGE UP
N GONORRHOEA RRNA SPEC QL NAA+PROBE: SIGNIFICANT CHANGE UP

## 2021-11-20 LAB
CULTURE RESULTS: SIGNIFICANT CHANGE UP
ORGANISM # SPEC MICROSCOPIC CNT: SIGNIFICANT CHANGE UP
SPECIMEN SOURCE: SIGNIFICANT CHANGE UP

## 2021-11-24 PROBLEM — G82.20 PARAPLEGIA, UNSPECIFIED: Chronic | Status: ACTIVE | Noted: 2021-11-14

## 2021-11-24 PROBLEM — F19.90 OTHER PSYCHOACTIVE SUBSTANCE USE, UNSPECIFIED, UNCOMPLICATED: Chronic | Status: ACTIVE | Noted: 2021-11-14

## 2021-11-26 DIAGNOSIS — A41.59 OTHER GRAM-NEGATIVE SEPSIS: ICD-10-CM

## 2021-11-26 DIAGNOSIS — E03.9 HYPOTHYROIDISM, UNSPECIFIED: ICD-10-CM

## 2021-11-26 DIAGNOSIS — N39.0 URINARY TRACT INFECTION, SITE NOT SPECIFIED: ICD-10-CM
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
## 2021-11-26 DIAGNOSIS — F19.10 OTHER PSYCHOACTIVE SUBSTANCE ABUSE, UNCOMPLICATED: ICD-10-CM

## 2021-11-26 DIAGNOSIS — S24.109D: ICD-10-CM

## 2021-11-26 DIAGNOSIS — A41.9 SEPSIS, UNSPECIFIED ORGANISM: ICD-10-CM

## 2021-11-26 DIAGNOSIS — G82.20 PARAPLEGIA, UNSPECIFIED: ICD-10-CM

## 2021-11-26 DIAGNOSIS — G95.89 OTHER SPECIFIED DISEASES OF SPINAL CORD: ICD-10-CM

## 2021-11-26 DIAGNOSIS — Z21 ASYMPTOMATIC HUMAN IMMUNODEFICIENCY VIRUS [HIV] INFECTION STATUS: ICD-10-CM

## 2021-12-03 ENCOUNTER — APPOINTMENT (OUTPATIENT)
Dept: UROLOGY | Facility: CLINIC | Age: 36
End: 2021-12-03

## 2021-12-03 VITALS
WEIGHT: 170 LBS | HEIGHT: 69 IN | HEART RATE: 86 BPM | DIASTOLIC BLOOD PRESSURE: 65 MMHG | BODY MASS INDEX: 25.18 KG/M2 | TEMPERATURE: 98.2 F | SYSTOLIC BLOOD PRESSURE: 104 MMHG

## 2021-12-03 DIAGNOSIS — Z78.9 OTHER SPECIFIED HEALTH STATUS: ICD-10-CM

## 2021-12-03 DIAGNOSIS — N10 ACUTE TUBULO-INTERSTITIAL NEPHRITIS: ICD-10-CM

## 2021-12-03 DIAGNOSIS — Z87.898 PERSONAL HISTORY OF OTHER SPECIFIED CONDITIONS: ICD-10-CM

## 2021-12-03 DIAGNOSIS — B20 HUMAN IMMUNODEFICIENCY VIRUS [HIV] DISEASE: ICD-10-CM

## 2021-12-03 RX ORDER — SULFAMETHOXAZOLE AND TRIMETHOPRIM 800; 160 MG/1; MG/1
800-160 TABLET ORAL
Qty: 50 | Refills: 0 | Status: ACTIVE | COMMUNITY
Start: 2021-12-03 | End: 1900-01-01

## 2021-12-06 ENCOUNTER — NON-APPOINTMENT (OUTPATIENT)
Age: 36
End: 2021-12-06

## 2021-12-06 ENCOUNTER — APPOINTMENT (OUTPATIENT)
Dept: PHYSICAL MEDICINE AND REHAB | Facility: CLINIC | Age: 36
End: 2021-12-06
Payer: MEDICAID

## 2021-12-06 VITALS
DIASTOLIC BLOOD PRESSURE: 90 MMHG | HEART RATE: 78 BPM | SYSTOLIC BLOOD PRESSURE: 133 MMHG | OXYGEN SATURATION: 98 % | TEMPERATURE: 95.4 F

## 2021-12-06 DIAGNOSIS — Z98.890 OTHER SPECIFIED POSTPROCEDURAL STATES: ICD-10-CM

## 2021-12-06 DIAGNOSIS — Z86.79 OTHER SPECIFIED POSTPROCEDURAL STATES: ICD-10-CM

## 2021-12-06 DIAGNOSIS — G95.20 UNSPECIFIED CORD COMPRESSION: ICD-10-CM

## 2021-12-06 DIAGNOSIS — G82.22 PARAPLEGIA, INCOMPLETE: ICD-10-CM

## 2021-12-06 LAB
ANION GAP SERPL CALC-SCNC: 13 MMOL/L
BASOPHILS # BLD AUTO: 0.02 K/UL
BASOPHILS NFR BLD AUTO: 0.4 %
BUN SERPL-MCNC: 8 MG/DL
CALCIUM SERPL-MCNC: 9.6 MG/DL
CHLORIDE SERPL-SCNC: 101 MMOL/L
CO2 SERPL-SCNC: 26 MMOL/L
CREAT SERPL-MCNC: 1.02 MG/DL
EOSINOPHIL # BLD AUTO: 0.03 K/UL
EOSINOPHIL NFR BLD AUTO: 0.6 %
GLUCOSE SERPL-MCNC: 93 MG/DL
HCT VFR BLD CALC: 42 %
HGB BLD-MCNC: 13.4 G/DL
IMM GRANULOCYTES NFR BLD AUTO: 0.2 %
LYMPHOCYTES # BLD AUTO: 2.62 K/UL
LYMPHOCYTES NFR BLD AUTO: 51.9 %
MAN DIFF?: NORMAL
MCHC RBC-ENTMCNC: 30.9 PG
MCHC RBC-ENTMCNC: 31.9 GM/DL
MCV RBC AUTO: 96.8 FL
MONOCYTES # BLD AUTO: 0.31 K/UL
MONOCYTES NFR BLD AUTO: 6.1 %
NEUTROPHILS # BLD AUTO: 2.06 K/UL
NEUTROPHILS NFR BLD AUTO: 40.8 %
PLATELET # BLD AUTO: 261 K/UL
POTASSIUM SERPL-SCNC: 4.7 MMOL/L
RBC # BLD: 4.34 M/UL
RBC # FLD: 13.4 %
SODIUM SERPL-SCNC: 140 MMOL/L
WBC # FLD AUTO: 5.05 K/UL

## 2021-12-06 PROCEDURE — 99215 OFFICE O/P EST HI 40 MIN: CPT

## 2021-12-06 RX ORDER — CEFIXIME 400 MG/1
400 CAPSULE ORAL
Qty: 2 | Refills: 0 | Status: DISCONTINUED | COMMUNITY
Start: 2021-07-26

## 2021-12-06 RX ORDER — FLUDROCORTISONE ACETATE 0.1 MG
TABLET ORAL
Refills: 0 | Status: DISCONTINUED | COMMUNITY
End: 2021-12-06

## 2021-12-06 RX ORDER — DOXYCYCLINE 100 MG/1
100 CAPSULE ORAL
Qty: 56 | Refills: 0 | Status: DISCONTINUED | COMMUNITY
Start: 2021-07-26

## 2021-12-06 RX ORDER — LIDOCAINE 5 G/100G
5 OINTMENT TOPICAL
Qty: 2 | Refills: 3 | Status: ACTIVE | COMMUNITY
Start: 2021-12-06 | End: 1900-01-01

## 2021-12-06 NOTE — PHYSICAL EXAM
[1] : L4 ankle dorsiflexors 1/5 [4] : S1 ankle flexors 4/5 [5] : S1 ankle flexors 5/5 [Normal] : Normal skin color and pigmentation, normal turgor and no rash [de-identified] : +PVR done showing 310ml residual urine sonographically.  [de-identified] : Amb with walker with right hip hiking, leg extended and absence of dorsiflexion. [de-identified] : +clonus on the RLE, MAS 2 on knee flexion and extension (see below for MMT)

## 2021-12-06 NOTE — END OF VISIT
[] : Resident [FreeTextEntry3] : I was physically present for key portion of the history and physical and I directed medical management. Note modified as needed.  [Time Spent: ___ minutes] : I have spent [unfilled] minutes of time on the encounter.

## 2021-12-06 NOTE — ASSESSMENT
[FreeTextEntry1] : 36 year old male with PMH of HIV (Biktarvy, last CD4 264, viral load undetected 11/19 during episode of sepsis), with a recent spinal cord injury post work-out s/p T8-9 lami decompression 9/15/2021 with T7 MIRELLA B now improved to AIS D. After discharge from [facility] he developed urosepsis with large phlegmon seen on his left kidney. He is currently on abx and following up with urology. Patient now showing significant functional improvement.\par \par Will recommend:\par -PVR done showing 310ml residual urine. Will order lidocaine jelly in hopes of it helping with SC. Can also consider prelubricated catheter. Will discuss with Urology.\par -Reinforced the importance of keeping with with urology follow up due to pyelonephritis and follow up CT. \par -Will benefit from an AFO on his right foot due to foot drop and inverted right foot, for now suggested to use high top sneaker until starts outpt PT. \par -To contact [facility] for patient to start his outpatient PT. \par Follow up 2 months.

## 2021-12-06 NOTE — REVIEW OF SYSTEMS
[Negative] : Integumentary [FreeTextEntry7] : +daily enema which has been managing his BM  [FreeTextEntry8] : +urinary retention, able to void small amounts  [de-identified] : +see HPI

## 2021-12-06 NOTE — HISTORY OF PRESENT ILLNESS
[FreeTextEntry1] : 36 year old male with PMH of HIV (Biktarvy, last CD4 264, viral load undetected 11/19 during episode of sepsis), with a recent spinal cord injury post work-out (hemorrhage) s/p T8-9 lami decompression 9/15/2021 w/ subarachnoid clot evac. Patient initially with motor complete injury, and course c/b urinary retention req ISC and constipation req enemas. \par \par Patient was admitted to [facility] for rehab on 9/28, with  T7 MIRELLA B paraplegia. Course c/b septic Klebsiella UTI (10/08) requiring IV abx (CTX, meropenem). ID req 21 days total of abx until 10/29 with concerns of prostatitis vs orchitis and patient was d/c w/ oral ceftin. At [place] he experienced motor recovery but left using a wc. He has been receiving home therapy.\par \par After his DC from [facility] patient was later admitted to [facility] for  fever found to have urosepsis 2/2 Klebsiella UTI. CT A/P shows normal prostate but 5 cm left intrarenal phlegmon, d/c w/ bactrim x 14 days (11/17-11/30). Patient had outpatient follow up with urology on 12/3 who recommended to continue Bactrim for 3 weeks. Post void residual was done at that time showing 200ml. Patient was told to restart SC due to concerns about his retention and the large phlegmon seen on his left kidney. Patient with plans to follow up with urology for repeat CT to assess his kidney. To date, though, patient stated that he has been unable to perform SC due to severe pain when trying to insert the catheter. He uses a lubricating jelly but states that the pain is still too severe. He has not been SCing himself since his urology visit.  He is able to currently void and he did have episodes of incontinence at night but stated that he has not had for the past few days. \par \par In regards to his neurological status he has made great improvements. He does endorse to some neuropathic pain on his BLE but they are very short lived and he currently does not want to take medication for them. He also has spasticity noted on his RLE and notes that his right foot will invert when he extends his knee. He is now walking with a walker and seeks to transition to outpatient therapy.\par \par In regards to his bowel management, he takes water enema qAM. Denies any incontinence. \par \par He currently uses a RW and WC at home as needed and is able to perform his ADLs and transfers independently. he has a part time aide and lives with his family members  who are able to help him if needed. He is currently participating in home PT twice/week but would like to start transitioning to outpatient PT.

## 2021-12-07 ENCOUNTER — TRANSCRIPTION ENCOUNTER (OUTPATIENT)
Age: 36
End: 2021-12-07

## 2021-12-07 NOTE — PHYSICAL EXAM
[Normal Appearance] : normal appearance [Well Groomed] : well groomed [General Appearance - In No Acute Distress] : no acute distress [Arterial Pulses Normal] : the pedal pulses were normal [Edema] : no peripheral edema [] : no respiratory distress [Exaggerated Use Of Accessory Muscles For Inspiration] : no accessory muscle use [Flat] : flat [Soft, Nontender] : the abdomen was soft and nontender [Left] : left CVA tenderness present [Normal NISSA] : normal rectal tone, no rectal masses, prostate is smooth, symmetric and non-tender [Size (1+)] : size was 1+ [Stooped] : stooped [Limping On The Right] : limping on the right [Hemiplegia Of Right Side] : hemiplegia was present on the right [Motor Strength Lower Extremities Right] : there was weakness of the right lower extremity [Oriented To Time, Place, And Person] : oriented to person, place, and time [Mood] : the mood was normal [Not Anxious] : not anxious [Prostate Hard Area Or Nodule Bilaterally] : had no palpable nodules [Rectal Exam - Prostate] : was not indurated [Prostate Tenderness] : was not tender [Prostate Fluctuant] : was not fluctuant [Rectal Tenderness] : no tenderness [Anal Fissure] : no anal fissure [Skin Tags] : no residual hemorrhoidal skin tags seen [Motor Strength Lower Extremities Left] : strength was normal in the left lower extremity

## 2021-12-07 NOTE — END OF VISIT
[Time Spent: ___ minutes] : I have spent [unfilled] minutes of time on the encounter. [] : Resident [FreeTextEntry3] : We stressed the importance of CIC and how urinating does not mean things are normal. He states he understood.

## 2021-12-07 NOTE — ASSESSMENT
[Acute Pyelonephritis (590.10\N10)] : right humerus [Renal Abscess (590.2\N15.1)] : by Alivia 1988 method [FreeTextEntry1] : 37 yo male with PMH of HIV (on Biktarvy) and recent spinal cord injury (9/15/21) s/p decompression c/b spinal shock, urinary retention and multiple episodes of septic Klebsiella UTI and acute pyelonephritis presents as new patient for dysuria. \par \par We discussed that patient should continue to ISC to prevent acute urinary retention as patient's bladder scan in office was 209 cc after attempting to urinate. Patient's urinary frequency, urgency, and nocturia is likely due to incomplete bladder emptying especially given the history of spinal cord injury. Due to the left kidney 5cm phlegmon, he also is likely to have a continuous Klebsiella UTI exacerbated by his immunocompromised state, spinal shock, and urinary retention. \par \par Patient will be restarted on Bactrim for 3 weeks with a follow-up CT A/P in order to evaluate left kidney. We spoke about the importance to continue ISC and enemas to prevent constipation.\par \par We talked about restarting ISC 4-6 times a day, with the goal of draining <400 ccs at a time and adjusting appropriately. Also the importance of abx and to take probiotics due to the prolonged course. We stressed the importance of follow-up due to the phlegmon in the left kidney.

## 2021-12-07 NOTE — HISTORY OF PRESENT ILLNESS
[Urinary Retention] : urinary retention [Urinary Urgency] : urinary urgency [Urinary Frequency] : urinary frequency [Nocturia] : nocturia [Straining] : straining [Dysuria] : dysuria [FreeTextEntry1] : 36 year old male with PMH of HIV (Biktarvy, last CD4 264, viral load undetected 11/19 during episode of sepsis), with a recent spinal cord injury post work-out s/p T8-9 lami decompression 9/15/2021. \par \par 9/15/21: T8-T9 lami decompression w/ subarachnoid clot evac. Patient initially paralyzed waist down. Course c/b urinary retention req ISC and constipation req enemas. \par \par 9/28/21: [facility] for rehab, course c/b septic Klebsiella UTI (10/08) requiring IV abx (CTX, meropenem). ID req 21 days total of abx until 10/29 with concerns of prostatitis vs orchitis and patient was d/c w/ oral ceftin.\par \par 11/14/21: Admission to [facility] for sepsis and 2/2 Klebsiella UTI. CT A/P shows normal prostate but 5 cm left intrarenal phlegmon, d/c w/ bactrim x 14 days (11/17-11/30)\par \par Currently has minor dysuria, much improved, had urinary incontinence but improved now. Urgency, frequency every 45 mins if adequately hydrated, nocturia 3-4 times. Urinates twice, once with weak stream than stronger stream after bladder massage. \par \par Has constipation, 1 BM daily w/ tap water enema. Currently has improved LE weakness (R > L) and walks with rolling walker. Has home aid.\par \par Patient unable to urinate in office, bladder scan 209 cc.\par \par Phone number: [phone] [Urinary Incontinence] : no urinary incontinence [Erectile Dysfunction] : no Erectile Dysfunction [Hematuria - Gross] : no gross hematuria [Abdominal Pain] : no abdominal pain [Weight Loss] : no recent ~M [unfilled] weight loss [Fever] : no fever [Nausea] : no nausea

## 2021-12-07 NOTE — REVIEW OF SYSTEMS
[Constipation] : constipation [see HPI] : see HPI [Negative] : Psychiatric [Chest Pain] : no chest pain [Leg Claudication] : no intermittent leg claudication [Lower Ext Edema] : no extremity edema [Shortness Of Breath] : no shortness of breath [Cough] : no cough [Abdominal Pain] : no abdominal pain [Vomiting] : no vomiting

## 2021-12-10 ENCOUNTER — NON-APPOINTMENT (OUTPATIENT)
Age: 36
End: 2021-12-10

## 2021-12-29 ENCOUNTER — OUTPATIENT (OUTPATIENT)
Dept: OUTPATIENT SERVICES | Facility: HOSPITAL | Age: 36
LOS: 1 days | End: 2021-12-29
Payer: MEDICAID

## 2021-12-29 ENCOUNTER — APPOINTMENT (OUTPATIENT)
Dept: CT IMAGING | Facility: HOSPITAL | Age: 36
End: 2021-12-29

## 2021-12-29 DIAGNOSIS — Z98.890 OTHER SPECIFIED POSTPROCEDURAL STATES: Chronic | ICD-10-CM

## 2021-12-29 PROCEDURE — 74178 CT ABD&PLV WO CNTR FLWD CNTR: CPT | Mod: 26

## 2021-12-29 PROCEDURE — 74178 CT ABD&PLV WO CNTR FLWD CNTR: CPT

## 2021-12-30 RX ORDER — SULFAMETHOXAZOLE AND TRIMETHOPRIM 800; 160 MG/1; MG/1
800-160 TABLET ORAL TWICE DAILY
Qty: 14 | Refills: 0 | Status: ACTIVE | COMMUNITY
Start: 2021-12-30 | End: 1900-01-01

## 2022-01-01 NOTE — DISCHARGE NOTE PROVIDER - PROVIDER TOKENS
PROVIDER:[TOKEN:[[id number]],FOLLOWUP:[1 month],ESTABLISHEDPATIENT:[T]] Statement Selected PROVIDER:[TOKEN:[[id number]],FOLLOWUP:[1 month],ESTABLISHEDPATIENT:[T]],PROVIDER:[TOKEN:[[id number]],FOLLOWUP:[2 weeks]]

## 2022-01-07 ENCOUNTER — APPOINTMENT (OUTPATIENT)
Dept: UROLOGY | Facility: CLINIC | Age: 37
End: 2022-01-07

## 2022-01-07 VITALS
TEMPERATURE: 95.4 F | HEIGHT: 69 IN | WEIGHT: 170 LBS | HEART RATE: 100 BPM | DIASTOLIC BLOOD PRESSURE: 87 MMHG | SYSTOLIC BLOOD PRESSURE: 133 MMHG | BODY MASS INDEX: 25.18 KG/M2

## 2022-01-07 RX ORDER — SULFAMETHOXAZOLE AND TRIMETHOPRIM 800; 160 MG/1; MG/1
800-160 TABLET ORAL
Qty: 120 | Refills: 0 | Status: ACTIVE | COMMUNITY
Start: 2022-01-07 | End: 1900-01-01

## 2022-01-10 NOTE — END OF VISIT
[] : Resident [FreeTextEntry3] : The importance of CIC was stressed to the patient. He feels that any voiding is "normal" but we explained that just because he is urinating does not mean that he is voiding normally and that he is not doing damage to his upper tracts. Phlegmon improved. Continue CIC. Will continue to monitor progress. I am hopeful he regains bladder function but he may need to be studied in several months if has not regained baseline function.

## 2022-01-10 NOTE — ASSESSMENT
[Renal Abscess (590.2\N15.1)] : by Alivia 1988 method [FreeTextEntry1] : 37 yo male with PMH of HIV (on Biktarvy) and recent spinal cord injury (9/15/21) s/p decompression c/b spinal shock, urinary retention and multiple episodes of septic Klebsiella UTI and acute pyelonephritis/renal phlegmon\par -recent CT with improvement in size of phlegmon\par -stressed importance in performing routine CIC and if obtaining high volume of urine to increase frequency of CIC\par -continue Bactrim DS until repeat CT in 4 weeks, prescription sent to patient's pharmacy\par -f/u 4 weeks after repeat CT or sooner PRN

## 2022-01-10 NOTE — HISTORY OF PRESENT ILLNESS
[Urinary Retention] : urinary retention [FreeTextEntry1] : 36 year old male with PMH of HIV (Biktarvy, last CD4 264, viral load undetected 11/19 during episode of sepsis), with a recent spinal cord injury post work-out s/p T8-9 lami decompression 9/15/2021. \par \par 9/15/21: T8-T9 lami decompression w/ subarachnoid clot evac. Patient initially paralyzed waist down. Course c/b urinary retention req ISC and constipation req enemas. \par \par 9/28/21: [facility] for rehab, course c/b septic Klebsiella UTI (10/08) requiring IV abx (CTX, meropenem). ID req 21 days total of abx until 10/29 with concerns of prostatitis vs orchitis and patient was d/c w/ oral ceftin.\par \par 11/14/21: Admission to [facility] for sepsis and 2/2 Klebsiella UTI. CT A/P shows normal prostate but 5 cm left intrarenal phlegmon, d/c w/ bactrim x 14 days (11/17-11/30)\par \par Currently has minor dysuria, much improved, had urinary incontinence but improved now. Urgency, frequency every 45 mins if adequately hydrated, nocturia 3-4 times. Urinates twice, once with weak stream than stronger stream after bladder massage. \par \par Has constipation, 1 BM daily w/ tap water enema. Currently has improved LE weakness (R > L) and walks with rolling walker. Has home aid.\par \par 1/7: Here for follow up. Had recent CT A/P urogram 12/29/2021 which demonstrated improvement of left focal pyelonephritis from 4.2x3.9 cm (11/16/21) to 2.5x2.2 cm. Has been taking Bactrim DS BID since last visit. Reports performing regular CIC 3 times daily with volumes approx 300cc. Also reports improved voiding in between CIC. Has been improving with lower extremity weakness as well an is able to ambulate with a walker. Denies any flank pain, fevers chills or other issues. [Hematuria - Gross] : no gross hematuria [Fever] : no fever

## 2022-02-08 ENCOUNTER — OUTPATIENT (OUTPATIENT)
Dept: OUTPATIENT SERVICES | Facility: HOSPITAL | Age: 37
LOS: 1 days | End: 2022-02-08
Payer: MEDICAID

## 2022-02-08 ENCOUNTER — APPOINTMENT (OUTPATIENT)
Dept: CT IMAGING | Facility: HOSPITAL | Age: 37
End: 2022-02-08
Payer: MEDICAID

## 2022-02-08 DIAGNOSIS — Z98.890 OTHER SPECIFIED POSTPROCEDURAL STATES: Chronic | ICD-10-CM

## 2022-02-08 PROCEDURE — 74178 CT ABD&PLV WO CNTR FLWD CNTR: CPT | Mod: 26

## 2022-02-08 PROCEDURE — 74178 CT ABD&PLV WO CNTR FLWD CNTR: CPT

## 2022-02-15 ENCOUNTER — APPOINTMENT (OUTPATIENT)
Dept: PHYSICAL MEDICINE AND REHAB | Facility: CLINIC | Age: 37
End: 2022-02-15

## 2022-02-18 ENCOUNTER — APPOINTMENT (OUTPATIENT)
Dept: UROLOGY | Facility: CLINIC | Age: 37
End: 2022-02-18
Payer: MEDICAID

## 2022-02-18 VITALS
DIASTOLIC BLOOD PRESSURE: 80 MMHG | RESPIRATION RATE: 14 BRPM | TEMPERATURE: 98.2 F | BODY MASS INDEX: 25.18 KG/M2 | HEART RATE: 75 BPM | HEIGHT: 69 IN | WEIGHT: 170 LBS | SYSTOLIC BLOOD PRESSURE: 114 MMHG

## 2022-02-18 DIAGNOSIS — N39.41 URGE INCONTINENCE: ICD-10-CM

## 2022-02-18 PROCEDURE — 51741 ELECTRO-UROFLOWMETRY FIRST: CPT

## 2022-02-18 PROCEDURE — 51797 INTRAABDOMINAL PRESSURE TEST: CPT

## 2022-02-18 PROCEDURE — 51784 ANAL/URINARY MUSCLE STUDY: CPT

## 2022-02-18 PROCEDURE — 51728 CYSTOMETROGRAM W/VP: CPT

## 2022-02-18 RX ORDER — OXYBUTYNIN CHLORIDE 10 MG/1
10 TABLET, EXTENDED RELEASE ORAL
Qty: 30 | Refills: 3 | Status: ACTIVE | COMMUNITY
Start: 2022-02-18 | End: 1900-01-01

## 2022-02-18 RX ORDER — SULFAMETHOXAZOLE AND TRIMETHOPRIM 400; 80 MG/1; MG/1
400-80 TABLET ORAL TWICE DAILY
Qty: 28 | Refills: 0 | Status: ACTIVE | COMMUNITY
Start: 2022-02-18 | End: 1900-01-01

## 2022-04-15 ENCOUNTER — APPOINTMENT (OUTPATIENT)
Dept: UROLOGY | Facility: CLINIC | Age: 37
End: 2022-04-15

## 2022-04-29 ENCOUNTER — APPOINTMENT (OUTPATIENT)
Dept: UROLOGY | Facility: CLINIC | Age: 37
End: 2022-04-29

## 2022-04-29 VITALS
SYSTOLIC BLOOD PRESSURE: 128 MMHG | HEART RATE: 86 BPM | HEIGHT: 69 IN | OXYGEN SATURATION: 98 % | WEIGHT: 170 LBS | DIASTOLIC BLOOD PRESSURE: 87 MMHG | RESPIRATION RATE: 18 BRPM | TEMPERATURE: 98 F | BODY MASS INDEX: 25.18 KG/M2

## 2022-05-03 NOTE — END OF VISIT
[] : Resident [FreeTextEntry3] : The patient had a large uninhibited contraction vs poor compliance at low volume which resulted in sustained high Pdet >40at low volumes. Given this, bladder volumes of 200cc or more could be dangerous for this patient thus we recommended more frequent CIC and Sonogram. We stressed compliance as he keeps thinking he can void (he counts incontinence as voiding) and stops his CIC\par

## 2022-05-03 NOTE — HISTORY OF PRESENT ILLNESS
[FreeTextEntry1] : 36 year old male with PMH of HIV (Biktarvy, last CD4 264, viral load undetected 11/19 during episode of sepsis), with a recent spinal cord injury post work-out s/p T8-9 lami decompression 9/15/2021. \par \par 9/15/21: T8-T9 lami decompression w/ subarachnoid clot evac. Patient initially paralyzed waist down. Course c/b urinary retention req ISC and constipation req enemas. \par \par 9/28/21: [facility] for rehab, course c/b septic Klebsiella UTI (10/08) requiring IV abx (CTX, meropenem). ID req 21 days total of abx until 10/29 with concerns of prostatitis vs orchitis and patient was d/c w/ oral ceftin.\par \par 11/14/21: Admission to [facility] for sepsis and 2/2 Klebsiella UTI. CT A/P shows normal prostate but 5 cm left intrarenal phlegmon, d/c w/ bactrim x 14 days (11/17-11/30)\par \par Currently has minor dysuria, much improved, had urinary incontinence but improved now. Urgency, frequency every 45 mins if adequately hydrated, nocturia 3-4 times. Urinates twice, once with weak stream than stronger stream after bladder massage. \par \par Has constipation, 1 BM daily w/ tap water enema. Currently has improved LE weakness (R > L) and walks with rolling walker. Has home aid.\par \par 1/7: Here for follow up. Had recent CT A/P urogram 12/29/2021 which demonstrated improvement of left focal pyelonephritis from 4.2x3.9 cm (11/16/21) to 2.5x2.2 cm. Has been taking Bactrim DS BID since last visit. Reports performing regular CIC 3 times daily with volumes approx 300cc. Also reports improved voiding in between CIC. Has been improving with lower extremity weakness as well an is able to ambulate with a walker. Denies any flank pain, fevers chills or other issues. \par \par 4/29/2022: The patient finished his Bactrim DS BID in 03/2022, and has had no recurrence of flank pain, fevers/chills, since then. His most recent CT urogram was on 2/18/2022, which showed a slight decrease in phlegmon size, from 2.4cm x 2.2cm x 2.5cm on 12/29/2021 to 2.0cm x 2.1cm x 1.7cm. He has has spontaneous urinations, with CIC for residual after each void yielding 100-200cc.

## 2022-05-03 NOTE — PHYSICAL EXAM
[Normal Appearance] : normal appearance [Well Groomed] : well groomed [Abdomen Soft] : soft [Abdomen Tenderness] : non-tender [Skin Color & Pigmentation] : normal skin color and pigmentation [Edema] : no peripheral edema [] : no respiratory distress [Exaggerated Use Of Accessory Muscles For Inspiration] : no accessory muscle use [Oriented To Time, Place, And Person] : oriented to person, place, and time [No Palpable Adenopathy] : no palpable adenopathy [FreeTextEntry1] : No suprapubic or CVA tenderness

## 2022-05-03 NOTE — ASSESSMENT
[FreeTextEntry1] : 36M PMH of HIV (on Biktarvy) and recent spinal cord injury (9/15/21) s/p decompression c/b spinal shock, urinary retention and multiple episodes of septic Klebsiella UTI and acute pyelonephritis/renal phlegmon. The patient has been clinically stable off antibiotics, with his most recent CT showing only slight improvement in phlegmon size 2/18/2022. He is "voiding" 2-3x per day, with CIC for residual only yielding 100-200cc and without complaints. His UDS 2/18/2022 showed poor compliance with some detrusor instability. Given his poor compliance and small amount of voids/CIC per day, he is a risk of damaging his upper tracts.\par \par Plan:\par - BMP today to assess for damage to upper tracts\par - RP ultrasound to assess for hydronephrosis and phlegmon size\par - CIC 4-5x per day\par - Return to clinic in 2 weeks to discuss results

## 2022-06-20 ENCOUNTER — APPOINTMENT (OUTPATIENT)
Dept: ULTRASOUND IMAGING | Facility: CLINIC | Age: 37
End: 2022-06-20
Payer: MEDICAID

## 2022-06-20 ENCOUNTER — RESULT REVIEW (OUTPATIENT)
Age: 37
End: 2022-06-20

## 2022-06-20 ENCOUNTER — OUTPATIENT (OUTPATIENT)
Dept: OUTPATIENT SERVICES | Facility: HOSPITAL | Age: 37
LOS: 1 days | End: 2022-06-20

## 2022-06-20 DIAGNOSIS — Z98.890 OTHER SPECIFIED POSTPROCEDURAL STATES: Chronic | ICD-10-CM

## 2022-06-20 PROCEDURE — 76770 US EXAM ABDO BACK WALL COMP: CPT | Mod: 26

## 2022-06-20 NOTE — BH CONSULTATION LIAISON PROGRESS NOTE - NSBHCONSULTMEDPRN_PSY_A_CORE
From: Oscar Rivera  To: Jannie Hester  Sent: 6/17/2022 12:32 PM CDT  Subject: Remove cyst    Could you please put in a referral for me to go see Dr. Flavio Mccullough in plastic surgery. Thank you.
yes
yes

## 2023-03-28 NOTE — DISCHARGE NOTE NURSING/CASE MANAGEMENT/SOCIAL WORK - NSDCPEPTSTRK_GEN_ALL_CORE
Noted  
Patient is due to see Dr. Michaud for 6 month diabetic check. Patient was called three times and has not called back to schedule. Recall deleted  
Call 911 for stroke/Need for follow up after discharge/Prescribed medications/Risk factors for stroke/Stroke education booklet/Stroke support groups for patients, families, and friends/Stroke warning signs and symptoms/Signs and symptoms of stroke

## 2023-09-11 ENCOUNTER — APPOINTMENT (OUTPATIENT)
Dept: UROLOGY | Facility: CLINIC | Age: 38
End: 2023-09-11
Payer: SELF-PAY

## 2023-09-11 VITALS
SYSTOLIC BLOOD PRESSURE: 120 MMHG | TEMPERATURE: 97.4 F | HEIGHT: 68 IN | BODY MASS INDEX: 25.76 KG/M2 | WEIGHT: 170 LBS | DIASTOLIC BLOOD PRESSURE: 83 MMHG | OXYGEN SATURATION: 98 % | HEART RATE: 80 BPM

## 2023-09-11 DIAGNOSIS — N15.1 RENAL AND PERINEPHRIC ABSCESS: ICD-10-CM

## 2023-09-11 DIAGNOSIS — R33.9 RETENTION OF URINE, UNSPECIFIED: ICD-10-CM

## 2023-09-11 LAB
BILIRUB UR QL STRIP: NORMAL
CLARITY UR: CLEAR
COLLECTION METHOD: NORMAL
GLUCOSE UR-MCNC: NORMAL
HCG UR QL: 0.2 EU/DL
HGB UR QL STRIP.AUTO: NORMAL
KETONES UR-MCNC: NORMAL
LEUKOCYTE ESTERASE UR QL STRIP: NORMAL
NITRITE UR QL STRIP: NORMAL
PH UR STRIP: 5.5
PROT UR STRIP-MCNC: NORMAL
SP GR UR STRIP: 1.01

## 2023-09-11 PROCEDURE — 99213 OFFICE O/P EST LOW 20 MIN: CPT | Mod: 25

## 2023-09-11 PROCEDURE — 51798 US URINE CAPACITY MEASURE: CPT

## 2023-09-12 LAB
APPEARANCE: CLEAR
BACTERIA: NEGATIVE /HPF
BILIRUBIN URINE: NEGATIVE
BLOOD URINE: NEGATIVE
CAST: 0 /LPF
COLOR: YELLOW
EPITHELIAL CELLS: 0 /HPF
GLUCOSE QUALITATIVE U: NEGATIVE MG/DL
KETONES URINE: NEGATIVE MG/DL
LEUKOCYTE ESTERASE URINE: NEGATIVE
MICROSCOPIC-UA: NORMAL
NITRITE URINE: NEGATIVE
PH URINE: 6
PROTEIN URINE: NEGATIVE MG/DL
RED BLOOD CELLS URINE: 2 /HPF
SPECIFIC GRAVITY URINE: 1.01
UROBILINOGEN URINE: 0.2 MG/DL
WHITE BLOOD CELLS URINE: 0 /HPF

## 2023-09-13 LAB — BACTERIA UR CULT: NORMAL

## 2023-12-07 NOTE — PROGRESS NOTE ADULT - REASON FOR ADMISSION
C7 Imfinzi given. Tolerated well. Next appts given to pt. Dc'd home in stable condition.    Spinal Cord Compression

## 2024-08-19 NOTE — ED ADULT NURSE NOTE - OBJECTIVE STATEMENT
255.5
Pt presents to ER c/o sudden onset of atraumatic b/l leg numbness and tingling for approx 1 hour. denies fall/injury. also reports sudden onset lower back pain prior to leg numbness. no bowel incontinence. pt reports working out at gym today and heavy lifting. hx HIV

## 2025-03-25 NOTE — DISCHARGE NOTE NURSING/CASE MANAGEMENT/SOCIAL WORK - NSDCPEPAMP_GEN_ALL_CORE
Cardiac Rehab:   Phase 2 outpatient cardiac rehabilitation referral to [facility] [place], [phone]. Follow activity restrictions and exercise guidelines in the cardiac rehabilitation folder.     DISCHARGE INSTRUCTIONS     As part of your transition home, we are providing you with this set of discharge instructions, as a guide to help you in that process. In addition to the care provided during your hospital stay, there may be upcoming clinic visits, laboratory appointments, and/or results noted on this After Visit Summary (AVS).     To assist the physicians caring for you during this transition, we would like you remind you of the followin. Please call a Provider for help if you experience any of the following: Difficulty breathing, headache, or visual disturbances, Inability to eat, drink, or take medication, Persistent nausea or vomiting, Severe uncontrolled pain, and Temperature greater than 100.4 degrees Fahrenheit (38 degrees Celsius)  2. Activity Instructions: Normal activity as tolerated  3. Dressing/Wound Instructions: Not applicable  4. Lifting & Weight-bearing Instructions: No restrictions and Lifting restrictions 10 lbs over the next week  5. Diet: Return to previous diet  6. Miscellaneous: n/a    If you have any questions 24-48 hours after discharge, please feel free to contact the hospital unit/department you were discharged from.     
“[facility]” pamphlet given